# Patient Record
Sex: MALE | Race: WHITE | NOT HISPANIC OR LATINO
[De-identification: names, ages, dates, MRNs, and addresses within clinical notes are randomized per-mention and may not be internally consistent; named-entity substitution may affect disease eponyms.]

---

## 2017-07-17 ENCOUNTER — APPOINTMENT (OUTPATIENT)
Dept: RADIOLOGY | Facility: CLINIC | Age: 82
End: 2017-07-17

## 2017-07-17 ENCOUNTER — OUTPATIENT (OUTPATIENT)
Dept: OUTPATIENT SERVICES | Facility: HOSPITAL | Age: 82
LOS: 1 days | End: 2017-07-17
Payer: MEDICARE

## 2017-07-17 ENCOUNTER — APPOINTMENT (OUTPATIENT)
Dept: VASCULAR SURGERY | Facility: CLINIC | Age: 82
End: 2017-07-17

## 2017-07-17 VITALS
HEART RATE: 78 BPM | SYSTOLIC BLOOD PRESSURE: 183 MMHG | BODY MASS INDEX: 26.33 KG/M2 | OXYGEN SATURATION: 98 % | HEIGHT: 65 IN | TEMPERATURE: 97.8 F | DIASTOLIC BLOOD PRESSURE: 65 MMHG | WEIGHT: 158 LBS | RESPIRATION RATE: 15 BRPM

## 2017-07-17 DIAGNOSIS — H26.9 UNSPECIFIED CATARACT: Chronic | ICD-10-CM

## 2017-07-17 DIAGNOSIS — M79.89 OTHER SPECIFIED SOFT TISSUE DISORDERS: ICD-10-CM

## 2017-07-17 DIAGNOSIS — Z98.89 OTHER SPECIFIED POSTPROCEDURAL STATES: Chronic | ICD-10-CM

## 2017-07-17 DIAGNOSIS — Z87.891 PERSONAL HISTORY OF NICOTINE DEPENDENCE: ICD-10-CM

## 2017-07-17 DIAGNOSIS — Z86.39 PERSONAL HISTORY OF OTHER ENDOCRINE, NUTRITIONAL AND METABOLIC DISEASE: ICD-10-CM

## 2017-07-17 DIAGNOSIS — Z78.9 OTHER SPECIFIED HEALTH STATUS: ICD-10-CM

## 2017-07-17 PROCEDURE — 73090 X-RAY EXAM OF FOREARM: CPT

## 2017-07-17 PROCEDURE — 73090 X-RAY EXAM OF FOREARM: CPT | Mod: 26,RT

## 2017-07-17 PROCEDURE — 73110 X-RAY EXAM OF WRIST: CPT

## 2017-07-17 PROCEDURE — 73110 X-RAY EXAM OF WRIST: CPT | Mod: 26,RT

## 2017-07-18 ENCOUNTER — APPOINTMENT (OUTPATIENT)
Dept: ULTRASOUND IMAGING | Facility: CLINIC | Age: 82
End: 2017-07-18

## 2017-07-18 ENCOUNTER — OUTPATIENT (OUTPATIENT)
Dept: OUTPATIENT SERVICES | Facility: HOSPITAL | Age: 82
LOS: 1 days | End: 2017-07-18
Payer: MEDICARE

## 2017-07-18 DIAGNOSIS — H26.9 UNSPECIFIED CATARACT: Chronic | ICD-10-CM

## 2017-07-18 DIAGNOSIS — Z98.89 OTHER SPECIFIED POSTPROCEDURAL STATES: Chronic | ICD-10-CM

## 2017-07-18 DIAGNOSIS — M79.89 OTHER SPECIFIED SOFT TISSUE DISORDERS: ICD-10-CM

## 2017-07-18 PROCEDURE — 93971 EXTREMITY STUDY: CPT

## 2017-07-24 ENCOUNTER — APPOINTMENT (OUTPATIENT)
Dept: VASCULAR SURGERY | Facility: CLINIC | Age: 82
End: 2017-07-24

## 2017-07-24 VITALS
HEIGHT: 65 IN | WEIGHT: 158 LBS | SYSTOLIC BLOOD PRESSURE: 173 MMHG | TEMPERATURE: 97 F | HEART RATE: 59 BPM | RESPIRATION RATE: 15 BRPM | DIASTOLIC BLOOD PRESSURE: 68 MMHG | BODY MASS INDEX: 26.33 KG/M2 | OXYGEN SATURATION: 97 %

## 2017-07-24 DIAGNOSIS — M79.89 OTHER SPECIFIED SOFT TISSUE DISORDERS: ICD-10-CM

## 2017-07-31 ENCOUNTER — APPOINTMENT (OUTPATIENT)
Dept: VASCULAR SURGERY | Facility: CLINIC | Age: 82
End: 2017-07-31
Payer: MEDICARE

## 2017-07-31 VITALS
DIASTOLIC BLOOD PRESSURE: 68 MMHG | HEIGHT: 65 IN | TEMPERATURE: 98 F | BODY MASS INDEX: 26.33 KG/M2 | SYSTOLIC BLOOD PRESSURE: 162 MMHG | RESPIRATION RATE: 15 BRPM | WEIGHT: 158 LBS | OXYGEN SATURATION: 98 % | HEART RATE: 73 BPM

## 2017-07-31 PROCEDURE — 29580 STRAPPING UNNA BOOT: CPT | Mod: LT

## 2017-07-31 PROCEDURE — 99213 OFFICE O/P EST LOW 20 MIN: CPT | Mod: 25

## 2017-08-07 ENCOUNTER — APPOINTMENT (OUTPATIENT)
Dept: VASCULAR SURGERY | Facility: CLINIC | Age: 82
End: 2017-08-07
Payer: MEDICARE

## 2017-08-07 VITALS
OXYGEN SATURATION: 99 % | WEIGHT: 156.03 LBS | HEIGHT: 66 IN | BODY MASS INDEX: 25.07 KG/M2 | RESPIRATION RATE: 16 BRPM | DIASTOLIC BLOOD PRESSURE: 50 MMHG | HEART RATE: 56 BPM | SYSTOLIC BLOOD PRESSURE: 143 MMHG | TEMPERATURE: 97.5 F

## 2017-08-07 PROCEDURE — 29580 STRAPPING UNNA BOOT: CPT | Mod: LT

## 2017-08-07 PROCEDURE — 99213 OFFICE O/P EST LOW 20 MIN: CPT | Mod: 25

## 2017-08-14 ENCOUNTER — APPOINTMENT (OUTPATIENT)
Dept: VASCULAR SURGERY | Facility: CLINIC | Age: 82
End: 2017-08-14
Payer: MEDICARE

## 2017-08-14 VITALS
BODY MASS INDEX: 25.07 KG/M2 | RESPIRATION RATE: 16 BRPM | HEART RATE: 64 BPM | WEIGHT: 156 LBS | TEMPERATURE: 98.1 F | OXYGEN SATURATION: 99 % | HEIGHT: 66 IN | SYSTOLIC BLOOD PRESSURE: 132 MMHG | DIASTOLIC BLOOD PRESSURE: 46 MMHG

## 2017-08-14 PROCEDURE — 29580 STRAPPING UNNA BOOT: CPT | Mod: LT

## 2017-08-14 PROCEDURE — 99213 OFFICE O/P EST LOW 20 MIN: CPT | Mod: 25

## 2017-08-21 ENCOUNTER — APPOINTMENT (OUTPATIENT)
Dept: VASCULAR SURGERY | Facility: CLINIC | Age: 82
End: 2017-08-21
Payer: MEDICARE

## 2017-08-21 ENCOUNTER — APPOINTMENT (OUTPATIENT)
Dept: VASCULAR SURGERY | Facility: CLINIC | Age: 82
End: 2017-08-21

## 2017-08-21 VITALS
BODY MASS INDEX: 24.7 KG/M2 | TEMPERATURE: 98 F | RESPIRATION RATE: 16 BRPM | OXYGEN SATURATION: 98 % | WEIGHT: 153.03 LBS | HEART RATE: 65 BPM | SYSTOLIC BLOOD PRESSURE: 155 MMHG | DIASTOLIC BLOOD PRESSURE: 55 MMHG

## 2017-08-21 PROCEDURE — 36471 NJX SCLRSNT MLT INCMPTNT VN: CPT | Mod: LT

## 2017-09-05 ENCOUNTER — APPOINTMENT (OUTPATIENT)
Dept: VASCULAR SURGERY | Facility: CLINIC | Age: 82
End: 2017-09-05
Payer: MEDICARE

## 2017-09-05 PROCEDURE — 36471 NJX SCLRSNT MLT INCMPTNT VN: CPT | Mod: LT

## 2017-09-18 ENCOUNTER — APPOINTMENT (OUTPATIENT)
Dept: VASCULAR SURGERY | Facility: CLINIC | Age: 82
End: 2017-09-18

## 2017-10-02 ENCOUNTER — APPOINTMENT (OUTPATIENT)
Dept: VASCULAR SURGERY | Facility: CLINIC | Age: 82
End: 2017-10-02
Payer: MEDICARE

## 2017-10-02 VITALS
OXYGEN SATURATION: 98 % | RESPIRATION RATE: 16 BRPM | SYSTOLIC BLOOD PRESSURE: 161 MMHG | WEIGHT: 155 LBS | HEIGHT: 66 IN | HEART RATE: 57 BPM | TEMPERATURE: 97.8 F | DIASTOLIC BLOOD PRESSURE: 57 MMHG | BODY MASS INDEX: 24.91 KG/M2

## 2017-10-02 DIAGNOSIS — I83.892 VARICOSE VEINS OF LEFT LOWER EXTREMITY WITH OTHER COMPLICATIONS: ICD-10-CM

## 2017-10-02 PROCEDURE — 36471 NJX SCLRSNT MLT INCMPTNT VN: CPT | Mod: LT

## 2017-10-05 ENCOUNTER — APPOINTMENT (OUTPATIENT)
Dept: VASCULAR SURGERY | Facility: CLINIC | Age: 82
End: 2017-10-05

## 2017-10-05 VITALS — SYSTOLIC BLOOD PRESSURE: 160 MMHG | DIASTOLIC BLOOD PRESSURE: 54 MMHG

## 2017-10-05 VITALS
OXYGEN SATURATION: 98 % | HEIGHT: 66 IN | BODY MASS INDEX: 25.07 KG/M2 | DIASTOLIC BLOOD PRESSURE: 74 MMHG | SYSTOLIC BLOOD PRESSURE: 174 MMHG | WEIGHT: 156 LBS | TEMPERATURE: 97.8 F | HEART RATE: 77 BPM | RESPIRATION RATE: 16 BRPM

## 2017-10-08 PROBLEM — I83.892 VARICOSE VEINS OF LEFT LOWER EXTREMITY WITH COMPLICATIONS: Status: ACTIVE | Noted: 2017-09-06

## 2017-12-21 ENCOUNTER — APPOINTMENT (OUTPATIENT)
Dept: CARDIOLOGY | Facility: CLINIC | Age: 82
End: 2017-12-21
Payer: MEDICARE

## 2017-12-21 PROCEDURE — 93000 ELECTROCARDIOGRAM COMPLETE: CPT

## 2017-12-21 PROCEDURE — 99214 OFFICE O/P EST MOD 30 MIN: CPT

## 2018-04-04 ENCOUNTER — RECORD ABSTRACTING (OUTPATIENT)
Age: 83
End: 2018-04-04

## 2018-04-04 DIAGNOSIS — Z95.828 PRESENCE OF OTHER VASCULAR IMPLANTS AND GRAFTS: ICD-10-CM

## 2018-04-04 DIAGNOSIS — Z98.890 OTHER SPECIFIED POSTPROCEDURAL STATES: ICD-10-CM

## 2018-04-04 DIAGNOSIS — Z86.79 OTHER SPECIFIED POSTPROCEDURAL STATES: ICD-10-CM

## 2018-04-04 RX ORDER — SIMVASTATIN 20 MG/1
20 TABLET, FILM COATED ORAL
Qty: 90 | Refills: 3 | Status: ACTIVE | COMMUNITY

## 2018-04-05 PROBLEM — N20.0 RECURRENT NEPHROLITHIASIS: Status: RESOLVED | Noted: 2018-04-05 | Resolved: 2018-04-05

## 2018-04-10 ENCOUNTER — APPOINTMENT (OUTPATIENT)
Dept: CARDIOLOGY | Facility: CLINIC | Age: 83
End: 2018-04-10
Payer: MEDICARE

## 2018-04-10 VITALS
HEART RATE: 62 BPM | DIASTOLIC BLOOD PRESSURE: 60 MMHG | BODY MASS INDEX: 27.31 KG/M2 | RESPIRATION RATE: 16 BRPM | HEIGHT: 64 IN | WEIGHT: 160 LBS | SYSTOLIC BLOOD PRESSURE: 125 MMHG

## 2018-04-10 DIAGNOSIS — N20.0 CALCULUS OF KIDNEY: ICD-10-CM

## 2018-04-10 PROCEDURE — 93000 ELECTROCARDIOGRAM COMPLETE: CPT

## 2018-04-10 PROCEDURE — 99214 OFFICE O/P EST MOD 30 MIN: CPT

## 2018-04-10 PROCEDURE — 93306 TTE W/DOPPLER COMPLETE: CPT

## 2018-04-10 RX ORDER — CIPROFLOXACIN HYDROCHLORIDE 500 MG/1
500 TABLET, FILM COATED ORAL
Qty: 14 | Refills: 0 | Status: DISCONTINUED | COMMUNITY
Start: 2017-03-20 | End: 2018-04-10

## 2018-04-10 RX ORDER — PREDNISONE 10 MG/1
10 TABLET ORAL
Refills: 0 | Status: DISCONTINUED | COMMUNITY
End: 2018-04-10

## 2018-04-10 RX ORDER — RANITIDINE 150 MG/1
150 TABLET ORAL DAILY
Refills: 0 | Status: DISCONTINUED | COMMUNITY
End: 2018-04-10

## 2018-04-10 RX ORDER — SULFAMETHOXAZOLE AND TRIMETHOPRIM 800; 160 MG/1; MG/1
800-160 TABLET ORAL
Qty: 14 | Refills: 0 | Status: DISCONTINUED | COMMUNITY
Start: 2017-05-21 | End: 2018-04-10

## 2018-04-10 RX ORDER — CEFADROXIL 500 MG/1
500 CAPSULE ORAL
Qty: 14 | Refills: 0 | Status: DISCONTINUED | COMMUNITY
Start: 2017-07-14 | End: 2018-04-10

## 2018-04-10 RX ORDER — CEPHALEXIN 500 MG/1
500 CAPSULE ORAL
Qty: 28 | Refills: 0 | Status: DISCONTINUED | COMMUNITY
Start: 2017-04-12 | End: 2018-04-10

## 2018-04-10 RX ORDER — METHYLPREDNISOLONE 4 MG/1
4 TABLET ORAL
Qty: 21 | Refills: 0 | Status: DISCONTINUED | COMMUNITY
Start: 2017-07-14 | End: 2018-04-10

## 2018-04-10 RX ORDER — AMPICILLIN 250 MG/1
250 CAPSULE ORAL
Qty: 28 | Refills: 0 | Status: DISCONTINUED | COMMUNITY
Start: 2017-04-30 | End: 2018-04-10

## 2018-04-10 RX ORDER — PREDNISONE 5 MG/1
5 TABLET ORAL
Qty: 30 | Refills: 0 | Status: DISCONTINUED | COMMUNITY
Start: 2017-07-31 | End: 2018-04-10

## 2018-05-04 ENCOUNTER — MEDICATION RENEWAL (OUTPATIENT)
Age: 83
End: 2018-05-04

## 2018-05-08 ENCOUNTER — RX RENEWAL (OUTPATIENT)
Age: 83
End: 2018-05-08

## 2018-07-13 ENCOUNTER — MEDICATION RENEWAL (OUTPATIENT)
Age: 83
End: 2018-07-13

## 2018-08-14 ENCOUNTER — APPOINTMENT (OUTPATIENT)
Dept: CARDIOLOGY | Facility: CLINIC | Age: 83
End: 2018-08-14
Payer: MEDICARE

## 2018-08-14 VITALS
HEART RATE: 55 BPM | SYSTOLIC BLOOD PRESSURE: 150 MMHG | DIASTOLIC BLOOD PRESSURE: 58 MMHG | HEIGHT: 64 IN | RESPIRATION RATE: 16 BRPM | WEIGHT: 159 LBS | BODY MASS INDEX: 27.14 KG/M2

## 2018-08-14 PROCEDURE — 93000 ELECTROCARDIOGRAM COMPLETE: CPT

## 2018-08-14 PROCEDURE — 93880 EXTRACRANIAL BILAT STUDY: CPT

## 2018-08-14 PROCEDURE — 99214 OFFICE O/P EST MOD 30 MIN: CPT

## 2018-12-11 ENCOUNTER — APPOINTMENT (OUTPATIENT)
Dept: CARDIOLOGY | Facility: CLINIC | Age: 83
End: 2018-12-11
Payer: MEDICARE

## 2018-12-11 VITALS
WEIGHT: 154 LBS | RESPIRATION RATE: 16 BRPM | BODY MASS INDEX: 26.29 KG/M2 | HEART RATE: 53 BPM | DIASTOLIC BLOOD PRESSURE: 68 MMHG | SYSTOLIC BLOOD PRESSURE: 140 MMHG | HEIGHT: 64 IN

## 2018-12-11 DIAGNOSIS — M06.9 RHEUMATOID ARTHRITIS, UNSPECIFIED: ICD-10-CM

## 2018-12-11 DIAGNOSIS — H91.90 UNSPECIFIED HEARING LOSS, UNSPECIFIED EAR: ICD-10-CM

## 2018-12-11 PROCEDURE — 93000 ELECTROCARDIOGRAM COMPLETE: CPT

## 2018-12-11 PROCEDURE — 99214 OFFICE O/P EST MOD 30 MIN: CPT

## 2018-12-11 RX ORDER — CYANOCOBALAMIN 1000 UG/ML
1000 INJECTION INTRAMUSCULAR; SUBCUTANEOUS
Refills: 0 | Status: DISCONTINUED | COMMUNITY
End: 2018-12-11

## 2018-12-11 RX ORDER — ADALIMUMAB 40MG/0.8ML
40 KIT SUBCUTANEOUS
Refills: 0 | Status: ACTIVE | COMMUNITY

## 2018-12-11 RX ORDER — AMITRIPTYLINE HYDROCHLORIDE 25 MG/1
25 TABLET, FILM COATED ORAL
Refills: 0 | Status: DISCONTINUED | COMMUNITY
Start: 2017-07-31 | End: 2018-12-11

## 2018-12-11 NOTE — REASON FOR VISIT
[FreeTextEntry1] : Patient returns here for followup with regard to management of problems which include:\par \par 1. Aortic stenosis - severe\par \par 2. Coronary artery disease\par \par 3. Carotid stenosis - moderate\par \par 4. Pulmonary hypertension - mild\par

## 2018-12-11 NOTE — DISCUSSION/SUMMARY
[FreeTextEntry1] : ECG: Normal sinus rhythm at 53 beats per minute. LVH. Early repolarization. Left atrial enlargement.\par \par Carotid study 8/14/18\par bilateral tortuosity with moderate plaquing and ulcerated disease. Relative;y unchanged from prior.\par \par Laboratory data 6/19/2018\par Creatinine 1.96 (Prior 1.74 > 1.5 7/17)\par \par Hemoglobin 13.3\par A1c 6\par No lipids\par \par 4/10/18: Echocardiogram\par  hyperdynamic left ventricular systolic function. LVEF 70%. Left atrial dilatation.\par Severe aortic stenosis.\par Moderate tricuspid insufficiency\par Mild pulmonary hypertension.\par \par Impression: \par Patient has no new symptoms referable to his aortic stenosis. \par The echocardiographic findings have not substantially change.\par \par Mixed hyperlipidemia remains a bit elevated on current medical management which is simvastatin 20 mg a day. repeat labs pending.\par \par Carotid disease with moderate bilateral stenoses unchanged from prior\par Increasing creatinine from baseline with a current diuretic regimen.\par \par Plan:\par Followup laboratory data through the office of Dr. Wenceslao Sweeney\par \par Patient to contact me with any increasing shortness of breath, chest pain or dizziness.\par \par Echo in April with F/U\par No other changes in management at this time.

## 2018-12-11 NOTE — HISTORY OF PRESENT ILLNESS
[FreeTextEntry1] : Remains physically active, less during the winter months and looking into beginning the "Silver Sneakers" program. Denies any new chest pain, palpitations, PND, orthopnea. Does get a little bit of edema and LACEY,  unchanged from baseline.\par \par \par Other medical problems include:\par Diabetes\par Hiatal hernia\par Hearing impairment\par Chronic kidney disease

## 2018-12-11 NOTE — PHYSICAL EXAM
[Normal Conjunctiva] : the conjunctiva exhibited no abnormalities [Eyelids - No Xanthelasma] : the eyelids demonstrated no xanthelasmas [Normal Oral Mucosa] : normal oral mucosa [No Oral Pallor] : no oral pallor [No Oral Cyanosis] : no oral cyanosis [Normal Jugular Venous A Waves Present] : normal jugular venous A waves present [Normal Jugular Venous V Waves Present] : normal jugular venous V waves present [No Jugular Venous Lan A Waves] : no jugular venous lan A waves [Respiration, Rhythm And Depth] : normal respiratory rhythm and effort [Exaggerated Use Of Accessory Muscles For Inspiration] : no accessory muscle use [Auscultation Breath Sounds / Voice Sounds] : lungs were clear to auscultation bilaterally [Systolic grade ___/6] : A grade [unfilled]/6 systolic murmur was heard. [Abdomen Soft] : soft [Abdomen Tenderness] : non-tender [Abdomen Mass (___ Cm)] : no abdominal mass palpated [Abnormal Walk] : normal gait [Gait - Sufficient For Exercise Testing] : the gait was sufficient for exercise testing [Nail Clubbing] : no clubbing of the fingernails [Cyanosis, Localized] : no localized cyanosis [Petechial Hemorrhages (___cm)] : no petechial hemorrhages [Skin Color & Pigmentation] : normal skin color and pigmentation [] : no rash [No Venous Stasis] : no venous stasis [Skin Lesions] : no skin lesions [No Skin Ulcers] : no skin ulcer [No Xanthoma] : no  xanthoma was observed [FreeTextEntry1] : trace pretibial edema

## 2019-04-09 ENCOUNTER — NON-APPOINTMENT (OUTPATIENT)
Age: 84
End: 2019-04-09

## 2019-04-09 ENCOUNTER — APPOINTMENT (OUTPATIENT)
Dept: CARDIOLOGY | Facility: CLINIC | Age: 84
End: 2019-04-09
Payer: MEDICARE

## 2019-04-09 VITALS
HEIGHT: 64 IN | DIASTOLIC BLOOD PRESSURE: 60 MMHG | HEART RATE: 58 BPM | RESPIRATION RATE: 14 BRPM | BODY MASS INDEX: 26.63 KG/M2 | SYSTOLIC BLOOD PRESSURE: 145 MMHG | WEIGHT: 156 LBS

## 2019-04-09 DIAGNOSIS — M81.0 AGE-RELATED OSTEOPOROSIS W/OUT CURRENT PATHOLOGICAL FRACTURE: ICD-10-CM

## 2019-04-09 PROCEDURE — 93306 TTE W/DOPPLER COMPLETE: CPT

## 2019-04-09 PROCEDURE — 93000 ELECTROCARDIOGRAM COMPLETE: CPT

## 2019-04-09 PROCEDURE — 99214 OFFICE O/P EST MOD 30 MIN: CPT

## 2019-04-09 RX ORDER — DENOSUMAB 60 MG/ML
60 INJECTION SUBCUTANEOUS
Refills: 0 | Status: ACTIVE | COMMUNITY

## 2019-04-09 NOTE — PHYSICAL EXAM
[Normal Conjunctiva] : the conjunctiva exhibited no abnormalities [Eyelids - No Xanthelasma] : the eyelids demonstrated no xanthelasmas [Normal Oral Mucosa] : normal oral mucosa [No Oral Pallor] : no oral pallor [No Oral Cyanosis] : no oral cyanosis [Normal Jugular Venous A Waves Present] : normal jugular venous A waves present [Normal Jugular Venous V Waves Present] : normal jugular venous V waves present [Respiration, Rhythm And Depth] : normal respiratory rhythm and effort [No Jugular Venous Lan A Waves] : no jugular venous lan A waves [Exaggerated Use Of Accessory Muscles For Inspiration] : no accessory muscle use [Auscultation Breath Sounds / Voice Sounds] : lungs were clear to auscultation bilaterally [Systolic grade ___/6] : A grade [unfilled]/6 systolic murmur was heard. [Abdomen Soft] : soft [Abdomen Tenderness] : non-tender [Gait - Sufficient For Exercise Testing] : the gait was sufficient for exercise testing [Abnormal Walk] : normal gait [Abdomen Mass (___ Cm)] : no abdominal mass palpated [Cyanosis, Localized] : no localized cyanosis [Nail Clubbing] : no clubbing of the fingernails [Petechial Hemorrhages (___cm)] : no petechial hemorrhages [FreeTextEntry1] : trace pretibial edema [Skin Color & Pigmentation] : normal skin color and pigmentation [] : no rash [No Venous Stasis] : no venous stasis [Skin Lesions] : no skin lesions [No Xanthoma] : no  xanthoma was observed [No Skin Ulcers] : no skin ulcer

## 2019-04-09 NOTE — DISCUSSION/SUMMARY
[FreeTextEntry1] : ECG: Normal sinus rhythm at 58 beats per minute. LVH. Early repolarization Peaked t waves . Left atrial enlargement.\par \par Lab data 3/26/19:\par A1c 5.8\par Creatinine 1.6\par Hemoglobin 13.7\par \par Echocardiogram today:\par Severe aortic valve stenosis with some degree of progression of the transvalvular gradient.\par Normal LV function\par Calcific mitral valve disease with mild insufficiency\par \par \par \par Carotid study 8/14/18\par bilateral tortuosity with moderate plaquing and ulcerated disease. Relative;y unchanged from prior.\par \par Laboratory data 6/19/2018\par Creatinine 1.96 (Prior 1.74 > 1.5 7/17)\par \par Hemoglobin 13.3\par A1c 6\par No lipids\par \par Impression: \par Patient has no new symptoms referable to his aortic stenosis. \par The echocardiographic findings indicate some progression of the AS of which he remains asymptomatic\par \par Mixed hyperlipidemia remains a bit elevated on current medical management which is simvastatin 20 mg a day. repeat labs pending.\par \par Carotid disease with moderate bilateral stenoses unchanged from prior\par Increasing creatinine unchanged from baseline \par \par Plan:\par Followup laboratory data through the office of Dr. Wenceslao Sweeney\par \par Patient to contact me with any increasing shortness of breath, chest pain or dizziness.\par \par No other changes in management at this time.

## 2019-04-09 NOTE — HISTORY OF PRESENT ILLNESS
[FreeTextEntry1] : Remains physically active, less during the winter months and he has started the "Silver Sneakers" program. Denies any new chest pain, palpitations, PND, orthopnea. Does get a little bit of edema and LACEY,  unchanged from baseline.\par \par \par Other medical problems include:\par Diabetes\par Hiatal hernia\par Hearing impairment\par Chronic kidney disease

## 2019-04-25 ENCOUNTER — MEDICATION RENEWAL (OUTPATIENT)
Age: 84
End: 2019-04-25

## 2019-06-25 ENCOUNTER — NON-APPOINTMENT (OUTPATIENT)
Age: 84
End: 2019-06-25

## 2019-06-25 ENCOUNTER — RECORD ABSTRACTING (OUTPATIENT)
Age: 84
End: 2019-06-25

## 2019-06-25 ENCOUNTER — APPOINTMENT (OUTPATIENT)
Dept: CARDIOLOGY | Facility: CLINIC | Age: 84
End: 2019-06-25
Payer: MEDICARE

## 2019-06-25 VITALS
SYSTOLIC BLOOD PRESSURE: 150 MMHG | HEIGHT: 64 IN | RESPIRATION RATE: 14 BRPM | OXYGEN SATURATION: 98 % | WEIGHT: 151 LBS | HEART RATE: 55 BPM | BODY MASS INDEX: 25.78 KG/M2 | DIASTOLIC BLOOD PRESSURE: 65 MMHG

## 2019-06-25 PROCEDURE — 99214 OFFICE O/P EST MOD 30 MIN: CPT

## 2019-06-25 PROCEDURE — 93000 ELECTROCARDIOGRAM COMPLETE: CPT

## 2019-06-25 NOTE — HISTORY OF PRESENT ILLNESS
[FreeTextEntry1] : Remains physically active,  and he has started the "Silver Sneakers" program. Denies any new chest pain, palpitations, PND, orthopnea. Does get a little bit of edema and LACEY,  unchanged from baseline.\par \par \par Other medical problems include:\par - Diabetes\par - Hiatal hernia\par - Hearing impairment\par - Chronic kidney disease

## 2019-06-25 NOTE — REASON FOR VISIT
[FreeTextEntry1] : Patient returns here for medical clearance for pending cysto and bladder biopsy.  His medical history includes:\par \par 1. Aortic stenosis - severe\par \par 2. Coronary artery disease\par \par 3. Carotid stenosis - moderate\par \par 4. Pulmonary hypertension - mild\par

## 2019-06-25 NOTE — DISCUSSION/SUMMARY
[FreeTextEntry1] : ECG: Normal sinus rhythm at 58 beats per minute. LVH. Early repolarization Peaked t waves . Left atrial enlargement.\par \par Lab data 3/26/19:\par A1c 5.8\par Creatinine 1.6\par Hemoglobin 13.7\par \par Echocardiogram  4/9/19\par Severe aortic valve stenosis with some increase of the transvalvular gradient.\par Normal LV function\par Calcific mitral valve disease with mild insufficiency\par \par \par Carotid study 8/14/18\par bilateral tortuosity with moderate plaquing and ulcerated disease. Relative;y unchanged from prior.\par \par Laboratory data 6/19/2018\par Creatinine 1.96 (Prior 1.74 > 1.5 7/17)\par \par Hemoglobin 13.3\par A1c 6\par No lipids\par \par Impression: \par 1. Patient has no new symptoms referable to his aortic stenosis. \par    The echocardiographic findings indicate some progression of the AS of which he remains asymptomatic\par \par 2. Mixed hyperlipidemia remains a bit elevated on current medical management which is simvastatin 20 mg a day. \par \par 3. Carotid disease with moderate bilateral stenoses unchanged from prior\par \par 4. Increasing creatinine unchanged from baseline \par \par Plan:\par Patient is at its least a moderate cardiac risk but he is in as good condition as is possible. Currently there is no cardiac contraindication to undergoing proposed cystoscopy and bladder biopsy with the use of IV conscious sedation such as propofol.\par Care must be taken to watch his blood pressure very carefully during the case and avoid precipitating any significant drops.\par \par Followup laboratory data through the office of Dr. Wenceslao Sweeney\par \par Patient to contact me with any increasing shortness of breath, chest pain or dizziness.\par \par No other changes in management at this time.

## 2019-06-25 NOTE — PHYSICAL EXAM
[Normal Conjunctiva] : the conjunctiva exhibited no abnormalities [Normal Oral Mucosa] : normal oral mucosa [No Oral Pallor] : no oral pallor [Eyelids - No Xanthelasma] : the eyelids demonstrated no xanthelasmas [No Oral Cyanosis] : no oral cyanosis [Normal Jugular Venous A Waves Present] : normal jugular venous A waves present [Normal Jugular Venous V Waves Present] : normal jugular venous V waves present [No Jugular Venous Lan A Waves] : no jugular venous lan A waves [Respiration, Rhythm And Depth] : normal respiratory rhythm and effort [Auscultation Breath Sounds / Voice Sounds] : lungs were clear to auscultation bilaterally [Exaggerated Use Of Accessory Muscles For Inspiration] : no accessory muscle use [Systolic grade ___/6] : A grade [unfilled]/6 systolic murmur was heard. [Abdomen Soft] : soft [Abdomen Tenderness] : non-tender [Abdomen Mass (___ Cm)] : no abdominal mass palpated [Abnormal Walk] : normal gait [Gait - Sufficient For Exercise Testing] : the gait was sufficient for exercise testing [Nail Clubbing] : no clubbing of the fingernails [Petechial Hemorrhages (___cm)] : no petechial hemorrhages [Cyanosis, Localized] : no localized cyanosis [No Venous Stasis] : no venous stasis [] : no rash [Skin Color & Pigmentation] : normal skin color and pigmentation [Skin Lesions] : no skin lesions [No Skin Ulcers] : no skin ulcer [No Xanthoma] : no  xanthoma was observed [FreeTextEntry1] : trace pretibial edema

## 2019-06-26 ENCOUNTER — RX RENEWAL (OUTPATIENT)
Age: 84
End: 2019-06-26

## 2020-01-02 ENCOUNTER — NON-APPOINTMENT (OUTPATIENT)
Age: 85
End: 2020-01-02

## 2020-01-02 ENCOUNTER — APPOINTMENT (OUTPATIENT)
Dept: CARDIOLOGY | Facility: CLINIC | Age: 85
End: 2020-01-02
Payer: MEDICARE

## 2020-01-02 VITALS
DIASTOLIC BLOOD PRESSURE: 68 MMHG | SYSTOLIC BLOOD PRESSURE: 142 MMHG | BODY MASS INDEX: 26.12 KG/M2 | WEIGHT: 153 LBS | OXYGEN SATURATION: 98 % | HEART RATE: 60 BPM | HEIGHT: 64 IN | RESPIRATION RATE: 16 BRPM

## 2020-01-02 PROCEDURE — 93000 ELECTROCARDIOGRAM COMPLETE: CPT

## 2020-01-02 PROCEDURE — 99214 OFFICE O/P EST MOD 30 MIN: CPT

## 2020-01-02 NOTE — REVIEW OF SYSTEMS
[see HPI] : see HPI [Joint Pain] : joint pain [Loss Of Hearing] : hearing loss [Joint Stiffness] : joint stiffness [Joint Swelling] : joint swelling [Dizziness] : dizziness [Negative] : Integumentary

## 2020-01-02 NOTE — PHYSICAL EXAM
[Normal Conjunctiva] : the conjunctiva exhibited no abnormalities [Normal Oral Mucosa] : normal oral mucosa [Eyelids - No Xanthelasma] : the eyelids demonstrated no xanthelasmas [No Oral Pallor] : no oral pallor [No Oral Cyanosis] : no oral cyanosis [Normal Jugular Venous V Waves Present] : normal jugular venous V waves present [Normal Jugular Venous A Waves Present] : normal jugular venous A waves present [No Jugular Venous Lan A Waves] : no jugular venous lan A waves [Exaggerated Use Of Accessory Muscles For Inspiration] : no accessory muscle use [Respiration, Rhythm And Depth] : normal respiratory rhythm and effort [Auscultation Breath Sounds / Voice Sounds] : lungs were clear to auscultation bilaterally [Systolic grade ___/6] : A grade [unfilled]/6 systolic murmur was heard. [Abdomen Soft] : soft [Abdomen Tenderness] : non-tender [Abdomen Mass (___ Cm)] : no abdominal mass palpated [Nail Clubbing] : no clubbing of the fingernails [Cyanosis, Localized] : no localized cyanosis [Petechial Hemorrhages (___cm)] : no petechial hemorrhages [Skin Color & Pigmentation] : normal skin color and pigmentation [] : no rash [No Skin Ulcers] : no skin ulcer [Skin Lesions] : no skin lesions [No Xanthoma] : no  xanthoma was observed [FreeTextEntry1] : Mild venous stasis changes

## 2020-01-02 NOTE — REASON FOR VISIT
[FreeTextEntry1] : Patient returns here cardiac revaluation. His medical history includes:\par \par 1. Aortic stenosis - severe\par \par 2. Coronary artery disease\par \par 3. Carotid stenosis - moderate\par \par 4. Pulmonary hypertension - mild\par

## 2020-01-02 NOTE — HISTORY OF PRESENT ILLNESS
[FreeTextEntry1] : During his last visit he was cleared for  cystoscopy which he tolerated well. There is a known hx if frequent  UTIs. This is managed through Dr. Avalos. By his report all biopsies collected were negative for any malignancy.\par \par Remains physically active and uses an cane with ambulation. Denies any new chest pain, palpitations, PND, orthopnea. Does get a little bit of edema and LACEY both  unchanged from baseline. There is some mild dizziness when he bends over, but none with exertion\par \par Mentions that he stopped his Lasix temporarily due to dehydration but this has been restarted.\par \par \par Other medical problems include:\par - Diabetes\par - Hiatal hernia\par - Hearing impairment\par - Chronic kidney disease

## 2020-01-02 NOTE — ASSESSMENT
[FreeTextEntry1] : ECG: Normal sinus rhythm at 60 beats per minute, J point elevation, 1'AVB\par \par Lab data 12/6/19\par Creat. 1.77 ( 1.69 in March)\par \par Lab data 3/26/19\par A1C 5.8\par \par Lab data 3/19/2018\par Chol. 168\par HDL    41\par LDL    93\par Tri.    261\par \par Echocardiogram  4/9/19\par Severe aortic valve stenosis with some increase of the transvalvular gradient.\par Peak 78 mm Hg\par Mean 43 mm Hg\par Normal LV function\par Calcific mitral valve disease with mild insufficiency\par \par Carotid study 8/14/18\par bilateral tortuosity with moderate plaquing and ulcerated disease. Relative;y unchanged from prior.\par \par Impression: \par 1. Patient has no new symptoms referable to his aortic stenosis. \par    The echocardiographic findings indicate some progression of the AS of which he remains asymptomatic\par \par 2.last lipid collected in 2018 showed mixed hyperlipidemia which was a bit elevated on current medical     management which is simvastatin 20 mg a day. \par \par 3. Carotid disease with moderate bilateral stenoses unchanged from prior. Bruits noted on exam right >left.\par \par 4. December creat. 1.7 creatinine at his baseline, 1.69 in march.\par \par 5. Mild edema noted on exam today, denies worsening SOB. Did stop Lasix temporarily.\par \par Plan:\par \par Followup laboratory data through the office of Dr. Wenceslao Sweeney\par \par Patient to contact me with any increasing shortness of breath, chest pain or dizziness.\par  \par Schedule echocardiogram to reassess his aortic valve stenosis and pulmonary hypertension.\par \par Schedule carotid duplex to reassess his bilateral carotid artery stenosis.

## 2020-01-20 ENCOUNTER — RX RENEWAL (OUTPATIENT)
Age: 85
End: 2020-01-20

## 2020-06-29 ENCOUNTER — RX RENEWAL (OUTPATIENT)
Age: 85
End: 2020-06-29

## 2020-07-01 ENCOUNTER — APPOINTMENT (OUTPATIENT)
Dept: CARDIOLOGY | Facility: CLINIC | Age: 85
End: 2020-07-01
Payer: MEDICARE

## 2020-07-01 PROCEDURE — 93306 TTE W/DOPPLER COMPLETE: CPT

## 2020-07-02 NOTE — PHYSICAL EXAM
[Normal Conjunctiva] : the conjunctiva exhibited no abnormalities [Eyelids - No Xanthelasma] : the eyelids demonstrated no xanthelasmas [Normal Oral Mucosa] : normal oral mucosa [No Oral Pallor] : no oral pallor [No Oral Cyanosis] : no oral cyanosis [Normal Jugular Venous A Waves Present] : normal jugular venous A waves present [No Jugular Venous Lan A Waves] : no jugular venous lan A waves [Normal Jugular Venous V Waves Present] : normal jugular venous V waves present [Respiration, Rhythm And Depth] : normal respiratory rhythm and effort [Systolic grade ___/6] : A grade [unfilled]/6 systolic murmur was heard. [Auscultation Breath Sounds / Voice Sounds] : lungs were clear to auscultation bilaterally [Exaggerated Use Of Accessory Muscles For Inspiration] : no accessory muscle use [Abdomen Tenderness] : non-tender [Abdomen Soft] : soft [Abdomen Mass (___ Cm)] : no abdominal mass palpated [Nail Clubbing] : no clubbing of the fingernails [Cyanosis, Localized] : no localized cyanosis [Petechial Hemorrhages (___cm)] : no petechial hemorrhages [Skin Color & Pigmentation] : normal skin color and pigmentation [] : no rash [No Skin Ulcers] : no skin ulcer [Skin Lesions] : no skin lesions [No Xanthoma] : no  xanthoma was observed [FreeTextEntry1] : Mild venous stasis changes

## 2020-07-02 NOTE — REVIEW OF SYSTEMS
[see HPI] : see HPI [Joint Pain] : joint pain [Loss Of Hearing] : hearing loss [Joint Stiffness] : joint stiffness [Dizziness] : dizziness [Joint Swelling] : joint swelling [Negative] : Endocrine

## 2020-07-07 ENCOUNTER — APPOINTMENT (OUTPATIENT)
Dept: CARDIOLOGY | Facility: CLINIC | Age: 85
End: 2020-07-07
Payer: MEDICARE

## 2020-07-16 ENCOUNTER — APPOINTMENT (OUTPATIENT)
Dept: CARDIOLOGY | Facility: CLINIC | Age: 85
End: 2020-07-16
Payer: MEDICARE

## 2020-07-16 PROCEDURE — 93880 EXTRACRANIAL BILAT STUDY: CPT

## 2020-07-22 NOTE — REVIEW OF SYSTEMS
[Joint Pain] : joint pain [Joint Swelling] : joint swelling [see HPI] : see HPI [Loss Of Hearing] : hearing loss [Joint Stiffness] : joint stiffness [Dizziness] : dizziness [Negative] : Heme/Lymph

## 2020-07-23 ENCOUNTER — APPOINTMENT (OUTPATIENT)
Dept: CARDIOLOGY | Facility: CLINIC | Age: 85
End: 2020-07-23
Payer: MEDICARE

## 2020-07-23 ENCOUNTER — NON-APPOINTMENT (OUTPATIENT)
Age: 85
End: 2020-07-23

## 2020-07-23 VITALS
SYSTOLIC BLOOD PRESSURE: 120 MMHG | OXYGEN SATURATION: 97 % | TEMPERATURE: 98.1 F | RESPIRATION RATE: 16 BRPM | BODY MASS INDEX: 26.29 KG/M2 | DIASTOLIC BLOOD PRESSURE: 70 MMHG | WEIGHT: 154 LBS | HEIGHT: 64 IN | HEART RATE: 62 BPM

## 2020-07-23 PROCEDURE — 93000 ELECTROCARDIOGRAM COMPLETE: CPT

## 2020-07-23 PROCEDURE — 99214 OFFICE O/P EST MOD 30 MIN: CPT

## 2020-07-23 RX ORDER — GLIMEPIRIDE 1 MG/1
1 TABLET ORAL
Refills: 0 | Status: DISCONTINUED | COMMUNITY
End: 2020-07-23

## 2020-07-23 RX ORDER — GLIMEPIRIDE 1 MG/1
1 TABLET ORAL
Refills: 0 | Status: ACTIVE | COMMUNITY

## 2020-07-23 NOTE — HISTORY OF PRESENT ILLNESS
[FreeTextEntry1] : Remains physically active and uses an cane with ambulation. Denies any new chest pain, palpitations, PND, orthopnea. Does get a little bit of edema and LACEY both  unchanged from baseline. There is some mild dizziness when he bends over, but none with exertion\par \par Other medical problems include:\par - Diabetes\par - Hiatal hernia\par - Hearing impairment\par - Chronic kidney disease

## 2020-07-23 NOTE — ASSESSMENT
[FreeTextEntry1] : ECG: Normal sinus rhythm at 62  beats per minute, J point elevation, 1'AVB\par \par Lab Data 20\par Chol: 164\par HDL: 57\par LDL: 84\par Tr\par Creat: 1.65\par A1c: 5.8\par \par Lab data 19\par Creat. 1.77 ( 1.69 in March)\par \par Lab data 3/26/19\par A1C 5.8\par \par Lab data 3/19/2018\par Chol. 168\par HDL    41\par LDL    93\par Tri.    261\par \par Echocardiogram 20:\par LV of 65-70%\par Severe aortic stenosis with a peak velocity 4.7\par Peak gradient of 87 mmHg\par Mean gradient 40 mm mercury\par Pulmonary pressures of 33\par \par Echocardiogram  19\par Severe aortic valve stenosis with some increase of the transvalvular gradient.\par Peak 78 mm Hg\par Mean 43 mm Hg\par Normal LV function\par Calcific mitral valve disease with mild insufficiency\par \par Carotid study 20\par bilateral tortuosity with moderate plaquing and ulcerated disease. Relative;y unchanged from prior.\par \par Impression: \par 1. Patient has no new symptoms referable to his aortic stenosis. \par    The echocardiographic findings indicate some progression of the AS of which he remains asymptomatic\par \par 2.last lipid collected shows mixed hyperlipidemia which was a bit elevated on current medical management which is simvastatin 20 mg a day. \par \par 3. Carotid disease with moderate bilateral stenoses unchanged from prior. Bruits noted on exam right >left.\par \par 4. creatinine at his baseline, 1.64 \par \par 5. Mild edema noted on exam today, denies worsening SOB. Did stop Lasix temporarily.\par \par Plan:\par \par Followup laboratory data through the office of Dr. Wenceslao Sweeney\par \par Patient to contact me with any increasing shortness of breath, chest pain or dizziness.\par  \par

## 2020-07-23 NOTE — PHYSICAL EXAM
[Normal Conjunctiva] : the conjunctiva exhibited no abnormalities [Eyelids - No Xanthelasma] : the eyelids demonstrated no xanthelasmas [Normal Oral Mucosa] : normal oral mucosa [No Oral Pallor] : no oral pallor [Normal Jugular Venous A Waves Present] : normal jugular venous A waves present [Normal Jugular Venous V Waves Present] : normal jugular venous V waves present [No Oral Cyanosis] : no oral cyanosis [No Jugular Venous Lan A Waves] : no jugular venous lan A waves [Respiration, Rhythm And Depth] : normal respiratory rhythm and effort [Exaggerated Use Of Accessory Muscles For Inspiration] : no accessory muscle use [Auscultation Breath Sounds / Voice Sounds] : lungs were clear to auscultation bilaterally [Systolic grade ___/6] : A grade [unfilled]/6 systolic murmur was heard. [Abdomen Soft] : soft [Abdomen Tenderness] : non-tender [Abdomen Mass (___ Cm)] : no abdominal mass palpated [Nail Clubbing] : no clubbing of the fingernails [Petechial Hemorrhages (___cm)] : no petechial hemorrhages [Cyanosis, Localized] : no localized cyanosis [] : no rash [Skin Color & Pigmentation] : normal skin color and pigmentation [No Skin Ulcers] : no skin ulcer [Skin Lesions] : no skin lesions [No Xanthoma] : no  xanthoma was observed [FreeTextEntry1] : Mild venous stasis changes

## 2020-10-13 ENCOUNTER — INPATIENT (INPATIENT)
Facility: HOSPITAL | Age: 85
LOS: 3 days | Discharge: HOME | DRG: 242 | End: 2020-10-17
Attending: FAMILY MEDICINE | Admitting: HOSPITALIST
Payer: COMMERCIAL

## 2020-10-13 ENCOUNTER — APPOINTMENT (OUTPATIENT)
Dept: CARDIOLOGY | Facility: CLINIC | Age: 85
End: 2020-10-13
Payer: MEDICARE

## 2020-10-13 ENCOUNTER — NON-APPOINTMENT (OUTPATIENT)
Age: 85
End: 2020-10-13

## 2020-10-13 VITALS
WEIGHT: 158 LBS | DIASTOLIC BLOOD PRESSURE: 76 MMHG | BODY MASS INDEX: 26.98 KG/M2 | OXYGEN SATURATION: 93 % | HEIGHT: 64 IN | SYSTOLIC BLOOD PRESSURE: 152 MMHG | RESPIRATION RATE: 20 BRPM | HEART RATE: 77 BPM | TEMPERATURE: 97.6 F

## 2020-10-13 VITALS
OXYGEN SATURATION: 99 % | HEART RATE: 75 BPM | HEIGHT: 65 IN | TEMPERATURE: 99 F | DIASTOLIC BLOOD PRESSURE: 65 MMHG | RESPIRATION RATE: 24 BRPM | SYSTOLIC BLOOD PRESSURE: 143 MMHG

## 2020-10-13 DIAGNOSIS — H26.9 UNSPECIFIED CATARACT: Chronic | ICD-10-CM

## 2020-10-13 DIAGNOSIS — Z98.89 OTHER SPECIFIED POSTPROCEDURAL STATES: Chronic | ICD-10-CM

## 2020-10-13 DIAGNOSIS — I50.9 HEART FAILURE, UNSPECIFIED: ICD-10-CM

## 2020-10-13 LAB
ALBUMIN SERPL ELPH-MCNC: 4 G/DL — SIGNIFICANT CHANGE UP (ref 3.3–5.2)
ALP SERPL-CCNC: 51 U/L — SIGNIFICANT CHANGE UP (ref 40–120)
ALT FLD-CCNC: 31 U/L — SIGNIFICANT CHANGE UP
ANION GAP SERPL CALC-SCNC: 14 MMOL/L — SIGNIFICANT CHANGE UP (ref 5–17)
AST SERPL-CCNC: 22 U/L — SIGNIFICANT CHANGE UP
BASOPHILS # BLD AUTO: 0.06 K/UL — SIGNIFICANT CHANGE UP (ref 0–0.2)
BASOPHILS NFR BLD AUTO: 0.7 % — SIGNIFICANT CHANGE UP (ref 0–2)
BILIRUB SERPL-MCNC: 0.4 MG/DL — SIGNIFICANT CHANGE UP (ref 0.4–2)
BUN SERPL-MCNC: 32 MG/DL — HIGH (ref 8–20)
CALCIUM SERPL-MCNC: 9.3 MG/DL — SIGNIFICANT CHANGE UP (ref 8.6–10.2)
CHLORIDE SERPL-SCNC: 104 MMOL/L — SIGNIFICANT CHANGE UP (ref 98–107)
CO2 SERPL-SCNC: 20 MMOL/L — LOW (ref 22–29)
CREAT SERPL-MCNC: 1.52 MG/DL — HIGH (ref 0.5–1.3)
EOSINOPHIL # BLD AUTO: 0.17 K/UL — SIGNIFICANT CHANGE UP (ref 0–0.5)
EOSINOPHIL NFR BLD AUTO: 1.9 % — SIGNIFICANT CHANGE UP (ref 0–6)
GLUCOSE SERPL-MCNC: 106 MG/DL — HIGH (ref 70–99)
HCT VFR BLD CALC: 39.9 % — SIGNIFICANT CHANGE UP (ref 39–50)
HGB BLD-MCNC: 12.4 G/DL — LOW (ref 13–17)
IMM GRANULOCYTES NFR BLD AUTO: 0.3 % — SIGNIFICANT CHANGE UP (ref 0–1.5)
LYMPHOCYTES # BLD AUTO: 1.68 K/UL — SIGNIFICANT CHANGE UP (ref 1–3.3)
LYMPHOCYTES # BLD AUTO: 19.1 % — SIGNIFICANT CHANGE UP (ref 13–44)
MAGNESIUM SERPL-MCNC: 2.2 MG/DL — SIGNIFICANT CHANGE UP (ref 1.6–2.6)
MCHC RBC-ENTMCNC: 30.8 PG — SIGNIFICANT CHANGE UP (ref 27–34)
MCHC RBC-ENTMCNC: 31.1 GM/DL — LOW (ref 32–36)
MCV RBC AUTO: 99 FL — SIGNIFICANT CHANGE UP (ref 80–100)
MONOCYTES # BLD AUTO: 0.79 K/UL — SIGNIFICANT CHANGE UP (ref 0–0.9)
MONOCYTES NFR BLD AUTO: 9 % — SIGNIFICANT CHANGE UP (ref 2–14)
NEUTROPHILS # BLD AUTO: 6.06 K/UL — SIGNIFICANT CHANGE UP (ref 1.8–7.4)
NEUTROPHILS NFR BLD AUTO: 69 % — SIGNIFICANT CHANGE UP (ref 43–77)
NT-PROBNP SERPL-SCNC: HIGH PG/ML (ref 0–300)
PLATELET # BLD AUTO: 263 K/UL — SIGNIFICANT CHANGE UP (ref 150–400)
POTASSIUM SERPL-MCNC: 4.9 MMOL/L — SIGNIFICANT CHANGE UP (ref 3.5–5.3)
POTASSIUM SERPL-SCNC: 4.9 MMOL/L — SIGNIFICANT CHANGE UP (ref 3.5–5.3)
PROT SERPL-MCNC: 7.6 G/DL — SIGNIFICANT CHANGE UP (ref 6.6–8.7)
RBC # BLD: 4.03 M/UL — LOW (ref 4.2–5.8)
RBC # FLD: 13.9 % — SIGNIFICANT CHANGE UP (ref 10.3–14.5)
SODIUM SERPL-SCNC: 138 MMOL/L — SIGNIFICANT CHANGE UP (ref 135–145)
TROPONIN T SERPL-MCNC: 0.07 NG/ML — HIGH (ref 0–0.06)
WBC # BLD: 8.79 K/UL — SIGNIFICANT CHANGE UP (ref 3.8–10.5)
WBC # FLD AUTO: 8.79 K/UL — SIGNIFICANT CHANGE UP (ref 3.8–10.5)

## 2020-10-13 PROCEDURE — 93000 ELECTROCARDIOGRAM COMPLETE: CPT

## 2020-10-13 PROCEDURE — 93010 ELECTROCARDIOGRAM REPORT: CPT

## 2020-10-13 PROCEDURE — 99285 EMERGENCY DEPT VISIT HI MDM: CPT

## 2020-10-13 PROCEDURE — 99223 1ST HOSP IP/OBS HIGH 75: CPT

## 2020-10-13 PROCEDURE — 99214 OFFICE O/P EST MOD 30 MIN: CPT

## 2020-10-13 PROCEDURE — 71046 X-RAY EXAM CHEST 2 VIEWS: CPT | Mod: 26

## 2020-10-13 PROCEDURE — 93306 TTE W/DOPPLER COMPLETE: CPT | Mod: 26

## 2020-10-13 RX ORDER — METOPROLOL TARTRATE 50 MG
25 TABLET ORAL EVERY 12 HOURS
Refills: 0 | Status: DISCONTINUED | OUTPATIENT
Start: 2020-10-13 | End: 2020-10-17

## 2020-10-13 RX ORDER — SIMVASTATIN 20 MG/1
20 TABLET, FILM COATED ORAL AT BEDTIME
Refills: 0 | Status: DISCONTINUED | OUTPATIENT
Start: 2020-10-13 | End: 2020-10-17

## 2020-10-13 RX ORDER — FUROSEMIDE 40 MG
40 TABLET ORAL EVERY 12 HOURS
Refills: 0 | Status: DISCONTINUED | OUTPATIENT
Start: 2020-10-14 | End: 2020-10-15

## 2020-10-13 RX ORDER — ASPIRIN/CALCIUM CARB/MAGNESIUM 324 MG
325 TABLET ORAL ONCE
Refills: 0 | Status: COMPLETED | OUTPATIENT
Start: 2020-10-13 | End: 2020-10-13

## 2020-10-13 RX ORDER — ASPIRIN/CALCIUM CARB/MAGNESIUM 324 MG
81 TABLET ORAL DAILY
Refills: 0 | Status: DISCONTINUED | OUTPATIENT
Start: 2020-10-13 | End: 2020-10-17

## 2020-10-13 RX ORDER — FUROSEMIDE 40 MG
40 TABLET ORAL ONCE
Refills: 0 | Status: COMPLETED | OUTPATIENT
Start: 2020-10-13 | End: 2020-10-13

## 2020-10-13 RX ORDER — HEPARIN SODIUM 5000 [USP'U]/ML
5000 INJECTION INTRAVENOUS; SUBCUTANEOUS EVERY 8 HOURS
Refills: 0 | Status: DISCONTINUED | OUTPATIENT
Start: 2020-10-13 | End: 2020-10-16

## 2020-10-13 RX ORDER — CLOPIDOGREL BISULFATE 75 MG/1
75 TABLET, FILM COATED ORAL DAILY
Refills: 0 | Status: DISCONTINUED | OUTPATIENT
Start: 2020-10-13 | End: 2020-10-17

## 2020-10-13 RX ADMIN — Medication 40 MILLIGRAM(S): at 17:20

## 2020-10-13 RX ADMIN — Medication 25 MILLIGRAM(S): at 23:08

## 2020-10-13 RX ADMIN — HEPARIN SODIUM 5000 UNIT(S): 5000 INJECTION INTRAVENOUS; SUBCUTANEOUS at 23:07

## 2020-10-13 RX ADMIN — Medication 325 MILLIGRAM(S): at 19:53

## 2020-10-13 RX ADMIN — SIMVASTATIN 20 MILLIGRAM(S): 20 TABLET, FILM COATED ORAL at 23:08

## 2020-10-13 NOTE — ED ADULT NURSE REASSESSMENT NOTE - NS ED NURSE REASSESS COMMENT FT1
received pt aao x3, forgetful. moving all extremities with equal strength, Respirations even and unlabored. sp02 97% on room air. moist cough noted. states relief of sob denies chest pain, dizziness. no complaints. resting comfortably. no distress noted. will continue to monitor

## 2020-10-13 NOTE — CONSULT NOTE ADULT - SUBJECTIVE AND OBJECTIVE BOX
CHIEF COMPLAINT: dyspnea    HPI: Patient is a  89y Male with severe AS, CAD, PAD, DM, HTN, RA, gout here with increasing dyspnea. Began past few weeks. MOre short of breath and unable to lay flat without dyspnea. No PND however. Increasing leg edema as well. LAsix cut back to QOD recently due to increased creatinine, he states dyspnea started prior to this however. Mild sharp left sided chest pains that are self limiting and brief. No palps/syncope/change in diet or weight. No fevers/chills/sweats. No blood in urine or stool.     PAST MEDICAL & SURGICAL HISTORY:  -Stented coronary artery  -High cholesterol  -HTN (hypertension)  -Diabetes  -Gout  -h/o RA (rheumatoid arthritis)  -Osteoarthrosis  -Ulcer disease stomach  -Diabetes mellitus type II  -Kidney stones  -BPH (benign prostatic hypertrophy)  -Status post hip surgery  -Cataract b/l 2001 &amp; 2002  -History of cystoscopy TURP 2010  -Kidney stone had lithotripsy in 1983 &amp; 2010  -S/P knee replacement right in January 1/28/13 &amp; left 12/13/13        MEDICATIONS:  AMaryl 1mg daily  CLopidogrel 75mg daily  ASA 325mg daily  Furosemide 40mg PO QOD  GLyburide 1.25mg po daily  Humira  Metoprolol tartrate 25mg po bid  Prolia  Simvastatin 20mg daily      FAMILY HISTORY:  FAMILY HISTORY: non-contributory for CV diseaes      SOCIAL HISTORY: no EtOH, drugs or tobacco    ROS: GI negative, All others negative    PHYSCIAL EXAM:  Vital Signs Last 24 Hrs  T(C): 37.1 (13 Oct 2020 15:52), Max: 37.1 (13 Oct 2020 15:52)  T(F): 98.7 (13 Oct 2020 15:52), Max: 98.7 (13 Oct 2020 15:52)  HR: 75 (13 Oct 2020 15:52) (75 - 75)  BP: 143/65 (13 Oct 2020 15:52) (143/65 - 143/65)  BP(mean): --  RR: 24 (13 Oct 2020 15:52) (24 - 24)  SpO2: 99% (13 Oct 2020 15:52) (99% - 99%)  I&O's Summary    GEN: elderly M mildly dyspneic  HEENT: MMM, sclera anicteric  RESP: bilateral crackles 1/3 up lung fields  CVS: diminished S1S2, RRR, ,mild JVD, II/VI harsh systolic murmur  GI: Soft, NT, ND, BS+  EXT: 1+ edema bilaterally to mid shins  NEURO: AAOX3  PSYCH: Normal affect    ECG: (from office) SR, LVH with repol, anterior infarct, possible anterior/lateral ischemia    LABS: pending    ECHO 7/2020:  1. NOrmal LV function, EF 65-70%  2. Severe AS, peak velocity 4.7m/sec, peak gradient 87mmHg  3. RVSP 33mmHg                RADIOLOGY & ADDITIONAL STUDIES:    Assessment:     89y Male with severe AS, CAD, PAD, DM, HTN, RA, gout here with acute diastolic CHF. Likely underlying severe AS contributing, possibly ischemic heart disease as well. LAsix recently cut back too. Patient in SR, BP low with narrow pulse pressure suggestive poor output. Needs diuresis, expect he will tolerate but need to monitor closely. Will need cath as well to evaluate CAD and for evaluation prior to possible TAVR of which patient amenable to.     Plan:  1. Admit to monitored bed  2. LAsix 40mg IV BID and monitor renal function lytes closely. Will get one dose now then additional dose in am, will adjust further depending on response  3. Continue home CV meds, may need to hold beta blocker if BP low (MAP < 60)  4. Echo and trend biomarkers, repeat ECG in am  5. Will arrange LHC once stabilizes and pending clinical course  6. Supportive care  7. Glycemic control per hospitalist  8. Discussed with ED attending     Reid Marcial MD

## 2020-10-13 NOTE — H&P ADULT - NSHPSOCIALHISTORY_GEN_ALL_CORE
Former cigarette use stopped many years ago, drinks wine socially, no illicit drug use. , lives with family

## 2020-10-13 NOTE — ASSESSMENT
[FreeTextEntry1] : Sinus  Rhythm \par -Left atrial enlargement. \par  Voltage criteria for LVH  (R(I)+S(III) exceeds 2.50 mV)  -Voltage criteria w/o ST/T abnormality may be normal. \par  -Anterior infarct -age undetermined. \par  -Nonspecific ST depression   +   T-abnormality  -Nondiagnostic  -Possible  Anterior/lateral  ischemia. \par \par Laboratory data 2020:\par BUN 41\par Creatinine 1.88\par Cholesterol 160\par HDL 56\par LDL 79\par \par Lab Data 20\par Chol: 164\par HDL: 57\par LDL: 84\par Tr\par Creat: 1.65\par A1c: 5.8\par \par Lab data 19\par Creat. 1.77 ( 1.69 in March)\par \par Lab data 3/26/19\par A1C 5.8\par \par Lab data 3/19/2018\par Chol. 168\par HDL    41\par LDL    93\par Tri.    261\par \par Echocardiogram 20:\par LV of 65-70%\par Severe aortic stenosis with a peak velocity 4.7\par Peak gradient of 87 mmHg\par Mean gradient 40 mm mercury\par Pulmonary pressures of 33\par \par Echocardiogram  19\par Severe aortic valve stenosis with some increase of the transvalvular gradient.\par Peak 78 mm Hg\par Mean 43 mm Hg\par Normal LV function\par Calcific mitral valve disease with mild insufficiency\par \par Carotid study 20\par bilateral tortuosity with moderate plaquing and ulcerated disease. Relative;y unchanged from prior.\par \par Impression: \par 1.  Patient with progressive symptoms of heart failure likely related to his severe aortic valve stenosis.  He is     dyspneic with minimal activity is having orthopnea and worsening lower extremity edema.\par    The echocardiographic findings indicate some progression of the AS of which he remains asymptomatic\par \par 2.chronic kidney disease with a baseline creatinine of about 1.6 which recently increased to 1.88.\par \par 3. Carotid disease with moderate bilateral stenoses unchanged from prior. Bruits noted on exam right >left.\par \par 4.  Recently abnormal renal and pelvic ultrasound with a cystoscopy planned in the near future\par \par 5.  Worsening lower extremity edema, likely contributed to by recent decrease in Lasix dosing.\par \par Plan:\par \par The patient will be hospitalized in order to treat advancing heart failure in the face of severe aortic stenosis.\par I explained this to the patient and his family who all understand and completely agree with that.\par \par The patient will be evaluated and likely undergo cardiac catheterization and angiography.\par \par The issue of his chronic kidney disease will certainly pose an increased risk and concern.\par \par The patient may be a candidate for a TAVR.\par \par The family understands that given his advancing age and multiple problems, that his overall morbidity and mortality are necessarily going to be increased and that we will remain in close contact with them with regard to testing outcomes and proposed treatment strategies.\par  \par

## 2020-10-13 NOTE — ED ADULT NURSE NOTE - NSIMPLEMENTINTERV_GEN_ALL_ED
Implemented All Fall Risk Interventions:  [place] to call system. Call bell, personal items and telephone within reach. Instruct patient to call for assistance. Room bathroom lighting operational. Non-slip footwear when patient is off stretcher. Physically safe environment: no spills, clutter or unnecessary equipment. Stretcher in lowest position, wheels locked, appropriate side rails in place. Provide visual cue, wrist band, yellow gown, etc. Monitor gait and stability. Monitor for mental status changes and reorient to person, place, and time. Review medications for side effects contributing to fall risk. Reinforce activity limits and safety measures with patient and family.

## 2020-10-13 NOTE — ED PROVIDER NOTE - CARE PLAN
Principal Discharge DX:	CHF (congestive heart failure)  Secondary Diagnosis:	Elevated troponin level

## 2020-10-13 NOTE — REVIEW OF SYSTEMS
[Loss Of Hearing] : hearing loss [see HPI] : see HPI [Joint Pain] : joint pain [Joint Swelling] : joint swelling [Joint Stiffness] : joint stiffness [Dizziness] : dizziness [Negative] : Heme/Lymph [Shortness Of Breath] : shortness of breath [Dyspnea on exertion] : dyspnea during exertion [Lower Ext Edema] : lower extremity edema

## 2020-10-13 NOTE — H&P ADULT - NSICDXPASTSURGICALHX_GEN_ALL_CORE_FT
PAST SURGICAL HISTORY:  Cataract b/l 2001 & 2002    History of cystoscopy TURP 2010    Kidney stone removed about 50 years ago    Kidney stone had lithotripsy in 1983 & 2010    S/P angioplasty with stent 2011 - 1 coronary stent    S/P knee replacement right in January 1/28/13 & left 12/13/13    Status post hip surgery

## 2020-10-13 NOTE — PHYSICAL EXAM
[Normal Conjunctiva] : the conjunctiva exhibited no abnormalities [Eyelids - No Xanthelasma] : the eyelids demonstrated no xanthelasmas [Normal Oral Mucosa] : normal oral mucosa [No Oral Pallor] : no oral pallor [No Oral Cyanosis] : no oral cyanosis [Normal Jugular Venous A Waves Present] : normal jugular venous A waves present [Normal Jugular Venous V Waves Present] : normal jugular venous V waves present [No Jugular Venous Lan A Waves] : no jugular venous lan A waves [Respiration, Rhythm And Depth] : normal respiratory rhythm and effort [Exaggerated Use Of Accessory Muscles For Inspiration] : no accessory muscle use [Auscultation Breath Sounds / Voice Sounds] : lungs were clear to auscultation bilaterally [Systolic grade ___/6] : A grade [unfilled]/6 systolic murmur was heard. [Abdomen Soft] : soft [Abdomen Tenderness] : non-tender [Abdomen Mass (___ Cm)] : no abdominal mass palpated [Nail Clubbing] : no clubbing of the fingernails [Cyanosis, Localized] : no localized cyanosis [Petechial Hemorrhages (___cm)] : no petechial hemorrhages [Skin Color & Pigmentation] : normal skin color and pigmentation [] : no rash [Skin Lesions] : no skin lesions [No Skin Ulcers] : no skin ulcer [No Xanthoma] : no  xanthoma was observed [FreeTextEntry1] : Mild venous stasis changes

## 2020-10-13 NOTE — H&P ADULT - ASSESSMENT
89 y/o male with PMH of CAD s/p stent, HTN, RA, DM-2 came to the ED complaining of increasing shortness of breath with orthopnea and b/l LE edema.       Acute CHF exacerbation   Admit to telemetry   Troponin: 0.07; BNP: > 16K   ECG: TWI in lateral lead  Lasix 40mg bid, monitor renal function  Echo ordered   Cardiology following   Trend cardiac enzymes   Daily weight     CAD   S/p stents   Plavix 75mg   Aspirin 81mg   Statin and BB     HTN/HLD  Metoprolol ER 25mg bid with holding parameters   Simvastatin 20mg     DM-2  Hold PO medications   Insulin sliding scale     RA/OP  Humira q 2weeks (next dose on Friday)  Prolia q 6months     Supportive   DVT prophylaxis: Heparin sc   Diet: DASH     89 y/o male with PMH of CAD s/p stent, HTN, RA, DM-2 came to the ED complaining of increasing shortness of breath with orthopnea and b/l LE edema.       Acute CHF exacerbation   Admit to telemetry   Troponin: 0.07; BNP: > 16K   ECG: TWI in lateral lead  Lasix 40mg bid, monitor renal function  Echo ordered   Cardiology following   Trend cardiac enzymes   Daily weight     CKD-3   Stable (prior seen in [facility])   Monitor renal function while on diuretic     CAD   S/p stents   Plavix 75mg   Aspirin 81mg   Statin and BB     HTN/HLD  Metoprolol ER 25mg bid with holding parameters   Simvastatin 20mg     DM-2  Hold PO medications   Insulin sliding scale     RA/OP  Humira q 2weeks (next dose on Friday)  Prolia q 6months     Supportive   DVT prophylaxis: Heparin sc   Diet: DASH

## 2020-10-13 NOTE — ED ADULT NURSE REASSESSMENT NOTE - NS ED NURSE REASSESS COMMENT FT1
returned from echo. no distress noted. pt has no complaints. aao x3, respirations even and unlabored. denies chest pain, sob. call bell within reach. assisted with urinal. clear yellow urine voided. no distress noted. will continue to monitor.

## 2020-10-13 NOTE — ED ADULT NURSE NOTE - OBJECTIVE STATEMENT
Assumed care at 1648 pt co difficulty breathing and leg swelling bilaterally. pt placed on cardiac monitor, sating at 98% on room air. pt denies any fevers, cough, chest pain.

## 2020-10-13 NOTE — ED PROVIDER NOTE - CLINICAL SUMMARY MEDICAL DECISION MAKING FREE TEXT BOX
Pt sent in by Cardiology/Dr. Yadav for admission with dx CHF.  Pt with elevated BnP and Troponin.  Case d/w Hospitalist and will admit to Tele.  Rx ASA and diuretics

## 2020-10-13 NOTE — ED ADULT NURSE NOTE - ED STAT RN HANDOFF DETAILS
report given to Bethany in holding room. pt has no complaints. vss. transported to CDU 9 in no acute distress

## 2020-10-13 NOTE — ED PROVIDER NOTE - OBJECTIVE STATEMENT
Pt presenting from cardiologist office for worsening SOB. He ntoes symptoms present for the past several days. Ocassional chest tightness. No fevers, chills, cough,s yncope. MIld LE edema. Snet in for concern for worsening hear failure. No changes in med.

## 2020-10-13 NOTE — H&P ADULT - NSICDXPASTMEDICALHX_GEN_ALL_CORE_FT
PAST MEDICAL HISTORY:  BPH (benign prostatic hypertrophy)     CAD (coronary artery disease)     Diabetes     Diabetes mellitus type II    Gout h/o    High cholesterol     HTN (hypertension)     Hypertension     Kidney stones     Osteoarthrosis     RA (rheumatoid arthritis)     Stented coronary artery 1    Ulcer disease stomach

## 2020-10-13 NOTE — ED ADULT NURSE NOTE - PMH
BPH (benign prostatic hypertrophy)    CAD (coronary artery disease)    Diabetes    Diabetes mellitus  type II  Gout  h/o  High cholesterol    HTN (hypertension)    Hypertension    Kidney stones    Osteoarthrosis    RA (rheumatoid arthritis)    Stented coronary artery  1  Ulcer disease  stomach

## 2020-10-13 NOTE — HISTORY OF PRESENT ILLNESS
[FreeTextEntry1] : Patient has been experiencing progressive exertional dyspnea orthopnea and edema over the course of the last several weeks.  There is not been any associated chest pain.\par This seems to be a change from his baseline exertional dyspnea and edema.\par Speaking with his daughter she confirms that he has "been much worse lately".\par Her recent laboratory test showed that his creatinine had increased from 1.6-1.8 and furosemide was cut from daily to every other day but the shortness of breath preexisted that.\par \par Is also having a work-up performed through his urologist with regard to possible renal stones or bladder stones.  A cystoscopy is planned.\par \par Other medical problems include:\par - Diabetes\par - Hiatal hernia\par - Hearing impairment\par - Chronic kidney disease

## 2020-10-13 NOTE — H&P ADULT - HISTORY OF PRESENT ILLNESS
88 y/o male with PMH of 88 y/o male with PMH of CAD s/p stent, HTN, RA, DM-2 came to the ED complaining of increasing shortness of breath. Patient said it has been going on for few weeks now, he saw his PCP recently who changed his Lasix from daily to every other day. He noted associated orthopnea making it difficult to sleep at night and b/l LE edema. He has no chest pain, palpitation, diaphoresis, nausea, vomiting, abdominal pain, change in bowel/urinary habit, dizziness, HA, recent travel, calf pain.

## 2020-10-14 ENCOUNTER — TRANSCRIPTION ENCOUNTER (OUTPATIENT)
Age: 85
End: 2020-10-14

## 2020-10-14 DIAGNOSIS — I10 ESSENTIAL (PRIMARY) HYPERTENSION: ICD-10-CM

## 2020-10-14 DIAGNOSIS — E78.5 HYPERLIPIDEMIA, UNSPECIFIED: ICD-10-CM

## 2020-10-14 DIAGNOSIS — E11.9 TYPE 2 DIABETES MELLITUS WITHOUT COMPLICATIONS: ICD-10-CM

## 2020-10-14 LAB
ANION GAP SERPL CALC-SCNC: 14 MMOL/L — SIGNIFICANT CHANGE UP (ref 5–17)
BUN SERPL-MCNC: 31 MG/DL — HIGH (ref 8–20)
CALCIUM SERPL-MCNC: 8.9 MG/DL — SIGNIFICANT CHANGE UP (ref 8.6–10.2)
CHLORIDE SERPL-SCNC: 105 MMOL/L — SIGNIFICANT CHANGE UP (ref 98–107)
CK SERPL-CCNC: 74 U/L — SIGNIFICANT CHANGE UP (ref 30–200)
CO2 SERPL-SCNC: 21 MMOL/L — LOW (ref 22–29)
CREAT SERPL-MCNC: 1.55 MG/DL — HIGH (ref 0.5–1.3)
GLUCOSE BLDC GLUCOMTR-MCNC: 113 MG/DL — HIGH (ref 70–99)
GLUCOSE BLDC GLUCOMTR-MCNC: 85 MG/DL — SIGNIFICANT CHANGE UP (ref 70–99)
GLUCOSE BLDC GLUCOMTR-MCNC: 85 MG/DL — SIGNIFICANT CHANGE UP (ref 70–99)
GLUCOSE BLDC GLUCOMTR-MCNC: 91 MG/DL — SIGNIFICANT CHANGE UP (ref 70–99)
GLUCOSE SERPL-MCNC: 92 MG/DL — SIGNIFICANT CHANGE UP (ref 70–99)
MAGNESIUM SERPL-MCNC: 1.9 MG/DL — SIGNIFICANT CHANGE UP (ref 1.6–2.6)
PHOSPHATE SERPL-MCNC: 2.9 MG/DL — SIGNIFICANT CHANGE UP (ref 2.4–4.7)
POTASSIUM SERPL-MCNC: 4.2 MMOL/L — SIGNIFICANT CHANGE UP (ref 3.5–5.3)
POTASSIUM SERPL-SCNC: 4.2 MMOL/L — SIGNIFICANT CHANGE UP (ref 3.5–5.3)
SARS-COV-2 IGG SERPL QL IA: NEGATIVE — SIGNIFICANT CHANGE UP
SARS-COV-2 IGM SERPL IA-ACNC: <3.8 AU/ML — SIGNIFICANT CHANGE UP
SARS-COV-2 RNA SPEC QL NAA+PROBE: SIGNIFICANT CHANGE UP
SODIUM SERPL-SCNC: 140 MMOL/L — SIGNIFICANT CHANGE UP (ref 135–145)
TROPONIN T SERPL-MCNC: 0.09 NG/ML — HIGH (ref 0–0.06)

## 2020-10-14 PROCEDURE — 93454 CORONARY ARTERY ANGIO S&I: CPT | Mod: 26

## 2020-10-14 PROCEDURE — 93010 ELECTROCARDIOGRAM REPORT: CPT

## 2020-10-14 PROCEDURE — 99152 MOD SED SAME PHYS/QHP 5/>YRS: CPT

## 2020-10-14 PROCEDURE — 99232 SBSQ HOSP IP/OBS MODERATE 35: CPT | Mod: 25

## 2020-10-14 RX ORDER — DEXTROSE 50 % IN WATER 50 %
15 SYRINGE (ML) INTRAVENOUS ONCE
Refills: 0 | Status: DISCONTINUED | OUTPATIENT
Start: 2020-10-14 | End: 2020-10-17

## 2020-10-14 RX ORDER — DEXTROSE 50 % IN WATER 50 %
25 SYRINGE (ML) INTRAVENOUS ONCE
Refills: 0 | Status: DISCONTINUED | OUTPATIENT
Start: 2020-10-14 | End: 2020-10-17

## 2020-10-14 RX ORDER — GLUCAGON INJECTION, SOLUTION 0.5 MG/.1ML
1 INJECTION, SOLUTION SUBCUTANEOUS ONCE
Refills: 0 | Status: DISCONTINUED | OUTPATIENT
Start: 2020-10-14 | End: 2020-10-17

## 2020-10-14 RX ORDER — GLIMEPIRIDE 1 MG
1 TABLET ORAL
Qty: 0 | Refills: 0 | DISCHARGE

## 2020-10-14 RX ORDER — SODIUM CHLORIDE 9 MG/ML
1000 INJECTION, SOLUTION INTRAVENOUS
Refills: 0 | Status: DISCONTINUED | OUTPATIENT
Start: 2020-10-14 | End: 2020-10-17

## 2020-10-14 RX ORDER — DEXTROSE 50 % IN WATER 50 %
12.5 SYRINGE (ML) INTRAVENOUS ONCE
Refills: 0 | Status: DISCONTINUED | OUTPATIENT
Start: 2020-10-14 | End: 2020-10-17

## 2020-10-14 RX ORDER — INSULIN LISPRO 100/ML
VIAL (ML) SUBCUTANEOUS
Refills: 0 | Status: DISCONTINUED | OUTPATIENT
Start: 2020-10-14 | End: 2020-10-17

## 2020-10-14 RX ORDER — INSULIN LISPRO 100/ML
VIAL (ML) SUBCUTANEOUS AT BEDTIME
Refills: 0 | Status: DISCONTINUED | OUTPATIENT
Start: 2020-10-14 | End: 2020-10-17

## 2020-10-14 RX ADMIN — Medication 25 MILLIGRAM(S): at 05:34

## 2020-10-14 RX ADMIN — Medication 81 MILLIGRAM(S): at 11:58

## 2020-10-14 RX ADMIN — CLOPIDOGREL BISULFATE 75 MILLIGRAM(S): 75 TABLET, FILM COATED ORAL at 11:58

## 2020-10-14 RX ADMIN — HEPARIN SODIUM 5000 UNIT(S): 5000 INJECTION INTRAVENOUS; SUBCUTANEOUS at 05:34

## 2020-10-14 RX ADMIN — Medication 25 MILLIGRAM(S): at 20:46

## 2020-10-14 RX ADMIN — Medication 40 MILLIGRAM(S): at 20:46

## 2020-10-14 RX ADMIN — Medication 40 MILLIGRAM(S): at 05:34

## 2020-10-14 RX ADMIN — SIMVASTATIN 20 MILLIGRAM(S): 20 TABLET, FILM COATED ORAL at 21:07

## 2020-10-14 NOTE — DISCHARGE NOTE PROVIDER - PROVIDER TOKENS
FREE:[LAST:[Balot],PHONE:[(   )    -],FAX:[(   )    -]],PROVIDER:[TOKEN:[[id number]]] PROVIDER:[TOKEN:[[id number]]],FREE:[LAST:[Balot],PHONE:[(   )    -],FAX:[(   )    -]],PROVIDER:[TOKEN:[[id number]]] PROVIDER:[TOKEN:[[id number]]],PROVIDER:[TOKEN:[[id number]]],FREE:[LAST:[Balot],PHONE:[(   )    -],FAX:[(   )    -]],PROVIDER:[TOKEN:[[id number]]]

## 2020-10-14 NOTE — PROGRESS NOTE ADULT - SUBJECTIVE AND OBJECTIVE BOX
Patient s/p coronary angiogram.   Full report to follow.     Severe proximal to mid lAD disease. RCA proximal 70% as well. Severe AS.    Based on echo findings of wall motion abnormality, NSTEMI, Plan for PCI to LAD. Deferred for today considering poor GFR.   Plan for friday.     Continue aspirin and plavix.

## 2020-10-14 NOTE — PROGRESS NOTE ADULT - SUBJECTIVE AND OBJECTIVE BOX
BEVERLY THOMAS  [id number]        Chief Complaint: follow up CHFrEF/AS/CAD      Subjective: feeling better, able to sleep a bit and lay flat. Breathing better      24 hour Tele: SR, 4.5 second pause/CHB, short run NSVT         aspirin  chewable 81 milliGRAM(s) Oral daily  clopidogrel Tablet 75 milliGRAM(s) Oral daily  dextrose 40% Gel 15 Gram(s) Oral once PRN  dextrose 5%. 1000 milliLiter(s) IV Continuous <Continuous>  dextrose 50% Injectable 12.5 Gram(s) IV Push once  dextrose 50% Injectable 25 Gram(s) IV Push once  dextrose 50% Injectable 25 Gram(s) IV Push once  furosemide   Injectable 40 milliGRAM(s) IV Push every 12 hours  glucagon  Injectable 1 milliGRAM(s) IntraMuscular once PRN  heparin   Injectable 5000 Unit(s) SubCutaneous every 8 hours  insulin lispro (HumaLOG) corrective regimen sliding scale   SubCutaneous three times a day before meals  insulin lispro (HumaLOG) corrective regimen sliding scale   SubCutaneous at bedtime  metoprolol succinate ER 25 milliGRAM(s) Oral every 12 hours  simvastatin 20 milliGRAM(s) Oral at bedtime          Physical Exam:  T(C): 36.5 (10-14-20 @ 07:47), Max: 37.1 (10-13-20 @ 15:52)  HR: 67 (10-14-20 @ 07:47) (67 - 88)  BP: 129/66 (10-14-20 @ 07:47) (128/65 - 164/65)  RR: 18 (10-14-20 @ 07:47) (18 - 25)  SpO2: 97% (10-14-20 @ 07:47) (96% - 99%)  Wt(kg): --  GEN: elderly M mildly dyspneic  HEENT: MMM, sclera anicteric  RESP: faint basilar crackles  CVS: diminished S1S2, RRR, ,mild JVD, II/VI harsh systolic murmur  GI: Soft, NT, ND, BS+  EXT: 1+ edema bilaterally to mid shins  NEURO: AAOX3  PSYCH: Normal affect    I&O's Summary    13 Oct 2020 07:01  -  14 Oct 2020 07:00  --------------------------------------------------------  IN: 240 mL / OUT: 0 mL / NET: 240 mL    14 Oct 2020 07:01  -  14 Oct 2020 09:48  --------------------------------------------------------  IN: 500 mL / OUT: 0 mL / NET: 500 mL          Labs:   14 Oct 2020 06:19    140    |  105    |  31.0   ----------------------------<  92     4.2     |  21.0   |  1.55     Ca    8.9        14 Oct 2020 06:19  Phos  2.9       14 Oct 2020 06:19  Mg     1.9       14 Oct 2020 06:19    TPro  7.6    /  Alb  4.0    /  TBili  0.4    /  DBili  x      /  AST  22     /  ALT  31     /  AlkPhos  51     13 Oct 2020 17:01                          12.4   8.79  )-----------( 263      ( 13 Oct 2020 17:01 )             39.9       CARDIAC MARKERS ( 14 Oct 2020 04:13 )  x     / 0.09 ng/mL / 74 U/L / x     / x      CARDIAC MARKERS ( 13 Oct 2020 17:01 )  x     / 0.07 ng/mL / x     / x     / x            ECG: SR, prior ASMI likely recent, anterolateral ST depressions/TWI    ECHO (personally reviewed)  1. Mild to moderate LV dysfunction, EF 40-45%  2. Severe hypokinesis mid-apical septal/anterior walls, apex, apical inferior and basal inferior  3. Severe AS, mild to moderate AI  4. Mild RV dysfunction  5. Elevated LAP, grade I diastolic dysfunction  6. MOderate MR  7. RVSP 40mmHg  8. SEvere LAE      Assessment:  89yMale PMHX CAD, severe AS, DM, HTN, RA here with acute systolic CHF. Suspect subacute MI as leading to exacerbation, also with severe AS underlying. Diuresed well, has CKD but creatinine stable overnight and lower than recently (1.6-1.8 last month). Trops borderline only. CXR with significant congestion, ? underlying interstitial lung disease as well but suspect predominantly CHF. Short CHB ovenight as well.     Plan:  1. Cath today to help determine course of management, ? PCI and TAVR  2. Continue IV lasix  3. Continue ASA/statin/metoprolol, consider adding additional after load reduction with entresto or CCB depending on renal function  4. EP evaluation for CHB  5. GLycemic control  6. Discussed with patient and his daughter Arabella who are in agreement with plan       Reid Marcial MD

## 2020-10-14 NOTE — ED ADULT NURSE REASSESSMENT NOTE - NS ED NURSE REASSESS COMMENT FT1
pt sleeping in stretcher, easy to arouse, respirations even and unlabored. sp02 96-98% on room air. no complaints denies sob, chest pain. vital signs stable. call bell within reach. will continue to monitor.

## 2020-10-14 NOTE — DISCHARGE NOTE PROVIDER - CARE PROVIDERS DIRECT ADDRESSES
,DirectAddress_Unknown,[email or web address],DirectAddress_Unknown,DirectAddress_Unknown ,[email or web address],DirectAddress_Unknown,DirectAddress_Unknown,DirectAddress_Unknown

## 2020-10-14 NOTE — DISCHARGE NOTE PROVIDER - CARE PROVIDER_API CALL
Zahra,   Phone: (   )    -  Fax: (   )    -  Follow Up Time:     Osmar Corona  CARDIOVASCULAR DISEASE  [address] [place] [address]  Phone: [phone]  Fax: [phone]  Follow Up Time:    Osmar Corona  CARDIOVASCULAR DISEASE  [address] [place] [address]  Phone: [phone]  Fax: [phone]  Follow Up Time:     Zahra   Phone: (   )    -  Fax: (   )    -  Follow Up Time:     Adonis Kaur)  Cardiac Electrophysiology; Cardiovascular Disease; Internal Medicine  [address] [place] [address]  Phone: [phone]  Fax: [phone]  Follow Up Time:    Osmar Corona  CARDIOVASCULAR DISEASE  [address] [place] [address]  Phone: [phone]  Fax: [phone]  Follow Up Time:     Adonis Kaur)  Cardiac Electrophysiology; Cardiovascular Disease; Internal Medicine  [address] [place] [address]  Phone: [phone]  Fax: [phone]  Follow Up Time:     Zahra,   Phone: (   )    -  Fax: (   )    -  Follow Up Time:     Praful Jett)  Surgery; Thoracic and Cardiac Surgery  [address] [place] [address]  Phone: [phone]  Fax: [phone]  Follow Up Time:

## 2020-10-14 NOTE — PROGRESS NOTE ADULT - SUBJECTIVE AND OBJECTIVE BOX
Patient is a 89y old  Male who presents with a chief complaint of CHF exacerbation (14 Oct 2020 09:48)        CARDIOVASCULAR:  furosemide   Injectable 40 milliGRAM(s) IV Push every 12 hours  metoprolol succinate ER 25 milliGRAM(s) Oral every 12 hours      HEMATOLOGIC:  aspirin  chewable 81 milliGRAM(s) Oral daily  clopidogrel Tablet 75 milliGRAM(s) Oral daily  heparin   Injectable 5000 Unit(s) SubCutaneous every 8 hours        ENDO/METABOLIC:  dextrose 40% Gel 15 Gram(s) Oral once PRN  dextrose 50% Injectable 12.5 Gram(s) IV Push once  dextrose 50% Injectable 25 Gram(s) IV Push once  dextrose 50% Injectable 25 Gram(s) IV Push once  glucagon  Injectable 1 milliGRAM(s) IntraMuscular once PRN  insulin lispro (HumaLOG) corrective regimen sliding scale   SubCutaneous three times a day before meals  insulin lispro (HumaLOG) corrective regimen sliding scale   SubCutaneous at bedtime  simvastatin 20 milliGRAM(s) Oral at bedtime    IV FLUID/NUTRITION:  dextrose 5%. 1000 milliLiter(s) IV Continuous <Continuous>          Allergies    No Known Allergies    Intolerances      Vital Signs Last 24 Hrs  T(C): 36.5 (14 Oct 2020 07:47), Max: 37.1 (13 Oct 2020 15:52)  T(F): 97.7 (14 Oct 2020 07:47), Max: 98.7 (13 Oct 2020 15:52)  HR: 67 (14 Oct 2020 07:47) (67 - 88)  BP: 129/66 (14 Oct 2020 07:47) (128/65 - 164/65)  BP(mean): --  RR: 18 (14 Oct 2020 07:47) (18 - 25)  SpO2: 97% (14 Oct 2020 07:47) (96% - 99%)                REVIEW OF SYSTEMS:    CONSTITUTIONAL: No fever, weight loss, or fatigue  EYES: No eye pain, visual disturbances, or discharge  ENMT:  No difficulty hearing, tinnitus, vertigo; No sinus or throat pain  NECK: No pain or stiffness  RESPIRATORY: + shortness of breath  CARDIOVASCULAR: No chest pain, palpitations, dizziness, or leg swelling  GASTROINTESTINAL: No abdominal or epigastric pain. No nausea, vomiting, or hematemesis; No diarrhea or constipation. No melena or hematochezia.  EXT: + ankle edema   NEUROLOGICAL: No headaches, memory loss, loss of strength, numbness, or tremors  SKIN: No itching, burning, rashes, or lesions   LYMPH NODES: No enlarged glands  MUSCULOSKELETAL: No joint pain or swelling; No muscle, back, or extremity pain        PHYSICAL EXAM:    GENERAL: NAD, well-groomed, well-developed  HEAD:  Atraumatic, Normocephalic  EYES: EOMI, PERRLA, conjunctiva and sclera clear  NERVOUS SYSTEM:  Alert & Oriented X3, Good concentration;   CHEST/LUNG: clear  no SOB   HEART: Regular   ABDOMEN: Soft, Nontender, Nondistended; Bowel sounds present  EXTREMITIES:  2+ Peripheral Pulses, No clubbing, cyanosis, or edema  LYMPH: No lymphadenopathy noted        LABS:                        12.4   8.79  )-----------( 263      ( 13 Oct 2020 17:01 )             39.9           10-14    140  |  105  |  31.0<H>  ----------------------------<  92  4.2   |  21.0<L>  |  1.55<H>    Ca    8.9      14 Oct 2020 06:19  Phos  2.9     10-14  Mg     1.9     10-14    TPro  7.6  /  Alb  4.0  /  TBili  0.4  /  DBili  x   /  AST  22  /  ALT  31  /  AlkPhos  51  10-13            Serum Pro-Brain Natriuretic Peptide: 02194 pg/mL (10-13 @ 17:01)      Albumin, Serum: 4.0 g/dL (10-13 @ 17:01)    LIVER FUNCTIONS - ( 13 Oct 2020 17:01 )  Alb: 4.0 g/dL / Pro: 7.6 g/dL / ALK PHOS: 51 U/L / ALT: 31 U/L / AST: 22 U/L / GGT: x             RADIOLOGY & ADDITIONAL TESTS:    < from: Xray Chest 2 Views PA/Lat (10.13.20 @ 17:26) >  IMPRESSION: Congestive changes as noted.    < end of copied text >

## 2020-10-14 NOTE — DISCHARGE NOTE PROVIDER - NSDCCPTREATMENT_GEN_ALL_CORE_FT
PRINCIPAL PROCEDURE  Procedure: Coronary stent placement  Findings and Treatment: LESLYE x 3 to LAD       PRINCIPAL PROCEDURE  Procedure: Coronary stent placement  Findings and Treatment: LESLYE x 3 to LAD      SECONDARY PROCEDURE  Procedure: Insertion, cardiac pacemaker, dual chamber  Findings and Treatment: [facility] will call you to schedule a 2 week post op follow up, please call [phone] if you dont hear from them or need to be seen sooner.   Post Operative Device Instructions:  - Bruising around the implant site or over the chest, side or arm near the incision is normal, and will take a few weeks to resolve.  -Do not lift the affected arm higher than 90 degrees (shoulder height) in any direction for 6 weeks.   - Do not push, pull or lift anything heavier than 10 lbs (about a gallon of milk) with the affected arm for 4 weeks.     -keep site dry for one week  - Do not touch the incision until it is completely healed.   - There is dermabond on your incision, purple surgical glue, it will slowly fall off on its own, do not pick or pull it.  - Do not apply soaps, creams, lotions, ointments or powders to the incision until it is completely healed.  You should call the doctor if:   - you notice redness, drainage, swelling, increased tenderness, hot sensation around the  incision, bleeding or incision edges pulling apart.  - your temperature is greater than 100 degress F for more than 24 hours.  - you notice swelling or bulging at the incision or around the device that was not there when you left the hospital or is increasing in size.  -you experience increased difficulty breathing.  - you notice new/worsening swelling in your legs and ankles.  - you faint or have dizzy spells.  -you have any questions or concerns regarding your device or the procedure.

## 2020-10-14 NOTE — PROGRESS NOTE ADULT - SUBJECTIVE AND OBJECTIVE BOX
Nurse Practitioner Progress note:   s/p LHC   Reveals Severe prox to mid 95% occlusion of the LAD  60-70 % occlusion to the RCA      Patient feels well.  Denies chest pain, shortness of breath, dizziness or palpitations at this time    Right groin procedure site with mynx closure CDI.  No bleeding, no hematoma, site soft, non tender, positive pedal pulses bilaterally    T(C): 36.4 (10-14-20 @ 17:46), Max: 37.1 (10-14-20 @ 03:24)  HR: 77 (10-14-20 @ 19:07) (67 - 81)  BP: 128/70 (10-14-20 @ 19:07) (120/64 - 164/65)  RR: 18 (10-14-20 @ 19:07) (18 - 23)  SpO2: 99% (10-14-20 @ 19:07) (96% - 99%)    CBC Full  -  ( 13 Oct 2020 17:01 )  WBC Count : 8.79 K/uL  RBC Count : 4.03 M/uL  Hemoglobin : 12.4 g/dL  Hematocrit : 39.9 %  Platelet Count - Automated : 263 K/uL  Mean Cell Volume : 99.0 fl  Mean Cell Hemoglobin : 30.8 pg  Mean Cell Hemoglobin Concentration : 31.1 gm/dL  Auto Neutrophil # : 6.06 K/uL  Auto Lymphocyte # : 1.68 K/uL  Auto Monocyte # : 0.79 K/uL  Auto Eosinophil # : 0.17 K/uL  Auto Basophil # : 0.06 K/uL  Auto Neutrophil % : 69.0 %  Auto Lymphocyte % : 19.1 %  Auto Monocyte % : 9.0 %  Auto Eosinophil % : 1.9 %  Auto Basophil % : 0.7 %    10-14    140  |  105  |  31.0<H>  ----------------------------<  92  4.2   |  21.0<L>  |  1.55<H>    Ca    8.9      14 Oct 2020 06:19  Phos  2.9     10-14  Mg     1.9     10-14    TPro  7.6  /  Alb  4.0  /  TBili  0.4  /  DBili  x   /  AST  22  /  ALT  31  /  AlkPhos  51  10-13    CARDIAC MARKERS ( 14 Oct 2020 04:13 )  x     / 0.09 ng/mL / 74 U/L / x     / x      CARDIAC MARKERS ( 13 Oct 2020 17:01 )  x     / 0.07 ng/mL / x     / x     / x              MEDICATIONS  (STANDING):  aspirin  chewable 81 milliGRAM(s) Oral daily  clopidogrel Tablet 75 milliGRAM(s) Oral daily  dextrose 5%. 1000 milliLiter(s) (50 mL/Hr) IV Continuous <Continuous>  dextrose 50% Injectable 12.5 Gram(s) IV Push once  dextrose 50% Injectable 25 Gram(s) IV Push once  dextrose 50% Injectable 25 Gram(s) IV Push once  furosemide   Injectable 40 milliGRAM(s) IV Push every 12 hours  heparin   Injectable 5000 Unit(s) SubCutaneous every 8 hours  insulin lispro (HumaLOG) corrective regimen sliding scale   SubCutaneous three times a day before meals  insulin lispro (HumaLOG) corrective regimen sliding scale   SubCutaneous at bedtime  metoprolol succinate ER 25 milliGRAM(s) Oral every 12 hours  simvastatin 20 milliGRAM(s) Oral at bedtime    MEDICATIONS  (PRN):  dextrose 40% Gel 15 Gram(s) Oral once PRN Blood Glucose LESS THAN 70 milliGRAM(s)/deciliter  glucagon  Injectable 1 milliGRAM(s) IntraMuscular once PRN Glucose LESS THAN 70 milligrams/deciliter        HPI:  89 y/o male with PMH of CAD s/p stent, HTN, RA, DM-2 came to the ED complaining of increasing shortness of breath. Patient said it has been going on for few weeks now, he saw his PCP recently who changed his Lasix from daily to every other day. He noted associated orthopnea making it difficult to sleep at night and b/l LE edema. He has no chest pain, palpitation, diaphoresis, nausea, vomiting, abdominal pain, change in bowel/urinary habit, dizziness, HA, recent travel, calf pain.  (13 Oct 2020 23:30)    now s/p LHC   Reveals Severe prox to mid 95% occlusion of the LAD  60-70 % occlusion to the RCA        ASSESSMENT/PLAN:    Coronary artery disease  -Severe Coronary artery disease  -Recover patient for 3 hours  -Resume cardiac diet  -Ambulate patient p 3 hours  -Plan LHC on Friday with intervention to the LAD

## 2020-10-14 NOTE — CHART NOTE - NSCHARTNOTEFT_GEN_A_CORE
Patient received for a [facility] as part of evaluation of CAD pre TAVR.    89yMale PMHX CAD, severe AS, DM, HTN, RA here with acute systolic CHF. Suspect subacute MI as leading to exacerbation, also with severe AS underlying. Diuresed well, has CKD but creatinine stable overnight and lower than recently (1.6-1.8 last month). Trops borderline only. CXR with significant congestion, ? underlying interstitial lung disease as well but suspect predominantly CHF. Short CHB ovenight as well.     Pt A&O x 3  Lungs CTA  S1S2 3/6 ROB  Telemetry SR    ASA 3  Mallampati II  Creat 1.55  GFR 39  Bleeding Risk 1.8%    Plan C

## 2020-10-14 NOTE — DISCHARGE NOTE PROVIDER - NSDCCPCAREPLAN_GEN_ALL_CORE_FT
PRINCIPAL DISCHARGE DIAGNOSIS  Diagnosis: CHF (congestive heart failure)  Assessment and Plan of Treatment:       SECONDARY DISCHARGE DIAGNOSES  Diagnosis: CAD (coronary atherosclerotic disease)  Assessment and Plan of Treatment:      PRINCIPAL DISCHARGE DIAGNOSIS  Diagnosis: Acute on chronic HFrEF (heart failure with reduced ejection fraction)  Assessment and Plan of Treatment:       SECONDARY DISCHARGE DIAGNOSES  Diagnosis: CAD (coronary atherosclerotic disease)  Assessment and Plan of Treatment:      PRINCIPAL DISCHARGE DIAGNOSIS  Diagnosis: Acute on chronic HFrEF (heart failure with reduced ejection fraction)  Assessment and Plan of Treatment:       SECONDARY DISCHARGE DIAGNOSES  Diagnosis: Subacute ischemic heart disease  Assessment and Plan of Treatment:     Diagnosis: Symptomatic severe aortic stenosis with low ejection fraction  Assessment and Plan of Treatment:     Diagnosis: Heart block  Assessment and Plan of Treatment: Heart block    Diagnosis: CAD (coronary atherosclerotic disease)  Assessment and Plan of Treatment:

## 2020-10-14 NOTE — DISCHARGE NOTE PROVIDER - NSDCFUADDINST_GEN_ALL_CORE_FT
Choose lean meats and poultry without skin and prepare them without added saturated and trans fat.  Eat fish at least twice a week. Recent research shows that eating oily fish containing omega-3 fatty acids (for example, salmon, trout and herring) may help lower your risk of death from coronary artery disease.  Select fat-free, 1 percent fat and low-fat dairy products.  Cut back on foods containing partially hydrogenated vegetable oils to reduce trans fat in your diet.   To lower cholesterol, reduce saturated fat to no more than 5 to 6 percent of total calories. For someone eating 2,000 calories a day, that’s about 13 grams of saturated fat.  Cut back on beverages and foods with added sugars.  Choose and prepare foods with little or no salt. To lower blood pressure, aim to eat no more than 2,400 milligrams of sodium per day. Reducing daily intake to 1,500 mg is desirable because it can lower blood pressure even further.  If you drink alcohol, drink in moderation. That means one drink per day if you’re a woman and two drinks  per day if you’re a man.  Follow the American Heart Association recommendations when you eat out, and keep an eye on your portion sizes.   Choose lean meats and poultry without skin and prepare them without added saturated and trans fat.  Eat fish at least twice a week. Recent research shows that eating oily fish containing omega-3 fatty acids (for example, salmon, trout and herring) may help lower your risk of death from coronary artery disease.  Select fat-free, 1 percent fat and low-fat dairy products.  Cut back on foods containing partially hydrogenated vegetable oils to reduce trans fat in your diet.   To lower cholesterol, reduce saturated fat to no more than 5 to 6 percent of total calories. For someone eating 2,000 calories a day, that’s about 13 grams of saturated fat.  Cut back on beverages and foods with added sugars.  Choose and prepare foods with little or no salt. To lower blood pressure, aim to eat no more than 2,400 milligrams of sodium per day. Reducing daily intake to 1,500 mg is desirable because it can lower blood pressure even further.  If you drink alcohol, drink in moderation. That means one drink per day if you’re a woman and two drinks  per day if you’re a man.  Follow the American Heart Association recommendations when you eat out, and keep an eye on your portion sizes.     will need follow up with Dr Jett for TAVR.  Office number [phone].

## 2020-10-14 NOTE — DISCHARGE NOTE PROVIDER - NSDCFUADDAPPT_GEN_ALL_CORE_FT
Follow up with Dr. Corona in one to two weeks    No heavy lifting, driving, sex, tub baths, swimming, or any activity that submerges the lower half of the body in water for 48 hours.  Limited walking and stairs for 48 hours.    Change the bandaid after 24 hours and every 24 hours after that.  Keep the puncture site dry and covered with a bandaid until a scab forms.    Observe the site frequently.  If bleeding or a large lump (the size of a golf ball or bigger) occurs lie flat, apply continuous direct pressure just above the puncture site for at least 10 minutes, and notify your physician immediately.  If the bleeding cannot be controlled, call 911 immediately for assistance.  Notify your physician of pain, swelling or any drainage.    Notify your physician immediately if coldness, numbness, discoloration or pain in your foot occurs.   Follow up with Dr. Corona in one to two weeks    No heavy lifting, driving, sex, tub baths, swimming, or any activity that submerges the lower half of the body in water for 48 hours.  Limited walking and stairs for 48 hours.    Change the bandaid after 24 hours and every 24 hours after that.  Keep the puncture site dry and covered with a bandaid until a scab forms.    Observe the site frequently.  If bleeding or a large lump (the size of a golf ball or bigger) occurs lie flat, apply continuous direct pressure just above the puncture site for at least 10 minutes, and notify your physician immediately.  If the bleeding cannot be controlled, call 911 immediately for assistance.  Notify your physician of pain, swelling or any drainage.    Notify your physician immediately if coldness, numbness, discoloration or pain in your foot occurs.    will need follow up with Dr Jett for TAVR.  Office number [phone].

## 2020-10-14 NOTE — DISCHARGE NOTE PROVIDER - HOSPITAL COURSE
89 y/o male with PMH of CAD s/p stent, HTN, RA, DM-2 came to the ED complaining of increasing shortness of breath. Patient said it has been going on for few weeks now, he saw his PCP recently who changed his Lasix from daily to every other day. He noted associated orthopnea making it difficult to sleep at night and b/l LE edema. He has no chest pain, palpitation, diaphoresis, nausea, vomiting, abdominal pain, change in bowel/urinary habit, dizziness, HA, recent travel, calf pain.     1. Acute on chronic CHF exacerbation -- due to Severe Aortic Stenosis     2. Severe AS - for TAVR as out pt     3. 3* Heart Block   pacemaker placed by EP     3. Subacute MI and CAD -   severe prox occlusion of LAD and 60-70% fo RCA  Had PCI on Oct 16, 2020    4. CKD-3     5. HTN  Metoprolol ER 25mg bid     6, HLD   continue Zocor     7. DM-2  Hold PO medications   Insulin sliding scale     8. RA/OP  Humira q 2weeks (next dose on Friday)  Prolia q 6months

## 2020-10-14 NOTE — PROGRESS NOTE ADULT - ASSESSMENT
89 y/o male with PMH of CAD s/p stent, HTN, RA, DM-2 came to the ED complaining of increasing shortness of breath. Patient said it has been going on for few weeks now, he saw his PCP recently who changed his Lasix from daily to every other day. He noted associated orthopnea making it difficult to sleep at night and b/l LE edema. He has no chest pain, palpitation, diaphoresis, nausea, vomiting, abdominal pain, change in bowel/urinary habit, dizziness, HA, recent travel, calf pain.     1. Acute CHF exacerbation   Troponin: 0.07; BNP: > 16K   Lasix 40mg bid, monitor renal function  Echo ordered   Cardiology Dr ZACHARY Corona  Trend cardiac enzymes   Daily weight     2. CKD-3   Stable (prior seen in [facility])   Monitor renal function while on diuretic     3. CAD   S/p stents   Plavix 75mg   Aspirin 81mg   Statin and BB     4. HTN  Metoprolol ER 25mg bid with holding parameters     5, HLD   continue Zocor     6. DM-2  Hold PO medications   Insulin sliding scale     7. RA/OP  Humira q 2weeks (next dose on Friday)  Prolia q 6months      89 y/o male with PMH of CAD s/p stent, HTN, RA, DM-2 came to the ED complaining of increasing shortness of breath. Patient said it has been going on for few weeks now, he saw his PCP recently who changed his Lasix from daily to every other day. He noted associated orthopnea making it difficult to sleep at night and b/l LE edema. He has no chest pain, palpitation, diaphoresis, nausea, vomiting, abdominal pain, change in bowel/urinary habit, dizziness, HA, recent travel, calf pain.     1. Acute on chronic CHF exacerbation   Troponin: 0.07; BNP: > 16K   Lasix 40mg bid, monitor renal function  Echo ordered   Cardiology Dr ZACHARY Corona  Trend cardiac enzymes   Daily weight     2. CKD-3   Stable (prior seen in [facility])   Monitor renal function while on diuretic     3. CAD   S/p stents   Plavix 75mg   Aspirin 81mg   Statin and BB     4. HTN  Metoprolol ER 25mg bid with holding parameters     5, HLD   continue Zocor     6. DM-2  Hold PO medications   Insulin sliding scale     7. RA/OP  Humira q 2weeks (next dose on Friday)  Prolia q 6months

## 2020-10-14 NOTE — CONSULT NOTE ADULT - SUBJECTIVE AND OBJECTIVE BOX
89 year old male patient with a known history of CAD s/p stent, HTN, RA, DM-2 came to the ED complaining of increasing shortness of breath. Patient said it has been going on for few weeks now, he saw his PCP recently who changed his Lasix from daily to every other day. He noted associated orthopnea making it difficult to sleep at night and b/l LE edema. He has no chest pain, palpitation, diaphoresis, nausea, vomiting, abdominal pain, change in bowel/urinary habit, dizziness, HA, recent travel, calf pain.     PAST MEDICAL & SURGICAL HISTORY:  Stented coronary artery  1  High cholesterol  HTN (hypertension)  Diabetes  Gout  h/o  RA (rheumatoid arthritis)  Osteoarthrosis  Ulcer disease  stomach  Diabetes mellitus  type II  Kidney stones  BPH (benign prostatic hypertrophy)  Hypertension  CAD (coronary artery disease)  Status post hip surgery  Cataract  b/l 2001 &amp; 2002  History of cystoscopy  TURP 2010  Kidney stone  had lithotripsy in 1983 &amp; 2010  Kidney stone  removed about 50 years ago  S/P angioplasty with stent  2011 - 1 coronary stent  S/P knee replacement  right in January 1/28/13 &amp; left 12/13/13    REVIEW OF SYSTEMS  General:	  Skin/Breast:	  Ophthalmologic:	  ENMT:	  Respiratory and Thorax:  Cardiovascular:	  Gastrointestinal:	  Genitourinary:	  Musculoskeletal:	  Neurological:	  Psychiatric:	  Hematology/Lymphatics:	  Endocrine:	  Allergic/Immunologic:	    MEDICATIONS  (STANDING):  aspirin  chewable 81 milliGRAM(s) Oral daily  clopidogrel Tablet 75 milliGRAM(s) Oral daily  dextrose 5%. 1000 milliLiter(s) (50 mL/Hr) IV Continuous <Continuous>  dextrose 50% Injectable 12.5 Gram(s) IV Push once  dextrose 50% Injectable 25 Gram(s) IV Push once  dextrose 50% Injectable 25 Gram(s) IV Push once  furosemide   Injectable 40 milliGRAM(s) IV Push every 12 hours  heparin   Injectable 5000 Unit(s) SubCutaneous every 8 hours  insulin lispro (HumaLOG) corrective regimen sliding scale   SubCutaneous three times a day before meals  insulin lispro (HumaLOG) corrective regimen sliding scale   SubCutaneous at bedtime  metoprolol succinate ER 25 milliGRAM(s) Oral every 12 hours  simvastatin 20 milliGRAM(s) Oral at bedtime    MEDICATIONS  (PRN):  dextrose 40% Gel 15 Gram(s) Oral once PRN Blood Glucose LESS THAN 70 milliGRAM(s)/deciliter  glucagon  Injectable 1 milliGRAM(s) IntraMuscular once PRN Glucose LESS THAN 70 milligrams/deciliter    Allergies  No Known Allergies    SOCIAL HISTORY:    FAMILY HISTORY:  No pertinent family history in first degree relatives    Vital Signs Last 24 Hrs  T(C): 36.5 (14 Oct 2020 07:47), Max: 37.1 (13 Oct 2020 15:52)  T(F): 97.7 (14 Oct 2020 07:47), Max: 98.7 (13 Oct 2020 15:52)  HR: 67 (14 Oct 2020 07:47) (67 - 88)  BP: 129/66 (14 Oct 2020 07:47) (128/65 - 164/65)  RR: 18 (14 Oct 2020 07:47) (18 - 25)  SpO2: 97% (14 Oct 2020 07:47) (96% - 99%)    Physical Exam:  Constitutional: AAOx3, NAD  Neck: supple, No JVD  Cardiovascular: +S1S2 RRR, 3/6 ROB at LSB  Pulmonary: CTA b/l, unlabored, no wheezes, rales. rhonci  Abdomen: +BS, soft NTND  Extremities: no edema b/l, +distal pulses b/l  Neuro: non focal, speech clear, DONAHUE x 4    LABS:                        12.4   8.79  )-----------( 263      ( 13 Oct 2020 17:01 )             39.9     140  |  105  |  31.0<H>  ----------------------------<  92  4.2   |  21.0<L>  |  1.55<H>  Ca    8.9      14 Oct 2020 06:19  Phos  2.9     10-14  Mg     1.9     10-14  TPro  7.6  /  Alb  4.0  /  TBili  0.4  /  DBili  x   /  AST  22  /  ALT  31  /  AlkPhos  51  10-13  LIVER FUNCTIONS - ( 13 Oct 2020 17:01 )  Alb: 4.0 g/dL / Pro: 7.6 g/dL / ALK PHOS: 51 U/L / ALT: 31 U/L / AST: 22 U/L / GGT: x         CARDIAC MARKERS ( 14 Oct 2020 04:13 )  x     / 0.09 ng/mL / 74 U/L / x     / x      CARDIAC MARKERS ( 13 Oct 2020 17:01 )  x     / 0.07 ng/mL / x     / x     / x        RADIOLOGY & ADDITIONAL STUDIES:  CXR 10/13/2020  Frontal and lateral views of the chest were obtained with suboptimal inspiration. With allowance for this, the heart is enlarged in size and the lungs show moderate central congestion with small pleural effusions. There is no evidence of pneumothorax.  IMPRESSION: Congestive changes as noted.    TTE 10/13/2020  Summary:   1. Left ventricular ejection fraction, by visual estimation, is 40 to 45%.   2. Mildly to moderately decreased global left ventricular systolic function.   3. Multiple left ventricular regional wall motion abnormalities exist. See wall motion findings.   4. Severely enlarged left atrium.   5. Spectral Doppler shows impaired relaxation pattern of left ventricular myocardial filling (Grade I diastolic dysfunction).   6. Normal right atrial size.   7. There is no evidence of pericardial effusion.   8. Mild thickening of the anterior and posterior mitral valve leaflets.   9. Mild to moderate mitral valve regurgitation.  10. Peak transmitral mean gradient equals 3.0 mmHg, calculated mitral valve area by pressure half time equals 1.77 cm² consistent with mild mitral stenosis.  11. Severe mitral annular calcification.  12. Mild-moderate tricuspid regurgitation.  13. Mild aortic regurgitation.  14. Severe aortic valve stenosis.  15. Estimated pulmonary artery systolic pressure is 40.5 mmHg assuming a right atrial pressure of 3 mmHg, which is consistent with mild pulmonary hypertension.  16. Endocardial visualization was enhanced with intravenous echo contrast.  17. Mitral valve mean gradient is 3.0 mmHg consistent with mild mitral stenosis.  18. Peak transaortic gradient equals 73.3 mmHg, mean transaortic gradient equals 44.2 mmHg, the calculated aortic valve area equals 0.60 cm² by the continuity equation consistent with severe aortic stenosis.  19. The aortic valve mean gradient is 44.2 mmHg consistent with severe aortic stenosis.    EKG: NSR at 74bpm; QRSD 88ms    A/P  89 year old male patient with a known history of CAD s/p stent, HTN, RA, DM-2, severe aortic stenosis, EF 40-45%, admitted with an acute on chronic systolic heart failure exacerbation    Noted on telemetry to have an episode of high grade AV block: Phase IV block vs vagally mediated. Awaiting [facility] for PCI/TAVR.        Very pleasant 89 year old male patient with a known history of CAD s/p stent, HTN, RA, DM-2 came to the ED complaining of increasing shortness of breath. Patient said it has been going on for few weeks now, he saw his PCP recently who changed his Lasix from daily to every other day. He noted associated orthopnea making it difficult to sleep at night and b/l LE edema. He also admits to fatigue and has been tired for at least 3 weeks time. He is very active otherwise and enjoys working outside. He is worried that his symptoms will affect his quality of life. He denies any dizziness or syncope in his lifetime. He has no chest pain, palpitation, diaphoresis, nausea, vomiting, abdominal pain, change in bowel/urinary habit, HA, recent travel, calf pain.     Noted on telemetry to have high grade AV block with pause of 4.59seconds. Patient reports he was sleeping at the time of the event. He admits that he has been somnolent recently and has been falling asleep spontaneously throughout the day.     PAST MEDICAL & SURGICAL HISTORY:  Stented coronary artery  1  High cholesterol  HTN (hypertension)  Diabetes  Gout  h/o  RA (rheumatoid arthritis)  Osteoarthrosis  Ulcer disease  stomach  Diabetes mellitus  type II  Kidney stones  BPH (benign prostatic hypertrophy)  Hypertension  CAD (coronary artery disease)  Status post hip surgery  Cataract  b/l 2001 &amp; 2002  History of cystoscopy  TURP 2010  Kidney stone  had lithotripsy in 1983 &amp; 2010  Kidney stone  removed about 50 years ago  S/P angioplasty with stent  2011 - 1 coronary stent  S/P knee replacement  right in January 1/28/13 &amp; left 12/13/13    REVIEW OF SYSTEMS  General: +fatigue, - fever or chills  Skin/Breast: - rashes  Ophthalmologic: - blurred vision	  ENMT: - sore throat  Respiratory and Thorax: +cough, - dyspnea  Cardiovascular:	+chest pain, +LACEY, + LE edema  Gastrointestinal:	 -N/V/D/C  Genitourinary: - dysuria  Musculoskeletal:	 +gout and arthritis  Neurological: - weaknesses  Psychiatric: - anxiety or depression    MEDICATIONS  (STANDING):  aspirin  chewable 81 milliGRAM(s) Oral daily  clopidogrel Tablet 75 milliGRAM(s) Oral daily  dextrose 5%. 1000 milliLiter(s) (50 mL/Hr) IV Continuous <Continuous>  dextrose 50% Injectable 12.5 Gram(s) IV Push once  dextrose 50% Injectable 25 Gram(s) IV Push once  dextrose 50% Injectable 25 Gram(s) IV Push once  furosemide   Injectable 40 milliGRAM(s) IV Push every 12 hours  heparin   Injectable 5000 Unit(s) SubCutaneous every 8 hours  insulin lispro (HumaLOG) corrective regimen sliding scale   SubCutaneous three times a day before meals  insulin lispro (HumaLOG) corrective regimen sliding scale   SubCutaneous at bedtime  metoprolol succinate ER 25 milliGRAM(s) Oral every 12 hours  simvastatin 20 milliGRAM(s) Oral at bedtime    MEDICATIONS  (PRN):  dextrose 40% Gel 15 Gram(s) Oral once PRN Blood Glucose LESS THAN 70 milliGRAM(s)/deciliter  glucagon  Injectable 1 milliGRAM(s) IntraMuscular once PRN Glucose LESS THAN 70 milligrams/deciliter    Allergies  No Known Allergies    SOCIAL HISTORY: former cigarette, cigar and pipe smoker (1ppd x 35 years stopped 1989), 1 cup coffee daily, social ETOH    FAMILY HISTORY:  No pertinent family history in first degree relatives    Vital Signs Last 24 Hrs  T(C): 36.5 (14 Oct 2020 07:47), Max: 37.1 (13 Oct 2020 15:52)  T(F): 97.7 (14 Oct 2020 07:47), Max: 98.7 (13 Oct 2020 15:52)  HR: 67 (14 Oct 2020 07:47) (67 - 88)  BP: 129/66 (14 Oct 2020 07:47) (128/65 - 164/65)  RR: 18 (14 Oct 2020 07:47) (18 - 25)  SpO2: 97% (14 Oct 2020 07:47) (96% - 99%)    Physical Exam:  Constitutional: AAOx3, NAD, thin  Neck: supple, No JVD  Cardiovascular: +S1S2 RRR, 3/6 ROB at LSB  Pulmonary: Coarse breath sounds bilaterally with rales at bases  Abdomen: +BS, soft NTND  Extremities: trace LE pitting edema  Neuro: non focal, speech clear, DONAHUE x 4    LABS:                        12.4   8.79  )-----------( 263      ( 13 Oct 2020 17:01 )             39.9     140  |  105  |  31.0<H>  ----------------------------<  92  4.2   |  21.0<L>  |  1.55<H>  Ca    8.9      14 Oct 2020 06:19  Phos  2.9     10-14  Mg     1.9     10-14  TPro  7.6  /  Alb  4.0  /  TBili  0.4  /  DBili  x   /  AST  22  /  ALT  31  /  AlkPhos  51  10-13  LIVER FUNCTIONS - ( 13 Oct 2020 17:01 )  Alb: 4.0 g/dL / Pro: 7.6 g/dL / ALK PHOS: 51 U/L / ALT: 31 U/L / AST: 22 U/L / GGT: x         CARDIAC MARKERS ( 14 Oct 2020 04:13 )  x     / 0.09 ng/mL / 74 U/L / x     / x      CARDIAC MARKERS ( 13 Oct 2020 17:01 )  x     / 0.07 ng/mL / x     / x     / x        RADIOLOGY & ADDITIONAL STUDIES:  CXR 10/13/2020  Frontal and lateral views of the chest were obtained with suboptimal inspiration. With allowance for this, the heart is enlarged in size and the lungs show moderate central congestion with small pleural effusions. There is no evidence of pneumothorax.  IMPRESSION: Congestive changes as noted.    TTE 10/13/2020  Summary:   1. Left ventricular ejection fraction, by visual estimation, is 40 to 45%.   2. Mildly to moderately decreased global left ventricular systolic function.   3. Multiple left ventricular regional wall motion abnormalities exist. See wall motion findings.   4. Severely enlarged left atrium.   5. Spectral Doppler shows impaired relaxation pattern of left ventricular myocardial filling (Grade I diastolic dysfunction).   6. Normal right atrial size.   7. There is no evidence of pericardial effusion.   8. Mild thickening of the anterior and posterior mitral valve leaflets.   9. Mild to moderate mitral valve regurgitation.  10. Peak transmitral mean gradient equals 3.0 mmHg, calculated mitral valve area by pressure half time equals 1.77 cm² consistent with mild mitral stenosis.  11. Severe mitral annular calcification.  12. Mild-moderate tricuspid regurgitation.  13. Mild aortic regurgitation.  14. Severe aortic valve stenosis.  15. Estimated pulmonary artery systolic pressure is 40.5 mmHg assuming a right atrial pressure of 3 mmHg, which is consistent with mild pulmonary hypertension.  16. Endocardial visualization was enhanced with intravenous echo contrast.  17. Mitral valve mean gradient is 3.0 mmHg consistent with mild mitral stenosis.  18. Peak transaortic gradient equals 73.3 mmHg, mean transaortic gradient equals 44.2 mmHg, the calculated aortic valve area equals 0.60 cm² by the continuity equation consistent with severe aortic stenosis.  19. The aortic valve mean gradient is 44.2 mmHg consistent with severe aortic stenosis.    EKG: NSR at 74bpm; QRSD 88ms    A/P  89 year old male patient with a known history of CAD s/p stent, HTN, RA, DM-2, severe aortic stenosis, EF 40-45%, admitted with an acute on chronic systolic heart failure exacerbation    Noted on telemetry to have an episode of high grade AV block: Phase IV block vs vagally mediated. Awaiting [facility] for PCI/TAVR.     - NPO for LHC  - Full recommendations to follow.        Very pleasant 89 year old male patient with a known history of CAD s/p stent, HTN, RA, DM-2 came to the ED complaining of increasing shortness of breath. Patient said it has been going on for few weeks now, he saw his PCP recently who changed his Lasix from daily to every other day. He noted associated orthopnea making it difficult to sleep at night and b/l LE edema. He also admits to fatigue and has been tired for at least 3 weeks time. He is very active otherwise and enjoys working outside. He is worried that his symptoms will affect his quality of life. He denies any dizziness or syncope in his lifetime. He has no chest pain, palpitation, diaphoresis, nausea, vomiting, abdominal pain, change in bowel/urinary habit, HA, recent travel, calf pain.     Noted on telemetry to have high grade AV block with pause of 4.59 seconds during nocturnal hours, and also around 4 seconds sinus pause during daytime.  He admits that he has been somnolent recently and has been falling asleep spontaneously throughout the day.     PAST MEDICAL & SURGICAL HISTORY:  Stented coronary artery  High cholesterol  HTN (hypertension)  Diabetes  Gout  h/o  RA (rheumatoid arthritis)  Osteoarthrosis  Ulcer disease  stomach  Diabetes mellitus  type II  Kidney stones  BPH (benign prostatic hypertrophy)  Hypertension  CAD (coronary artery disease)  Status post hip surgery  Cataract  b/l 2001 &amp; 2002  History of cystoscopy  TURP 2010  Kidney stone  had lithotripsy in 1983 &amp; 2010  Kidney stone  removed about 50 years ago  S/P angioplasty with stent  2011 - 1 coronary stent  S/P knee replacement  right in January 1/28/13 &amp; left 12/13/13    REVIEW OF SYSTEMS  General: +fatigue, - fever or chills  Skin/Breast: - rashes  Ophthalmologic: - blurred vision	  ENMT: - sore throat  Respiratory and Thorax: +cough, - dyspnea  Cardiovascular:	+chest pain, +LACEY, + LE edema  Gastrointestinal:	 -N/V/D/C  Genitourinary: - dysuria  Musculoskeletal:	 +gout and arthritis  Neurological: - weaknesses  Psychiatric: - anxiety or depression    MEDICATIONS  (STANDING):  aspirin  chewable 81 milliGRAM(s) Oral daily  clopidogrel Tablet 75 milliGRAM(s) Oral daily  dextrose 5%. 1000 milliLiter(s) (50 mL/Hr) IV Continuous <Continuous>  dextrose 50% Injectable 12.5 Gram(s) IV Push once  dextrose 50% Injectable 25 Gram(s) IV Push once  dextrose 50% Injectable 25 Gram(s) IV Push once  furosemide   Injectable 40 milliGRAM(s) IV Push every 12 hours  heparin   Injectable 5000 Unit(s) SubCutaneous every 8 hours  insulin lispro (HumaLOG) corrective regimen sliding scale   SubCutaneous three times a day before meals  insulin lispro (HumaLOG) corrective regimen sliding scale   SubCutaneous at bedtime  metoprolol succinate ER 25 milliGRAM(s) Oral every 12 hours  simvastatin 20 milliGRAM(s) Oral at bedtime    MEDICATIONS  (PRN):  dextrose 40% Gel 15 Gram(s) Oral once PRN Blood Glucose LESS THAN 70 milliGRAM(s)/deciliter  glucagon  Injectable 1 milliGRAM(s) IntraMuscular once PRN Glucose LESS THAN 70 milligrams/deciliter    Allergies  No Known Allergies    SOCIAL HISTORY: former cigarette, cigar and pipe smoker (1ppd x 35 years stopped 1989), 1 cup coffee daily, social ETOH    FAMILY HISTORY:  No pertinent family history in first degree relatives    Vital Signs Last 24 Hrs  T(C): 36.5 (14 Oct 2020 07:47), Max: 37.1 (13 Oct 2020 15:52)  T(F): 97.7 (14 Oct 2020 07:47), Max: 98.7 (13 Oct 2020 15:52)  HR: 67 (14 Oct 2020 07:47) (67 - 88)  BP: 129/66 (14 Oct 2020 07:47) (128/65 - 164/65)  RR: 18 (14 Oct 2020 07:47) (18 - 25)  SpO2: 97% (14 Oct 2020 07:47) (96% - 99%)    Physical Exam:  Constitutional: AAOx3, NAD, thin  Neck: supple, No JVD  Cardiovascular: +S1S2 RRR, 3/6 ROB at LSB  Pulmonary: Coarse breath sounds bilaterally with rales at bases  Abdomen: +BS, soft NTND  Extremities: trace LE pitting edema  Neuro: non focal, speech clear, DONAHUE x 4    LABS:                        12.4   8.79  )-----------( 263      ( 13 Oct 2020 17:01 )             39.9     140  |  105  |  31.0<H>  ----------------------------<  92  4.2   |  21.0<L>  |  1.55<H>  Ca    8.9      14 Oct 2020 06:19  Phos  2.9     10-14  Mg     1.9     10-14  TPro  7.6  /  Alb  4.0  /  TBili  0.4  /  DBili  x   /  AST  22  /  ALT  31  /  AlkPhos  51  10-13  LIVER FUNCTIONS - ( 13 Oct 2020 17:01 )  Alb: 4.0 g/dL / Pro: 7.6 g/dL / ALK PHOS: 51 U/L / ALT: 31 U/L / AST: 22 U/L / GGT: x         CARDIAC MARKERS ( 14 Oct 2020 04:13 )  x     / 0.09 ng/mL / 74 U/L / x     / x      CARDIAC MARKERS ( 13 Oct 2020 17:01 )  x     / 0.07 ng/mL / x     / x     / x        RADIOLOGY & ADDITIONAL STUDIES:  CXR 10/13/2020  Frontal and lateral views of the chest were obtained with suboptimal inspiration. With allowance for this, the heart is enlarged in size and the lungs show moderate central congestion with small pleural effusions. There is no evidence of pneumothorax.  IMPRESSION: Congestive changes as noted.    TTE 10/13/2020  Summary:   1. Left ventricular ejection fraction, by visual estimation, is 40 to 45%.   2. Mildly to moderately decreased global left ventricular systolic function.   3. Multiple left ventricular regional wall motion abnormalities exist. See wall motion findings.   4. Severely enlarged left atrium.   5. Spectral Doppler shows impaired relaxation pattern of left ventricular myocardial filling (Grade I diastolic dysfunction).   6. Normal right atrial size.   7. There is no evidence of pericardial effusion.   8. Mild thickening of the anterior and posterior mitral valve leaflets.   9. Mild to moderate mitral valve regurgitation.  10. Peak transmitral mean gradient equals 3.0 mmHg, calculated mitral valve area by pressure half time equals 1.77 cm² consistent with mild mitral stenosis.  11. Severe mitral annular calcification.  12. Mild-moderate tricuspid regurgitation.  13. Mild aortic regurgitation.  14. Severe aortic valve stenosis.  15. Estimated pulmonary artery systolic pressure is 40.5 mmHg assuming a right atrial pressure of 3 mmHg, which is consistent with mild pulmonary hypertension.  16. Endocardial visualization was enhanced with intravenous echo contrast.  17. Mitral valve mean gradient is 3.0 mmHg consistent with mild mitral stenosis.  18. Peak transaortic gradient equals 73.3 mmHg, mean transaortic gradient equals 44.2 mmHg, the calculated aortic valve area equals 0.60 cm² by the continuity equation consistent with severe aortic stenosis.  19. The aortic valve mean gradient is 44.2 mmHg consistent with severe aortic stenosis.    EKG: NSR at 74bpm; QRSD 88ms    A/P  89 year old male patient with a known history of CAD s/p stent, HTN, RA, DM-2, severe aortic stenosis, EF 40-45%, admitted with an acute on chronic systolic heart failure exacerbation    Noted on telemetry to have an episode of high grade AV block: Awaiting [facility] for PCI/TAVR.     - NPO for LHC  - Full recommendations to follow.

## 2020-10-15 LAB
ANION GAP SERPL CALC-SCNC: 14 MMOL/L — SIGNIFICANT CHANGE UP (ref 5–17)
ANION GAP SERPL CALC-SCNC: 17 MMOL/L — SIGNIFICANT CHANGE UP (ref 5–17)
BUN SERPL-MCNC: 31 MG/DL — HIGH (ref 8–20)
BUN SERPL-MCNC: 34 MG/DL — HIGH (ref 8–20)
CALCIUM SERPL-MCNC: 9.1 MG/DL — SIGNIFICANT CHANGE UP (ref 8.6–10.2)
CALCIUM SERPL-MCNC: 9.3 MG/DL — SIGNIFICANT CHANGE UP (ref 8.6–10.2)
CHLORIDE SERPL-SCNC: 101 MMOL/L — SIGNIFICANT CHANGE UP (ref 98–107)
CHLORIDE SERPL-SCNC: 99 MMOL/L — SIGNIFICANT CHANGE UP (ref 98–107)
CO2 SERPL-SCNC: 22 MMOL/L — SIGNIFICANT CHANGE UP (ref 22–29)
CO2 SERPL-SCNC: 24 MMOL/L — SIGNIFICANT CHANGE UP (ref 22–29)
CREAT SERPL-MCNC: 1.74 MG/DL — HIGH (ref 0.5–1.3)
CREAT SERPL-MCNC: 1.8 MG/DL — HIGH (ref 0.5–1.3)
GLUCOSE BLDC GLUCOMTR-MCNC: 109 MG/DL — HIGH (ref 70–99)
GLUCOSE BLDC GLUCOMTR-MCNC: 116 MG/DL — HIGH (ref 70–99)
GLUCOSE BLDC GLUCOMTR-MCNC: 126 MG/DL — HIGH (ref 70–99)
GLUCOSE BLDC GLUCOMTR-MCNC: 127 MG/DL — HIGH (ref 70–99)
GLUCOSE SERPL-MCNC: 107 MG/DL — HIGH (ref 70–99)
GLUCOSE SERPL-MCNC: 138 MG/DL — HIGH (ref 70–99)
HCT VFR BLD CALC: 43.8 % — SIGNIFICANT CHANGE UP (ref 39–50)
HGB BLD-MCNC: 13.9 G/DL — SIGNIFICANT CHANGE UP (ref 13–17)
MAGNESIUM SERPL-MCNC: 1.9 MG/DL — SIGNIFICANT CHANGE UP (ref 1.6–2.6)
MCHC RBC-ENTMCNC: 31.2 PG — SIGNIFICANT CHANGE UP (ref 27–34)
MCHC RBC-ENTMCNC: 31.7 GM/DL — LOW (ref 32–36)
MCV RBC AUTO: 98.4 FL — SIGNIFICANT CHANGE UP (ref 80–100)
PLATELET # BLD AUTO: 262 K/UL — SIGNIFICANT CHANGE UP (ref 150–400)
POTASSIUM SERPL-MCNC: 4.3 MMOL/L — SIGNIFICANT CHANGE UP (ref 3.5–5.3)
POTASSIUM SERPL-MCNC: 4.5 MMOL/L — SIGNIFICANT CHANGE UP (ref 3.5–5.3)
POTASSIUM SERPL-SCNC: 4.3 MMOL/L — SIGNIFICANT CHANGE UP (ref 3.5–5.3)
POTASSIUM SERPL-SCNC: 4.5 MMOL/L — SIGNIFICANT CHANGE UP (ref 3.5–5.3)
RBC # BLD: 4.45 M/UL — SIGNIFICANT CHANGE UP (ref 4.2–5.8)
RBC # FLD: 13.7 % — SIGNIFICANT CHANGE UP (ref 10.3–14.5)
SODIUM SERPL-SCNC: 138 MMOL/L — SIGNIFICANT CHANGE UP (ref 135–145)
SODIUM SERPL-SCNC: 139 MMOL/L — SIGNIFICANT CHANGE UP (ref 135–145)
WBC # BLD: 7.91 K/UL — SIGNIFICANT CHANGE UP (ref 3.8–10.5)
WBC # FLD AUTO: 7.91 K/UL — SIGNIFICANT CHANGE UP (ref 3.8–10.5)

## 2020-10-15 PROCEDURE — 93010 ELECTROCARDIOGRAM REPORT: CPT

## 2020-10-15 PROCEDURE — 99232 SBSQ HOSP IP/OBS MODERATE 35: CPT

## 2020-10-15 RX ORDER — SODIUM CHLORIDE 9 MG/ML
1000 INJECTION INTRAMUSCULAR; INTRAVENOUS; SUBCUTANEOUS
Refills: 0 | Status: DISCONTINUED | OUTPATIENT
Start: 2020-10-15 | End: 2020-10-17

## 2020-10-15 RX ADMIN — SODIUM CHLORIDE 50 MILLILITER(S): 9 INJECTION INTRAMUSCULAR; INTRAVENOUS; SUBCUTANEOUS at 19:17

## 2020-10-15 RX ADMIN — CLOPIDOGREL BISULFATE 75 MILLIGRAM(S): 75 TABLET, FILM COATED ORAL at 11:09

## 2020-10-15 RX ADMIN — Medication 81 MILLIGRAM(S): at 11:09

## 2020-10-15 RX ADMIN — Medication 40 MILLIGRAM(S): at 05:21

## 2020-10-15 RX ADMIN — Medication 25 MILLIGRAM(S): at 21:40

## 2020-10-15 RX ADMIN — HEPARIN SODIUM 5000 UNIT(S): 5000 INJECTION INTRAVENOUS; SUBCUTANEOUS at 21:40

## 2020-10-15 RX ADMIN — HEPARIN SODIUM 5000 UNIT(S): 5000 INJECTION INTRAVENOUS; SUBCUTANEOUS at 05:21

## 2020-10-15 RX ADMIN — HEPARIN SODIUM 5000 UNIT(S): 5000 INJECTION INTRAVENOUS; SUBCUTANEOUS at 17:09

## 2020-10-15 RX ADMIN — SIMVASTATIN 20 MILLIGRAM(S): 20 TABLET, FILM COATED ORAL at 21:40

## 2020-10-15 RX ADMIN — Medication 25 MILLIGRAM(S): at 05:21

## 2020-10-15 NOTE — PROGRESS NOTE ADULT - ASSESSMENT
88 y/o male with PMH of CAD s/p stent, HTN, RA, DM-2 came to the ED complaining of increasing shortness of breath. Patient said it has been going on for few weeks now, he saw his PCP recently who changed his Lasix from daily to every other day. He noted associated orthopnea making it difficult to sleep at night and b/l LE edema. He has no chest pain, palpitation, diaphoresis, nausea, vomiting, abdominal pain, change in bowel/urinary habit, dizziness, HA, recent travel, calf pain.     1. Acute on chronic CHF exacerbation   Troponin: 0.07; BNP: > 16K   Lasix 40mg bid, monitor renal function  Echo ordered   Cardiology Dr ZACHARY Corona  Trend cardiac enzymes   Daily weight     2. Severe AS - for TAVR     3. CAD - severe prox occlusion of LAD  and 60-70% fo RCA  for PCI on Oct 16, 2020    4. CKD-3   Stable (prior seen in [facility])   Monitor renal function while on diuretic         5. HTN  Metoprolol ER 25mg bid with holding parameters     6, HLD   continue Zocor     7. DM-2  Hold PO medications   Insulin sliding scale     8. RA/OP  Humira q 2weeks (next dose on Friday)  Prolia q 6months

## 2020-10-15 NOTE — PROGRESS NOTE ADULT - SUBJECTIVE AND OBJECTIVE BOX
Cardiology Nurse Practitioner Progress note:     s/p LHC via RFA -Reveals Severe prox to mid 95% occlusion of the LAD  60-70 % occlusion to the RCA    observed overnight on tele-no overnight events    Patient feels well.  Denies chest pain, shortness of breath, dizziness or palpitations at this time.        EKG:NSR@70  TELE: NSR with PACs 70s  GENERAL: appears comfortable  CV: RRR, S1 S2, no murmur, rub, clicks or gallop noted  RESP: LCTA bilaterally, no wheeze, no rhonchi or crackles noted  GI/: soft, NT/ND  NEURO: AOx4, no focal deficits  EXTREMITIES: no edema, clubbing or cyanosis noted  PROCEDURE SITE:  Right groin dressing intact , site CDI with no bleeding, no hematoma, area soft, non tender, positive pedal pulses bilaterally ( angioseal closure agent utilized)        T(C): 36.8 (10-15-20 @ 05:00), Max: 36.8 (10-15-20 @ 05:00)  HR: 76 (10-15-20 @ 09:05) (65 - 77)  BP: 111/58 (10-15-20 @ 09:05) (109/58 - 149/75)  RR: 18 (10-15-20 @ 09:05) (17 - 20)  SpO2: 96% (10-15-20 @ 09:05) (96% - 99%)  Wt(kg): --  CBC Full  -  ( 15 Oct 2020 08:26 )  WBC Count : 7.91 K/uL  RBC Count : 4.45 M/uL  Hemoglobin : 13.9 g/dL  Hematocrit : 43.8 %  Platelet Count - Automated : 262 K/uL  Mean Cell Volume : 98.4 fl  Mean Cell Hemoglobin : 31.2 pg  Mean Cell Hemoglobin Concentration : 31.7 gm/dL  Auto Neutrophil # : x  Auto Lymphocyte # : x  Auto Monocyte # : x  Auto Eosinophil # : x  Auto Basophil # : x  Auto Neutrophil % : x  Auto Lymphocyte % : x  Auto Monocyte % : x  Auto Eosinophil % : x  Auto Basophil % : x    10-15    138  |  99  |  31.0<H>  ----------------------------<  107<H>  4.5   |  22.0  |  1.74<H>    Ca    9.3      15 Oct 2020 08:26  Phos  2.9     10-14  Mg     1.9     10-15    TPro  7.6  /  Alb  4.0  /  TBili  0.4  /  DBili  x   /  AST  22  /  ALT  31  /  AlkPhos  51  10-13    CARDIAC MARKERS ( 14 Oct 2020 04:13 )  x     / 0.09 ng/mL / 74 U/L / x     / x      CARDIAC MARKERS ( 13 Oct 2020 17:01 )  x     / 0.07 ng/mL / x     / x     / x              MEDICATIONS  (STANDING):  aspirin  chewable 81 milliGRAM(s) Oral daily  clopidogrel Tablet 75 milliGRAM(s) Oral daily  dextrose 5%. 1000 milliLiter(s) (50 mL/Hr) IV Continuous <Continuous>  dextrose 50% Injectable 12.5 Gram(s) IV Push once  dextrose 50% Injectable 25 Gram(s) IV Push once  dextrose 50% Injectable 25 Gram(s) IV Push once  heparin   Injectable 5000 Unit(s) SubCutaneous every 8 hours  insulin lispro (HumaLOG) corrective regimen sliding scale   SubCutaneous three times a day before meals  insulin lispro (HumaLOG) corrective regimen sliding scale   SubCutaneous at bedtime  metoprolol succinate ER 25 milliGRAM(s) Oral every 12 hours  simvastatin 20 milliGRAM(s) Oral at bedtime    MEDICATIONS  (PRN):  dextrose 40% Gel 15 Gram(s) Oral once PRN Blood Glucose LESS THAN 70 milliGRAM(s)/deciliter  glucagon  Injectable 1 milliGRAM(s) IntraMuscular once PRN Glucose LESS THAN 70 milligrams/deciliter        HPI:  89 y/o male with PMH of CAD s/p stent, HTN, RA, DM-2 came to the ED complaining of increasing shortness of breath. Patient said it has been going on for few weeks now, he saw his PCP recently who changed his Lasix from daily to every other day. He noted associated orthopnea making it difficult to sleep at night and b/l LE edema. He has no chest pain, palpitation, diaphoresis, nausea, vomiting, abdominal pain, change in bowel/urinary habit, dizziness, HA, recent travel, calf pain.  (13 Oct 2020 23:30)    s/p LHC via RFA -Reveals Severe prox to mid 95% occlusion of the LAD  60-70 % occlusion to the RCA    ASSESSMENT/PLAN:    -RFA groin precautions  -Transfer to inpatient tele unit  -NPO after midnight   -Plan for PCI of LAD tomorrow 10/16 with Dr Doshi   -Discontinue lasix IV as per Dr Doshi  -BMP @ 1700 today to determine need for gentle hydration (EF 40-45%) in preparation for LAD PCI/ intervention tomorrow)  -continue aspirin, plavix, statin   -Plan of care discussed with patient  -labs, EKG and procedure site assessed  -Discussed therapeutic lifestyle changes to reduce risk factors such as following a cardiac diet, weight loss, maintaining a healthy weight, exercise, smoking cessation, medication compliance, and regular follow-up  with MD to know your numbers (BP, cholesterol, weight, and glucose)

## 2020-10-15 NOTE — PROGRESS NOTE ADULT - ASSESSMENT
Assessment:  Morales Nichols is an 89 year old man with past medical history of Coronary artery disease, Severe aortic stenosis, Hypertension, Diabetes mellitus and rheumatoid arthritis who presented with shortness of breath, found to have acute systolic congestive heart failure. Patient with new decrement in LV systolic function, also with severe aortic stenosis (which patient had previously). Hospital course also complicated by episodes of complete heart block, so will require permanent pacemaker implantation.     Would recommend the following:     Recommendations:  [] Acute systolic congestive heart failure: Given elevated Cr post cardiac catherization, would hold diuresis at this time. Can continue beta blocker as dosed, would not titrate up further.  [] Severe aortic stenosis: Echo images reviewed and dimensionless index < 0.25. Likely plan for TAVR given underlying comorbidities and patient age.   [] Coronary artery disease: Elevated troponin, cardiac cath with obstructive disease in mid-LAD and in-stent RCA disease. Plan for PCI when Cr stabilizes (baseline appears ~1.8 on review of outpatient charts). Continue aspirin, statin, metoprolol.   [] Complete heart block: Follow up Cardiac EP regarding pacemaker placement     Thank you for the consult. We will continue to follow along.    Nasra Iyer MD  Cardiology

## 2020-10-15 NOTE — CHART NOTE - NSCHARTNOTEFT_GEN_A_CORE
Repeat creat slightly higher  going for stent tomorrow  will start ns at 50cc /hr starting at 12 am  bmp in the am

## 2020-10-15 NOTE — PROGRESS NOTE ADULT - SUBJECTIVE AND OBJECTIVE BOX
Patient is a 89y old  Male who presents with a chief complaint of CHF exacerbation (14 Oct 2020 09:48)        CARDIOVASCULAR:  furosemide   Injectable 40 milliGRAM(s) IV Push every 12 hours  metoprolol succinate ER 25 milliGRAM(s) Oral every 12 hours      HEMATOLOGIC:  aspirin  chewable 81 milliGRAM(s) Oral daily  clopidogrel Tablet 75 milliGRAM(s) Oral daily  heparin   Injectable 5000 Unit(s) SubCutaneous every 8 hours        ENDO/METABOLIC:  dextrose 40% Gel 15 Gram(s) Oral once PRN  dextrose 50% Injectable 12.5 Gram(s) IV Push once  dextrose 50% Injectable 25 Gram(s) IV Push once  dextrose 50% Injectable 25 Gram(s) IV Push once  glucagon  Injectable 1 milliGRAM(s) IntraMuscular once PRN  insulin lispro (HumaLOG) corrective regimen sliding scale   SubCutaneous three times a day before meals  insulin lispro (HumaLOG) corrective regimen sliding scale   SubCutaneous at bedtime  simvastatin 20 milliGRAM(s) Oral at bedtime    IV FLUID/NUTRITION:  dextrose 5%. 1000 milliLiter(s) IV Continuous <Continuous>          Allergies    No Known Allergies    Intolerances      Vital Signs Last 24 Hrs  T(C): 36.4 (15 Oct 2020 18:03), Max: 36.8 (15 Oct 2020 05:00)  T(F): 97.6 (15 Oct 2020 18:03), Max: 98.3 (15 Oct 2020 05:00)  HR: 79 (15 Oct 2020 18:03) (65 - 80)  BP: 145/69 (15 Oct 2020 18:03) (105/54 - 145/69)  BP(mean): 94 (15 Oct 2020 18:03) (71 - 94)  RR: 18 (15 Oct 2020 18:03) (17 - 18)  SpO2: 96% (15 Oct 2020 18:03) (94% - 99%)              REVIEW OF SYSTEMS:    CONSTITUTIONAL: No fever, weight loss, or fatigue  EYES: No eye pain, visual disturbances, or discharge  ENMT:  No difficulty hearing, tinnitus, vertigo; No sinus or throat pain  NECK: No pain or stiffness  RESPIRATORY: + shortness of breath  CARDIOVASCULAR: No chest pain, palpitations, dizziness, or leg swelling  GASTROINTESTINAL: No abdominal or epigastric pain. No nausea, vomiting, or hematemesis; No diarrhea or constipation. No melena or hematochezia.  EXT: + ankle edema   NEUROLOGICAL: No headaches, memory loss, loss of strength, numbness, or tremors  SKIN: No itching, burning, rashes, or lesions   LYMPH NODES: No enlarged glands  MUSCULOSKELETAL: No joint pain or swelling; No muscle, back, or extremity pain        PHYSICAL EXAM:    GENERAL: NAD, well-groomed, well-developed  HEAD:  Atraumatic, Normocephalic  EYES: EOMI, PERRLA, conjunctiva and sclera clear  NERVOUS SYSTEM:  Alert & Oriented X3, Good concentration;   CHEST/LUNG: clear  no SOB   HEART: Regular   ABDOMEN: Soft, Nontender, Nondistended; Bowel sounds present  EXTREMITIES:  2+ Peripheral Pulses, No clubbing, cyanosis, or edema  LYMPH: No lymphadenopathy noted        LABS:                          13.9   7.91  )-----------( 262      ( 15 Oct 2020 08:26 )             43.8       10-15    139  |  101  |  34.0<H>  ----------------------------<  138<H>  4.3   |  24.0  |  1.80<H>    Ca    9.1      15 Oct 2020 16:54  Phos  2.9     10-14  Mg     1.9     10-15                      Albumin, Serum: 4.0 g/dL (10-13 @ 17:01)    LIVER FUNCTIONS - ( 13 Oct 2020 17:01 )  Alb: 4.0 g/dL / Pro: 7.6 g/dL / ALK PHOS: 51 U/L / ALT: 31 U/L / AST: 22 U/L / GGT: x             RADIOLOGY & ADDITIONAL TESTS:    < from: Xray Chest 2 Views PA/Lat (10.13.20 @ 17:26) >  IMPRESSION: Congestive changes as noted.    < end of copied text >

## 2020-10-15 NOTE — PROGRESS NOTE ADULT - SUBJECTIVE AND OBJECTIVE BOX
BEVERLY THOMAS  [id number]      Chief Complaint: Coronary artery disease/Severe aortic stenosis/HFrEF      Interval History: The patient reports feeling ok, denies angina or dyspnea.       Tele: normal sinus rhythm, ~1.6 second pause this morning, patient denies dizziness at this time      Current meds:   aspirin  chewable 81 milliGRAM(s) Oral daily  clopidogrel Tablet 75 milliGRAM(s) Oral daily  dextrose 40% Gel 15 Gram(s) Oral once PRN  dextrose 5%. 1000 milliLiter(s) IV Continuous <Continuous>  dextrose 50% Injectable 12.5 Gram(s) IV Push once  dextrose 50% Injectable 25 Gram(s) IV Push once  dextrose 50% Injectable 25 Gram(s) IV Push once  glucagon  Injectable 1 milliGRAM(s) IntraMuscular once PRN  heparin   Injectable 5000 Unit(s) SubCutaneous every 8 hours  insulin lispro (HumaLOG) corrective regimen sliding scale   SubCutaneous three times a day before meals  insulin lispro (HumaLOG) corrective regimen sliding scale   SubCutaneous at bedtime  metoprolol succinate ER 25 milliGRAM(s) Oral every 12 hours  simvastatin 20 milliGRAM(s) Oral at bedtime      Objective:     Vital Signs:   T(C): 36.8 (10-15-20 @ 09:35), Max: 36.8 (10-15-20 @ 05:00)  HR: 72 (10-15-20 @ 09:35) (65 - 77)  BP: 115/52 (10-15-20 @ 09:35) (109/58 - 149/75)  RR: 18 (10-15-20 @ 09:35) (17 - 20)  SpO2: 94% (10-15-20 @ 09:35) (94% - 99%)  Wt(kg): --    Physical Exam:   General: elderly man, no distress  Neck: supple, radiated systolic murmur present  CVS: JVP ~ 10 cm H20, RRR, s1, obliteration of s2, harsh 4/6 systolic ejection murmur at RUSB, radiates to carotids  Pulm: unlabored respirations, mostly clear   Abd: non-distended  Ext: trace lower extremity edema b/l   Neuro A&O x3  Psych: Normal affect      Labs:   15 Oct 2020 08:26    138    |  99     |  31.0   ----------------------------<  107    4.5     |  22.0   |  1.74     Ca    9.3        15 Oct 2020 08:26  Phos  2.9       14 Oct 2020 06:19  Mg     1.9       15 Oct 2020 08:26    TPro  7.6    /  Alb  4.0    /  TBili  0.4    /  DBili  x      /  AST  22     /  ALT  31     /  AlkPhos  51     13 Oct 2020 17:01                          13.9   7.91  )-----------( 262      ( 15 Oct 2020 08:26 )             43.8       CARDIAC MARKERS ( 14 Oct 2020 04:13 )  x     / 0.09 ng/mL / 74 U/L / x     / x      CARDIAC MARKERS ( 13 Oct 2020 17:01 )  x     / 0.07 ng/mL / x     / x     / x          ECG (10/13/2020): normal sinus rhythm, LVH with repolarization abnormality, nonspecific ST/T wave changes     TTE (10/13/2020):   1. Left ventricular ejection fraction, by visual estimation, is 40 to 45%.   2. Mildly to moderately decreased global left ventricular systolic function.   3. Multiple left ventricular regional wall motion abnormalities exist. See wall motion findings.   4. Severely enlarged left atrium.   5. Spectral Doppler shows impaired relaxation pattern of left ventricular myocardial filling (Grade I diastolic dysfunction).   6. Normal right atrial size.   7. There is no evidence of pericardial effusion.   8. Mild thickening of the anterior and posterior mitral valve leaflets.   9. Mild to moderate mitral valve regurgitation.  10. Peak transmitral mean gradient equals 3.0 mmHg, calculated mitral valve area by pressure half time equals 1.77 cm² consistent with mild mitral stenosis.  11. Severe mitral annular calcification.  12. Mild-moderate tricuspid regurgitation.  13. Mild aortic regurgitation.  14. Severe aortic valve stenosis.  15. Estimated pulmonary artery systolic pressure is 40.5 mmHg assuming a right atrial pressure of 3 mmHg, which is consistent with mild pulmonary hypertension.  16. Endocardial visualization was enhanced with intravenous echo contrast.  17. Mitral valve mean gradient is 3.0 mmHg consistent with mild mitral stenosis.  18. Peak transaortic gradient equals 73.3 mmHg, mean transaortic gradient equals 44.2 mmHg, the calculated aortic valve area equals 0.60 cm² by the continuity equation consistent with severe aortic stenosis.  19. The aortic valve mean gradient is 44.2 mmHg consistent with severe aortic stenosis.      Cardiac Cath (10/14/2020):  VENTRICLES: No LV gram was performed; however, a recent echocardiogram  demonstrated an EF of 40 %.  CORONARY VESSELS: The coronary circulation is right dominant.  LM:   --  LM: 20-30% disease.  LAD:   --  Mid LAD: There was a 95 % stenosis. The lesion was heavily  calcified.  CX:   --  OM1: There was a 50 % stenosis.  RCA:   --  Proximal RCA: There was a 70 % stenosis in-stent.  COMPLICATIONS: No complications occurred during the cath lab visit.  DIAGNOSTIC IMPRESSIONS: Severe mid LAD and RCA disease. Intermediate syntax  score.  Severe AS by TTE.  DIAGNOSTIC RECOMMENDATIONS: Considering abnormal cardiac enzymes, wall  motion abnormality in the anterior wall, plan for high risk PCI of LAD.  Deferred due to low GFR.  Patient also need PPM due to high grade AV block. In addition, TAVR  evaluation.  Not a surgical candidate due to advanced age.  Will plan for PCI of LAD after renal recovery.

## 2020-10-16 DIAGNOSIS — I50.23 ACUTE ON CHRONIC SYSTOLIC (CONGESTIVE) HEART FAILURE: ICD-10-CM

## 2020-10-16 DIAGNOSIS — I45.9 CONDUCTION DISORDER, UNSPECIFIED: ICD-10-CM

## 2020-10-16 DIAGNOSIS — E11.9 TYPE 2 DIABETES MELLITUS WITHOUT COMPLICATIONS: ICD-10-CM

## 2020-10-16 DIAGNOSIS — I25.10 ATHEROSCLEROTIC HEART DISEASE OF NATIVE CORONARY ARTERY WITHOUT ANGINA PECTORIS: ICD-10-CM

## 2020-10-16 DIAGNOSIS — I35.0 NONRHEUMATIC AORTIC (VALVE) STENOSIS: ICD-10-CM

## 2020-10-16 LAB
A1C WITH ESTIMATED AVERAGE GLUCOSE RESULT: 5.9 % — HIGH (ref 4–5.6)
ALBUMIN SERPL ELPH-MCNC: 3.4 G/DL — SIGNIFICANT CHANGE UP (ref 3.3–5.2)
ALP SERPL-CCNC: 43 U/L — SIGNIFICANT CHANGE UP (ref 40–120)
ALT FLD-CCNC: 18 U/L — SIGNIFICANT CHANGE UP
ANION GAP SERPL CALC-SCNC: 12 MMOL/L — SIGNIFICANT CHANGE UP (ref 5–17)
APTT BLD: 102.1 SEC — HIGH (ref 27.5–35.5)
APTT BLD: 29 SEC — SIGNIFICANT CHANGE UP (ref 27.5–35.5)
AST SERPL-CCNC: 24 U/L — SIGNIFICANT CHANGE UP
BILIRUB SERPL-MCNC: 0.3 MG/DL — LOW (ref 0.4–2)
BLD GP AB SCN SERPL QL: SIGNIFICANT CHANGE UP
BUN SERPL-MCNC: 32 MG/DL — HIGH (ref 8–20)
CALCIUM SERPL-MCNC: 8.1 MG/DL — LOW (ref 8.6–10.2)
CHLORIDE SERPL-SCNC: 104 MMOL/L — SIGNIFICANT CHANGE UP (ref 98–107)
CHOLEST SERPL-MCNC: 135 MG/DL — SIGNIFICANT CHANGE UP (ref 110–199)
CO2 SERPL-SCNC: 22 MMOL/L — SIGNIFICANT CHANGE UP (ref 22–29)
CREAT SERPL-MCNC: 1.75 MG/DL — HIGH (ref 0.5–1.3)
ESTIMATED AVERAGE GLUCOSE: 123 MG/DL — HIGH (ref 68–114)
GLUCOSE BLDC GLUCOMTR-MCNC: 107 MG/DL — HIGH (ref 70–99)
GLUCOSE BLDC GLUCOMTR-MCNC: 138 MG/DL — HIGH (ref 70–99)
GLUCOSE BLDC GLUCOMTR-MCNC: 94 MG/DL — SIGNIFICANT CHANGE UP (ref 70–99)
GLUCOSE SERPL-MCNC: 140 MG/DL — HIGH (ref 70–99)
HCT VFR BLD CALC: 39 % — SIGNIFICANT CHANGE UP (ref 39–50)
HDLC SERPL-MCNC: 59 MG/DL — SIGNIFICANT CHANGE UP
HGB BLD-MCNC: 12.3 G/DL — LOW (ref 13–17)
INR BLD: 1.07 RATIO — SIGNIFICANT CHANGE UP (ref 0.88–1.16)
INR BLD: 1.15 RATIO — SIGNIFICANT CHANGE UP (ref 0.88–1.16)
LIPID PNL WITH DIRECT LDL SERPL: 53 MG/DL — SIGNIFICANT CHANGE UP
MCHC RBC-ENTMCNC: 31.1 PG — SIGNIFICANT CHANGE UP (ref 27–34)
MCHC RBC-ENTMCNC: 31.5 GM/DL — LOW (ref 32–36)
MCV RBC AUTO: 98.5 FL — SIGNIFICANT CHANGE UP (ref 80–100)
NT-PROBNP SERPL-SCNC: HIGH PG/ML (ref 0–300)
PLATELET # BLD AUTO: 220 K/UL — SIGNIFICANT CHANGE UP (ref 150–400)
POTASSIUM SERPL-MCNC: 4.7 MMOL/L — SIGNIFICANT CHANGE UP (ref 3.5–5.3)
POTASSIUM SERPL-SCNC: 4.7 MMOL/L — SIGNIFICANT CHANGE UP (ref 3.5–5.3)
PREALB SERPL-MCNC: 17 MG/DL — LOW (ref 18–38)
PROT SERPL-MCNC: 6.4 G/DL — LOW (ref 6.6–8.7)
PROTHROM AB SERPL-ACNC: 12.4 SEC — SIGNIFICANT CHANGE UP (ref 10.6–13.6)
PROTHROM AB SERPL-ACNC: 13.2 SEC — SIGNIFICANT CHANGE UP (ref 10.6–13.6)
RBC # BLD: 3.96 M/UL — LOW (ref 4.2–5.8)
RBC # FLD: 13.6 % — SIGNIFICANT CHANGE UP (ref 10.3–14.5)
SODIUM SERPL-SCNC: 138 MMOL/L — SIGNIFICANT CHANGE UP (ref 135–145)
TOTAL CHOLESTEROL/HDL RATIO MEASUREMENT: 2 RATIO — LOW (ref 3.4–9.6)
TRIGL SERPL-MCNC: 116 MG/DL — SIGNIFICANT CHANGE UP (ref 10–200)
WBC # BLD: 7.44 K/UL — SIGNIFICANT CHANGE UP (ref 3.8–10.5)
WBC # FLD AUTO: 7.44 K/UL — SIGNIFICANT CHANGE UP (ref 3.8–10.5)

## 2020-10-16 PROCEDURE — 93458 L HRT ARTERY/VENTRICLE ANGIO: CPT | Mod: 26,XU

## 2020-10-16 PROCEDURE — 33208 INSRT HEART PM ATRIAL & VENT: CPT | Mod: KX

## 2020-10-16 PROCEDURE — 92941 PRQ TRLML REVSC TOT OCCL AMI: CPT | Mod: LD

## 2020-10-16 PROCEDURE — 93880 EXTRACRANIAL BILAT STUDY: CPT | Mod: 26

## 2020-10-16 PROCEDURE — 93010 ELECTROCARDIOGRAM REPORT: CPT | Mod: 76

## 2020-10-16 PROCEDURE — 99232 SBSQ HOSP IP/OBS MODERATE 35: CPT | Mod: 25

## 2020-10-16 PROCEDURE — 99152 MOD SED SAME PHYS/QHP 5/>YRS: CPT

## 2020-10-16 RX ORDER — ONDANSETRON 8 MG/1
4 TABLET, FILM COATED ORAL THREE TIMES A DAY
Refills: 0 | Status: DISCONTINUED | OUTPATIENT
Start: 2020-10-16 | End: 2020-10-17

## 2020-10-16 RX ORDER — ACETAMINOPHEN 500 MG
650 TABLET ORAL EVERY 6 HOURS
Refills: 0 | Status: DISCONTINUED | OUTPATIENT
Start: 2020-10-16 | End: 2020-10-17

## 2020-10-16 RX ORDER — CEFAZOLIN SODIUM 1 G
2000 VIAL (EA) INJECTION EVERY 8 HOURS
Refills: 0 | Status: COMPLETED | OUTPATIENT
Start: 2020-10-16 | End: 2020-10-17

## 2020-10-16 RX ADMIN — Medication 25 MILLIGRAM(S): at 17:52

## 2020-10-16 RX ADMIN — Medication 25 MILLIGRAM(S): at 06:02

## 2020-10-16 RX ADMIN — SIMVASTATIN 20 MILLIGRAM(S): 20 TABLET, FILM COATED ORAL at 21:10

## 2020-10-16 RX ADMIN — Medication 100 MILLIGRAM(S): at 21:10

## 2020-10-16 NOTE — CONSULT NOTE ADULT - ASSESSMENT
89 year old man with past medical history of Coronary artery disease, Severe aortic stenosis, Hypertension, Diabetes mellitus and rheumatoid arthritis who presented with shortness of breath, found to have acute systolic congestive heart failure. Patient with new decrement in LV systolic function, also with severe aortic stenosis (which patient had previously). Hospital course also complicated by episodes of complete heart block. On 10/16/2020 went to cath lab for 3 stents as well as left subclavian PPM.  Now being seen for Possible TAVR intervention for Severe Aortic valve stenosis.

## 2020-10-16 NOTE — CONSULT NOTE ADULT - ATTENDING COMMENTS
The patient was personally seen and examined. Echocardiogram and cardiac catheterization were personally reviewed. With the patient's advanced age, he is an appropriate candidate for transcatheter and upper placement. The transcatheter procedure was discussed in detail the patient. He is scheduled for discharge home tomorrow. We'll arrange for outpatient transcatheter workup including CT angiogram of the chest abdomen and pelvis. Thereafter, he will be scheduled for transcatheter aortic valve replacement with both myself and Dr. Doshi.    Risks benefits and alternatives to transcatheter aortic valve placement were discussed with the patient in detail.  Risks discussed included, but not limited to infection, bleeding, myocardial infarction, cerebrovascular accident, renal failure,  vascular injury requiring intervention, cardiac rupture, and death.  .   Furthermore, the patient's STS score and its significance were discussed directly with the patient and family.  I would like to thank you for referring this patient to my attention and for allowing me to participate in his care.  If there are any further questions, or I can be of any further assistance, please do not hesitate contacting me at any time.

## 2020-10-16 NOTE — PROGRESS NOTE ADULT - SUBJECTIVE AND OBJECTIVE BOX
Cardiology NP post procedure note:    -s/p RHC via RFV #7 fr sheath: RV 35/1/9, PCW 17/14/14, PA 37/15/23, RA 8/7/6, PA sat 65%; s/p Rotational artherectomy and LESLYE x 1 (Synergy 2.25x64pj) to pLAD and LESLYE x 2 to mLAD (Synergy 3.0x8mm and Synergy 3.0x20mm) via RFA #6Fr sheath by Dr. Doshi (prelim result; official report to follow)    EKG post PCI: NSR 65 bpm LVH without ST elevations/depressions  TELE: NSR 60s    MEDICATIONS  (STANDING):  aspirin  chewable 81 milliGRAM(s) Oral daily  clopidogrel Tablet 75 milliGRAM(s) Oral daily  dextrose 5%. 1000 milliLiter(s) (50 mL/Hr) IV Continuous <Continuous>  dextrose 50% Injectable 12.5 Gram(s) IV Push once  dextrose 50% Injectable 25 Gram(s) IV Push once  dextrose 50% Injectable 25 Gram(s) IV Push once  insulin lispro (HumaLOG) corrective regimen sliding scale   SubCutaneous three times a day before meals  insulin lispro (HumaLOG) corrective regimen sliding scale   SubCutaneous at bedtime  metoprolol succinate ER 25 milliGRAM(s) Oral every 12 hours  simvastatin 20 milliGRAM(s) Oral at bedtime  sodium chloride 0.9%. 1000 milliLiter(s) (50 mL/Hr) IV Continuous <Continuous>    MEDICATIONS  (PRN):  dextrose 40% Gel 15 Gram(s) Oral once PRN Blood Glucose LESS THAN 70 milliGRAM(s)/deciliter  glucagon  Injectable 1 milliGRAM(s) IntraMuscular once PRN Glucose LESS THAN 70 milligrams/deciliter      Allergies:  No Known Allergies      PAST MEDICAL & SURGICAL HISTORY:  Stented coronary artery  1    High cholesterol    HTN (hypertension)    Diabetes    Gout  h/o    RA (rheumatoid arthritis)    Osteoarthrosis    Ulcer disease  stomach    Diabetes mellitus  type II    Kidney stones    BPH (benign prostatic hypertrophy)    Hypertension    CAD (coronary artery disease)    Status post hip surgery    Cataract  b/l 2001 &amp; 2002    History of cystoscopy  TURP 2010    Kidney stone  had lithotripsy in 1983 &amp; 2010    Kidney stone  removed about 50 years ago    S/P angioplasty with stent  2011 - 1 coronary stent    S/P knee replacement  right in January 1/28/13 &amp; left 12/13/13        Vital Signs Last 24 Hrs  T(C): 36.6 (16 Oct 2020 06:29), Max: 36.7 (15 Oct 2020 13:51)  T(F): 97.8 (16 Oct 2020 06:29), Max: 98.1 (15 Oct 2020 13:51)  HR: 61 (16 Oct 2020 09:45) (59 - 80)  BP: 123/56 (16 Oct 2020 09:45) (105/54 - 145/69)  BP(mean): 94 (15 Oct 2020 18:03) (71 - 94)  RR: 16 (16 Oct 2020 09:45) (16 - 18)  SpO2: 100% (16 Oct 2020 09:45) (94% - 100%)    Physical Exam:  Constitutional: NAD, AAOx3  Cardiovascular: +S1S2 RRR  Pulmonary: CTA b/l, unlabored  GI: soft NTND +BS  Extremities: no pedal edema, +distal pulses b/l  Neuro: non focal, DONAHUE x4  Procedure site: RFA #6fr sheath sutured in place and RFV #7fr sheath immobilized in place; sites benign without hematoma/bleeding; + right PP    LABS:                        13.9   7.91  )-----------( 262      ( 15 Oct 2020 08:26 )             43.8     10-15    139  |  101  |  34.0<H>  ----------------------------<  138<H>  4.3   |  24.0  |  1.80<H>    Ca    9.1      15 Oct 2020 16:54  Mg     1.9     10-15        RADIOLOGY & ADDITIONAL TESTS:

## 2020-10-16 NOTE — CONSULT NOTE ADULT - SUBJECTIVE AND OBJECTIVE BOX
Surgeon: Dr. Jett        HISTORY OF PRESENT ILLNESS:  89 year old man with past medical history of Coronary artery disease, Severe aortic stenosis, Hypertension, Diabetes mellitus and rheumatoid arthritis.  He is a very active gentelmen who works in his garden outside often. He explained that he had been having to stop more often to catch his breath doing his ormal activity.  SOB releived with rest.  He presented to [facility] on 10/13/20 with increased shortness of breath, and increase in lower extremity edema.  He was found to have acute systolic congestive heart failure. Patient with new decrement in LV systolic function, also with severe aortic stenosis (which patient had previously). Hospital course also complicated by episodes of complete heart block. Had Cath on 10/14 and plan was made for high risk stent as well as possible TAVR in future.  Brought to Cath lab on 10/16/20 for 3 cardiac stents placed as well as PPM  (information from cath lab recovery, report not in chart yet.)  Now being evaluated for TAVR.    Currently denies and fevers, chills, SOB, chest pain Nausea or vomiting.        PAST MEDICAL & SURGICAL HISTORY:  Stented coronary artery  1    High cholesterol    HTN (hypertension)    Diabetes    Gout  h/o    RA (rheumatoid arthritis)    Osteoarthrosis    Ulcer disease  stomach    Diabetes mellitus  type II    Kidney stones    BPH (benign prostatic hypertrophy)    Hypertension    CAD (coronary artery disease)    Status post hip surgery    Cataract  b/l 2001 &amp; 2002    History of cystoscopy  TURP 2010    Kidney stone  had lithotripsy in 1983 &amp; 2010    Kidney stone  removed about 50 years ago    S/P angioplasty with stent  2011 - 1 coronary stent    S/P knee replacement  right in January 1/28/13 &amp; left 12/13/13        MEDICATIONS  (STANDING):  aspirin  chewable 81 milliGRAM(s) Oral daily  ceFAZolin   IVPB 2000 milliGRAM(s) IV Intermittent every 8 hours  clopidogrel Tablet 75 milliGRAM(s) Oral daily  insulin lispro (HumaLOG) corrective regimen sliding scale   SubCutaneous three times a day before meals  insulin lispro (HumaLOG) corrective regimen sliding scale   SubCutaneous at bedtime  metoprolol succinate ER 25 milliGRAM(s) Oral every 12 hours  simvastatin 20 milliGRAM(s) Oral at bedtime  sodium chloride 0.9%. 1000 milliLiter(s) (50 mL/Hr) IV Continuous <Continuous>    MEDICATIONS  (PRN):  acetaminophen   Tablet .. 650 milliGRAM(s) Oral every 6 hours PRN Mild Pain (1 - 3)  dextrose 40% Gel 15 Gram(s) Oral once PRN Blood Glucose LESS THAN 70 milliGRAM(s)/deciliter  glucagon  Injectable 1 milliGRAM(s) IntraMuscular once PRN Glucose LESS THAN 70 milligrams/deciliter  ondansetron Injectable 4 milliGRAM(s) IV Push three times a day PRN Nausea and/or Vomiting        Allergies    No Known Allergies    Intolerances        SOCIAL HISTORY:  Smoker: [x ] Yes  [ ] No        PACK YEARS:                         WHEN QUIT?1988  ETOH use: [x ] Yes  [ ] No              FREQUENCY / QUANTITY: 1 glass of red wine nightly  Ilicit Drug use:  [ ] Yes  [x ] No  Occupation: retired  Live with: wife and Daughter  Assisted device use:cane    FAMILY HISTORY:  No pertinent family history in first degree relatives        Review of Systems  CONSTITUTIONAL:  Fevers[ ] chills[ ] sweats[ ] fatigue[ ] weight loss[ ] weight gain [ ]                                     NEGATIVE [x ]   NEURO:  parathesias[ ] seizures [ ]  syncope [ ]  confusion [ ]                                                                                NEGATIVE[x ]   EYES: glasses[ ]  blurry vision[ ]  discharge[ ] pain[ ] glaucoma [ ]                                                                          NEGATIVE[x ]   ENMT:  difficulty hearing [x ]  vertigo[ ]  dysphagia[ ] epistaxis[ ] recent dental work [ ]                                    NEGATIVE[ ]   CV:  chest pain[ ] palpitations[ ] LACEY [ ] diaphoresis [ ]                                                                                           NEGATIVE[x ]   RESPIRATORY:  wheezing[ ] SOB[x ] cough [ ] sputum[ ] hemoptysis[ ]                                                                  NEGATIVE[ ]   GI:  nausea[ ]  vommiting [ ]  diarrhea[ ] constipation [ ] melena [ ]                                                                      NEGATIVE[x ]   : hematuria[ ]  dysuria[ ] urgency[ ] incontinence[ ]                                                                                            NEGATIVE[x ]   MUSKULOSKELETAL:  arthritis[x ]  joint swelling [ ] muscle weakness [ ]                                                                NEGATIVE[ ]   SKIN/BREAST:  rash[ ] itching [ ]  hair loss[ ] masses[ ]                                                                                              NEGATIVE[x ]   PSYCH:  dementia [ ] depresion [ ] anxiety[ ]                                                                                                               NEGATIVE[x ]   HEME/LYMPH:  bruises easily[ ] enlarged lymph nodes[ ] tender lymph nodes[ ]                                               NEGATIVE[x ]   ENDOCRINE:  cold intolerance[ ] heat intolerance[ ] polydipsia[ ]                                                                          NEGATIVE[x ]     PHYSICAL EXAM  Vital Signs Last 24 Hrs  T(C): 36.6 (16 Oct 2020 06:29), Max: 36.7 (15 Oct 2020 16:05)  T(F): 97.8 (16 Oct 2020 06:29), Max: 98.1 (15 Oct 2020 16:05)  HR: 66 (16 Oct 2020 14:45) (59 - 79)  BP: 104/48 (16 Oct 2020 14:45) (104/48 - 145/69)  BP(mean): 94 (15 Oct 2020 18:03) (94 - 94)  RR: 18 (16 Oct 2020 14:45) (16 - 18)  SpO2: 94% (16 Oct 2020 14:45) (94% - 100%)    CONSTITUTIONAL:                                                                          WNL[ ]   Neuro: WNL[x ] Normal exam oriented to person/place & time with no focal motor or sensory  deficits.                     Eyes: WNL[x ]   Normal exam of conjunctiva & lids, pupils equally reactive.    ENT: WNL[x ]    Normal exam of nasal/oral mucosa with absence of cyanosis.   Neck: WNL[x ]  Normal exam of jugular veins, trachea & thyroid.   Chest:  Normal lung exam with good air movement decreased at bases                                                                             CV:  Regular rate and rhythm, + murmur                                                                          GI: WNL[x ] Normal exam of abdomen, + BS, Non tender/                                                                                                   Extremities: Trace B/L edema in lower extremities.  Pressure dressing to left subclavian S/P PPM  Lower Extremity Pulses: B/L PT/Dp +1 B/L radial +1                                                            LABS:                        13.9   7.91  )-----------( 262      ( 15 Oct 2020 08:26 )             43.8     10-15    139  |  101  |  34.0<H>  ----------------------------<  138<H>  4.3   |  24.0  |  1.80<H>    Ca    9.1      15 Oct 2020 16:54  Mg     1.9     10-15      PT/INR - ( 16 Oct 2020 10:29 )   PT: 13.2 sec;   INR: 1.15 ratio         PTT - ( 16 Oct 2020 10:29 )  PTT:102.1 sec          Serum Pro-Brain Natriuretic Peptide: 80375 pg/mL (10-13-20 @ 17:01)        Cardiac Cath:ath< from: Cardiac Cath Lab - Adult (10.14.20 @ 18:30) >  VENTRICLES: No LV gram was performed; however, a recent echocardiogram  demonstrated an EF of 40 %.  CORONARY VESSELS: The coronary circulation is right dominant.  LM:   --  LM: 20-30% disease.  LAD:   --  Mid LAD: There was a 95 % stenosis. The lesion was heavily  calcified.  CX:   --  OM1: There was a 50 % stenosis.  RCA:   --  Proximal RCA: There was a 70 % stenosis in-stent.  COMPLICATIONS: No complications occurred during the cath lab visit.  DIAGNOSTIC IMPRESSIONS: Severe mid LAD and RCA disease. Intermediate syntax  score.  Severe AS by TTE.  DIAGNOSTIC RECOMMENDATIONS: Considering abnormal cardiac enzymes, wall  motion abnormality in the anterior wall, plan for high risk PCI of LAD.  Deferred due to low GFR.  Patient also need PPM due to high grade AV block. In addition, TAVR  evaluation.  Not a surgical candidate due to advanced age.  Will plan for PCI of LAD after renal recovery.  Prepared and signed by  Elvin Doshi MD      < end of copied text >      TTE / CONNIE:    < from: TTE Echo Complete w/o Contrast w/ Doppler (10.13.20 @ 20:56) >  Summary:   1. Left ventricular ejection fraction, by visual estimation, is 40 to 45%.   2. Mildly to moderately decreased global left ventricular systolic function.   3. Multiple left ventricular regional wall motion abnormalities exist. See wall motion findings.   4. Severely enlarged left atrium.   5. Spectral Doppler shows impaired relaxation pattern of left ventricular myocardial filling (Grade I diastolic dysfunction).   6. Normal right atrial size.   7. There is no evidence of pericardial effusion.   8. Mild thickening of the anterior and posterior mitral valve leaflets.   9. Mild to moderate mitral valve regurgitation.  10. Peak transmitral mean gradient equals 3.0 mmHg, calculated mitral valve area by pressure half time equals 1.77 cm² consistent with mild mitral stenosis.  11. Severe mitral annular calcification.  12. Mild-moderate tricuspid regurgitation.  13. Mild aortic regurgitation.  14. Severe aortic valve stenosis.  15. Estimated pulmonary artery systolic pressure is 40.5 mmHg assuming a right atrial pressure of 3 mmHg, which is consistent with mild pulmonary hypertension.  16. Endocardial visualization was enhanced with intravenous echo contrast.  17. Mitral valve mean gradient is 3.0 mmHg consistent with mild mitral stenosis.  18. Peak transaortic gradient equals 73.3 mmHg, mean transaortic gradient equals 44.2 mmHg, the calculated aortic valve area equals 0.60 cm² by the continuity equation consistent with severe aortic stenosis.  19. The aortic valve mean gradient is 44.2 mmHg consistent with severe aortic stenosis.    MD Puma Electronically signed on 10/14/2020 at 10:18:58 AM      < end of copied text >    xray< from: Xray Chest 2 Views PA/Lat (10.13.20 @ 17:26) >  PROCEDURE DATE:  10/13/2020          INTERPRETATION:  Chest 2 views    HISTORY: Chest pain    COMPARISON: 7/27/2015    Frontal and lateral views of the chest were obtained with suboptimal inspiration. With allowance for this, the heart is enlarged in size and the lungs show moderate central congestion with small pleural effusions. There is no evidence of pneumothorax.    IMPRESSION: Congestive changes as noted.        < end of copied text >

## 2020-10-16 NOTE — CONSULT NOTE ADULT - PROBLEM SELECTOR RECOMMENDATION 3
Undergoing evaluation for possible TAVR in future  Carotid duplex ordered as well as PFT's  Will need out patient TAVR CT in the future.  Patient can be discharged as per primary team and will need follow up with Dr Jett for TAVR.  Office number [phone].

## 2020-10-16 NOTE — PROGRESS NOTE ADULT - SUBJECTIVE AND OBJECTIVE BOX
PROCEDURE(S): Dual Chamber [employer] Pacemaker Implant    ELECTROPHYSIOLOGIST(S): Adonis Kaur MD    COMPLICATIONS:  none          DISPOSITION:  Observation Unit           CONDITION: Stable    Pt doing well s/p DC BSCI PPM implant to LACW via cephalic cutdown. DDD 60-115bpm.  Denies complaint. Pt also s/p LESLYE x 3 via RFA this am (pLAD & mLAD).    Exam:   T(C): 36.6 (10-16-20 @ 06:29), Max: 36.7 (10-15-20 @ 16:05)  HR: 66 (10-16-20 @ 14:45) (59 - 79)  BP: 104/48 (10-16-20 @ 14:45) (104/48 - 145/69)  RR: 18 (10-16-20 @ 14:45) (16 - 18)  SpO2: 94% (10-16-20 @ 14:45) (94% - 100%)    VSS, NAD, A&O x 3  LACW PPM site: +pressure dsg C/D/I; no bleeding, hematoma, erythema or edema  Card: S1/S2, RRR, +IV/VI ROB radiation to carotids   Resp: lungs CTA b/l    ECG: SR 68 bpm, prolonged AV delay, narrow QRS 95msec, TWI V1-V3, present since admission   TTE 10/13/20: EF 40-45%, +WMA, enlarged LA, grade I DD, mild-mod MR, mild-mod TR, severe AS    Assessment: 89 year old male patient with a known history of HTN, RA, DM-2, CAD s/p prior PCI, severe aortic stenosis, admitted 10/13/20 with acute on chronic CHF ( EF 40-45%). While on telemetry pt noted to have intermittent sinus pauses and intermittent high grade AV block. Pt s/p R&LHC today 10/16/20 s/p PCI x 3 (pLAD/mLAD), plan for outpt TAVR. Now s/p uncomplicated DC BSCI PPM to LACW via cephalic cutdown. DDD 60-115bpm.        Plan:   Continued observation on telemetry overnight.   Bedrest x 4 hours post implant, then OOB w/ assist & progress as tolerated.    Cont Ancef 2gm IV q 8 hours x 2 additional doses to complete 24 hour course.   Pain control with PO analgesia PRN.   NO HEPARIN OR LOVENOX, INCLUDING PROPHYLACTIC/SUBCUT DOSING, UNTIL OTHERWISE ADVISED BY EP.   ECG, Labs, PA/Lat CXR and device check in AM.   EP PA to remove pressure dsg in am, Keep site dry x 48 hours, no lifting affected arm over shoulder x 6 weeks  Continued plan per general Medical and Cardiology team   Continue ASA, Plavix, metoprolol, simvastatin   No ACEI/ARB due to AS  Outpt f/up in 1-2 weeks for device and site check, sooner if needed.    ROOSEVELT Kaur

## 2020-10-16 NOTE — PROGRESS NOTE ADULT - ASSESSMENT
A/P: Morales Nichols is an 89 year old man with past medical history of Coronary artery disease, Severe aortic stenosis, Hypertension, Diabetes mellitus and rheumatoid arthritis who presented with shortness of breath, found to have acute systolic congestive heart failure and elevated troponin.   Patient with new decrement in LV systolic function, also with severe aortic stenosis (which patient had previously). Hospital course also complicated by episodes of complete heart block.  LHC 10/14/20 revealed obstructive disease in mid-LAD and in-stent RCA disease. Plan was for PCI when Cr stabilizes (baseline appears ~1.8 on review of outpatient charts).  Today he is s/p  RHC via RFV #7 fr sheath: RV 35/1/9, PCW 17/14/14, PA 37/15/23, RA 8/7/6, PA sat 65%; s/p Rotational artherectomy and LESLYE x 1 (Synergy 2.25t57sn) to pLAD and LESLYE x 2 to mLAD (Synergy 3.0x8mm and Synergy 3.0x20mm) via RFA #6Fr sheath by Dr. Ruggiero (prelim result; official report to follow).  -Bedrest while sheaths in place  -Discontinue sheaths when ACT <= 180--will check 2 hours post procedure ( intraprocedure)  -Meds: Continue ASA/plavix/statin/beta blocker as dosed (as per Dr. Iyer)  -Creatine stable at 1.8--gentle hydration overnight, intra and post procedure for 4-6 hours (Wedge 14)  -Continue to hold diuretics   -Severe aortic stenosis: Likely plan for TAVR given underlying comorbidities and patient age.   -CHB:  Plan is for PPM placement this afternoon   -Maintain NPO status  -Discussed with Dr. Ruggiero and EP staff             A/P: Morales Nichols is an 89 year old man with past medical history of Coronary artery disease, Severe aortic stenosis, Hypertension, Diabetes mellitus and rheumatoid arthritis who presented with shortness of breath, found to have acute systolic congestive heart failure and elevated troponin.   Patient with new decrement in LV systolic function, also with severe aortic stenosis (which patient had previously). Hospital course also complicated by episodes of complete heart block.  LHC 10/14/20 revealed obstructive disease in mid-LAD and in-stent RCA disease. Plan was for PCI when Cr stabilizes (baseline appears ~1.8 on review of outpatient charts).  Today he is s/p  RHC via RFV #7 fr sheath: RV 35/1/9, PCW 17/14/14, PA 37/15/23, RA 8/7/6, PA sat 65%; s/p Rotational artherectomy and LESLYE x 1 (Synergy 2.48n47ny) to pLAD and LESLYE x 2 to mLAD (Synergy 3.0x8mm and Synergy 3.0x20mm) via RFA #6Fr sheath by Dr. Ruggiero (prelim result; official report to follow).  -Bedrest while sheaths in place  -Discontinue sheaths when ACT <= 180--will check 2 hours post procedure ( intraprocedure)  -Meds: Continue ASA/plavix/statin/beta blocker as dosed (as per Dr. Iyer)  -Creatine stable at 1.8--gentle hydration overnight, intra and post procedure for 4-6 hours (Wedge 14)  -Continue to hold diuretics   -Severe aortic stenosis: Likely plan for TAVR given underlying comorbidities and patient age.   -CHB:  Plan is for PPM placement this afternoon   -Maintain NPO status  -Discussed with Dr. Ruggiero and EP staff    Cardiology NP addendum:   -RFA/V sheaths removed--Manual compression held x 20 minutes with hemostasis obtained--4x4 and tegaderm dressing placed; no bruit; + right PP  -Neurovascular checks to RLE with frequent VS as ordered  -Bedrest x 6 hours post removal of sheath until 1730 then OOB as tolerated  -Patient may sit up 4 hours after removal of sheath at 1530          A/P: Morales Nichols is an 89 year old man with past medical history of Coronary artery disease, Severe aortic stenosis, Hypertension, Diabetes mellitus and rheumatoid arthritis who presented with shortness of breath, found to have acute systolic congestive heart failure and elevated troponin.   Patient with new decrement in LV systolic function, also with severe aortic stenosis (which patient had previously). Hospital course also complicated by episodes of complete heart block.  LHC 10/14/20 revealed obstructive disease in mid-LAD and in-stent RCA disease. Plan was for PCI when Cr stabilizes (baseline appears ~1.8 on review of outpatient charts).  Today he is s/p  RHC via RFV #7 fr sheath: RV 35/1/9, PCW 17/14/14, PA 37/15/23, RA 8/7/6, PA sat 65%; s/p Rotational artherectomy and LESLYE x 1 (Synergy 2.49n65tr) to pLAD and LESLYE x 2 to mLAD (Synergy 3.0x8mm and Synergy 3.0x20mm) via RFA #6Fr sheath by Dr. Ruggiero (prelim result; official report to follow).  -Bedrest while sheaths in place  -Discontinue sheaths when ACT <= 180--will check 2 hours post procedure ( intraprocedure)  -Meds: Continue ASA/plavix/statin/beta blocker as dosed (as per Dr. Iyer)  -cardiac rehab info provided/referral and communication to cardiac rehab completed  -Creatine stable at 1.8--gentle hydration overnight, intra and post procedure for 4-6 hours (Wedge 14)  -Continue to hold diuretics   -Severe aortic stenosis: Likely plan for TAVR given underlying comorbidities and patient age.   -CHB:  Plan is for PPM placement this afternoon   -Maintain NPO status  -Discussed with Dr. Ruggiero and EP staff    Cardiology NP addendum:   - at 1100: RFA/V sheaths removed--Manual compression held x 20 minutes with hemostasis obtained--4x4 and tegaderm dressing placed; no bruit; + right PP  -Neurovascular checks to RLE with frequent VS as ordered  -Bedrest x 6 hours post removal of sheath until 1730 then OOB as tolerated  -Patient may sit up 4 hours after removal of sheath at 1530

## 2020-10-16 NOTE — PROGRESS NOTE ADULT - SUBJECTIVE AND OBJECTIVE BOX
Patient is a 89y old  Male who presents with a chief complaint of CHF exacerbation (14 Oct 2020 09:48)        CARDIOVASCULAR:  furosemide   Injectable 40 milliGRAM(s) IV Push every 12 hours  metoprolol succinate ER 25 milliGRAM(s) Oral every 12 hours      HEMATOLOGIC:  aspirin  chewable 81 milliGRAM(s) Oral daily  clopidogrel Tablet 75 milliGRAM(s) Oral daily  heparin   Injectable 5000 Unit(s) SubCutaneous every 8 hours        ENDO/METABOLIC:  dextrose 40% Gel 15 Gram(s) Oral once PRN  dextrose 50% Injectable 12.5 Gram(s) IV Push once  dextrose 50% Injectable 25 Gram(s) IV Push once  dextrose 50% Injectable 25 Gram(s) IV Push once  glucagon  Injectable 1 milliGRAM(s) IntraMuscular once PRN  insulin lispro (HumaLOG) corrective regimen sliding scale   SubCutaneous three times a day before meals  insulin lispro (HumaLOG) corrective regimen sliding scale   SubCutaneous at bedtime  simvastatin 20 milliGRAM(s) Oral at bedtime    IV FLUID/NUTRITION:  dextrose 5%. 1000 milliLiter(s) IV Continuous <Continuous>          Allergies    No Known Allergies          Vital Signs Last 24 Hrs  T(C): 36.6 (16 Oct 2020 06:29), Max: 36.8 (15 Oct 2020 09:35)  T(F): 97.8 (16 Oct 2020 06:29), Max: 98.2 (15 Oct 2020 09:35)  HR: 67 (16 Oct 2020 06:29) (65 - 80)  BP: 119/56 (16 Oct 2020 06:29) (105/54 - 145/69)  BP(mean): 94 (15 Oct 2020 18:03) (71 - 94)  RR: 18 (16 Oct 2020 06:29) (18 - 18)  SpO2: 97% (16 Oct 2020 06:29) (94% - 98%)              REVIEW OF SYSTEMS:    CONSTITUTIONAL: No fever, weight loss, or fatigue  EYES: No eye pain, visual disturbances, or discharge  ENMT:  No difficulty hearing, tinnitus, vertigo; No sinus or throat pain  NECK: No pain or stiffness  RESPIRATORY: + shortness of breath  CARDIOVASCULAR: No chest pain, palpitations, dizziness, or leg swelling  GASTROINTESTINAL: No abdominal or epigastric pain. No nausea, vomiting, or hematemesis; No diarrhea or constipation. No melena or hematochezia.  EXT: + ankle edema   NEUROLOGICAL: No headaches, memory loss, loss of strength, numbness, or tremors  SKIN: No itching, burning, rashes, or lesions   LYMPH NODES: No enlarged glands  MUSCULOSKELETAL: No joint pain or swelling; No muscle, back, or extremity pain        PHYSICAL EXAM:    GENERAL: NAD, well-groomed, well-developed  HEAD:  Atraumatic, Normocephalic  EYES: EOMI, PERRLA, conjunctiva and sclera clear  NERVOUS SYSTEM:  Alert & Oriented X3, Good concentration;   CHEST/LUNG: clear  no SOB   HEART: Regular   ABDOMEN: Soft, Nontender, Nondistended; Bowel sounds present  EXTREMITIES:  2+ Peripheral Pulses, No clubbing, cyanosis, or edema  LYMPH: No lymphadenopathy noted        LABS:                          13.9   7.91  )-----------( 262      ( 15 Oct 2020 08:26 )             43.8       10-15    139  |  101  |  34.0<H>  ----------------------------<  138<H>  4.3   |  24.0  |  1.80<H>    Ca    9.1      15 Oct 2020 16:54  Phos  2.9     10-14  Mg     1.9     10-15                      Albumin, Serum: 4.0 g/dL (10-13 @ 17:01)    LIVER FUNCTIONS - ( 13 Oct 2020 17:01 )  Alb: 4.0 g/dL / Pro: 7.6 g/dL / ALK PHOS: 51 U/L / ALT: 31 U/L / AST: 22 U/L / GGT: x             RADIOLOGY & ADDITIONAL TESTS:    < from: Xray Chest 2 Views PA/Lat (10.13.20 @ 17:26) >  IMPRESSION: Congestive changes as noted.    < end of copied text >

## 2020-10-16 NOTE — PROGRESS NOTE ADULT - ASSESSMENT
89 y/o male with PMH of CAD s/p stent, HTN, RA, DM-2 came to the ED complaining of increasing shortness of breath. Patient said it has been going on for few weeks now, he saw his PCP recently who changed his Lasix from daily to every other day. He noted associated orthopnea making it difficult to sleep at night and b/l LE edema. He has no chest pain, palpitation, diaphoresis, nausea, vomiting, abdominal pain, change in bowel/urinary habit, dizziness, HA, recent travel, calf pain.     1. Acute on chronic CHF exacerbation   Troponin: 0.07; BNP: > 16K   Lasix 40mg bid, monitor renal function  Echo ordered   Cardiology Dr ZACHARY Corona        2. Severe AS - for TAVR     3. CAD - severe prox occlusion of LAD  and 60-70% fo RCA  for PCI on Oct 16, 2020    4. CKD-3   Stable (prior seen in [facility])   Monitor renal function while on diuretic         5. HTN  Metoprolol ER 25mg bid with holding parameters     6, HLD   continue Zocor     7. DM-2  Hold PO medications   Insulin sliding scale     8. RA/OP  Humira q 2weeks (next dose on Friday)  Prolia q 6months

## 2020-10-16 NOTE — PROGRESS NOTE ADULT - SUBJECTIVE AND OBJECTIVE BOX
BEVERLY THOMAS  [id number]      Chief Complaint: Coronary artery disease/Severe aortic stenosis/HFrEF      Interval History: The patient was examined in cath lab holding area, had PCI to LAD this morning. Reports feeling ok.       Current meds:   aspirin  chewable 81 milliGRAM(s) Oral daily  clopidogrel Tablet 75 milliGRAM(s) Oral daily  dextrose 40% Gel 15 Gram(s) Oral once PRN  dextrose 5%. 1000 milliLiter(s) IV Continuous <Continuous>  dextrose 50% Injectable 12.5 Gram(s) IV Push once  dextrose 50% Injectable 25 Gram(s) IV Push once  dextrose 50% Injectable 25 Gram(s) IV Push once  glucagon  Injectable 1 milliGRAM(s) IntraMuscular once PRN  insulin lispro (HumaLOG) corrective regimen sliding scale   SubCutaneous three times a day before meals  insulin lispro (HumaLOG) corrective regimen sliding scale   SubCutaneous at bedtime  metoprolol succinate ER 25 milliGRAM(s) Oral every 12 hours  simvastatin 20 milliGRAM(s) Oral at bedtime  sodium chloride 0.9%. 1000 milliLiter(s) IV Continuous <Continuous>      Objective:     Vital Signs:   T(C): 36.6 (10-16-20 @ 06:29), Max: 36.7 (10-15-20 @ 13:51)  HR: 69 (10-16-20 @ 12:30) (59 - 80)  BP: 138/63 (10-16-20 @ 12:30) (105/54 - 145/69)  RR: 18 (10-16-20 @ 12:30) (16 - 18)  SpO2: 98% (10-16-20 @ 12:30) (94% - 100%)  Wt(kg): --    Physical Exam:   General: elderly man, no distress  Neck: supple, radiated systolic murmur present  CVS: JVP ~ 10 cm H20, RRR, s1, obliteration of s2, harsh 4/6 systolic ejection murmur at RUSB, radiates to carotids  Pulm: unlabored respirations, mostly clear   Abd: non-distended  Ext: trace lower extremity edema b/l   Neuro A&O x3  Psych: Normal affect        Labs:   15 Oct 2020 16:54    139    |  101    |  34.0   ----------------------------<  138    4.3     |  24.0   |  1.80     Ca    9.1        15 Oct 2020 16:54  Mg     1.9       15 Oct 2020 08:26                            13.9   7.91  )-----------( 262      ( 15 Oct 2020 08:26 )             43.8     PT/INR - ( 16 Oct 2020 10:29 )   PT: 13.2 sec;   INR: 1.15 ratio         PTT - ( 16 Oct 2020 10:29 )  PTT:102.1 sec      ECG (10/13/2020): normal sinus rhythm, LVH with repolarization abnormality, nonspecific ST/T wave changes     TTE (10/13/2020):   1. Left ventricular ejection fraction, by visual estimation, is 40 to 45%.   2. Mildly to moderately decreased global left ventricular systolic function.   3. Multiple left ventricular regional wall motion abnormalities exist. See wall motion findings.   4. Severely enlarged left atrium.   5. Spectral Doppler shows impaired relaxation pattern of left ventricular myocardial filling (Grade I diastolic dysfunction).   6. Normal right atrial size.   7. There is no evidence of pericardial effusion.   8. Mild thickening of the anterior and posterior mitral valve leaflets.   9. Mild to moderate mitral valve regurgitation.  10. Peak transmitral mean gradient equals 3.0 mmHg, calculated mitral valve area by pressure half time equals 1.77 cm² consistent with mild mitral stenosis.  11. Severe mitral annular calcification.  12. Mild-moderate tricuspid regurgitation.  13. Mild aortic regurgitation.  14. Severe aortic valve stenosis.  15. Estimated pulmonary artery systolic pressure is 40.5 mmHg assuming a right atrial pressure of 3 mmHg, which is consistent with mild pulmonary hypertension.  16. Endocardial visualization was enhanced with intravenous echo contrast.  17. Mitral valve mean gradient is 3.0 mmHg consistent with mild mitral stenosis.  18. Peak transaortic gradient equals 73.3 mmHg, mean transaortic gradient equals 44.2 mmHg, the calculated aortic valve area equals 0.60 cm² by the continuity equation consistent with severe aortic stenosis.  19. The aortic valve mean gradient is 44.2 mmHg consistent with severe aortic stenosis.      Cardiac Cath (10/14/2020):  VENTRICLES: No LV gram was performed; however, a recent echocardiogram  demonstrated an EF of 40 %.  CORONARY VESSELS: The coronary circulation is right dominant.  LM:   --  LM: 20-30% disease.  LAD:   --  Mid LAD: There was a 95 % stenosis. The lesion was heavily  calcified.  CX:   --  OM1: There was a 50 % stenosis.  RCA:   --  Proximal RCA: There was a 70 % stenosis in-stent.  COMPLICATIONS: No complications occurred during the cath lab visit.  DIAGNOSTIC IMPRESSIONS: Severe mid LAD and RCA disease. Intermediate syntax  score.  Severe AS by TTE.  DIAGNOSTIC RECOMMENDATIONS: Considering abnormal cardiac enzymes, wall  motion abnormality in the anterior wall, plan for high risk PCI of LAD.  Deferred due to low GFR.  Patient also need PPM due to high grade AV block. In addition, TAVR  evaluation.  Not a surgical candidate due to advanced age.  Will plan for PCI of LAD after renal recovery.

## 2020-10-16 NOTE — PROGRESS NOTE ADULT - ASSESSMENT
Assessment:  Morales Nichols is an 89 year old man with past medical history of Coronary artery disease, Severe aortic stenosis, Hypertension, Diabetes mellitus and rheumatoid arthritis who presented with shortness of breath, found to have acute systolic congestive heart failure. Patient with new decrement in LV systolic function, also with severe aortic stenosis (which patient had previously). Hospital course also complicated by episodes of complete heart block, so will require permanent pacemaker implantation.     Would recommend the following:     Recommendations:  [] Acute systolic congestive heart failure: Is euvolemic, PCWP of 14 on cardiac cath today. No further diuretics needed at this time. Continue beta blocker.   [] Severe aortic stenosis: Echo images reviewed and dimensionless index < 0.25. Likely plan for TAVR given underlying comorbidities and patient age.   [] Coronary artery disease: S/p PCI to LAD this morning and plan for medical management of RCA disease per Interventional cardiology, patient reports feeling fine after procedure. Continue aspirin, clopidogrel, statin, metoprolol.   [] Complete heart block: Permanent pacemaker placement today per Cardiac EP.    Thank you for the consult. We will continue to follow along.    Nasra Iyer MD  Cardiology

## 2020-10-17 ENCOUNTER — TRANSCRIPTION ENCOUNTER (OUTPATIENT)
Age: 85
End: 2020-10-17

## 2020-10-17 VITALS
SYSTOLIC BLOOD PRESSURE: 122 MMHG | TEMPERATURE: 98 F | HEART RATE: 72 BPM | RESPIRATION RATE: 18 BRPM | DIASTOLIC BLOOD PRESSURE: 65 MMHG | OXYGEN SATURATION: 94 %

## 2020-10-17 LAB
ANION GAP SERPL CALC-SCNC: 14 MMOL/L — SIGNIFICANT CHANGE UP (ref 5–17)
APPEARANCE UR: CLEAR — SIGNIFICANT CHANGE UP
BACTERIA # UR AUTO: NEGATIVE — SIGNIFICANT CHANGE UP
BASOPHILS # BLD AUTO: 0.03 K/UL — SIGNIFICANT CHANGE UP (ref 0–0.2)
BASOPHILS NFR BLD AUTO: 0.3 % — SIGNIFICANT CHANGE UP (ref 0–2)
BILIRUB UR-MCNC: NEGATIVE — SIGNIFICANT CHANGE UP
BUN SERPL-MCNC: 27 MG/DL — HIGH (ref 8–20)
CALCIUM SERPL-MCNC: 8 MG/DL — LOW (ref 8.6–10.2)
CHLORIDE SERPL-SCNC: 104 MMOL/L — SIGNIFICANT CHANGE UP (ref 98–107)
CO2 SERPL-SCNC: 20 MMOL/L — LOW (ref 22–29)
COLOR SPEC: YELLOW — SIGNIFICANT CHANGE UP
CREAT SERPL-MCNC: 1.52 MG/DL — HIGH (ref 0.5–1.3)
DIFF PNL FLD: ABNORMAL
EOSINOPHIL # BLD AUTO: 0.28 K/UL — SIGNIFICANT CHANGE UP (ref 0–0.5)
EOSINOPHIL NFR BLD AUTO: 3.2 % — SIGNIFICANT CHANGE UP (ref 0–6)
EPI CELLS # UR: SIGNIFICANT CHANGE UP
GLUCOSE BLDC GLUCOMTR-MCNC: 95 MG/DL — SIGNIFICANT CHANGE UP (ref 70–99)
GLUCOSE BLDC GLUCOMTR-MCNC: 98 MG/DL — SIGNIFICANT CHANGE UP (ref 70–99)
GLUCOSE SERPL-MCNC: 109 MG/DL — HIGH (ref 70–99)
GLUCOSE UR QL: NEGATIVE MG/DL — SIGNIFICANT CHANGE UP
HCT VFR BLD CALC: 39.2 % — SIGNIFICANT CHANGE UP (ref 39–50)
HGB BLD-MCNC: 12.5 G/DL — LOW (ref 13–17)
IMM GRANULOCYTES NFR BLD AUTO: 0.2 % — SIGNIFICANT CHANGE UP (ref 0–1.5)
KETONES UR-MCNC: ABNORMAL
LEUKOCYTE ESTERASE UR-ACNC: ABNORMAL
LYMPHOCYTES # BLD AUTO: 1.44 K/UL — SIGNIFICANT CHANGE UP (ref 1–3.3)
LYMPHOCYTES # BLD AUTO: 16.6 % — SIGNIFICANT CHANGE UP (ref 13–44)
MAGNESIUM SERPL-MCNC: 1.9 MG/DL — SIGNIFICANT CHANGE UP (ref 1.8–2.6)
MCHC RBC-ENTMCNC: 31 PG — SIGNIFICANT CHANGE UP (ref 27–34)
MCHC RBC-ENTMCNC: 31.9 GM/DL — LOW (ref 32–36)
MCV RBC AUTO: 97.3 FL — SIGNIFICANT CHANGE UP (ref 80–100)
MONOCYTES # BLD AUTO: 1.1 K/UL — HIGH (ref 0–0.9)
MONOCYTES NFR BLD AUTO: 12.7 % — SIGNIFICANT CHANGE UP (ref 2–14)
NEUTROPHILS # BLD AUTO: 5.82 K/UL — SIGNIFICANT CHANGE UP (ref 1.8–7.4)
NEUTROPHILS NFR BLD AUTO: 67 % — SIGNIFICANT CHANGE UP (ref 43–77)
NITRITE UR-MCNC: NEGATIVE — SIGNIFICANT CHANGE UP
PH UR: 6 — SIGNIFICANT CHANGE UP (ref 5–8)
PLATELET # BLD AUTO: 205 K/UL — SIGNIFICANT CHANGE UP (ref 150–400)
POTASSIUM SERPL-MCNC: 4.4 MMOL/L — SIGNIFICANT CHANGE UP (ref 3.5–5.3)
POTASSIUM SERPL-SCNC: 4.4 MMOL/L — SIGNIFICANT CHANGE UP (ref 3.5–5.3)
PROT UR-MCNC: 30 MG/DL
RBC # BLD: 4.03 M/UL — LOW (ref 4.2–5.8)
RBC # FLD: 13.3 % — SIGNIFICANT CHANGE UP (ref 10.3–14.5)
RBC CASTS # UR COMP ASSIST: SIGNIFICANT CHANGE UP /HPF (ref 0–4)
SODIUM SERPL-SCNC: 138 MMOL/L — SIGNIFICANT CHANGE UP (ref 135–145)
SP GR SPEC: 1.01 — SIGNIFICANT CHANGE UP (ref 1.01–1.02)
UROBILINOGEN FLD QL: NEGATIVE MG/DL — SIGNIFICANT CHANGE UP
WBC # BLD: 8.69 K/UL — SIGNIFICANT CHANGE UP (ref 3.8–10.5)
WBC # FLD AUTO: 8.69 K/UL — SIGNIFICANT CHANGE UP (ref 3.8–10.5)
WBC UR QL: ABNORMAL

## 2020-10-17 PROCEDURE — 99221 1ST HOSP IP/OBS SF/LOW 40: CPT

## 2020-10-17 PROCEDURE — 86900 BLOOD TYPING SEROLOGIC ABO: CPT

## 2020-10-17 PROCEDURE — C1874: CPT

## 2020-10-17 PROCEDURE — 86769 SARS-COV-2 COVID-19 ANTIBODY: CPT

## 2020-10-17 PROCEDURE — C1889: CPT

## 2020-10-17 PROCEDURE — 99152 MOD SED SAME PHYS/QHP 5/>YRS: CPT

## 2020-10-17 PROCEDURE — 83880 ASSAY OF NATRIURETIC PEPTIDE: CPT

## 2020-10-17 PROCEDURE — 36415 COLL VENOUS BLD VENIPUNCTURE: CPT

## 2020-10-17 PROCEDURE — 85027 COMPLETE CBC AUTOMATED: CPT

## 2020-10-17 PROCEDURE — 84134 ASSAY OF PREALBUMIN: CPT

## 2020-10-17 PROCEDURE — C1760: CPT

## 2020-10-17 PROCEDURE — 71046 X-RAY EXAM CHEST 2 VIEWS: CPT

## 2020-10-17 PROCEDURE — 93306 TTE W/DOPPLER COMPLETE: CPT

## 2020-10-17 PROCEDURE — C1898: CPT

## 2020-10-17 PROCEDURE — 85610 PROTHROMBIN TIME: CPT

## 2020-10-17 PROCEDURE — 93010 ELECTROCARDIOGRAM REPORT: CPT

## 2020-10-17 PROCEDURE — 85730 THROMBOPLASTIN TIME PARTIAL: CPT

## 2020-10-17 PROCEDURE — 82550 ASSAY OF CK (CPK): CPT

## 2020-10-17 PROCEDURE — 84484 ASSAY OF TROPONIN QUANT: CPT

## 2020-10-17 PROCEDURE — 99232 SBSQ HOSP IP/OBS MODERATE 35: CPT | Mod: 24

## 2020-10-17 PROCEDURE — 84100 ASSAY OF PHOSPHORUS: CPT

## 2020-10-17 PROCEDURE — 83036 HEMOGLOBIN GLYCOSYLATED A1C: CPT

## 2020-10-17 PROCEDURE — 86850 RBC ANTIBODY SCREEN: CPT

## 2020-10-17 PROCEDURE — 82962 GLUCOSE BLOOD TEST: CPT

## 2020-10-17 PROCEDURE — C1769: CPT

## 2020-10-17 PROCEDURE — C1753: CPT

## 2020-10-17 PROCEDURE — 83735 ASSAY OF MAGNESIUM: CPT

## 2020-10-17 PROCEDURE — C1724: CPT

## 2020-10-17 PROCEDURE — 93454 CORONARY ARTERY ANGIO S&I: CPT

## 2020-10-17 PROCEDURE — 93880 EXTRACRANIAL BILAT STUDY: CPT

## 2020-10-17 PROCEDURE — 83520 IMMUNOASSAY QUANT NOS NONAB: CPT

## 2020-10-17 PROCEDURE — 96374 THER/PROPH/DIAG INJ IV PUSH: CPT

## 2020-10-17 PROCEDURE — 85025 COMPLETE CBC W/AUTO DIFF WBC: CPT

## 2020-10-17 PROCEDURE — 80053 COMPREHEN METABOLIC PANEL: CPT

## 2020-10-17 PROCEDURE — 83051 HEMOGLOBIN PLASMA: CPT

## 2020-10-17 PROCEDURE — 80061 LIPID PANEL: CPT

## 2020-10-17 PROCEDURE — C1725: CPT

## 2020-10-17 PROCEDURE — 71046 X-RAY EXAM CHEST 2 VIEWS: CPT | Mod: 26

## 2020-10-17 PROCEDURE — C1887: CPT

## 2020-10-17 PROCEDURE — U0003: CPT

## 2020-10-17 PROCEDURE — 81001 URINALYSIS AUTO W/SCOPE: CPT

## 2020-10-17 PROCEDURE — C1894: CPT

## 2020-10-17 PROCEDURE — C1785: CPT

## 2020-10-17 PROCEDURE — 93005 ELECTROCARDIOGRAM TRACING: CPT

## 2020-10-17 PROCEDURE — C1892: CPT

## 2020-10-17 PROCEDURE — C9600: CPT | Mod: LD

## 2020-10-17 PROCEDURE — 86901 BLOOD TYPING SEROLOGIC RH(D): CPT

## 2020-10-17 PROCEDURE — 99153 MOD SED SAME PHYS/QHP EA: CPT

## 2020-10-17 PROCEDURE — 80048 BASIC METABOLIC PNL TOTAL CA: CPT

## 2020-10-17 PROCEDURE — 93458 L HRT ARTERY/VENTRICLE ANGIO: CPT | Mod: XU

## 2020-10-17 PROCEDURE — 99285 EMERGENCY DEPT VISIT HI MDM: CPT | Mod: 25

## 2020-10-17 PROCEDURE — 33208 INSRT HEART PM ATRIAL & VENT: CPT

## 2020-10-17 RX ORDER — METOPROLOL TARTRATE 50 MG
2 TABLET ORAL
Qty: 0 | Refills: 0 | DISCHARGE

## 2020-10-17 RX ORDER — METOPROLOL TARTRATE 50 MG
1 TABLET ORAL
Qty: 0 | Refills: 0 | DISCHARGE
Start: 2020-10-17

## 2020-10-17 RX ADMIN — Medication 25 MILLIGRAM(S): at 05:20

## 2020-10-17 RX ADMIN — Medication 650 MILLIGRAM(S): at 05:19

## 2020-10-17 RX ADMIN — Medication 81 MILLIGRAM(S): at 08:53

## 2020-10-17 RX ADMIN — Medication 650 MILLIGRAM(S): at 06:22

## 2020-10-17 RX ADMIN — CLOPIDOGREL BISULFATE 75 MILLIGRAM(S): 75 TABLET, FILM COATED ORAL at 08:53

## 2020-10-17 RX ADMIN — Medication 100 MILLIGRAM(S): at 05:21

## 2020-10-17 NOTE — PROGRESS NOTE ADULT - SUBJECTIVE AND OBJECTIVE BOX
Patient is a 89y old  Male who presents with a chief complaint of CHF exacerbation (17 Oct 2020 10:48)        CARDIOVASCULAR:  metoprolol succinate ER 25 milliGRAM(s) Oral every 12 hours      NEUROLOGIC:  acetaminophen   Tablet .. 650 milliGRAM(s) Oral every 6 hours PRN  ondansetron Injectable 4 milliGRAM(s) IV Push three times a day PRN        HEMATOLOGIC:  aspirin  chewable 81 milliGRAM(s) Oral daily  clopidogrel Tablet 75 milliGRAM(s) Oral daily        ENDO/METABOLIC:  dextrose 40% Gel 15 Gram(s) Oral once PRN  dextrose 50% Injectable 12.5 Gram(s) IV Push once  dextrose 50% Injectable 25 Gram(s) IV Push once  dextrose 50% Injectable 25 Gram(s) IV Push once  glucagon  Injectable 1 milliGRAM(s) IntraMuscular once PRN  insulin lispro (HumaLOG) corrective regimen sliding scale   SubCutaneous three times a day before meals  insulin lispro (HumaLOG) corrective regimen sliding scale   SubCutaneous at bedtime  simvastatin 20 milliGRAM(s) Oral at bedtime    IV FLUID/NUTRITION:  dextrose 5%. 1000 milliLiter(s) IV Continuous <Continuous>  sodium chloride 0.9%. 1000 milliLiter(s) IV Continuous <Continuous>    TOPICAL:    IMMUNOLOGIC & OTHER      Allergies    No Known Allergies            Vital Signs Last 24 Hrs  T(C): 36.7 (17 Oct 2020 08:50), Max: 36.8 (17 Oct 2020 05:16)  T(F): 98.1 (17 Oct 2020 08:50), Max: 98.2 (17 Oct 2020 05:16)  HR: 72 (17 Oct 2020 08:50) (65 - 77)  BP: 122/65 (17 Oct 2020 08:50) (104/48 - 142/65)  BP(mean): --  RR: 18 (17 Oct 2020 08:50) (16 - 18)  SpO2: 94% (17 Oct 2020 08:50) (93% - 97%)                  REVIEW OF SYSTEMS:    CONSTITUTIONAL: No fever, weight loss, or fatigue  EYES: No eye pain, visual disturbances, or discharge  ENMT:  No difficulty hearing, tinnitus, vertigo; No sinus or throat pain  NECK: No pain or stiffness  RESPIRATORY: No cough, wheezing, chills or hemoptysis; No shortness of breath  CARDIOVASCULAR: No chest pain, palpitations, dizziness, or leg swelling  GASTROINTESTINAL: No abdominal or epigastric pain. No nausea, vomiting, or hematemesis; No diarrhea or constipation. No melena or hematochezia.  GENITOURINARY: No dysuria, frequency, hematuria, or incontinence  NEUROLOGICAL: No headaches, memory loss, loss of strength, numbness, or tremors  SKIN: No itching, burning, rashes, or lesions   LYMPH NODES: No enlarged glands  ENDOCRINE: No heat or cold intolerance; No hair loss      PHYSICAL EXAM:    GENERAL: NAD, well-groomed, well-developed  HEAD:  Atraumatic, Normocephalic  EYES: EOMI, PERRLA, conjunctiva and sclera clear  ENMT: No tonsillar erythema, exudates, or enlargement; Moist mucous membranes, Good dentition, No lesions  NECK: Supple, No JVD, Normal thyroid  NERVOUS SYSTEM:  Alert & Oriented X3, Good concentration; Motor Strength 5/5 B/L upper and lower extremities; DTRs 2+ intact and symmetric  CHEST/LUNG: Clear   L Upper chest wall Sx bandage clean and dry   HEART: Regular paced  ABDOMEN: Soft, Nontender, Nondistended; Bowel sounds present  EXTREMITIES:  2+ Peripheral Pulses, No clubbing, cyanosis, or edema        LABS:                        12.5   8.69  )-----------( 205      ( 17 Oct 2020 06:37 )             39.2       10-17    138  |  104  |  27.0<H>  ----------------------------<  109<H>  4.4   |  20.0<L>  |  1.52<H>    Ca    8.0<L>      17 Oct 2020 06:34  Mg     1.9     10-17    TPro  6.4<L>  /  Alb  3.4  /  TBili  0.3<L>  /  DBili  x   /  AST  24  /  ALT  18  /  AlkPhos  43  10-16    PT/INR - ( 16 Oct 2020 21:23 )   PT: 12.4 sec;   INR: 1.07 ratio         PTT - ( 16 Oct 2020 21:23 )  PTT:29.0 sec        Serum Pro-Brain Natriuretic Peptide: 97005 pg/mL (10-16 @ 21:23)      Albumin, Serum: 3.4 g/dL (10-16 @ 21:23)    LIVER FUNCTIONS - ( 16 Oct 2020 21:23 )  Alb: 3.4 g/dL / Pro: 6.4 g/dL / ALK PHOS: 43 U/L / ALT: 18 U/L / AST: 24 U/L / GGT: x             RADIOLOGY & ADDITIONAL TESTS:

## 2020-10-17 NOTE — DISCHARGE NOTE NURSING/CASE MANAGEMENT/SOCIAL WORK - PATIENT PORTAL LINK FT
You can access the FollowMyHealth Patient Portal offered by [facility] by registering at the following website: [email or web address]. By joining [facility] [employer]’s FollowMyHealth portal, you will also be able to view your health information using other applications (apps) compatible with our system.

## 2020-10-17 NOTE — PROGRESS NOTE ADULT - SUBJECTIVE AND OBJECTIVE BOX
MORALES THOMAS  [id number]        Chief Complaint: follow up HFrEF/AS/CAD/CHB  -pt s/p PCI LAD and PPM     Subjective: no cp/sob/palps      24 hour Tele: SR, no events        acetaminophen   Tablet .. 650 milliGRAM(s) Oral every 6 hours PRN  aspirin  chewable 81 milliGRAM(s) Oral daily  clopidogrel Tablet 75 milliGRAM(s) Oral daily  dextrose 40% Gel 15 Gram(s) Oral once PRN  dextrose 5%. 1000 milliLiter(s) IV Continuous <Continuous>  dextrose 50% Injectable 12.5 Gram(s) IV Push once  dextrose 50% Injectable 25 Gram(s) IV Push once  dextrose 50% Injectable 25 Gram(s) IV Push once  glucagon  Injectable 1 milliGRAM(s) IntraMuscular once PRN  insulin lispro (HumaLOG) corrective regimen sliding scale   SubCutaneous three times a day before meals  insulin lispro (HumaLOG) corrective regimen sliding scale   SubCutaneous at bedtime  metoprolol succinate ER 25 milliGRAM(s) Oral every 12 hours  ondansetron Injectable 4 milliGRAM(s) IV Push three times a day PRN  simvastatin 20 milliGRAM(s) Oral at bedtime  sodium chloride 0.9%. 1000 milliLiter(s) IV Continuous <Continuous>          Physical Exam:  T(C): 36.7 (10-17-20 @ 08:50), Max: 36.8 (10-17-20 @ 05:16)  HR: 72 (10-17-20 @ 08:50) (65 - 77)  BP: 122/65 (10-17-20 @ 08:50) (104/48 - 142/65)  RR: 18 (10-17-20 @ 08:50) (16 - 18)  SpO2: 94% (10-17-20 @ 08:50) (93% - 97%)  Wt(kg): --  GEN: elderly M mildly dyspneic  HEENT: MMM, sclera anicteric  RESP: faint basilar crackles  CVS: diminished S1, absent S2, RRR, ,no  JVD, III/VI harsh systolic murmur  GI: Soft, NT, ND, BS+  EXT: trace edema   NEURO: AAOX3  PSYCH: Normal affect    I&O's Summary    16 Oct 2020 07:01  -  17 Oct 2020 07:00  --------------------------------------------------------  IN: 340 mL / OUT: 700 mL / NET: -360 mL    17 Oct 2020 07:01  -  17 Oct 2020 12:33  --------------------------------------------------------  IN: 0 mL / OUT: 200 mL / NET: -200 mL          Labs:   17 Oct 2020 06:34    138    |  104    |  27.0   ----------------------------<  109    4.4     |  20.0   |  1.52     Ca    8.0        17 Oct 2020 06:34  Mg     1.9       17 Oct 2020 06:34    TPro  6.4    /  Alb  3.4    /  TBili  0.3    /  DBili  x      /  AST  24     /  ALT  18     /  AlkPhos  43     16 Oct 2020 21:23                          12.5   8.69  )-----------( 205      ( 17 Oct 2020 06:37 )             39.2     PT/INR - ( 16 Oct 2020 21:23 )   PT: 12.4 sec;   INR: 1.07 ratio         PTT - ( 16 Oct 2020 21:23 )  PTT:29.0 sec      TTE (10/13/2020):   1. Left ventricular ejection fraction, by visual estimation, is 40 to 45%.   2. Mildly to moderately decreased global left ventricular systolic function.   3. Multiple left ventricular regional wall motion abnormalities exist. See wall motion findings.   4. Severely enlarged left atrium.   5. Spectral Doppler shows impaired relaxation pattern of left ventricular myocardial filling (Grade I diastolic dysfunction).   6. Normal right atrial size.   7. There is no evidence of pericardial effusion.   8. Mild thickening of the anterior and posterior mitral valve leaflets.   9. Mild to moderate mitral valve regurgitation.  10. Peak transmitral mean gradient equals 3.0 mmHg, calculated mitral valve area by pressure half time equals 1.77 cm² consistent with mild mitral stenosis.  11. Severe mitral annular calcification.  12. Mild-moderate tricuspid regurgitation.  13. Mild aortic regurgitation.  14. Severe aortic valve stenosis.  15. Estimated pulmonary artery systolic pressure is 40.5 mmHg assuming a right atrial pressure of 3 mmHg, which is consistent with mild pulmonary hypertension.  16. Endocardial visualization was enhanced with intravenous echo contrast.  17. Mitral valve mean gradient is 3.0 mmHg consistent with mild mitral stenosis.  18. Peak transaortic gradient equals 73.3 mmHg, mean transaortic gradient equals 44.2 mmHg, the calculated aortic valve area equals 0.60 cm² by the continuity equation consistent with severe aortic stenosis.  19. The aortic valve mean gradient is 44.2 mmHg consistent with severe aortic stenosis.      Cardiac Cath (10/14/2020):  VENTRICLES: No LV gram was performed; however, a recent echocardiogram  demonstrated an EF of 40 %.  CORONARY VESSELS: The coronary circulation is right dominant.  LM:   --  LM: 20-30% disease.  LAD:   --  Mid LAD: There was a 95 % stenosis. The lesion was heavily  calcified.  CX:   --  OM1: There was a 50 % stenosis.  RCA:   --  Proximal RCA: There was a 70 % stenosis in-stent.  COMPLICATIONS: No complications occurred during the cath lab visit.  DIAGNOSTIC IMPRESSIONS: Severe mid LAD and RCA disease. Intermediate syntax score.  Severe AS by TTE.    DIAGNOSTIC RECOMMENDATIONS: Considering abnormal cardiac enzymes, wall  motion abnormality in the anterior wall, plan for high risk PCI of LAD.  Deferred due to low GFR.  Patient also need PPM due to high grade AV block. In addition, TAVR  evaluation.  Not a surgical candidate due to advanced age.  Will plan for PCI of LAD after renal recovery.        Assessment and Plan:   · Assessment	  Assessment:  Morales Thomas is an 89 year old man with past medical history of Coronary artery disease, Severe aortic stenosis, Hypertension, Diabetes mellitus and rheumatoid arthritis who presented with shortness of breath, found to have acute systolic congestive heart failure and likely recent MI. . Hospital course also complicated by episodes of complete heart block as well  -Patient s/p PCI to LAD and tolerated well. Renal function stable  -Diuresed initially, will need po daily lasix at discharge  -CHB and now s/p PPM  -seen by CTs, further outpatient eval for TAVR           Plan:  1. DC home today  2. Continue DAPT/statin/ beta blocker for recent PCI/MI  3. Restart po lasix home dose of 40mg daily tomorrow  4. Follow up this week with Dr. Corona  5. Follow up CTS for TAVR eval  6. EP follow up for PPM and wound check   7. Will need repeat BW this week, if renal function stable add ARB   8. thanks!      Reid Marcial MD

## 2020-10-17 NOTE — PROGRESS NOTE ADULT - SUBJECTIVE AND OBJECTIVE BOX
Pt doing well POD #1 s/p [employer] dual chamber pacemaker implant via left cephalic cutdown. Stable overnight w/o event. Denies complaint.       Exam:   VSS, NAD, A&O x 3  Incision: pressure dressing removed; Dermabond C/D/I; no bleeding, hematoma, erythema, exudate or edema; distal pulses intact  Card: S1/S2, RRR, no m/g/r  Resp: lungs CTA b/l  Abd: S/NT/ND  Groin (ight only): site C/D/I; no bleeding/hematoma/erythema/edema  Ext: no edema, distal pulses intact      Device interrogation: measurements appropriate & stable. Parameters confirmed.  [facility] Accolade MRI L311  DDD (RYTHMIQ) 60-110bpm  Sensing: P=1.8mV, R>25mV  Impedance: A = 533ohms, RV = 1026ohms  Threshold: A = 0.5V@0.4ms, RV = 0.4V@0.4ms  AP=3%,  = 3%    Tele: sinus rhythm w/ intact conduction; no sustained arrhythmias or acute changes.       CXR: pending.       Labs:                         12.5   8.69  )-----------( 205      ( 17 Oct 2020 06:37 )             39.2     10-17    138  |  104  |  27.0<H>  ----------------------------<  109<H>  4.4   |  20.0<L>  |  1.52<H>    Ca    8.0<L>      17 Oct 2020 06:34  Mg     1.9     10-17    TPro  6.4<L>  /  Alb  3.4  /  TBili  0.3<L>  /  DBili  x   /  AST  24  /  ALT  18  /  AlkPhos  43  10-16        Assessment:   89y Male h/o 89 year old male patient with a known history of HTN, RA, DM-2, CAD s/p prior PCI, severe aortic stenosis, admitted 10/13/20 with acute on chronic CHF ( EF 40-45%). He underwent R&LHC 10/16/20 with PCI x 3 (pLAD/mLAD) with plan for outpt TAVR in the near future. While on telemetry pt noted to have intermittent sinus pauses and intermittent high grade AV block. Now POD #1 s/p uncomplicated DC BSCI PPM via left cephalic cutdown (DDD 60-110bpm).      Plan:   Pt instructed as to ROM of affected arm - no lifting/pushing/pulling >10 lbs & shoulder ROM limited to 90deg & below x 4 weeks.   Pt instructed as to incision care and f/up - written instructions included in d/c documents.  Outpt f/up in 2-3 weeks - office will call pt to schedule.   Will await PA/Lat CXR.

## 2020-10-17 NOTE — PROGRESS NOTE ADULT - ATTENDING COMMENTS
doing well s/p ppm implant. device check reviewed with normal ppm function. outpatient EP followup for wound check and device monitoring.
Agree with above, daughter and primary cardiologist updated.
High risk PCI performed with 3 LESLYE to LAD. Medical management of RCA.   Patient's LVEDP is 14 after receiving fluids. SHould not be placed on any further diuretics at this time.

## 2020-10-17 NOTE — DISCHARGE NOTE NURSING/CASE MANAGEMENT/SOCIAL WORK - NSDCFUADDAPPT_GEN_ALL_CORE_FT
Follow up with Dr. Corona in one to two weeks    No heavy lifting, driving, sex, tub baths, swimming, or any activity that submerges the lower half of the body in water for 48 hours.  Limited walking and stairs for 48 hours.    Change the bandaid after 24 hours and every 24 hours after that.  Keep the puncture site dry and covered with a bandaid until a scab forms.    Observe the site frequently.  If bleeding or a large lump (the size of a golf ball or bigger) occurs lie flat, apply continuous direct pressure just above the puncture site for at least 10 minutes, and notify your physician immediately.  If the bleeding cannot be controlled, call 911 immediately for assistance.  Notify your physician of pain, swelling or any drainage.    Notify your physician immediately if coldness, numbness, discoloration or pain in your foot occurs.    will need follow up with Dr Jett for TAVR.  Office number [phone].

## 2020-10-17 NOTE — PROGRESS NOTE ADULT - SUBJECTIVE AND OBJECTIVE BOX
PHYSICAL EXAM:  R groin site  dressing CDI no bleeding no hematoma noted, site soft non tender, positive pedal pulses bilat        -VS, labs, diet   -continue aspirin, plavix, BB, CCB, ARB and statin  -ambulate ad jaswinder   -likely d/c as per Medicine  -post cath and d/c instructions reviewed  -discussed therapeutic lifestyle changes including diet, exercise weight loss, medication compliance, MD follow up appointment and knowing your number for BP, cholesterol, blood sugar

## 2020-10-20 ENCOUNTER — RX RENEWAL (OUTPATIENT)
Age: 85
End: 2020-10-20

## 2020-10-20 LAB
HGB FREE PLAS-MCNC: 63.5 MG/DL — HIGH
TSH RECEP AB FLD-ACNC: <1.1 IU/L — SIGNIFICANT CHANGE UP (ref 0–1.75)

## 2020-10-22 ENCOUNTER — APPOINTMENT (OUTPATIENT)
Dept: CARDIOLOGY | Facility: CLINIC | Age: 85
End: 2020-10-22
Payer: MEDICARE

## 2020-10-22 ENCOUNTER — NON-APPOINTMENT (OUTPATIENT)
Age: 85
End: 2020-10-22

## 2020-10-22 VITALS
OXYGEN SATURATION: 98 % | BODY MASS INDEX: 25.61 KG/M2 | HEIGHT: 64 IN | TEMPERATURE: 98 F | SYSTOLIC BLOOD PRESSURE: 130 MMHG | WEIGHT: 150 LBS | RESPIRATION RATE: 16 BRPM | DIASTOLIC BLOOD PRESSURE: 70 MMHG | HEART RATE: 62 BPM

## 2020-10-22 DIAGNOSIS — K21.9 GASTRO-ESOPHAGEAL REFLUX DISEASE W/OUT ESOPHAGITIS: ICD-10-CM

## 2020-10-22 DIAGNOSIS — R06.81 GASTRO-ESOPHAGEAL REFLUX DISEASE W/OUT ESOPHAGITIS: ICD-10-CM

## 2020-10-22 PROCEDURE — 99214 OFFICE O/P EST MOD 30 MIN: CPT

## 2020-10-22 PROCEDURE — 93000 ELECTROCARDIOGRAM COMPLETE: CPT

## 2020-10-22 RX ORDER — ASPIRIN 325 MG/1
325 TABLET, COATED ORAL
Refills: 0 | Status: DISCONTINUED | COMMUNITY
End: 2020-10-22

## 2020-10-22 NOTE — REVIEW OF SYSTEMS
[Loss Of Hearing] : hearing loss [see HPI] : see HPI [Shortness Of Breath] : shortness of breath [Dyspnea on exertion] : dyspnea during exertion [Lower Ext Edema] : lower extremity edema [Joint Pain] : joint pain [Joint Swelling] : joint swelling [Joint Stiffness] : joint stiffness [Dizziness] : dizziness [Negative] : Heme/Lymph

## 2020-10-22 NOTE — HISTORY OF PRESENT ILLNESS
[FreeTextEntry1] : Patient had been experiencing progressive exertional dyspnea orthopnea and edema over the course of the last several weeks.  There is not been any associated chest pain.\par This seems to be a change from his baseline exertional dyspnea and edema.\par Speaking with his daughter she confirms that he has "been much worse lately".\par Her recent laboratory test showed that his creatinine had increased from 1.6-1.8 and furosemide was cut from daily to every other day but the shortness of breath preexisted that.\par \par Based on the symptoms he was hospitalized from the office on 10/13/2020.\par Cardiac catheterization demonstrated\par A high-grade heavily calcified mid LAD stenosis.\par Because of creatinine elevation and high degree of AV block, PCI was deferred for the next day.\par A permanent pacemaker was implanted and the creatinine went from 1.8-1.5.\par Next day patient had 2 stents placed to the LAD with Rotablator\par \par While not back at baseline, he is less short of breath with exertion than he was before hospitalization.\par \par Is also having a work-up performed through his urologist with regard to possible renal stones or bladder stones.  A cystoscopy is planned.\par \par Other medical problems include:\par - Diabetes\par - Hiatal hernia\par - Hearing impairment\par - Chronic kidney disease

## 2020-10-22 NOTE — ASSESSMENT
[FreeTextEntry1] : Sinus  Rhythm \par -Left atrial enlargement. \par  Voltage criteria for LVH  (R(I)+S(III) exceeds 2.50 mV)  -Voltage criteria w/o ST/T abnormality may be normal. \par  -Anterior infarct -age undetermined. \par  -Nonspecific ST depression   +   T-abnormality  -Nondiagnostic  -Possible  Anterior/lateral  ischemia. \par \par Laboratory data 2020:\par BUN 41\par Creatinine 1.88\par Cholesterol 160\par HDL 56\par LDL 79\par \par Lab Data 20\par Chol: 164\par HDL: 57\par LDL: 84\par Tr\par Creat: 1.65\par A1c: 5.8\par \par Lab data 19\par Creat. 1.77 ( 1.69 in March)\par \par Lab data 3/26/19\par A1C 5.8\par \par Lab data 3/19/2018\par Chol. 168\par HDL    41\par LDL    93\par Tri.    261\par \par Echocardiogram 10/13/2020:\par Peak transaortic valve gradient 73\par Mean gradient 44\par Aortic valve area 0.6 calculation\par Estimated pulmonary pressure 40 mmHg\par Mild to moderate mitral regurgitation\par Diastolic dysfunction\par Left ventricular ejection fraction 40 to 45%\par \par Cardiac catheterization 10/14/2020:\par 20 to 30% left main\par 95% calcified mid LAD\par 50% circumflex\par 70% RCA in-stent restenosis\par \par 10/15/20\par Right heart catheterization;\par     pulmonary capillary wedge pressure 17\par     Pulmonary artery pressure at 37/15\par    Right atrial pressure 8\par Rotational atherectomy and drug-eluting stent to proximal LAD and drug-eluting stent x 2 to the mid LAD\par \par Echocardiogram 20:\par LV of 65-70%\par Severe aortic stenosis with a peak velocity 4.7\par Peak gradient of 87 mmHg\par Mean gradient 40 mm mercury\par Pulmonary pressures of 33\par \par Echocardiogram  19\par Severe aortic valve stenosis with some increase of the transvalvular gradient.\par Peak 78 mm Hg\par Mean 43 mm Hg\par Normal LV function\par Calcific mitral valve disease with mild insufficiency\par \par Carotid study 20\par bilateral tortuosity with moderate plaquing and ulcerated disease. Relative;y unchanged from prior.\par \par Impression: \par 1.  Patient with progressive symptoms of heart failure likely related to his LAD disease and  severe aortic valve stenosis.  He is clinically less short of breath though not back to baseline, however, is continuing to have worsening lower extremity edema off of furosemide which was stopped during hospitalization.\par    The echocardiographic findings indicate some progression of the AS \par \par 2.chronic kidney disease with a baseline creatinine of about 1.6 which recently increased to 1.88 then back to 1.5\par \par 3. Carotid disease with moderate bilateral stenoses unchanged from prior. Bruits noted on exam right >left.\par \par 4.  Recently abnormal renal and pelvic ultrasound with a cystoscopy planned in the near future\par \par 5.  Worsening lower extremity edema, likely contributed to by recent decrease in Lasix dosing.\par \par Plan:\par 1.  We will reinstitute furosemide 40 q. OD and obtain a basic metabolic panel in about 1 to 2 weeks.\par \par 2.  Patient will follow up with cardiothoracic and get the CT and preop stuff so he can proceed with TAVR in a timely fashion.\par \par 3. The issue of his chronic kidney disease will certainly pose an increased risk and concern.  We will watch this carefully and hydrate as necessary when possible.\par \par 4. The family understands that given his advancing age and multiple problems, that his overall morbidity and mortality are necessarily going to be increased and that we will remain in close contact with them with regard to testing outcomes and arrange for the TAVR in a timely fashion.\par  \par

## 2020-10-22 NOTE — REASON FOR VISIT
[FreeTextEntry1] : Patient returns here cardiac revaluation after hospitalization for progressive symptoms of heart failure and angina.\par He is status post stenting of the LAD and a permanent pacemaker.\par He is being evaluated for a TAVR in the near future.\par \par . His medical history includes:\par \par 1. Aortic stenosis - severe\par \par 2. Coronary artery disease\par \par 3. Carotid stenosis - moderate\par \par 4. Pulmonary hypertension - mild\par

## 2020-10-23 ENCOUNTER — NON-APPOINTMENT (OUTPATIENT)
Age: 85
End: 2020-10-23

## 2020-10-27 ENCOUNTER — RESULT REVIEW (OUTPATIENT)
Age: 85
End: 2020-10-27

## 2020-10-27 ENCOUNTER — OUTPATIENT (OUTPATIENT)
Dept: OUTPATIENT SERVICES | Facility: HOSPITAL | Age: 85
LOS: 1 days | End: 2020-10-27
Payer: MEDICARE

## 2020-10-27 DIAGNOSIS — H26.9 UNSPECIFIED CATARACT: Chronic | ICD-10-CM

## 2020-10-27 DIAGNOSIS — I35.0 NONRHEUMATIC AORTIC (VALVE) STENOSIS: ICD-10-CM

## 2020-10-27 DIAGNOSIS — Z98.89 OTHER SPECIFIED POSTPROCEDURAL STATES: Chronic | ICD-10-CM

## 2020-10-27 PROCEDURE — 71275 CT ANGIOGRAPHY CHEST: CPT

## 2020-10-27 PROCEDURE — 74174 CTA ABD&PLVS W/CONTRAST: CPT | Mod: 26

## 2020-10-27 PROCEDURE — 74174 CTA ABD&PLVS W/CONTRAST: CPT

## 2020-10-27 PROCEDURE — 71275 CT ANGIOGRAPHY CHEST: CPT | Mod: 26

## 2020-10-27 RX ORDER — SODIUM CHLORIDE 9 MG/ML
200 INJECTION INTRAMUSCULAR; INTRAVENOUS; SUBCUTANEOUS
Refills: 0 | Status: DISCONTINUED | OUTPATIENT
Start: 2020-10-27 | End: 2020-11-10

## 2020-10-27 RX ORDER — SODIUM CHLORIDE 9 MG/ML
400 INJECTION INTRAMUSCULAR; INTRAVENOUS; SUBCUTANEOUS
Refills: 0 | Status: COMPLETED | OUTPATIENT
Start: 2020-10-27 | End: 2020-10-27

## 2020-10-27 RX ADMIN — SODIUM CHLORIDE 200 MILLILITER(S): 9 INJECTION INTRAMUSCULAR; INTRAVENOUS; SUBCUTANEOUS at 10:41

## 2020-10-27 RX ADMIN — SODIUM CHLORIDE 200 MILLILITER(S): 9 INJECTION INTRAMUSCULAR; INTRAVENOUS; SUBCUTANEOUS at 13:21

## 2020-10-30 ENCOUNTER — NON-APPOINTMENT (OUTPATIENT)
Age: 85
End: 2020-10-30

## 2020-11-02 ENCOUNTER — APPOINTMENT (OUTPATIENT)
Dept: ELECTROPHYSIOLOGY | Facility: CLINIC | Age: 85
End: 2020-11-02
Payer: MEDICARE

## 2020-11-02 VITALS
WEIGHT: 157 LBS | TEMPERATURE: 98.2 F | HEIGHT: 64 IN | DIASTOLIC BLOOD PRESSURE: 63 MMHG | SYSTOLIC BLOOD PRESSURE: 142 MMHG | HEART RATE: 60 BPM | BODY MASS INDEX: 26.8 KG/M2 | OXYGEN SATURATION: 95 %

## 2020-11-02 DIAGNOSIS — I45.5 OTHER SPECIFIED HEART BLOCK: ICD-10-CM

## 2020-11-02 PROCEDURE — 93280 PM DEVICE PROGR EVAL DUAL: CPT

## 2020-11-02 PROCEDURE — 99024 POSTOP FOLLOW-UP VISIT: CPT

## 2020-11-02 NOTE — DISCUSSION/SUMMARY
[FreeTextEntry1] : 89y Male h/o 89 year old male patient with a known history of HTN, RA, DM-2, CAD s/p prior PCI, severe aortic stenosis, admitted 10/13/20 with acute on chronic CHF ( EF 40-45%). He underwent R&LHC 10/16/20 with PCI x 3 (pLAD/mLAD) with plan for outpt TAVR in the near future. While on telemetry pt noted to\par have intermittent sinus pauses and intermittent high grade AV block s/p uncomplicated DC BSCI PPM via left cephalic cutdown (DDD 60-110bpm) on 10/16/20.\par \par Since discharge he reports he has been feeling well, denies pain from implant site. Incision well approximated without erythema,edema, or exudate. Pocket without hematoma. \par \par Interrogation of dual chamber pacemaker reveals normal function. A, RV with adequate sense and pace thresholds. No events in arrhythmia log. Minimal HR variability, therefore rate response programmed on. \par \par Recommendation:\par \par Site care, remote follow up and post op arm restrictions reviewed. Dual chamber pacemaker with normal function. Remote monitoring in place. To return to office for routine device interrogation and programming of chronic settings in 3 months.\par \par Nataliya MCCARTHY-C

## 2020-11-02 NOTE — PROCEDURE
[No] : not [Sinus Bradycardia] : sinus bradycardia [Pacemaker] : pacemaker [Longevity: ___ months] : The estimated remaining battery life is [unfilled] months [Threshold Testing Performed] : Threshold testing was performed [Lead Imp:  ___ohms] : lead impedance was [unfilled] ohms [Sensing Amplitude ___mv] : sensing amplitude was [unfilled] mv [___V @] : [unfilled] V [___ ms] : [unfilled] ms [de-identified] : Octaviano Sci  [de-identified] : Acczulmade  [de-identified] : [id number] [de-identified] : 10/16/20 [de-identified] : Marti ELLSWORTH [de-identified] : 60 [de-identified] : AP- 15  <1%

## 2020-11-02 NOTE — HISTORY OF PRESENT ILLNESS
[de-identified] : 89y Male h/o 89 year old male patient with a known history of HTN, RA, DM-2, CAD s/p prior PCI, severe aortic stenosis, admitted 10/13/20 with acute on chronic CHF ( EF 40-45%). He underwent R&LHC 10/16/20 with PCI x 3 (pLAD/mLAD) with plan for outpt TAVR in the near future. While on telemetry pt noted to\par have intermittent sinus pauses and intermittent high grade AV block s/p uncomplicated DC BSCI PPM via left cephalic cutdown (DDD 60-110bpm) on 10/16/20.\par \par Since discharge he reports he has been feeling well, denies pain from implant site. Incision well approximated without erythema,edema, or exudate. Pocket without hematoma. \par \par Interrogatino of dual chamber pacemaker reveals normal function. A, RV with adequate sense and pace thresholds. No events in arrhythmia log. Minimal HR variability, therefore rate response programmed on.

## 2020-11-12 ENCOUNTER — OUTPATIENT (OUTPATIENT)
Dept: OUTPATIENT SERVICES | Facility: HOSPITAL | Age: 85
LOS: 1 days | End: 2020-11-12
Payer: MEDICARE

## 2020-11-12 ENCOUNTER — RESULT REVIEW (OUTPATIENT)
Age: 85
End: 2020-11-12

## 2020-11-12 VITALS
RESPIRATION RATE: 20 BRPM | SYSTOLIC BLOOD PRESSURE: 110 MMHG | TEMPERATURE: 98 F | HEART RATE: 65 BPM | HEIGHT: 64 IN | WEIGHT: 154.54 LBS | DIASTOLIC BLOOD PRESSURE: 50 MMHG

## 2020-11-12 DIAGNOSIS — Z98.89 OTHER SPECIFIED POSTPROCEDURAL STATES: Chronic | ICD-10-CM

## 2020-11-12 DIAGNOSIS — I25.10 ATHEROSCLEROTIC HEART DISEASE OF NATIVE CORONARY ARTERY WITHOUT ANGINA PECTORIS: ICD-10-CM

## 2020-11-12 DIAGNOSIS — I10 ESSENTIAL (PRIMARY) HYPERTENSION: ICD-10-CM

## 2020-11-12 DIAGNOSIS — Z01.818 ENCOUNTER FOR OTHER PREPROCEDURAL EXAMINATION: ICD-10-CM

## 2020-11-12 DIAGNOSIS — E11.9 TYPE 2 DIABETES MELLITUS WITHOUT COMPLICATIONS: ICD-10-CM

## 2020-11-12 DIAGNOSIS — Z91.89 OTHER SPECIFIED PERSONAL RISK FACTORS, NOT ELSEWHERE CLASSIFIED: ICD-10-CM

## 2020-11-12 DIAGNOSIS — Z29.9 ENCOUNTER FOR PROPHYLACTIC MEASURES, UNSPECIFIED: ICD-10-CM

## 2020-11-12 DIAGNOSIS — I35.0 NONRHEUMATIC AORTIC (VALVE) STENOSIS: ICD-10-CM

## 2020-11-12 DIAGNOSIS — H26.9 UNSPECIFIED CATARACT: Chronic | ICD-10-CM

## 2020-11-12 LAB
A1C WITH ESTIMATED AVERAGE GLUCOSE RESULT: 6.1 % — HIGH (ref 4–5.6)
ALBUMIN SERPL ELPH-MCNC: 3.9 G/DL — SIGNIFICANT CHANGE UP (ref 3.3–5.2)
ALP SERPL-CCNC: 45 U/L — SIGNIFICANT CHANGE UP (ref 40–120)
ALT FLD-CCNC: 11 U/L — SIGNIFICANT CHANGE UP
ANION GAP SERPL CALC-SCNC: 12 MMOL/L — SIGNIFICANT CHANGE UP (ref 5–17)
APPEARANCE UR: CLEAR — SIGNIFICANT CHANGE UP
APTT BLD: 32.9 SEC — SIGNIFICANT CHANGE UP (ref 27.5–35.5)
AST SERPL-CCNC: 13 U/L — SIGNIFICANT CHANGE UP
BASOPHILS # BLD AUTO: 0.04 K/UL — SIGNIFICANT CHANGE UP (ref 0–0.2)
BASOPHILS NFR BLD AUTO: 0.6 % — SIGNIFICANT CHANGE UP (ref 0–2)
BILIRUB SERPL-MCNC: 0.3 MG/DL — LOW (ref 0.4–2)
BILIRUB UR-MCNC: NEGATIVE — SIGNIFICANT CHANGE UP
BLD GP AB SCN SERPL QL: SIGNIFICANT CHANGE UP
BUN SERPL-MCNC: 37 MG/DL — HIGH (ref 8–20)
CALCIUM SERPL-MCNC: 9.3 MG/DL — SIGNIFICANT CHANGE UP (ref 8.6–10.2)
CHLORIDE SERPL-SCNC: 101 MMOL/L — SIGNIFICANT CHANGE UP (ref 98–107)
CO2 SERPL-SCNC: 25 MMOL/L — SIGNIFICANT CHANGE UP (ref 22–29)
COLOR SPEC: YELLOW — SIGNIFICANT CHANGE UP
CREAT SERPL-MCNC: 1.65 MG/DL — HIGH (ref 0.5–1.3)
DIFF PNL FLD: NEGATIVE — SIGNIFICANT CHANGE UP
EOSINOPHIL # BLD AUTO: 0.24 K/UL — SIGNIFICANT CHANGE UP (ref 0–0.5)
EOSINOPHIL NFR BLD AUTO: 3.4 % — SIGNIFICANT CHANGE UP (ref 0–6)
ESTIMATED AVERAGE GLUCOSE: 128 MG/DL — HIGH (ref 68–114)
GLUCOSE SERPL-MCNC: 68 MG/DL — LOW (ref 70–99)
GLUCOSE UR QL: NEGATIVE MG/DL — SIGNIFICANT CHANGE UP
HCT VFR BLD CALC: 40.8 % — SIGNIFICANT CHANGE UP (ref 39–50)
HGB BLD-MCNC: 12.7 G/DL — LOW (ref 13–17)
IMM GRANULOCYTES NFR BLD AUTO: 0.3 % — SIGNIFICANT CHANGE UP (ref 0–1.5)
INR BLD: 1.05 RATIO — SIGNIFICANT CHANGE UP (ref 0.88–1.16)
KETONES UR-MCNC: NEGATIVE — SIGNIFICANT CHANGE UP
LEUKOCYTE ESTERASE UR-ACNC: NEGATIVE — SIGNIFICANT CHANGE UP
LYMPHOCYTES # BLD AUTO: 1.57 K/UL — SIGNIFICANT CHANGE UP (ref 1–3.3)
LYMPHOCYTES # BLD AUTO: 22.4 % — SIGNIFICANT CHANGE UP (ref 13–44)
MCHC RBC-ENTMCNC: 30.8 PG — SIGNIFICANT CHANGE UP (ref 27–34)
MCHC RBC-ENTMCNC: 31.1 GM/DL — LOW (ref 32–36)
MCV RBC AUTO: 99 FL — SIGNIFICANT CHANGE UP (ref 80–100)
MONOCYTES # BLD AUTO: 0.77 K/UL — SIGNIFICANT CHANGE UP (ref 0–0.9)
MONOCYTES NFR BLD AUTO: 11 % — SIGNIFICANT CHANGE UP (ref 2–14)
MRSA PCR RESULT.: SIGNIFICANT CHANGE UP
NEUTROPHILS # BLD AUTO: 4.37 K/UL — SIGNIFICANT CHANGE UP (ref 1.8–7.4)
NEUTROPHILS NFR BLD AUTO: 62.3 % — SIGNIFICANT CHANGE UP (ref 43–77)
NITRITE UR-MCNC: NEGATIVE — SIGNIFICANT CHANGE UP
NT-PROBNP SERPL-SCNC: HIGH PG/ML (ref 0–300)
PH UR: 5 — SIGNIFICANT CHANGE UP (ref 5–8)
PLATELET # BLD AUTO: 239 K/UL — SIGNIFICANT CHANGE UP (ref 150–400)
POTASSIUM SERPL-MCNC: 4.7 MMOL/L — SIGNIFICANT CHANGE UP (ref 3.5–5.3)
POTASSIUM SERPL-SCNC: 4.7 MMOL/L — SIGNIFICANT CHANGE UP (ref 3.5–5.3)
PREALB SERPL-MCNC: 26 MG/DL — SIGNIFICANT CHANGE UP (ref 18–38)
PROT SERPL-MCNC: 7.8 G/DL — SIGNIFICANT CHANGE UP (ref 6.6–8.7)
PROT UR-MCNC: NEGATIVE MG/DL — SIGNIFICANT CHANGE UP
PROTHROM AB SERPL-ACNC: 12.1 SEC — SIGNIFICANT CHANGE UP (ref 10.6–13.6)
RBC # BLD: 4.12 M/UL — LOW (ref 4.2–5.8)
RBC # FLD: 13.2 % — SIGNIFICANT CHANGE UP (ref 10.3–14.5)
S AUREUS DNA NOSE QL NAA+PROBE: DETECTED
SODIUM SERPL-SCNC: 138 MMOL/L — SIGNIFICANT CHANGE UP (ref 135–145)
SP GR SPEC: 1.01 — SIGNIFICANT CHANGE UP (ref 1.01–1.02)
T3 SERPL-MCNC: 82 NG/DL — SIGNIFICANT CHANGE UP (ref 80–200)
T4 AB SER-ACNC: 5.8 UG/DL — SIGNIFICANT CHANGE UP (ref 4.5–12)
TSH SERPL-MCNC: 2.84 UIU/ML — SIGNIFICANT CHANGE UP (ref 0.27–4.2)
UROBILINOGEN FLD QL: NEGATIVE MG/DL — SIGNIFICANT CHANGE UP
WBC # BLD: 7.01 K/UL — SIGNIFICANT CHANGE UP (ref 3.8–10.5)
WBC # FLD AUTO: 7.01 K/UL — SIGNIFICANT CHANGE UP (ref 3.8–10.5)

## 2020-11-12 PROCEDURE — 93005 ELECTROCARDIOGRAM TRACING: CPT

## 2020-11-12 PROCEDURE — 93010 ELECTROCARDIOGRAM REPORT: CPT

## 2020-11-12 PROCEDURE — G0463: CPT

## 2020-11-12 PROCEDURE — 71046 X-RAY EXAM CHEST 2 VIEWS: CPT

## 2020-11-12 PROCEDURE — 71046 X-RAY EXAM CHEST 2 VIEWS: CPT | Mod: 26

## 2020-11-12 RX ORDER — MUPIROCIN 20 MG/G
1 OINTMENT TOPICAL
Qty: 1 | Refills: 0
Start: 2020-11-12

## 2020-11-12 NOTE — H&P PST ADULT - NSICDXPASTMEDICALHX_GEN_ALL_CORE_FT
PAST MEDICAL HISTORY:  BPH (benign prostatic hypertrophy)     CAD (coronary artery disease)     Diabetes     Diabetes mellitus type II    Gout h/o    High cholesterol     HTN (hypertension)     Hypertension     Kidney stones     Osteoarthrosis     RA (rheumatoid arthritis)     Stented coronary artery 1    Ulcer disease stomach     PAST MEDICAL HISTORY:  BPH (benign prostatic hypertrophy)     CAD (coronary artery disease)     Diabetes     Diabetes mellitus type II    Gout h/o    High cholesterol     HTN (hypertension)     Hypertension     Kidney stones     Osteoarthrosis     Pacemaker     RA (rheumatoid arthritis)     Stented coronary artery 2011, 2020    Ulcer disease stomach

## 2020-11-12 NOTE — H&P PST ADULT - EKG AND INTERPRETATION
Atrial paced rhythm with prolonged AV conduction 63  minimal voltage for LVH  anterior infarct, t wave abnormality possible lateral ischemia  pending final interpretation

## 2020-11-12 NOTE — H&P PST ADULT - LAB RESULTS AND INTERPRETATION
labs faxed to PCP Dr Sweeney  Surgeon made aware   Russ MCCARTHY-KATALINA labs faxed to PCP Dr Sweeney  +MSSA mupirocin ointment sent to pharmacy, patient notified and given instructions  Surgeon made aware of labs   Russ QUEZADA labs faxed to PCP Dr Sweeney  +MSSA mupirocin ointment sent to pharmacy, patients daughter Chasidy was notified and given instructions  Surgeon made aware of labs   Russ QUEZADA

## 2020-11-12 NOTE — PATIENT PROFILE ADULT - NSPREOP1_ABLETOREACHPT_GEN_A_NUR
[phone] daughter Manda    Denies domestic or international travel in the past 3 weeks [phone] daughter Manda    Denies domestic or international travel in the past 3 weeks/yes

## 2020-11-12 NOTE — H&P PST ADULT - ASSESSMENT
CAPRINI SCORE [CLOT]    AGE RELATED RISK FACTORS                                                       MOBILITY RELATED FACTORS  [ ] Age 41-60 years                                            (1 Point)                  [ ] Bed rest                                                        (1 Point)  [ ] Age: 61-74 years                                           (2 Points)                 [ ] Plaster cast                                                   (2 Points)  [x ] Age= 75 years                                              (3 Points)                 [ ] Bed bound for more than 72 hours                 (2 Points)    DISEASE RELATED RISK FACTORS                                               GENDER SPECIFIC FACTORS  [ ] Edema in the lower extremities                       (1 Point)                  [ ] Pregnancy                                                     (1 Point)  [ ] Varicose veins                                               (1 Point)                  [ ] Post-partum < 6 weeks                                   (1 Point)             [ ] BMI > 25 Kg/m2                                            (1 Point)                  [ ] Hormonal therapy  or oral contraception          (1 Point)                 [ ] Sepsis (in the previous month)                        (1 Point)                  [ ] History of pregnancy complications                 (1 point)  [ ] Pneumonia or serious lung disease                                               [ ] Unexplained or recurrent                     (1 Point)           (in the previous month)                               (1 Point)  [ ] Abnormal pulmonary function test                     (1 Point)                 SURGERY RELATED RISK FACTORS  [ ] Acute myocardial infarction                              (1 Point)                 [ ]  Section                                             (1 Point)  [ ] Congestive heart failure (in the previous month)  (1 Point)               [ ] Minor surgery                                                  (1 Point)   [ ] Inflammatory bowel disease                             (1 Point)                 [ ] Arthroscopic surgery                                        (2 Points)  [ ] Central venous access                                      (2 Points)                [ ] General surgery lasting more than 45 minutes   (2 Points)       [ ] Stroke (in the previous month)                          (5 Points)               [ ] Elective arthroplasty                                         (5 Points)                                                                                                                                               HEMATOLOGY RELATED FACTORS                                                 TRAUMA RELATED RISK FACTORS  [ ] Prior episodes of VTE                                     (3 Points)                [ ] Fracture of the hip, pelvis, or leg                       (5 Points)  [ ] Positive family history for VTE                         (3 Points)                 [ ] Acute spinal cord injury (in the previous month)  (5 Points)  [ ] Prothrombin 04316 A                                     (3 Points)                 [ ] Paralysis  (less than 1 month)                             (5 Points)  [ ] Factor V Leiden                                             (3 Points)                  [ ] Multiple Trauma within 1 month                        (5 Points)  [ ] Lupus anticoagulants                                     (3 Points)                                                           [ ] Anticardiolipin antibodies                               (3 Points)                                                       [ ] High homocysteine in the blood                      (3 Points)                                             [ ] Other congenital or acquired thrombophilia      (3 Points)                                                [ ] Heparin induced thrombocytopenia                  (3 Points)                                          Total Score [          ]    Caprini Score 0 - 2:  Low Risk, No VTE Prophylaxis required for most patients, encourage ambulation  Caprini Score 3 - 6:  At Risk, pharmacologic VTE prophylaxis is indicated for most patients (in the absence of a contraindication)  Caprini Score Greater than or = 7:  High Risk, pharmacologic VTE prophylaxis is indicated for most patients (in the absence of a contraindication)    OPIOID RISK TOOL    TONY EACH BOX THAT APPLIES AND ADD TOTALS AT THE END    FAMILY HISTORY OF SUBSTANCE ABUSE                 FEMALE         MALE                                                Alcohol                             [  ]1 pt          [  ]3pts                                               Illegal Durgs                     [  ]2 pts        [  ]3pts                                               Rx Drugs                           [  ]4 pts        [  ]4 pts    PERSONAL HISTORY OF SUBSTANCE ABUSE                                                                                          Alcohol                             [  ]3 pts       [  ]3 pts                                               Illegal Durgs                     [  ]4 pts        [  ]4 pts                                               Rx Drugs                           [  ]5 pts        [  ]5 pts    AGE BETWEEN 16-45 YEARS                                      [  ]1 pt         [  ]1 pt    HISTORY OF PREADOLESCENT   SEXUAL ABUSE                                                             [  ]3 pts        [  ]0pts    PSYCHOLOGICAL DISEASE                     ADD, OCD, Bipolar, Schizophrenia        [  ]2 pts         [  ]2 pts                      Depression                                               [  ]1 pt           [  ]1 pt           SCORING TOTAL  0                             A score of 3 or lower indicated LOW risk for future opiod abuse  A score of 4 to 7 indicated moderate risk for future opiod abuse  A score of 8 or higher indicates a high risk for opiod abuse CAPRINI SCORE [CLOT]    AGE RELATED RISK FACTORS                                                       MOBILITY RELATED FACTORS  [ ] Age 41-60 years                                            (1 Point)                  [ ] Bed rest                                                        (1 Point)  [ ] Age: 61-74 years                                           (2 Points)                 [ ] Plaster cast                                                   (2 Points)  [x ] Age= 75 years                                              (3 Points)                 [ ] Bed bound for more than 72 hours                 (2 Points)    DISEASE RELATED RISK FACTORS                                               GENDER SPECIFIC FACTORS  [x ] Edema in the lower extremities                       (1 Point)                  [ ] Pregnancy                                                     (1 Point)  [ ] Varicose veins                                               (1 Point)                  [ ] Post-partum < 6 weeks                                   (1 Point)             [x ] BMI > 25 Kg/m2                                            (1 Point)                  [ ] Hormonal therapy  or oral contraception          (1 Point)                 [ ] Sepsis (in the previous month)                        (1 Point)                  [ ] History of pregnancy complications                 (1 point)  [ ] Pneumonia or serious lung disease                                               [ ] Unexplained or recurrent                     (1 Point)           (in the previous month)                               (1 Point)  [ ] Abnormal pulmonary function test                     (1 Point)                 SURGERY RELATED RISK FACTORS  [ ] Acute myocardial infarction                              (1 Point)                 [ ]  Section                                             (1 Point)  [x ] Congestive heart failure (in the previous month)  (1 Point)               [ ] Minor surgery                                                  (1 Point)   [ ] Inflammatory bowel disease                             (1 Point)                 [ ] Arthroscopic surgery                                        (2 Points)  [ ] Central venous access                                      (2 Points)                [x ] General surgery lasting more than 45 minutes   (2 Points)       [ ] Stroke (in the previous month)                          (5 Points)               [ ] Elective arthroplasty                                         (5 Points)                                                                                                                                               HEMATOLOGY RELATED FACTORS                                                 TRAUMA RELATED RISK FACTORS  [ ] Prior episodes of VTE                                     (3 Points)                [ ] Fracture of the hip, pelvis, or leg                       (5 Points)  [ ] Positive family history for VTE                         (3 Points)                 [ ] Acute spinal cord injury (in the previous month)  (5 Points)  [ ] Prothrombin 38732 A                                     (3 Points)                 [ ] Paralysis  (less than 1 month)                             (5 Points)  [ ] Factor V Leiden                                             (3 Points)                  [ ] Multiple Trauma within 1 month                        (5 Points)  [ ] Lupus anticoagulants                                     (3 Points)                                                           [ ] Anticardiolipin antibodies                               (3 Points)                                                       [ ] High homocysteine in the blood                      (3 Points)                                             [ ] Other congenital or acquired thrombophilia      (3 Points)                                                [ ] Heparin induced thrombocytopenia                  (3 Points)                                          Total Score [     8     ]    Caprini Score 0 - 2:  Low Risk, No VTE Prophylaxis required for most patients, encourage ambulation  Caprini Score 3 - 6:  At Risk, pharmacologic VTE prophylaxis is indicated for most patients (in the absence of a contraindication)  Caprini Score Greater than or = 7:  High Risk, pharmacologic VTE prophylaxis is indicated for most patients (in the absence of a contraindication)    OPIOID RISK TOOL    TONY EACH BOX THAT APPLIES AND ADD TOTALS AT THE END    FAMILY HISTORY OF SUBSTANCE ABUSE                 FEMALE         MALE                                                Alcohol                             [  ]1 pt          [  ]3pts                                               Illegal Durgs                     [  ]2 pts        [  ]3pts                                               Rx Drugs                           [  ]4 pts        [  ]4 pts    PERSONAL HISTORY OF SUBSTANCE ABUSE                                                                                          Alcohol                             [  ]3 pts       [  ]3 pts                                               Illegal Durgs                     [  ]4 pts        [  ]4 pts                                               Rx Drugs                           [  ]5 pts        [  ]5 pts    AGE BETWEEN 16-45 YEARS                                      [  ]1 pt         [  ]1 pt    HISTORY OF PREADOLESCENT   SEXUAL ABUSE                                                             [  ]3 pts        [  ]0pts    PSYCHOLOGICAL DISEASE                     ADD, OCD, Bipolar, Schizophrenia        [  ]2 pts         [  ]2 pts                      Depression                                               [  ]1 pt           [  ]1 pt           SCORING TOTAL  0                             A score of 3 or lower indicated LOW risk for future opiod abuse  A score of 4 to 7 indicated moderate risk for future opiod abuse  A score of 8 or higher indicates a high risk for opiod abuse    Patient is scheduled for a TAVR on 20 with Dr Jett.  Patient and daughter verbalized understanding of pre operative instructions including to continue aspirin and plavix therapy, take metoprolol AM of surgery, NPO after 11pm 20, CHG  bath, COVID test , stop all vitamins/supplements for 1 week.

## 2020-11-12 NOTE — H&P PST ADULT - NSANTHOSAYNRD_GEN_A_CORE
No. SHERMAN screening performed.  STOP BANG Legend: 0-2 = LOW Risk; 3-4 = INTERMEDIATE Risk; 5-8 = HIGH Risk

## 2020-11-12 NOTE — H&P PST ADULT - MENTAL STATUS
patient alert and oriented x3  as per daughter confused at times however patient able to recall full medical history, medication dosages and time taken

## 2020-11-12 NOTE — H&P PST ADULT - RS GEN PE MLT RESP DETAILS PC
respirations non-labored/airway patent/no wheezes/clear to auscultation bilaterally/good air movement/no rhonchi/breath sounds equal/no rales

## 2020-11-12 NOTE — PATIENT PROFILE ADULT - NSPROHMSYMPCOND_GEN_A_NUR
musculoskeletal/pre op teaching surgical scrub pain management instructions given to pt and his daughter    Covid swab to be done Nov 17/cardiovascular/diabetes

## 2020-11-12 NOTE — H&P PST ADULT - HISTORY OF PRESENT ILLNESS
89 year old male with a pmhx of aortic stenosis  pacemaker  cad with stent in coronary artery   diabetes mellitus type 2   89 year old male presents to PST today with daughter present, patient has a pmhx of CAD stent x2 2011 and October 2020 following hospitalization  aortic stenosis  pacemaker  cad with stent in coronary artery   diabetes mellitus type 2   89 year old male presents to PST today with daughter present, patient has a pmhx of  HTN, RA, DM-2, CAD s/p prior PCI in 2011, severe aortic stenosis, admitted 10/13/20 with acute on chronic CHF (EF 40-45%). He underwent R&LHC 10/16/20 with PCI x 3 (pLAD/mLAD), plan for TAVR post discharge. Patient also had pacemaker placed for intermittent sinus pauses and high grade AV block.  Patient presents to PST today with complaints of dyspnea on exertion and lower leg swelling, he denies chest pain, pressure, SOB at rest, orthopnea, PND.  Patient is scheduled for a TAVR on 11/20/20 with Dr. Jett.

## 2020-11-12 NOTE — H&P PST ADULT - NEGATIVE NEUROLOGICAL SYMPTOMS
no generalized seizures/no vertigo/no syncope/no tremors/no paresthesias/no loss of sensation/no focal seizures

## 2020-11-12 NOTE — H&P PST ADULT - NSICDXPROBLEM_GEN_ALL_CORE_FT
PROBLEM DIAGNOSES  Problem: Aortic stenosis  Assessment and Plan: Severe aortic stenosis on TTE 10/2020, 3/6 murmur noted at [facility], plan for TAVR on 11/20/20 with Dr Jett.    Problem: CAD (coronary artery disease)  Assessment and Plan: pt is s/p cardiac cath with PCI stent x3 on plavix/aspirin. Plan to maintain aspirin/plavix therapy for surgery on 11/20 with Dr Jett    Problem: Type 2 diabetes mellitus  Assessment and Plan: Continue glimeperide as ordered, hold dose AM of surgery, A1C pending.    Problem: Need for prophylactic measure  Assessment and Plan: Caprini Score 8, HIGH risk, surgical team to order appropriate VTE prophylaxis.     Problem: At risk for sleep apnea  Assessment and Plan: At risk for sleep apnea, stop bang score 5, SHERMAN precautions    Problem: Hypertension  Assessment and Plan: /50 today at [facility],  pt denies any symptoms of low BP today at PST, patient to see PCP Zahra today, PCP notified of BP.

## 2020-11-12 NOTE — H&P PST ADULT - NS MD HP INPLANTS MED DEV
right knee prosthesis ,cardiac stent  X1 Pacemaker/b/l knee prosthesis, L hip, cardiac stent  X2/Lens implant

## 2020-11-12 NOTE — H&P PST ADULT - NEGATIVE GASTROINTESTINAL SYMPTOMS
no nausea/no melena/no vomiting/no change in bowel habits/no constipation/no abdominal pain/no diarrhea

## 2020-11-12 NOTE — H&P PST ADULT - LAST ECHOCARDIOGRAM
pt unsure LVEF 40-45% mild pulmonary htn, severe aortic stenosis, mild-moderate mitral regurgitation

## 2020-11-12 NOTE — PATIENT PROFILE ADULT - NSPROSPHOSPCHAPLAINYN_GEN_A_NUR
CLINICAL PHARMACY NOTE: MEDS TO [address] Select Patient?: Yes  Total # of Prescriptions Filled: 1   The following medications were delivered to the patient:  · Pantoprazole   Total # of Interventions Completed: 0  Time Spent (min): 0    Additional Documentation: no

## 2020-11-13 LAB
CULTURE RESULTS: SIGNIFICANT CHANGE UP
SPECIMEN SOURCE: SIGNIFICANT CHANGE UP

## 2020-11-16 ENCOUNTER — TRANSCRIPTION ENCOUNTER (OUTPATIENT)
Age: 85
End: 2020-11-16

## 2020-11-17 ENCOUNTER — APPOINTMENT (OUTPATIENT)
Dept: DISASTER EMERGENCY | Facility: CLINIC | Age: 85
End: 2020-11-17

## 2020-11-18 LAB — SARS-COV-2 N GENE NPH QL NAA+PROBE: NOT DETECTED

## 2020-11-19 ENCOUNTER — TRANSCRIPTION ENCOUNTER (OUTPATIENT)
Age: 85
End: 2020-11-19

## 2020-11-20 ENCOUNTER — INPATIENT (INPATIENT)
Facility: HOSPITAL | Age: 85
LOS: 1 days | Discharge: HOME | DRG: 266 | End: 2020-11-22
Attending: THORACIC SURGERY (CARDIOTHORACIC VASCULAR SURGERY) | Admitting: THORACIC SURGERY (CARDIOTHORACIC VASCULAR SURGERY)
Payer: MEDICARE

## 2020-11-20 ENCOUNTER — APPOINTMENT (OUTPATIENT)
Dept: CARDIOTHORACIC SURGERY | Facility: HOSPITAL | Age: 85
End: 2020-11-20

## 2020-11-20 VITALS
RESPIRATION RATE: 16 BRPM | HEART RATE: 59 BPM | WEIGHT: 151.9 LBS | OXYGEN SATURATION: 97 % | TEMPERATURE: 98 F | DIASTOLIC BLOOD PRESSURE: 57 MMHG | HEIGHT: 65 IN | SYSTOLIC BLOOD PRESSURE: 127 MMHG

## 2020-11-20 DIAGNOSIS — I35.0 NONRHEUMATIC AORTIC (VALVE) STENOSIS: ICD-10-CM

## 2020-11-20 DIAGNOSIS — Z98.89 OTHER SPECIFIED POSTPROCEDURAL STATES: Chronic | ICD-10-CM

## 2020-11-20 DIAGNOSIS — H26.9 UNSPECIFIED CATARACT: Chronic | ICD-10-CM

## 2020-11-20 LAB
ALBUMIN SERPL ELPH-MCNC: 3.3 G/DL — SIGNIFICANT CHANGE UP (ref 3.3–5.2)
ALP SERPL-CCNC: 42 U/L — SIGNIFICANT CHANGE UP (ref 40–120)
ALT FLD-CCNC: 9 U/L — SIGNIFICANT CHANGE UP
ANION GAP SERPL CALC-SCNC: 14 MMOL/L — SIGNIFICANT CHANGE UP (ref 5–17)
APTT BLD: 32.1 SEC — SIGNIFICANT CHANGE UP (ref 27.5–35.5)
APTT BLD: 33.1 SEC — SIGNIFICANT CHANGE UP (ref 27.5–35.5)
AST SERPL-CCNC: 14 U/L — SIGNIFICANT CHANGE UP
BASOPHILS # BLD AUTO: 0.02 K/UL — SIGNIFICANT CHANGE UP (ref 0–0.2)
BASOPHILS NFR BLD AUTO: 0.2 % — SIGNIFICANT CHANGE UP (ref 0–2)
BILIRUB SERPL-MCNC: 0.4 MG/DL — SIGNIFICANT CHANGE UP (ref 0.4–2)
BUN SERPL-MCNC: 32 MG/DL — HIGH (ref 8–20)
CALCIUM SERPL-MCNC: 8.3 MG/DL — LOW (ref 8.6–10.2)
CHLORIDE SERPL-SCNC: 103 MMOL/L — SIGNIFICANT CHANGE UP (ref 98–107)
CO2 SERPL-SCNC: 19 MMOL/L — LOW (ref 22–29)
CREAT SERPL-MCNC: 1.4 MG/DL — HIGH (ref 0.5–1.3)
EOSINOPHIL # BLD AUTO: 0.16 K/UL — SIGNIFICANT CHANGE UP (ref 0–0.5)
EOSINOPHIL NFR BLD AUTO: 1.9 % — SIGNIFICANT CHANGE UP (ref 0–6)
GAS PNL BLDA: SIGNIFICANT CHANGE UP
GLUCOSE BLDC GLUCOMTR-MCNC: 105 MG/DL — HIGH (ref 70–99)
GLUCOSE BLDC GLUCOMTR-MCNC: 177 MG/DL — HIGH (ref 70–99)
GLUCOSE BLDC GLUCOMTR-MCNC: 190 MG/DL — HIGH (ref 70–99)
GLUCOSE SERPL-MCNC: 207 MG/DL — HIGH (ref 70–99)
HCT VFR BLD CALC: 33.8 % — LOW (ref 39–50)
HGB BLD-MCNC: 10.6 G/DL — LOW (ref 13–17)
IMM GRANULOCYTES NFR BLD AUTO: 0.7 % — SIGNIFICANT CHANGE UP (ref 0–1.5)
INR BLD: 1.04 RATIO — SIGNIFICANT CHANGE UP (ref 0.88–1.16)
INR BLD: 1.14 RATIO — SIGNIFICANT CHANGE UP (ref 0.88–1.16)
LYMPHOCYTES # BLD AUTO: 0.99 K/UL — LOW (ref 1–3.3)
LYMPHOCYTES # BLD AUTO: 11.9 % — LOW (ref 13–44)
MAGNESIUM SERPL-MCNC: 2 MG/DL — SIGNIFICANT CHANGE UP (ref 1.6–2.6)
MCHC RBC-ENTMCNC: 30.4 PG — SIGNIFICANT CHANGE UP (ref 27–34)
MCHC RBC-ENTMCNC: 31.4 GM/DL — LOW (ref 32–36)
MCV RBC AUTO: 96.8 FL — SIGNIFICANT CHANGE UP (ref 80–100)
MONOCYTES # BLD AUTO: 0.17 K/UL — SIGNIFICANT CHANGE UP (ref 0–0.9)
MONOCYTES NFR BLD AUTO: 2.1 % — SIGNIFICANT CHANGE UP (ref 2–14)
NEUTROPHILS # BLD AUTO: 6.89 K/UL — SIGNIFICANT CHANGE UP (ref 1.8–7.4)
NEUTROPHILS NFR BLD AUTO: 83.2 % — HIGH (ref 43–77)
PLATELET # BLD AUTO: 174 K/UL — SIGNIFICANT CHANGE UP (ref 150–400)
POTASSIUM SERPL-MCNC: 4.2 MMOL/L — SIGNIFICANT CHANGE UP (ref 3.5–5.3)
POTASSIUM SERPL-SCNC: 4.2 MMOL/L — SIGNIFICANT CHANGE UP (ref 3.5–5.3)
PROT SERPL-MCNC: 6.3 G/DL — LOW (ref 6.6–8.7)
PROTHROM AB SERPL-ACNC: 12 SEC — SIGNIFICANT CHANGE UP (ref 10.6–13.6)
PROTHROM AB SERPL-ACNC: 13.1 SEC — SIGNIFICANT CHANGE UP (ref 10.6–13.6)
RBC # BLD: 3.49 M/UL — LOW (ref 4.2–5.8)
RBC # FLD: 13.1 % — SIGNIFICANT CHANGE UP (ref 10.3–14.5)
SODIUM SERPL-SCNC: 135 MMOL/L — SIGNIFICANT CHANGE UP (ref 135–145)
WBC # BLD: 8.29 K/UL — SIGNIFICANT CHANGE UP (ref 3.8–10.5)
WBC # FLD AUTO: 8.29 K/UL — SIGNIFICANT CHANGE UP (ref 3.8–10.5)

## 2020-11-20 PROCEDURE — 33363 REPLACE AORTIC VALVE OPEN: CPT | Mod: Q0,62

## 2020-11-20 PROCEDURE — ZZZZZ: CPT

## 2020-11-20 PROCEDURE — 15734 MUSCLE-SKIN GRAFT TRUNK: CPT

## 2020-11-20 PROCEDURE — 93010 ELECTROCARDIOGRAM REPORT: CPT

## 2020-11-20 PROCEDURE — 71045 X-RAY EXAM CHEST 1 VIEW: CPT | Mod: 26

## 2020-11-20 PROCEDURE — 93355 ECHO TRANSESOPHAGEAL (TEE): CPT

## 2020-11-20 RX ORDER — CLOPIDOGREL BISULFATE 75 MG/1
75 TABLET, FILM COATED ORAL DAILY
Refills: 0 | Status: DISCONTINUED | OUTPATIENT
Start: 2020-11-21 | End: 2020-11-22

## 2020-11-20 RX ORDER — DEXTROSE 50 % IN WATER 50 %
25 SYRINGE (ML) INTRAVENOUS ONCE
Refills: 0 | Status: DISCONTINUED | OUTPATIENT
Start: 2020-11-20 | End: 2020-11-22

## 2020-11-20 RX ORDER — SODIUM CHLORIDE 9 MG/ML
1000 INJECTION, SOLUTION INTRAVENOUS
Refills: 0 | Status: DISCONTINUED | OUTPATIENT
Start: 2020-11-20 | End: 2020-11-22

## 2020-11-20 RX ORDER — GLUCAGON INJECTION, SOLUTION 0.5 MG/.1ML
1 INJECTION, SOLUTION SUBCUTANEOUS ONCE
Refills: 0 | Status: DISCONTINUED | OUTPATIENT
Start: 2020-11-20 | End: 2020-11-22

## 2020-11-20 RX ORDER — PANTOPRAZOLE SODIUM 20 MG/1
40 TABLET, DELAYED RELEASE ORAL
Refills: 0 | Status: DISCONTINUED | OUTPATIENT
Start: 2020-11-20 | End: 2020-11-22

## 2020-11-20 RX ORDER — ONDANSETRON 8 MG/1
4 TABLET, FILM COATED ORAL ONCE
Refills: 0 | Status: DISCONTINUED | OUTPATIENT
Start: 2020-11-20 | End: 2020-11-22

## 2020-11-20 RX ORDER — ADALIMUMAB 40MG/0.8ML
0 KIT SUBCUTANEOUS
Qty: 0 | Refills: 0 | DISCHARGE

## 2020-11-20 RX ORDER — SIMVASTATIN 20 MG/1
20 TABLET, FILM COATED ORAL AT BEDTIME
Refills: 0 | Status: DISCONTINUED | OUTPATIENT
Start: 2020-11-20 | End: 2020-11-22

## 2020-11-20 RX ORDER — CHLORHEXIDINE GLUCONATE 213 G/1000ML
5 SOLUTION TOPICAL
Refills: 0 | Status: DISCONTINUED | OUTPATIENT
Start: 2020-11-20 | End: 2020-11-20

## 2020-11-20 RX ORDER — CEFUROXIME AXETIL 250 MG
1500 TABLET ORAL EVERY 8 HOURS
Refills: 0 | Status: COMPLETED | OUTPATIENT
Start: 2020-11-20 | End: 2020-11-21

## 2020-11-20 RX ORDER — SODIUM CHLORIDE 9 MG/ML
3 INJECTION INTRAMUSCULAR; INTRAVENOUS; SUBCUTANEOUS EVERY 8 HOURS
Refills: 0 | Status: DISCONTINUED | OUTPATIENT
Start: 2020-11-20 | End: 2020-11-20

## 2020-11-20 RX ORDER — POTASSIUM CHLORIDE 20 MEQ
10 PACKET (EA) ORAL
Refills: 0 | Status: DISCONTINUED | OUTPATIENT
Start: 2020-11-20 | End: 2020-11-20

## 2020-11-20 RX ORDER — INSULIN LISPRO 100/ML
VIAL (ML) SUBCUTANEOUS
Refills: 0 | Status: DISCONTINUED | OUTPATIENT
Start: 2020-11-20 | End: 2020-11-22

## 2020-11-20 RX ORDER — CEFUROXIME AXETIL 250 MG
1500 TABLET ORAL ONCE
Refills: 0 | Status: DISCONTINUED | OUTPATIENT
Start: 2020-11-20 | End: 2020-11-20

## 2020-11-20 RX ORDER — DEXTROSE 50 % IN WATER 50 %
12.5 SYRINGE (ML) INTRAVENOUS ONCE
Refills: 0 | Status: DISCONTINUED | OUTPATIENT
Start: 2020-11-20 | End: 2020-11-22

## 2020-11-20 RX ORDER — DEXTROSE 50 % IN WATER 50 %
15 SYRINGE (ML) INTRAVENOUS ONCE
Refills: 0 | Status: DISCONTINUED | OUTPATIENT
Start: 2020-11-20 | End: 2020-11-22

## 2020-11-20 RX ORDER — ASPIRIN/CALCIUM CARB/MAGNESIUM 324 MG
81 TABLET ORAL ONCE
Refills: 0 | Status: COMPLETED | OUTPATIENT
Start: 2020-11-20 | End: 2020-11-20

## 2020-11-20 RX ORDER — CEFUROXIME AXETIL 250 MG
750 TABLET ORAL EVERY 8 HOURS
Refills: 0 | Status: DISCONTINUED | OUTPATIENT
Start: 2020-11-20 | End: 2020-11-20

## 2020-11-20 RX ORDER — CLOPIDOGREL BISULFATE 75 MG/1
75 TABLET, FILM COATED ORAL ONCE
Refills: 0 | Status: COMPLETED | OUTPATIENT
Start: 2020-11-20 | End: 2020-11-20

## 2020-11-20 RX ORDER — ASPIRIN/CALCIUM CARB/MAGNESIUM 324 MG
81 TABLET ORAL DAILY
Refills: 0 | Status: DISCONTINUED | OUTPATIENT
Start: 2020-11-21 | End: 2020-11-22

## 2020-11-20 RX ORDER — PANTOPRAZOLE SODIUM 20 MG/1
40 TABLET, DELAYED RELEASE ORAL ONCE
Refills: 0 | Status: COMPLETED | OUTPATIENT
Start: 2020-11-20 | End: 2020-11-20

## 2020-11-20 RX ORDER — SODIUM CHLORIDE 9 MG/ML
1000 INJECTION INTRAMUSCULAR; INTRAVENOUS; SUBCUTANEOUS
Refills: 0 | Status: DISCONTINUED | OUTPATIENT
Start: 2020-11-20 | End: 2020-11-20

## 2020-11-20 RX ADMIN — Medication 1: at 22:39

## 2020-11-20 RX ADMIN — Medication 100 MILLIGRAM(S): at 22:35

## 2020-11-20 RX ADMIN — PANTOPRAZOLE SODIUM 40 MILLIGRAM(S): 20 TABLET, DELAYED RELEASE ORAL at 12:02

## 2020-11-20 RX ADMIN — SODIUM CHLORIDE 10 MILLILITER(S): 9 INJECTION INTRAMUSCULAR; INTRAVENOUS; SUBCUTANEOUS at 12:03

## 2020-11-20 RX ADMIN — CHLORHEXIDINE GLUCONATE 5 MILLILITER(S): 213 SOLUTION TOPICAL at 17:02

## 2020-11-20 RX ADMIN — Medication 100 MILLIGRAM(S): at 16:55

## 2020-11-20 RX ADMIN — SIMVASTATIN 20 MILLIGRAM(S): 20 TABLET, FILM COATED ORAL at 23:47

## 2020-11-20 RX ADMIN — CLOPIDOGREL BISULFATE 75 MILLIGRAM(S): 75 TABLET, FILM COATED ORAL at 23:46

## 2020-11-20 RX ADMIN — Medication 81 MILLIGRAM(S): at 23:46

## 2020-11-20 NOTE — BRIEF OPERATIVE NOTE - NSICDXBRIEFPREOP_GEN_ALL_CORE_FT
PRE-OP DIAGNOSIS:  Chronic diastolic CHF (congestive heart failure), NYHA class 3 20-Nov-2020 09:59:24  Shaheen Vázquez  Severe aortic stenosis 20-Nov-2020 09:58:49  Shaheen Vázquez

## 2020-11-20 NOTE — BRIEF OPERATIVE NOTE - COMMENTS
[employer] Representative: Porfirio Gary (clinical support)  Cell Saver Utilized - Unable to Quantify Blood Loss  Invasive Lines: Right Radial Arterial Line, Right Femoral Venous Sheath  IV Medication Infusions: None

## 2020-11-20 NOTE — CHART NOTE - NSCHARTNOTEFT_GEN_A_CORE
Commercial 34mm [employer] CoreValve Evolut R TAVR via Left Subclavian Artery Cutdown.  NCT# [id number], STS/ACC TVT Registry Patient ID# [id number].

## 2020-11-20 NOTE — BRIEF OPERATIVE NOTE - NSICDXBRIEFPOSTOP_GEN_ALL_CORE_FT
POST-OP DIAGNOSIS:  Chronic diastolic CHF (congestive heart failure), NYHA class 3 20-Nov-2020 09:59:49  Shaheen Vázquez  Severe aortic stenosis 20-Nov-2020 09:59:41  Shaheen Vázquez

## 2020-11-20 NOTE — BRIEF OPERATIVE NOTE - NSICDXBRIEFPROCEDURE_GEN_ALL_CORE_FT
PROCEDURES:  TAVR, open axillary artery approach 20-Nov-2020 10:00:10 TAVR via Left Subclavian Artery Cutdown (34mm CoreValve Evolut R) (NCT# [id number]) (STS/ACC TVT Registry Patient ID# [id number]) Shaheen Vázquez

## 2020-11-21 DIAGNOSIS — N40.0 BENIGN PROSTATIC HYPERPLASIA WITHOUT LOWER URINARY TRACT SYMPTOMS: ICD-10-CM

## 2020-11-21 DIAGNOSIS — E78.00 PURE HYPERCHOLESTEROLEMIA, UNSPECIFIED: ICD-10-CM

## 2020-11-21 LAB
ANION GAP SERPL CALC-SCNC: 12 MMOL/L — SIGNIFICANT CHANGE UP (ref 5–17)
APTT BLD: 30.3 SEC — SIGNIFICANT CHANGE UP (ref 27.5–35.5)
BUN SERPL-MCNC: 33 MG/DL — HIGH (ref 8–20)
CALCIUM SERPL-MCNC: 8.1 MG/DL — LOW (ref 8.6–10.2)
CHLORIDE SERPL-SCNC: 105 MMOL/L — SIGNIFICANT CHANGE UP (ref 98–107)
CO2 SERPL-SCNC: 20 MMOL/L — LOW (ref 22–29)
CREAT SERPL-MCNC: 1.53 MG/DL — HIGH (ref 0.5–1.3)
GLUCOSE BLDC GLUCOMTR-MCNC: 105 MG/DL — HIGH (ref 70–99)
GLUCOSE BLDC GLUCOMTR-MCNC: 124 MG/DL — HIGH (ref 70–99)
GLUCOSE BLDC GLUCOMTR-MCNC: 136 MG/DL — HIGH (ref 70–99)
GLUCOSE BLDC GLUCOMTR-MCNC: 140 MG/DL — HIGH (ref 70–99)
GLUCOSE BLDC GLUCOMTR-MCNC: 96 MG/DL — SIGNIFICANT CHANGE UP (ref 70–99)
GLUCOSE SERPL-MCNC: 147 MG/DL — HIGH (ref 70–99)
HCT VFR BLD CALC: 33 % — LOW (ref 39–50)
HGB BLD-MCNC: 10.4 G/DL — LOW (ref 13–17)
INR BLD: 1.12 RATIO — SIGNIFICANT CHANGE UP (ref 0.88–1.16)
MAGNESIUM SERPL-MCNC: 2.1 MG/DL — SIGNIFICANT CHANGE UP (ref 1.6–2.6)
MCHC RBC-ENTMCNC: 30.3 PG — SIGNIFICANT CHANGE UP (ref 27–34)
MCHC RBC-ENTMCNC: 31.5 GM/DL — LOW (ref 32–36)
MCV RBC AUTO: 96.2 FL — SIGNIFICANT CHANGE UP (ref 80–100)
PHOSPHATE SERPL-MCNC: 2.7 MG/DL — SIGNIFICANT CHANGE UP (ref 2.4–4.7)
PLATELET # BLD AUTO: 188 K/UL — SIGNIFICANT CHANGE UP (ref 150–400)
POTASSIUM SERPL-MCNC: 4.7 MMOL/L — SIGNIFICANT CHANGE UP (ref 3.5–5.3)
POTASSIUM SERPL-SCNC: 4.7 MMOL/L — SIGNIFICANT CHANGE UP (ref 3.5–5.3)
PROTHROM AB SERPL-ACNC: 12.9 SEC — SIGNIFICANT CHANGE UP (ref 10.6–13.6)
RBC # BLD: 3.43 M/UL — LOW (ref 4.2–5.8)
RBC # FLD: 13.2 % — SIGNIFICANT CHANGE UP (ref 10.3–14.5)
SODIUM SERPL-SCNC: 137 MMOL/L — SIGNIFICANT CHANGE UP (ref 135–145)
WBC # BLD: 9.89 K/UL — SIGNIFICANT CHANGE UP (ref 3.8–10.5)
WBC # FLD AUTO: 9.89 K/UL — SIGNIFICANT CHANGE UP (ref 3.8–10.5)

## 2020-11-21 PROCEDURE — 99232 SBSQ HOSP IP/OBS MODERATE 35: CPT

## 2020-11-21 PROCEDURE — 71250 CT THORAX DX C-: CPT | Mod: 26

## 2020-11-21 PROCEDURE — 93010 ELECTROCARDIOGRAM REPORT: CPT

## 2020-11-21 PROCEDURE — 71045 X-RAY EXAM CHEST 1 VIEW: CPT | Mod: 26

## 2020-11-21 RX ORDER — AMLODIPINE BESYLATE 2.5 MG/1
5 TABLET ORAL DAILY
Refills: 0 | Status: DISCONTINUED | OUTPATIENT
Start: 2020-11-21 | End: 2020-11-22

## 2020-11-21 RX ORDER — FUROSEMIDE 40 MG
40 TABLET ORAL DAILY
Refills: 0 | Status: DISCONTINUED | OUTPATIENT
Start: 2020-11-21 | End: 2020-11-22

## 2020-11-21 RX ORDER — TAMSULOSIN HYDROCHLORIDE 0.4 MG/1
0.4 CAPSULE ORAL AT BEDTIME
Refills: 0 | Status: DISCONTINUED | OUTPATIENT
Start: 2020-11-21 | End: 2020-11-22

## 2020-11-21 RX ADMIN — Medication 81 MILLIGRAM(S): at 13:14

## 2020-11-21 RX ADMIN — TAMSULOSIN HYDROCHLORIDE 0.4 MILLIGRAM(S): 0.4 CAPSULE ORAL at 01:39

## 2020-11-21 RX ADMIN — AMLODIPINE BESYLATE 5 MILLIGRAM(S): 2.5 TABLET ORAL at 14:08

## 2020-11-21 RX ADMIN — TAMSULOSIN HYDROCHLORIDE 0.4 MILLIGRAM(S): 0.4 CAPSULE ORAL at 22:41

## 2020-11-21 RX ADMIN — SIMVASTATIN 20 MILLIGRAM(S): 20 TABLET, FILM COATED ORAL at 22:39

## 2020-11-21 RX ADMIN — CLOPIDOGREL BISULFATE 75 MILLIGRAM(S): 75 TABLET, FILM COATED ORAL at 13:14

## 2020-11-21 RX ADMIN — Medication 40 MILLIGRAM(S): at 01:39

## 2020-11-21 RX ADMIN — PANTOPRAZOLE SODIUM 40 MILLIGRAM(S): 20 TABLET, DELAYED RELEASE ORAL at 06:27

## 2020-11-21 RX ADMIN — Medication 100 MILLIGRAM(S): at 06:35

## 2020-11-21 NOTE — PHYSICAL THERAPY INITIAL EVALUATION ADULT - PHYSICAL ASSIST/NONPHYSICAL ASSIST: STAND/SIT, REHAB EVAL
pt required increased physical assistance + verbal cues for proper hand placement, to help perform transfer/to safely lower to a sitting position/1 person assist

## 2020-11-21 NOTE — PHYSICAL THERAPY INITIAL EVALUATION ADULT - PHYSICAL ASSIST/NONPHYSICAL ASSIST: GAIT, REHAB EVAL
1 person assist/pt required increased physical assistance + verbal cues to help maintain proper upright walking posture during ambulation, assistance re proper use of AD and assistance for safety & falls prevention; pt required increased verbal cues for proper breathing techniques due to pt c/o apparent SOB, O2 sat WNL t/o.

## 2020-11-21 NOTE — PHYSICAL THERAPY INITIAL EVALUATION ADULT - ADDITIONAL COMMENTS
Pt lives with spouse, daughter and 14 y.o grandson in a private home with 1 ANA no handrails + 7 steps 2 handrails to bed&bath. Pt's PLOF was independent in all ADL's + ambulation with SAC, avid  and driving PTA. pt has RW, SAC and shower chair DME at home.

## 2020-11-21 NOTE — PHYSICAL THERAPY INITIAL EVALUATION ADULT - PHYSICAL ASSIST/NONPHYSICAL ASSIST: SIT/STAND, REHAB EVAL
pt required increased physical assistance + verbal cues for proper hand placement, to help perform transfer/to rise to a full standing position./1 person assist

## 2020-11-21 NOTE — PROGRESS NOTE ADULT - PROBLEM SELECTOR PLAN 2
Continue ASA and Plavix for graft patency  Continue statin for anti inflammatory properties  Resume home meds prior to d/c Recent PCI  Continue ASA and Plavix daily  Continue statin for anti inflammatory properties  Resume home meds prior to d/c

## 2020-11-21 NOTE — PROGRESS NOTE ADULT - SUBJECTIVE AND OBJECTIVE BOX
Subjective:  88yo M c/o nausea/vomiting, Zofran given.      HPI:  89 year old male presents to PST today with daughter present, patient has a pmhx of  HTN, RA, DM-2, CAD s/p prior PCI in 2011, severe aortic stenosis, admitted 10/13/20 with acute on chronic CHF (EF 40-45%). He underwent R&LHC 10/16/20 with PCI x 3 (pLAD/mLAD), plan for TAVR post discharge. Patient also had pacemaker placed for intermittent sinus pauses and high grade AV block.  Patient presents to PST today with complaints of dyspnea on exertion and lower leg swelling, he denies chest pain, pressure, SOB at rest, orthopnea, PND.  Patient is scheduled for a TAVR on 11/20/20 with Dr. Jett.      (12 Nov 2020 09:12)          PAST MEDICAL & SURGICAL HISTORY:  Pacemaker    Stented coronary artery  2011, 2020    High cholesterol    HTN (hypertension)    Diabetes    Gout  h/o    RA (rheumatoid arthritis)    Osteoarthrosis    Ulcer disease  stomach    Diabetes mellitus  type II    Kidney stones    BPH (benign prostatic hypertrophy)    Hypertension    CAD (coronary artery disease)    Status post hip surgery    Cataract  b/l 2001 &amp; 2002    History of cystoscopy  TURP 2010    Kidney stone  had lithotripsy in 1983 &amp; 2010    Kidney stone  removed about 50 years ago    S/P angioplasty with stent  2011 - 1 coronary stent    S/P knee replacement  right in January 1/28/13 &amp; left 12/13/13            MEDICATIONS  (STANDING):  aspirin  chewable 81 milliGRAM(s) Oral daily  cefuroxime  IVPB 1500 milliGRAM(s) IV Intermittent every 8 hours  clopidogrel Tablet 75 milliGRAM(s) Oral daily  dextrose 40% Gel 15 Gram(s) Oral once  dextrose 5%. 1000 milliLiter(s) (50 mL/Hr) IV Continuous <Continuous>  dextrose 5%. 1000 milliLiter(s) (100 mL/Hr) IV Continuous <Continuous>  dextrose 50% Injectable 25 Gram(s) IV Push once  dextrose 50% Injectable 12.5 Gram(s) IV Push once  dextrose 50% Injectable 25 Gram(s) IV Push once  glucagon  Injectable 1 milliGRAM(s) IntraMuscular once  insulin lispro (ADMELOG) corrective regimen sliding scale   SubCutaneous Before meals and at bedtime  ondansetron  IVPB 4 milliGRAM(s) IV Intermittent once  pantoprazole    Tablet 40 milliGRAM(s) Oral before breakfast  simvastatin 20 milliGRAM(s) Oral at bedtime    MEDICATIONS  (PRN):          Allergies    No Known Allergies    Intolerances          WEIGHTS:  Daily Height in cm: 165.1 (20 Nov 2020 05:57)    Daily   Admit Wt: Drug Dosing Weight  Height (cm): 165.1 (20 Nov 2020 05:57)  Weight (kg): 68.9 (20 Nov 2020 05:57)  BMI (kg/m2): 25.3 (20 Nov 2020 05:57)  BSA (m2): 1.76 (20 Nov 2020 05:57)  I&O's Summary    20 Nov 2020 07:01  -  21 Nov 2020 01:18  --------------------------------------------------------  IN: 280 mL / OUT: 705 mL / NET: -425 mL        VITAL SIGNS:  ICU Vital Signs Last 24 Hrs  T(C): 36.5 (21 Nov 2020 00:22), Max: 36.8 (20 Nov 2020 05:57)  T(F): 97.7 (21 Nov 2020 00:22), Max: 98.3 (20 Nov 2020 05:57)  HR: 64 (21 Nov 2020 00:00) (59 - 75)  BP: 151/64 (21 Nov 2020 00:00) (102/50 - 158/65)  BP(mean): 92 (21 Nov 2020 00:00) (71 - 99)  ABP: 140/40 (21 Nov 2020 00:00) (105/31 - 153/44)  ABP(mean): 72 (21 Nov 2020 00:00) (50 - 79)  RR: 16 (21 Nov 2020 00:00) (14 - 25)  SpO2: 96% (21 Nov 2020 00:00) (92% - 100%)        All laboratory results, radiology and medications reviewed.    LABS:  11-20    135  |  103  |  32.0<H>  ----------------------------<  207<H>  4.2   |  19.0<L>  |  1.40<H>    Ca    8.3<L>      20 Nov 2020 11:15  Mg     2.0     11-20    TPro  6.3<L>  /  Alb  3.3  /  TBili  0.4  /  DBili  x   /  AST  14  /  ALT  9   /  AlkPhos  42  11-20                                 10.6   8.29  )-----------( 174      ( 20 Nov 2020 11:15 )             33.8          PT/INR - ( 20 Nov 2020 11:15 )   PT: 13.1 sec;   INR: 1.14 ratio         PTT - ( 20 Nov 2020 11:15 )  PTT:32.1 sec  Bilirubin Total, Serum: 0.4 mg/dL (11-20 @ 11:15)    ABG - ( 20 Nov 2020 11:18 )  pH, Arterial: 7.36  pH, Blood: x     /  pCO2: 39    /  pO2: 73    / HCO3: 22    / Base Excess: -3.4  /  SaO2: 95                    CAPILLARY BLOOD GLUCOSE      POCT Blood Glucose.: 177 mg/dL (20 Nov 2020 22:19)  POCT Blood Glucose.: 190 mg/dL (20 Nov 2020 11:05)  POCT Blood Glucose.: 105 mg/dL (20 Nov 2020 06:11)             PHYSICAL EXAM:  General:  thin, no acute distress  Neurology:  alert and oriented X 4, nonfocal, no gross deficits  Respiratory:  clear to auscultation bilaterally  CV:  regular rate and rhythm, normal S1 S2  Abdomen:  soft, nontender, nondistended, positive bowel sounds  Extremities:  warm, well perfused, no edema +DP pulses

## 2020-11-21 NOTE — PHYSICAL THERAPY INITIAL EVALUATION ADULT - GENERAL OBSERVATIONS, REHAB EVAL
Pt received on 4EST, EVER dinh'ed pt for PT. pt observed sitting in chair with 2 L O2 NC(donned by RN during eval as pt reported increased SOB, sats were WNL t/o eval), telemonitor with , BP cuff and tena, pleasant, cooperative, A&O and c/o 0/10 pain t/o eval

## 2020-11-21 NOTE — PHYSICAL THERAPY INITIAL EVALUATION ADULT - DISCHARGE DISPOSITION, PT EVAL
home with assist, RW and home PT(for strength, balance, ambulation/stair and endurance training), pending progress and pending stair assessment, as pt is an increased falls and has multiple stairs to negotiate at home, deeming pt unsafe to return home at this time.

## 2020-11-21 NOTE — PHYSICAL THERAPY INITIAL EVALUATION ADULT - PLANNED THERAPY INTERVENTIONS, PT EVAL
bed mobility training/gait training/balance training/strengthening/transfer training/stair and endurance training

## 2020-11-22 ENCOUNTER — TRANSCRIPTION ENCOUNTER (OUTPATIENT)
Age: 85
End: 2020-11-22

## 2020-11-22 VITALS
OXYGEN SATURATION: 97 % | DIASTOLIC BLOOD PRESSURE: 62 MMHG | SYSTOLIC BLOOD PRESSURE: 128 MMHG | HEART RATE: 91 BPM | RESPIRATION RATE: 20 BRPM

## 2020-11-22 LAB
ANION GAP SERPL CALC-SCNC: 10 MMOL/L — SIGNIFICANT CHANGE UP (ref 5–17)
BUN SERPL-MCNC: 36 MG/DL — HIGH (ref 8–20)
CALCIUM SERPL-MCNC: 8.2 MG/DL — LOW (ref 8.6–10.2)
CHLORIDE SERPL-SCNC: 105 MMOL/L — SIGNIFICANT CHANGE UP (ref 98–107)
CO2 SERPL-SCNC: 23 MMOL/L — SIGNIFICANT CHANGE UP (ref 22–29)
CREAT SERPL-MCNC: 1.45 MG/DL — HIGH (ref 0.5–1.3)
GLUCOSE BLDC GLUCOMTR-MCNC: 127 MG/DL — HIGH (ref 70–99)
GLUCOSE BLDC GLUCOMTR-MCNC: 99 MG/DL — SIGNIFICANT CHANGE UP (ref 70–99)
GLUCOSE SERPL-MCNC: 125 MG/DL — HIGH (ref 70–99)
HCT VFR BLD CALC: 31.8 % — LOW (ref 39–50)
HGB BLD-MCNC: 10.3 G/DL — LOW (ref 13–17)
MAGNESIUM SERPL-MCNC: 2 MG/DL — SIGNIFICANT CHANGE UP (ref 1.6–2.6)
MCHC RBC-ENTMCNC: 31.1 PG — SIGNIFICANT CHANGE UP (ref 27–34)
MCHC RBC-ENTMCNC: 32.4 GM/DL — SIGNIFICANT CHANGE UP (ref 32–36)
MCV RBC AUTO: 96.1 FL — SIGNIFICANT CHANGE UP (ref 80–100)
PLATELET # BLD AUTO: 168 K/UL — SIGNIFICANT CHANGE UP (ref 150–400)
POTASSIUM SERPL-MCNC: 4 MMOL/L — SIGNIFICANT CHANGE UP (ref 3.5–5.3)
POTASSIUM SERPL-SCNC: 4 MMOL/L — SIGNIFICANT CHANGE UP (ref 3.5–5.3)
RBC # BLD: 3.31 M/UL — LOW (ref 4.2–5.8)
RBC # FLD: 13.2 % — SIGNIFICANT CHANGE UP (ref 10.3–14.5)
SODIUM SERPL-SCNC: 138 MMOL/L — SIGNIFICANT CHANGE UP (ref 135–145)
WBC # BLD: 9.55 K/UL — SIGNIFICANT CHANGE UP (ref 3.8–10.5)
WBC # FLD AUTO: 9.55 K/UL — SIGNIFICANT CHANGE UP (ref 3.8–10.5)

## 2020-11-22 PROCEDURE — 99232 SBSQ HOSP IP/OBS MODERATE 35: CPT

## 2020-11-22 PROCEDURE — 71045 X-RAY EXAM CHEST 1 VIEW: CPT | Mod: 26

## 2020-11-22 RX ORDER — TAMSULOSIN HYDROCHLORIDE 0.4 MG/1
1 CAPSULE ORAL
Qty: 0 | Refills: 0 | DISCHARGE
Start: 2020-11-22

## 2020-11-22 RX ORDER — TAMSULOSIN HYDROCHLORIDE 0.4 MG/1
2 CAPSULE ORAL
Qty: 0 | Refills: 0 | DISCHARGE
Start: 2020-11-22

## 2020-11-22 RX ORDER — AMLODIPINE BESYLATE 2.5 MG/1
1 TABLET ORAL
Qty: 30 | Refills: 0
Start: 2020-11-22 | End: 2020-12-21

## 2020-11-22 RX ORDER — METOPROLOL TARTRATE 50 MG
25 TABLET ORAL DAILY
Refills: 0 | Status: DISCONTINUED | OUTPATIENT
Start: 2020-11-22 | End: 2020-11-22

## 2020-11-22 RX ADMIN — Medication 25 MILLIGRAM(S): at 07:50

## 2020-11-22 RX ADMIN — AMLODIPINE BESYLATE 5 MILLIGRAM(S): 2.5 TABLET ORAL at 05:56

## 2020-11-22 RX ADMIN — Medication 40 MILLIGRAM(S): at 05:56

## 2020-11-22 RX ADMIN — Medication 81 MILLIGRAM(S): at 11:48

## 2020-11-22 RX ADMIN — CLOPIDOGREL BISULFATE 75 MILLIGRAM(S): 75 TABLET, FILM COATED ORAL at 11:48

## 2020-11-22 RX ADMIN — PANTOPRAZOLE SODIUM 40 MILLIGRAM(S): 20 TABLET, DELAYED RELEASE ORAL at 05:56

## 2020-11-22 NOTE — DISCHARGE NOTE PROVIDER - CARE PROVIDERS DIRECT ADDRESSES
[email or web address],DirectAddress_Unknown,DirectAddress_Unknown,DirectAddress_Unknown

## 2020-11-22 NOTE — DISCHARGE NOTE PROVIDER - NSDCCPTREATMENT_GEN_ALL_CORE_FT
PRINCIPAL PROCEDURE  Procedure: Coronary stent placement  Findings and Treatment: LESLYE x 3 to LAD      SECONDARY PROCEDURE  Procedure: Insertion, cardiac pacemaker, dual chamber  Findings and Treatment: [facility] will call you to schedule a 2 week post op follow up, please call [phone] if you dont hear from them or need to be seen sooner.   Post Operative Device Instructions:  - Bruising around the implant site or over the chest, side or arm near the incision is normal, and will take a few weeks to resolve.  -Do not lift the affected arm higher than 90 degrees (shoulder height) in any direction for 6 weeks.   - Do not push, pull or lift anything heavier than 10 lbs (about a gallon of milk) with the affected arm for 4 weeks.     -keep site dry for one week  - Do not touch the incision until it is completely healed.   - There is dermabond on your incision, purple surgical glue, it will slowly fall off on its own, do not pick or pull it.  - Do not apply soaps, creams, lotions, ointments or powders to the incision until it is completely healed.  You should call the doctor if:   - you notice redness, drainage, swelling, increased tenderness, hot sensation around the  incision, bleeding or incision edges pulling apart.  - your temperature is greater than 100 degress F for more than 24 hours.  - you notice swelling or bulging at the incision or around the device that was not there when you left the hospital or is increasing in size.  -you experience increased difficulty breathing.  - you notice new/worsening swelling in your legs and ankles.  - you faint or have dizzy spells.  -you have any questions or concerns regarding your device or the procedure.

## 2020-11-22 NOTE — PROGRESS NOTE ADULT - PROBLEM SELECTOR PLAN 7
Pantoprazole for GI prophylaxis  SCDs for DVT prophylaxis
Pantoprazole for GI prophylaxis  SCDs for DVT prophylaxis

## 2020-11-22 NOTE — PROGRESS NOTE ADULT - SUBJECTIVE AND OBJECTIVE BOX
Subjective:  89yo M resting comfortable, no c/o pain.      HPI:  89 year old male presents to PST today with daughter present, patient has a pmhx of  HTN, RA, DM-2, CAD s/p prior PCI in 2011, severe aortic stenosis, admitted 10/13/20 with acute on chronic CHF (EF 40-45%). He underwent R&LHC 10/16/20 with PCI x 3 (pLAD/mLAD), plan for TAVR post discharge. Patient also had pacemaker placed for intermittent sinus pauses and high grade AV block.  Patient presents to PST today with complaints of dyspnea on exertion and lower leg swelling, he denies chest pain, pressure, SOB at rest, orthopnea, PND.  Patient is scheduled for a TAVR on 11/20/20 with Dr. Jett.      (12 Nov 2020 09:12)          PAST MEDICAL & SURGICAL HISTORY:  Pacemaker    Stented coronary artery  2011, 2020    High cholesterol    HTN (hypertension)    Diabetes    Gout  h/o    RA (rheumatoid arthritis)    Osteoarthrosis    Ulcer disease  stomach    Diabetes mellitus  type II    Kidney stones    BPH (benign prostatic hypertrophy)    Hypertension    CAD (coronary artery disease)    Status post hip surgery    Cataract  b/l 2001 &amp; 2002    History of cystoscopy  TURP 2010    Kidney stone  had lithotripsy in 1983 &amp; 2010    Kidney stone  removed about 50 years ago    S/P angioplasty with stent  2011 - 1 coronary stent    S/P knee replacement  right in January 1/28/13 &amp; left 12/13/13            MEDICATIONS  (STANDING):  amLODIPine   Tablet 5 milliGRAM(s) Oral daily  aspirin  chewable 81 milliGRAM(s) Oral daily  clopidogrel Tablet 75 milliGRAM(s) Oral daily  dextrose 40% Gel 15 Gram(s) Oral once  dextrose 5%. 1000 milliLiter(s) (50 mL/Hr) IV Continuous <Continuous>  dextrose 5%. 1000 milliLiter(s) (100 mL/Hr) IV Continuous <Continuous>  dextrose 50% Injectable 25 Gram(s) IV Push once  dextrose 50% Injectable 12.5 Gram(s) IV Push once  dextrose 50% Injectable 25 Gram(s) IV Push once  furosemide    Tablet 40 milliGRAM(s) Oral daily  glucagon  Injectable 1 milliGRAM(s) IntraMuscular once  insulin lispro (ADMELOG) corrective regimen sliding scale   SubCutaneous Before meals and at bedtime  ondansetron  IVPB 4 milliGRAM(s) IV Intermittent once  pantoprazole    Tablet 40 milliGRAM(s) Oral before breakfast  simvastatin 20 milliGRAM(s) Oral at bedtime  tamsulosin 0.4 milliGRAM(s) Oral at bedtime    MEDICATIONS  (PRN):          Allergies    No Known Allergies    Intolerances          WEIGHTS:  Daily     Daily   Admit Wt: Drug Dosing Weight  Height (cm): 165.1 (20 Nov 2020 05:57)  Weight (kg): 68.9 (20 Nov 2020 05:57)  BMI (kg/m2): 25.3 (20 Nov 2020 05:57)  BSA (m2): 1.76 (20 Nov 2020 05:57)  I&O's Summary    20 Nov 2020 07:01  -  21 Nov 2020 07:00  --------------------------------------------------------  IN: 280 mL / OUT: 705 mL / NET: -425 mL    21 Nov 2020 07:01  -  22 Nov 2020 03:06  --------------------------------------------------------  IN: 50 mL / OUT: 1205 mL / NET: -1155 mL        VITAL SIGNS:  ICU Vital Signs Last 24 Hrs  T(C): 37 (22 Nov 2020 00:29), Max: 37 (22 Nov 2020 00:29)  T(F): 98.6 (22 Nov 2020 00:29), Max: 98.6 (22 Nov 2020 00:29)  HR: 78 (22 Nov 2020 02:00) (66 - 92)  BP: 142/65 (22 Nov 2020 02:00) (122/53 - 162/67)  BP(mean): 94 (22 Nov 2020 02:00) (76 - 121)  ABP: 137/46 (21 Nov 2020 06:00) (115/37 - 138/46)  ABP(mean): 77 (21 Nov 2020 06:00) (62 - 77)  RR: 32 (22 Nov 2020 02:00) (15 - 32)  SpO2: 93% (22 Nov 2020 02:00) (93% - 100%)        All laboratory results, radiology and medications reviewed.    LABS:  11-21    137  |  105  |  33.0<H>  ----------------------------<  147<H>  4.7   |  20.0<L>  |  1.53<H>    Ca    8.1<L>      21 Nov 2020 02:44  Phos  2.7     11-21  Mg     2.1     11-21    TPro  6.3<L>  /  Alb  3.3  /  TBili  0.4  /  DBili  x   /  AST  14  /  ALT  9   /  AlkPhos  42  11-20                                 10.4   9.89  )-----------( 188      ( 21 Nov 2020 02:44 )             33.0          PT/INR - ( 21 Nov 2020 02:44 )   PT: 12.9 sec;   INR: 1.12 ratio         PTT - ( 21 Nov 2020 02:44 )  PTT:30.3 sec    ABG - ( 20 Nov 2020 11:18 )  pH, Arterial: 7.36  pH, Blood: x     /  pCO2: 39    /  pO2: 73    / HCO3: 22    / Base Excess: -3.4  /  SaO2: 95              CAPILLARY BLOOD GLUCOSE      POCT Blood Glucose.: 105 mg/dL (21 Nov 2020 22:47)  POCT Blood Glucose.: 96 mg/dL (21 Nov 2020 19:39)  POCT Blood Glucose.: 124 mg/dL (21 Nov 2020 16:50)  POCT Blood Glucose.: 140 mg/dL (21 Nov 2020 12:09)  POCT Blood Glucose.: 136 mg/dL (21 Nov 2020 07:24)             PHYSICAL EXAM:  General:  thin, frail, no acute distress  Neurology:  alert and oriented X 4, nonfocal, no gross deficits  Respiratory:  clear to auscultation bilaterally  CV:  regular rate and rhythm, normal S1 S2  Abdomen:  soft, nontender, nondistended, positive bowel sounds  Extremities:  warm, well perfused, no edema +DP pulses

## 2020-11-22 NOTE — PROGRESS NOTE ADULT - PROBLEM SELECTOR PLAN 2
Recent PCI  Continue ASA and Plavix daily  Continue statin for anti inflammatory properties  Resume home meds prior to d/c

## 2020-11-22 NOTE — DISCHARGE NOTE PROVIDER - CARE PROVIDER_API CALL
Osmar Corona  CARDIOVASCULAR DISEASE  [address] [place] [address]  Phone: [phone]  Fax: [phone]  Follow Up Time:     Adonis Kaur)  Cardiac Electrophysiology; Cardiovascular Disease; Internal Medicine  [address] [place] [address]  Phone: [phone]  Fax: [phone]  Follow Up Time:     Zahra,   Phone: (   )    -  Fax: (   )    -  Follow Up Time:     Praful Jett)  Surgery; Thoracic and Cardiac Surgery  [address] [place] [address]  Phone: [phone]  Fax: [phone]  Follow Up Time:

## 2020-11-22 NOTE — DISCHARGE NOTE PROVIDER - NSDCFUADDAPPT_GEN_ALL_CORE_FT
Follow up with Dr. Corona in one to two weeks    No heavy lifting, driving, sex, tub baths, swimming, or any activity that submerges the lower half of the body in water for 48 hours.  Limited walking and stairs for 48 hours.    Change the bandaid after 24 hours and every 24 hours after that.  Keep the puncture site dry and covered with a bandaid until a scab forms.    Observe the site frequently.  If bleeding or a large lump (the size of a golf ball or bigger) occurs lie flat, apply continuous direct pressure just above the puncture site for at least 10 minutes, and notify your physician immediately.  If the bleeding cannot be controlled, call 911 immediately for assistance.  Notify your physician of pain, swelling or any drainage.    Notify your physician immediately if coldness, numbness, discoloration or pain in your foot occurs.    will need follow up with Dr Jett for TAVR.  Office number [phone].

## 2020-11-22 NOTE — PROGRESS NOTE ADULT - PROBLEM SELECTOR PLAN 1
s/p TAVR on 11/20/2020  Continue ASA and Plavix daily  No MCOT needed at d/c, pt has own PPM  Pt's discharge delayed secondary to PT clearance for stairs, plan for d/c home later today.
s/p TAVR on 11/20/2020  Continue ASA and Plavix daily  Will need echo in am prior to d/c home  c/o N/V post op, Zofran given  No MCOT needed at d/c, pt has own PPM

## 2020-11-22 NOTE — PROGRESS NOTE ADULT - ASSESSMENT
89 year old male with pmhx of  HTN, RA, DM-2, CAD s/p prior PCI in 2011, severe aortic stenosis, admitted 10/13/20 with acute on chronic CHF (EF 40-45%). He underwent R&LHC 10/16/20 with PCI x 3 (pLAD/mLAD), plan for TAVR post discharge. Patient also had pacemaker placed for intermittent sinus pauses and high grade AV block.  On Nov 20, 2020 pt underwent TAVR via Left Subclavian cutdown with Core Valve.
89 year old male with pmhx of  HTN, RA, DM-2, CAD s/p prior PCI in 2011, severe aortic stenosis, admitted 10/13/20 with acute on chronic CHF (EF 40-45%). He underwent R&LHC 10/16/20 with PCI x 3 (pLAD/mLAD), plan for TAVR post discharge. Patient also had pacemaker placed for intermittent sinus pauses and high grade AV block.  On Nov 20, 2020 pt underwent TAVR via Left Subclavian cutdown with Core Valve.

## 2020-11-22 NOTE — DISCHARGE NOTE PROVIDER - NSDCFUSCHEDAPPT_GEN_ALL_CORE_FT
BEVERLY THOMAS ; 01/22/2021 ; [facility] DavidwBEVERLY Carr ; 02/02/2021 ; [facility] [address]

## 2020-11-22 NOTE — DISCHARGE NOTE PROVIDER - NSDCCPCAREPLAN_GEN_ALL_CORE_FT
PRINCIPAL DISCHARGE DIAGNOSIS  Diagnosis: Acute on chronic HFrEF (heart failure with reduced ejection fraction)  Assessment and Plan of Treatment:       SECONDARY DISCHARGE DIAGNOSES  Diagnosis: Subacute ischemic heart disease  Assessment and Plan of Treatment:     Diagnosis: Symptomatic severe aortic stenosis with low ejection fraction  Assessment and Plan of Treatment:     Diagnosis: Heart block  Assessment and Plan of Treatment: Heart block    Diagnosis: CAD (coronary atherosclerotic disease)  Assessment and Plan of Treatment:

## 2020-11-22 NOTE — PROGRESS NOTE ADULT - PROBLEM SELECTOR PLAN 6
Pt started on Flomax for urinary retention requiring straight cath post procedure  Now voiding without difficulty
Pt started on Flomax for urinary retention requiring straight cath post procedure

## 2020-11-22 NOTE — DISCHARGE NOTE PROVIDER - PROVIDER TOKENS
PROVIDER:[TOKEN:[[id number]]],PROVIDER:[TOKEN:[[id number]]],FREE:[LAST:[Balot],PHONE:[(   )    -],FAX:[(   )    -]],PROVIDER:[TOKEN:[[id number]]]

## 2020-11-22 NOTE — PROGRESS NOTE ADULT - PROBLEM SELECTOR PLAN 3
Hg A1C 6.1  GAUDENCIO ACHS  Resume home medications prior to d/c
Hg A1C 6.1  GAUDENCIO ACHS  Resume home medications prior to d/c

## 2020-11-22 NOTE — DISCHARGE NOTE PROVIDER - NSDCMRMEDTOKEN_GEN_ALL_CORE_FT
amLODIPine 5 mg oral tablet: 1 tab(s) orally once a day  aspirin 81 mg oral delayed release capsule:  orally   clopidogrel 75 mg oral tablet: 1 tab(s) orally once a day - last dose 4/11/14  DME: [facility]  furosemide 40 mg oral tablet: 1 tab(s) orally once a day  glimepiride 1 mg oral tablet: 0.5 tab(s) orally once a day  metoprolol succinate 25 mg oral tablet, extended release: 1 tab(s) orally every 12 hours  mupirocin 2% topical ointment: 1 application in each affected nostril 2 times a day   Start 11/15  Do not use AM of Covid Test 11/17  Use AM of Surgery 11/20  Prolia 60 mg/mL subcutaneous solution:   simvastatin 20 mg oral tablet: 1 tab(s) orally once a day (at bedtime)  tamsulosin 0.4 mg oral capsule: 1 cap(s) orally once a day (at bedtime)

## 2020-11-22 NOTE — DISCHARGE NOTE NURSING/CASE MANAGEMENT/SOCIAL WORK - PATIENT PORTAL LINK FT
You can access the FollowMyHealth Patient Portal offered by [facility] by registering at the following website: [email or web address]. By joining [facility]’s FollowMyHealth portal, you will also be able to view your health information using other applications (apps) compatible with our system.

## 2020-11-22 NOTE — PROGRESS NOTE ADULT - PROBLEM SELECTOR PLAN 4
Continue Norvasc Daily  Consider resuming Lopressor if needed (pt has own PPM as backup)
Currently normotensive

## 2020-11-22 NOTE — DISCHARGE NOTE PROVIDER - HOSPITAL COURSE
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 No post operative complications. Post op day 1 required Norvasc for HTN and Physical therapy.  He denies chest pain, SOB, palpitation, diaphoresis, nausea, vomiting, abdominal pain, change in bowel/urinary habit, dizziness, HA, or , calf pain.     1. Acute on chronic CHF exacerbation -- s/p Elective TAVR for Severe Aortic Stenosis     2. TAVR anticoagulation ASA and Plavix      3. CAD - s/p PCI on Oct 16, 2020    4. CKD-3     5. HTN  Metoprolol ER 25mg bid , Norvasc added    6, HLD   continue Zocor     7. DM-2  Insulin sliding scale. To resume DM regimen at home.

## 2020-11-22 NOTE — DISCHARGE NOTE PROVIDER - NSDCFUADDINST_GEN_ALL_CORE_FT
Choose lean meats and poultry without skin and prepare them without added saturated and trans fat.  Eat fish at least twice a week. Recent research shows that eating oily fish containing omega-3 fatty acids (for example, salmon, trout and herring) may help lower your risk of death from coronary artery disease.  Select fat-free, 1 percent fat and low-fat dairy products.  Cut back on foods containing partially hydrogenated vegetable oils to reduce trans fat in your diet.   To lower cholesterol, reduce saturated fat to no more than 5 to 6 percent of total calories. For someone eating 2,000 calories a day, that’s about 13 grams of saturated fat.  Cut back on beverages and foods with added sugars.  Choose and prepare foods with little or no salt. To lower blood pressure, aim to eat no more than 2,400 milligrams of sodium per day. Reducing daily intake to 1,500 mg is desirable because it can lower blood pressure even further.  If you drink alcohol, drink in moderation. That means one drink per day if you’re a woman and two drinks  per day if you’re a man.  Follow the American Heart Association recommendations when you eat out, and keep an eye on your portion sizes.     will need follow up with Dr Jett for TAVR.  Office number [phone].

## 2020-11-23 ENCOUNTER — TRANSCRIPTION ENCOUNTER (OUTPATIENT)
Age: 85
End: 2020-11-23

## 2020-11-23 PROBLEM — Z95.0 PRESENCE OF CARDIAC PACEMAKER: Chronic | Status: ACTIVE | Noted: 2020-11-12

## 2020-11-25 ENCOUNTER — TRANSCRIPTION ENCOUNTER (OUTPATIENT)
Age: 85
End: 2020-11-25

## 2020-11-28 ENCOUNTER — EMERGENCY (EMERGENCY)
Facility: HOSPITAL | Age: 85
LOS: 1 days | Discharge: HOME | End: 2020-11-28
Attending: EMERGENCY MEDICINE
Payer: MEDICARE

## 2020-11-28 ENCOUNTER — TRANSCRIPTION ENCOUNTER (OUTPATIENT)
Age: 85
End: 2020-11-28

## 2020-11-28 VITALS
WEIGHT: 115.08 LBS | OXYGEN SATURATION: 97 % | DIASTOLIC BLOOD PRESSURE: 62 MMHG | SYSTOLIC BLOOD PRESSURE: 134 MMHG | RESPIRATION RATE: 18 BRPM | TEMPERATURE: 98 F | HEIGHT: 65 IN | HEART RATE: 66 BPM

## 2020-11-28 VITALS
HEART RATE: 80 BPM | DIASTOLIC BLOOD PRESSURE: 70 MMHG | OXYGEN SATURATION: 99 % | RESPIRATION RATE: 18 BRPM | SYSTOLIC BLOOD PRESSURE: 133 MMHG | TEMPERATURE: 98 F

## 2020-11-28 DIAGNOSIS — Z98.89 OTHER SPECIFIED POSTPROCEDURAL STATES: Chronic | ICD-10-CM

## 2020-11-28 DIAGNOSIS — H26.9 UNSPECIFIED CATARACT: Chronic | ICD-10-CM

## 2020-11-28 DIAGNOSIS — Z95.2 PRESENCE OF PROSTHETIC HEART VALVE: Chronic | ICD-10-CM

## 2020-11-28 LAB
ALBUMIN SERPL ELPH-MCNC: 3.8 G/DL — SIGNIFICANT CHANGE UP (ref 3.3–5.2)
ALP SERPL-CCNC: 52 U/L — SIGNIFICANT CHANGE UP (ref 40–120)
ALT FLD-CCNC: 16 U/L — SIGNIFICANT CHANGE UP
ANION GAP SERPL CALC-SCNC: 13 MMOL/L — SIGNIFICANT CHANGE UP (ref 5–17)
APTT BLD: 27.7 SEC — SIGNIFICANT CHANGE UP (ref 27.5–35.5)
AST SERPL-CCNC: 17 U/L — SIGNIFICANT CHANGE UP
BASOPHILS # BLD AUTO: 0.04 K/UL — SIGNIFICANT CHANGE UP (ref 0–0.2)
BASOPHILS NFR BLD AUTO: 0.5 % — SIGNIFICANT CHANGE UP (ref 0–2)
BILIRUB SERPL-MCNC: 0.3 MG/DL — LOW (ref 0.4–2)
BUN SERPL-MCNC: 31 MG/DL — HIGH (ref 8–20)
CALCIUM SERPL-MCNC: 9.5 MG/DL — SIGNIFICANT CHANGE UP (ref 8.6–10.2)
CHLORIDE SERPL-SCNC: 100 MMOL/L — SIGNIFICANT CHANGE UP (ref 98–107)
CO2 SERPL-SCNC: 24 MMOL/L — SIGNIFICANT CHANGE UP (ref 22–29)
CREAT SERPL-MCNC: 1.49 MG/DL — HIGH (ref 0.5–1.3)
EOSINOPHIL # BLD AUTO: 0.51 K/UL — HIGH (ref 0–0.5)
EOSINOPHIL NFR BLD AUTO: 6.6 % — HIGH (ref 0–6)
GLUCOSE SERPL-MCNC: 100 MG/DL — HIGH (ref 70–99)
HCT VFR BLD CALC: 37.3 % — LOW (ref 39–50)
HGB BLD-MCNC: 11.9 G/DL — LOW (ref 13–17)
IMM GRANULOCYTES NFR BLD AUTO: 0.4 % — SIGNIFICANT CHANGE UP (ref 0–1.5)
INR BLD: 1.04 RATIO — SIGNIFICANT CHANGE UP (ref 0.88–1.16)
LYMPHOCYTES # BLD AUTO: 1.78 K/UL — SIGNIFICANT CHANGE UP (ref 1–3.3)
LYMPHOCYTES # BLD AUTO: 23.2 % — SIGNIFICANT CHANGE UP (ref 13–44)
MCHC RBC-ENTMCNC: 30.4 PG — SIGNIFICANT CHANGE UP (ref 27–34)
MCHC RBC-ENTMCNC: 31.9 GM/DL — LOW (ref 32–36)
MCV RBC AUTO: 95.2 FL — SIGNIFICANT CHANGE UP (ref 80–100)
MONOCYTES # BLD AUTO: 0.71 K/UL — SIGNIFICANT CHANGE UP (ref 0–0.9)
MONOCYTES NFR BLD AUTO: 9.3 % — SIGNIFICANT CHANGE UP (ref 2–14)
NEUTROPHILS # BLD AUTO: 4.6 K/UL — SIGNIFICANT CHANGE UP (ref 1.8–7.4)
NEUTROPHILS NFR BLD AUTO: 60 % — SIGNIFICANT CHANGE UP (ref 43–77)
NT-PROBNP SERPL-SCNC: 2937 PG/ML — HIGH (ref 0–300)
PLATELET # BLD AUTO: 301 K/UL — SIGNIFICANT CHANGE UP (ref 150–400)
POTASSIUM SERPL-MCNC: 4.4 MMOL/L — SIGNIFICANT CHANGE UP (ref 3.5–5.3)
POTASSIUM SERPL-SCNC: 4.4 MMOL/L — SIGNIFICANT CHANGE UP (ref 3.5–5.3)
PROT SERPL-MCNC: 8.1 G/DL — SIGNIFICANT CHANGE UP (ref 6.6–8.7)
PROTHROM AB SERPL-ACNC: 12 SEC — SIGNIFICANT CHANGE UP (ref 10.6–13.6)
RBC # BLD: 3.92 M/UL — LOW (ref 4.2–5.8)
RBC # FLD: 13.1 % — SIGNIFICANT CHANGE UP (ref 10.3–14.5)
SODIUM SERPL-SCNC: 137 MMOL/L — SIGNIFICANT CHANGE UP (ref 135–145)
WBC # BLD: 7.67 K/UL — SIGNIFICANT CHANGE UP (ref 3.8–10.5)
WBC # FLD AUTO: 7.67 K/UL — SIGNIFICANT CHANGE UP (ref 3.8–10.5)

## 2020-11-28 PROCEDURE — 85025 COMPLETE CBC W/AUTO DIFF WBC: CPT

## 2020-11-28 PROCEDURE — 93010 ELECTROCARDIOGRAM REPORT: CPT

## 2020-11-28 PROCEDURE — 93005 ELECTROCARDIOGRAM TRACING: CPT

## 2020-11-28 PROCEDURE — 71045 X-RAY EXAM CHEST 1 VIEW: CPT | Mod: 26

## 2020-11-28 PROCEDURE — 93308 TTE F-UP OR LMTD: CPT | Mod: 26

## 2020-11-28 PROCEDURE — C8929: CPT

## 2020-11-28 PROCEDURE — 80053 COMPREHEN METABOLIC PANEL: CPT

## 2020-11-28 PROCEDURE — 85730 THROMBOPLASTIN TIME PARTIAL: CPT

## 2020-11-28 PROCEDURE — 36415 COLL VENOUS BLD VENIPUNCTURE: CPT

## 2020-11-28 PROCEDURE — 99284 EMERGENCY DEPT VISIT MOD MDM: CPT

## 2020-11-28 PROCEDURE — 83880 ASSAY OF NATRIURETIC PEPTIDE: CPT

## 2020-11-28 PROCEDURE — 93970 EXTREMITY STUDY: CPT | Mod: 26

## 2020-11-28 PROCEDURE — 93970 EXTREMITY STUDY: CPT

## 2020-11-28 PROCEDURE — 71045 X-RAY EXAM CHEST 1 VIEW: CPT

## 2020-11-28 PROCEDURE — 99024 POSTOP FOLLOW-UP VISIT: CPT

## 2020-11-28 PROCEDURE — 99284 EMERGENCY DEPT VISIT MOD MDM: CPT | Mod: 25

## 2020-11-28 PROCEDURE — 85610 PROTHROMBIN TIME: CPT

## 2020-11-28 RX ORDER — SIMVASTATIN 20 MG/1
20 TABLET, FILM COATED ORAL AT BEDTIME
Refills: 0 | Status: DISCONTINUED | OUTPATIENT
Start: 2020-11-28 | End: 2020-11-28

## 2020-11-28 RX ORDER — GABAPENTIN 400 MG/1
1 CAPSULE ORAL
Qty: 0 | Refills: 0 | DISCHARGE
Start: 2020-11-28

## 2020-11-28 RX ORDER — FUROSEMIDE 40 MG
1 TABLET ORAL
Qty: 0 | Refills: 0 | DISCHARGE

## 2020-11-28 RX ORDER — GABAPENTIN 400 MG/1
300 CAPSULE ORAL
Refills: 0 | Status: DISCONTINUED | OUTPATIENT
Start: 2020-11-28 | End: 2020-12-03

## 2020-11-28 RX ORDER — GABAPENTIN 400 MG/1
1 CAPSULE ORAL
Qty: 60 | Refills: 0
Start: 2020-11-28

## 2020-11-28 RX ORDER — TAMSULOSIN HYDROCHLORIDE 0.4 MG/1
0.4 CAPSULE ORAL AT BEDTIME
Refills: 0 | Status: DISCONTINUED | OUTPATIENT
Start: 2020-11-28 | End: 2020-11-28

## 2020-11-28 RX ORDER — METOPROLOL TARTRATE 50 MG
25 TABLET ORAL
Refills: 0 | Status: DISCONTINUED | OUTPATIENT
Start: 2020-11-28 | End: 2020-11-28

## 2020-11-28 RX ADMIN — Medication 40 MILLIGRAM(S): at 17:05

## 2020-11-28 RX ADMIN — GABAPENTIN 300 MILLIGRAM(S): 400 CAPSULE ORAL at 17:05

## 2020-11-28 NOTE — ED CLERICAL - NS ED CLERK NOTE PRE-ARRIVAL INFORMATION; ADDITIONAL PRE-ARRIVAL INFORMATION
This patient is enrolled in the Follow Your Heart program and has undergone a cardiac surgery procedure and has active care navigation. This patient can be followed up by the care navigation team within 24 hours. To arrange close follow-up or to obtain additional clinical information about this patient, please call the contact number above. Please call the cardiac surgery team once patient is registered at [phone] for consultation PRIOR to disposition decision.  The patient recently underwent a cardiac surgery procedure and the team can assist in acute medical management.

## 2020-11-28 NOTE — ED STATDOCS - CARE PROVIDER_API CALL
Praful Jett)  Surgery; Thoracic and Cardiac Surgery  [address] [place] [address]  Phone: [phone]  Fax: [phone]  Follow Up Time:

## 2020-11-28 NOTE — DISCHARGE NOTE PROVIDER - NSDCFUSCHEDAPPT_GEN_ALL_CORE_FT
BEVERLY THOMAS ; 12/02/2020 ; NPP CTSurg [address] [facility]  BEVERLY THOMAS ; 12/09/2020 ; NPP Cardiology [address]  BEVERLY THOMAS ; 01/22/2021 ; [facility] ; 02/02/2021 ; NPP Cardio Electro [address] [facility]

## 2020-11-28 NOTE — ED STATDOCS - OBJECTIVE STATEMENT
88 y/o male with PMHx of BPH, CAD, DM, Gout, High cholesterol, HTN, Kidney stones, Osteoarthrosis, Pacemaker, and RA presents to ED c/o bilateral lower leg swelling. Patient had a recent stent and pacemaker 6 weeks ago and since then has experiencing swelling of his legs. Patient had a TAVR last week and fluid has been getting worse and not going away. Patient also states he feels like the skin on his legs are ripping off. Patient has compressed socks at home but hasn't been wearing them.

## 2020-11-28 NOTE — ED STATDOCS - PSH
Cataract  b/l 2001 & 2002  History of cystoscopy  TURP 2010  Kidney stone  had lithotripsy in 1983 & 2010  Kidney stone  removed about 50 years ago  S/P angioplasty with stent  2011 - 1 coronary stent  S/P knee replacement  right in January 1/28/13 & left 12/13/13  Status post hip surgery

## 2020-11-28 NOTE — ED ADULT NURSE NOTE - PSH
Cataract  b/l 2001 & 2002  History of cystoscopy  TURP 2010  Kidney stone  had lithotripsy in 1983 & 2010  Kidney stone  removed about 50 years ago  S/P angioplasty with stent  2011 - 1 coronary stent  S/P knee replacement  right in January 1/28/13 & left 12/13/13  S/P TAVR (transcatheter aortic valve replacement)    Status post hip surgery

## 2020-11-28 NOTE — ED ADULT NURSE REASSESSMENT NOTE - NS ED NURSE REASSESS COMMENT FT1
Patient received awake and alert x4 in room PEDS-1.  Patient to have bedside ECHO at this time.  Patient to go home after results.  Patient has no labored breathing, patient is in no acute distress.

## 2020-11-28 NOTE — ED ADULT NURSE NOTE - CHIEF COMPLAINT QUOTE
89 M, nad, sent by pmd for BLLE cellulitis x 6 weeks, finsished course of abx, had ultrasound at [facility] which was negative. Daughter Chasidy cell [phone]

## 2020-11-28 NOTE — ED STATDOCS - NSFOLLOWUPINSTRUCTIONS_ED_ALL_ED_FT
Follow up with Dr. Jett this Tuesday    Return to ED if you develop shortness of breath or worsening swelling      Lymphedema is swelling that is caused by the abnormal collection of lymph in the tissues under the skin. Lymph is fluid from the tissues in your body that is removed through the lymphatic system. This system is part of your body's defense system (immune system) and includes lymph nodes and lymph vessels. The lymph vessels collect and carry the excess fluid, fats, proteins, and wastes from the tissues of the body to the bloodstream. This system also works to clean and remove bacteria and waste products from the body.    Lymphedema occurs when the lymphatic system is blocked. When the lymph vessels or lymph nodes are blocked or damaged, lymph does not drain properly. This causes an abnormal buildup of lymph, which leads to swelling in the affected area. This may include the trunk area, or an arm or leg. Lymphedema cannot be cured by medicines, but various methods can be used to help reduce the swelling.    There are two types of lymphedema: primary lymphedema and secondary lymphedema.      What are the causes?  The cause of this condition depends on the type of lymphedema that you have.  •Primary lymphedema is caused by the absence of lymph vessels or having abnormal lymph vessels at birth.    •Secondary lymphedema occurs when lymph vessels are blocked or damaged. Secondary lymphedema is more common. Common causes of lymph vessel blockage include:  •Skin infection, such as cellulitis.      •Infection by parasites (filariasis).      •Injury.      •Radiation therapy.      •Cancer.      •Formation of scar tissue.      •Surgery.          What are the signs or symptoms?  Symptoms of this condition include:  •Swelling of the arm or leg.      •A heavy or tight feeling in the arm or leg.      •Swelling of the feet, toes, or fingers. Shoes or rings may fit more tightly than before.      •Redness of the skin over the affected area.      •Limited movement of the affected limb.      •Sensitivity to touch or discomfort in the affected limb.        How is this diagnosed?  This condition may be diagnosed based on:  •Your symptoms and medical history.      •A physical exam.      •Bioimpedance spectroscopy. In this test, painless electrical currents are used to measure fluid levels in your body.    •Imaging tests, such as:  •Lymphoscintigraphy. In this test, a low dose of a radioactive substance is injected to trace the flow of lymph through the lymph vessels.      •MRI.      •CT scan.      •Duplex ultrasound. This test uses sound waves to produce images of the vessels and the blood flow on a screen.      •Lymphangiography. In this test, a contrast dye is injected into the lymph vessel to help show blockages.          How is this treated?  Treatment for this condition may depend on the cause of your lymphedema. Treatment may include:•Complete decongestive therapy (CDT). This is done by a certified lymphedema therapist to reduce fluid congestion. This therapy includes:  •Manual lymph drainage. This is a special massage technique that promotes lymph drainage out of a limb.      •Skin care.      •Compression wrapping of the affected area.      •Specific exercises. Certain exercises can help fluid move out of the affected limb.      •Compression. Various methods may be used to apply pressure to the affected limb to reduce the swelling. They include:  •Wearing compression stockings or sleeves on the affected limb.      •Wrapping the affected limb with special bandages.        •Surgery. This is usually done for severe cases only. For example, surgery may be done if you have trouble moving the limb or if the swelling does not get better with other treatments.      If an underlying condition is causing the lymphedema, treatment for that condition will be done. For example, antibiotic medicines may be used to treat an infection.      Follow these instructions at home:    Self-care   •The affected area is more likely to become injured or infected. Take these steps to help prevent infection:  •Keep the affected area clean and dry.      •Use approved creams or lotions to keep the skin moisturized.    •Protect your skin from cuts:   •Use gloves while cooking or gardening.      •Do not walk barefoot.       •If you shave the affected area, use an electric razor.          • Do not wear tight clothes, shoes, or jewelry.      •Eat a healthy diet that includes a lot of fruits and vegetables.      Activity     •Exercise regularly as directed by your health care provider.      • Do not sit with your legs crossed.      •When possible, keep the affected limb raised (elevated) above the level of your heart.      •Avoid carrying things with an arm that is affected by lymphedema.      General instructions     •Wear compression stockings or sleeves as told by your health care provider.      •Note any changes in size of the affected limb. You may be instructed to take regular measurements and keep track of them.      •Take over-the-counter and prescription medicines only as told by your health care provider.      •If you were prescribed an antibiotic medicine, take or apply it as told by your health care provider. Do not stop using the antibiotic even if you start to feel better.      • Do not use heating pads or ice packs over the affected area.      •Avoid having blood draws, IV insertions, or blood pressure checked on the affected limb.      •Keep all follow-up visits as told by your health care provider. This is important.

## 2020-11-28 NOTE — ED ADULT NURSE NOTE - OBJECTIVE STATEMENT
Pt had recent TAVR here at [facility] but states his ankles are now swollen with a pain that feels like "the skin is ripping off my legs". Pt noted to have erythema to b/l LE's up to knee and mild swelling noted. Pt denies any SOB, CP, fever/chills, back pain, ha, dizziness.

## 2020-11-28 NOTE — PROGRESS NOTE ADULT - SUBJECTIVE AND OBJECTIVE BOX
Subjective:  89 y/o male with PMHx of BPH, CAD, DM, Gout, High cholesterol, HTN, Kidney stones, Osteoarthrosis, Pacemaker, and RA presents to ED c/o bilateral lower leg swelling. Patient had a recent stent and pacemaker 6 weeks ago and since then has experiencing swelling of his legs. Patient s/p left SCL TAVR last week Patient also states he feels like the skin on his legs are ripping off. Patient has compressed socks at home but hasn't been wearing them.  In the ER Patient appears in no distress but complaining of pain in his egs"  patient states that he has had Lower extremity edema for months now without improvement despite him having his valve replaced and  taking  lasix.  Patient states that he keeps his legs elevated and is on a low sodium diet.  Pt scheduled to see Dr Maliha Teran in the office for a follow up appointment     VITAL SIGNS  T(C): 36.8 (11-28-20 @ 12:18), Max: 36.8 (11-28-20 @ 12:18)  HR: 72 (11-28-20 @ 16:02) (66 - 72)  BP: 122/67 (11-28-20 @ 16:02) (122/67 - 134/62)  RR: 16 (11-28-20 @ 16:02) (16 - 18)  SpO2: 98% (11-28-20 @ 16:02) (97% - 98%) RA   Wt(kg): --   on (O2)            Daily Height in cm: 165.1 (28 Nov 2020 12:18)    Daily Height (cm): 165.1 (11-28 @ 12:18)  Weight (kg): 52.2 (11-28 @ 12:18)  BMI (kg/m2): 19.2 (11-28 @ 12:18)  BSA (m2): 1.56 (11-28 @ 12:18)  Admit Wt: Drug Dosing Weight  Height (cm): 165.1 (28 Nov 2020 12:18)  Weight (kg): 52.2 (28 Nov 2020 12:18)  BMI (kg/m2): 19.2 (28 Nov 2020 12:18)  BSA (m2): 1.56 (28 Nov 2020 12:18)  Telemetry:   SR       MEDICATIONS  lasix  40 QD  lipitor 40  Plavix   ASA 81  glimiperide 1 mg  flomax  amlodipine     MEDICATIONS  (PRN):        PHYSICAL EXAM  General:Alert Awake NAD  Respiratory:  decreased at bases b/l , clear b/l   CV: RRR S1 S2   Abdomen:  soft NT ND + BS   Extremities: 2-3+ pitting edema knee to ankle,  mild venous stasis changes noted.  dry skin around ankles b/l , no erythema,  temperate from knee to ankle same throughout b/l  tender to touch b/l   Incisions: Left SCL + Staples no erythema no edema , healing well             I&O's Detail      LABS  11-28    137  |  100  |  31.0<H>  ----------------------------<  100<H>  4.4   |  24.0  |  1.49<H>    Ca    9.5      28 Nov 2020 13:43    TPro  8.1  /  Alb  3.8  /  TBili  0.3<L>  /  DBili  x   /  AST  17  /  ALT  16  /  AlkPhos  52  11-28                                 11.9   7.67  )-----------( 301      ( 28 Nov 2020 13:43 )             37.3          PT/INR - ( 28 Nov 2020 13:43 )   PT: 12.0 sec;   INR: 1.04 ratio         PTT - ( 28 Nov 2020 13:43 )  PTT:27.7 sec      LIVER FUNCTIONS - ( 28 Nov 2020 13:43 )  Alb: 3.8 g/dL / Pro: 8.1 g/dL / ALK PHOS: 52 U/L / ALT: 16 U/L / AST: 17 U/L / GGT: x              CAPILLARY BLOOD GLUCOSE               Today's CXR: pending     PAST MEDICAL & SURGICAL HISTORY:  Pacemaker    Stented coronary artery  2011, 2020    High cholesterol    HTN (hypertension)    Diabetes    Gout  h/o    RA (rheumatoid arthritis)    Osteoarthrosis    Ulcer disease  stomach    Diabetes mellitus  type II    Kidney stones    BPH (benign prostatic hypertrophy)    Hypertension    CAD (coronary artery disease)    Status post hip surgery    Cataract  b/l 2001 &amp; 2002    History of cystoscopy  TURP 2010    Kidney stone  had lithotripsy in 1983 &amp; 2010    Kidney stone  removed about 50 years ago    S/P angioplasty with stent  2011 - 1 coronary stent    S/P knee replacement  right in January 1/28/13 &amp; left 12/13/13        Subjective:  89 y/o male with PMHx of BPH, CAD, DM, Gout, High cholesterol, HTN, Kidney stones, Osteoarthrosis, Pacemaker, and RA presents to ED c/o bilateral lower leg swelling. Patient had a recent stent and pacemaker 6 weeks ago and since then has experiencing swelling of his legs. Patient s/p left SCL TAVR last week Patient also states he feels like the skin on his legs are ripping off. Patient has compressed socks at home but hasn't been wearing them.  In the ER Patient appears in no distress but complaining of pain in his legs"  patient states that he has had Lower extremity edema for months now without improvement despite him having his valve replaced and  taking  lasix.  Patient states that he keeps his legs elevated and is on a low sodium diet.  Pt scheduled to see Dr Maliha Teran in the office for a follow up appointment     VITAL SIGNS  T(C): 36.8 (11-28-20 @ 12:18), Max: 36.8 (11-28-20 @ 12:18)  HR: 72 (11-28-20 @ 16:02) (66 - 72)  BP: 122/67 (11-28-20 @ 16:02) (122/67 - 134/62)  RR: 16 (11-28-20 @ 16:02) (16 - 18)  SpO2: 98% (11-28-20 @ 16:02) (97% - 98%) RA   Wt(kg): --   on (O2)            Daily Height in cm: 165.1 (28 Nov 2020 12:18)    Daily Height (cm): 165.1 (11-28 @ 12:18)  Weight (kg): 52.2 (11-28 @ 12:18)  BMI (kg/m2): 19.2 (11-28 @ 12:18)  BSA (m2): 1.56 (11-28 @ 12:18)  Admit Wt: Drug Dosing Weight  Height (cm): 165.1 (28 Nov 2020 12:18)  Weight (kg): 52.2 (28 Nov 2020 12:18)  BMI (kg/m2): 19.2 (28 Nov 2020 12:18)  BSA (m2): 1.56 (28 Nov 2020 12:18)  Telemetry:   SR       MEDICATIONS  lasix  40 QD  lipitor 40  Plavix  ASA 81  glimiperide 1 mg  flomax  amlodipine     MEDICATIONS  (PRN):        PHYSICAL EXAM  General:Alert Awake NAD  Respiratory:  decreased at bases b/l , clear b/l   CV: RRR S1 S2   Abdomen:  soft NT ND + BS   Extremities: 2-3+ pitting edema knee to ankle,  mild venous stasis changes noted.  dry skin around ankles b/l , no erythema,  temperate from knee to ankle same throughout b/l  tender to touch b/l Rt > Left,  1+ b/l pulses DP , warm well perfused b/l   Incisions: Left SCL + Staples no erythema no edema , healing well             I&O's Detail      LABS  11-28    137  |  100  |  31.0<H>  ----------------------------<  100<H>  4.4   |  24.0  |  1.49<H>    Ca    9.5      28 Nov 2020 13:43    TPro  8.1  /  Alb  3.8  /  TBili  0.3<L>  /  DBili  x   /  AST  17  /  ALT  16  /  AlkPhos  52  11-28                                 11.9   7.67  )-----------( 301      ( 28 Nov 2020 13:43 )             37.3          PT/INR - ( 28 Nov 2020 13:43 )   PT: 12.0 sec;   INR: 1.04 ratio         PTT - ( 28 Nov 2020 13:43 )  PTT:27.7 sec      LIVER FUNCTIONS - ( 28 Nov 2020 13:43 )  Alb: 3.8 g/dL / Pro: 8.1 g/dL / ALK PHOS: 52 U/L / ALT: 16 U/L / AST: 17 U/L / GGT: x              CAPILLARY BLOOD GLUCOSE               Today's CXR: pending     PAST MEDICAL & SURGICAL HISTORY:  Pacemaker    Stented coronary artery  2011, 2020    High cholesterol    HTN (hypertension)    Diabetes    Gout  h/o    RA (rheumatoid arthritis)    Osteoarthrosis    Ulcer disease  stomach    Diabetes mellitus  type II    Kidney stones    BPH (benign prostatic hypertrophy)    Hypertension    CAD (coronary artery disease)    Status post hip surgery    Cataract  b/l 2001 &amp; 2002    History of cystoscopy  TURP 2010    Kidney stone  had lithotripsy in 1983 &amp; 2010    Kidney stone  removed about 50 years ago    S/P angioplasty with stent  2011 - 1 coronary stent    S/P knee replacement  right in January 1/28/13 &amp; left 12/13/13

## 2020-11-28 NOTE — ED STATDOCS - PROGRESS NOTE DETAILS
PA NOTE: TTE and CXR reviewed by CTsx team, no emergent intervention required. Pt to be started on Torsemide / Gabapentin. Pt to follow up with Dr. Jett this Tuesday. Pt given strict return precautions.

## 2020-11-28 NOTE — DISCHARGE NOTE PROVIDER - NSDCMRMEDTOKEN_GEN_ALL_CORE_FT
amLODIPine 5 mg oral tablet: 1 tab(s) orally once a day  aspirin 81 mg oral delayed release capsule:  orally   clopidogrel 75 mg oral tablet: 1 tab(s) orally once a day - last dose 4/11/14  gabapentin 300 mg oral capsule: 1 cap(s) orally 2 times a day  glimepiride 1 mg oral tablet: 0.5 tab(s) orally once a day  metoprolol succinate 25 mg oral tablet, extended release: 1 tab(s) orally every 12 hours  potassium chloride 20 mEq oral tablet, extended release: 1 tab(s) orally once a day   Prolia 60 mg/mL subcutaneous solution:   simvastatin 20 mg oral tablet: 1 tab(s) orally once a day (at bedtime)  tamsulosin 0.4 mg oral capsule: 1 cap(s) orally once a day (at bedtime)  torsemide 20 mg oral tablet: 2 tab(s) orally every 12 hours

## 2020-11-28 NOTE — ED STATDOCS - SHIFT CHANGE DETAILS
one week s/p TAVR. Here for leg swelling. CT ZARIA Clemente has ordered echo and cxr and will change lasix to toresimide bid  her cell is   f/u tests and dispo. Black Phone pt

## 2020-11-28 NOTE — ED STATDOCS - PATIENT PORTAL LINK FT
You can access the FollowMyHealth Patient Portal offered by [facility] by registering at the following website: [email or web address]. By joining [facility]’s FollowMyHealth portal, you will also be able to view your health information using other applications (apps) compatible with our system.

## 2020-11-28 NOTE — ED STATDOCS - PMH
BPH (benign prostatic hypertrophy)    CAD (coronary artery disease)    Diabetes    Diabetes mellitus  type II  Gout  h/o  High cholesterol    HTN (hypertension)    Hypertension    Kidney stones    Osteoarthrosis    Pacemaker    RA (rheumatoid arthritis)    Stented coronary artery  2011, 2020  Ulcer disease  stomach

## 2020-11-28 NOTE — DISCHARGE NOTE PROVIDER - NSDCCPGOAL_GEN_ALL_CORE_FT
To get better and follow your care plan as instructed.
Is This A New Presentation, Or A Follow-Up?: Skin Lesion
What Type Of Note Output Would You Prefer (Optional)?: Standard Output
Has Your Skin Lesion Been Treated?: not been treated

## 2020-11-28 NOTE — ED STATDOCS - ATTENDING CONTRIBUTION TO CARE
I, Anamaria Sanchez, performed the initial face to face bedside interview with this patient regarding history of present illness, review of symptoms and relevant past medical, social and family history.  I completed an independent physical examination.  I was the initial provider who evaluated this patient. I have signed out the follow up of any pending tests (i.e. labs, radiological studies) to the ACP.  I have communicated the patient’s plan of care and disposition with the ACP.  The history, relevant review of systems, past medical and surgical history, medical decision making, and physical examination was documented by the scribe in my presence and I attest to the accuracy of the documentation.

## 2020-11-28 NOTE — PROGRESS NOTE ADULT - ASSESSMENT
This is an 89 y/o M s/p Left SCL TAVR on 11/20/20 with Dr Jett who now presents with significant LE edema and LE pain.      Plan   Labs, Stat CXR. ECHO   d/c lasix and start BID Demadex   start gabapentin for LE leg pain  Ace Wrap legs b/l daily   If ECHO and CXR is negative, then patient can go home  Patient has an appointment this Tuesday with Dr Maliha campbell Lt S[facility]  DW Dr Jett This is an 89 y/o M s/p Left SCL TAVR on 11/20/20 with Dr Jett who now presents with significant LE edema and LE pain.      Plan   Labs, Stat CXR. ECHO   d/c lasix and start BID Demadex   start Potassium   start gabapentin for LE leg pain ,  neuropathy ?   Cont Lopressor, Lipitor Plavix ASA   Ace Wrap legs b/l daily   LE doppler negative for DVT   If ECHO negative for pericardial effusion and TAVR valve in place   CXR  + Pulmonary infiltrates    Patient has an appointment this Tuesday with Dr Maliha campbell Lt SCL   DW Dr Jett

## 2020-11-28 NOTE — ED ADULT TRIAGE NOTE - CHIEF COMPLAINT QUOTE
89 M, nad, sent by pmd for BLLE cellulitis x 6 weeks, finsished course of abx, had ultrasound at [facility] which was negative. Daughter Chasidy cell [phone]

## 2020-11-29 RX ORDER — POTASSIUM CHLORIDE 20 MEQ
1 PACKET (EA) ORAL
Qty: 5 | Refills: 0
Start: 2020-11-29 | End: 2020-12-03

## 2020-11-30 ENCOUNTER — TRANSCRIPTION ENCOUNTER (OUTPATIENT)
Age: 85
End: 2020-11-30

## 2020-11-30 ENCOUNTER — APPOINTMENT (OUTPATIENT)
Dept: CARDIOTHORACIC SURGERY | Facility: CLINIC | Age: 85
End: 2020-11-30
Payer: MEDICARE

## 2020-11-30 VITALS
RESPIRATION RATE: 15 BRPM | SYSTOLIC BLOOD PRESSURE: 130 MMHG | BODY MASS INDEX: 26.8 KG/M2 | OXYGEN SATURATION: 98 % | HEIGHT: 64 IN | TEMPERATURE: 98.2 F | DIASTOLIC BLOOD PRESSURE: 63 MMHG | HEART RATE: 66 BPM | WEIGHT: 157 LBS

## 2020-11-30 PROCEDURE — 99024 POSTOP FOLLOW-UP VISIT: CPT

## 2020-12-02 ENCOUNTER — APPOINTMENT (OUTPATIENT)
Dept: CARE COORDINATION | Facility: HOME HEALTH | Age: 85
End: 2020-12-02

## 2020-12-02 ENCOUNTER — APPOINTMENT (OUTPATIENT)
Dept: CARDIOTHORACIC SURGERY | Facility: CLINIC | Age: 85
End: 2020-12-02
Payer: MEDICARE

## 2020-12-02 ENCOUNTER — TRANSCRIPTION ENCOUNTER (OUTPATIENT)
Age: 85
End: 2020-12-02

## 2020-12-09 ENCOUNTER — APPOINTMENT (OUTPATIENT)
Dept: CARDIOLOGY | Facility: CLINIC | Age: 85
End: 2020-12-09
Payer: MEDICARE

## 2020-12-09 ENCOUNTER — TRANSCRIPTION ENCOUNTER (OUTPATIENT)
Age: 85
End: 2020-12-09

## 2020-12-09 ENCOUNTER — NON-APPOINTMENT (OUTPATIENT)
Age: 85
End: 2020-12-09

## 2020-12-09 VITALS
SYSTOLIC BLOOD PRESSURE: 130 MMHG | RESPIRATION RATE: 14 BRPM | HEIGHT: 64 IN | TEMPERATURE: 98.4 F | DIASTOLIC BLOOD PRESSURE: 55 MMHG | BODY MASS INDEX: 26.98 KG/M2 | WEIGHT: 158 LBS | HEART RATE: 66 BPM

## 2020-12-09 DIAGNOSIS — Z86.79 PERSONAL HISTORY OF OTHER DISEASES OF THE CIRCULATORY SYSTEM: ICD-10-CM

## 2020-12-09 DIAGNOSIS — Z00.00 ENCOUNTER FOR GENERAL ADULT MEDICAL EXAMINATION W/OUT ABNORMAL FINDINGS: ICD-10-CM

## 2020-12-09 DIAGNOSIS — D64.9 ANEMIA, UNSPECIFIED: ICD-10-CM

## 2020-12-09 PROCEDURE — 99215 OFFICE O/P EST HI 40 MIN: CPT

## 2020-12-09 PROCEDURE — 93000 ELECTROCARDIOGRAM COMPLETE: CPT

## 2020-12-09 PROCEDURE — 99072 ADDL SUPL MATRL&STAF TM PHE: CPT

## 2020-12-09 RX ORDER — POTASSIUM CHLORIDE 750 MG/1
10 TABLET, FILM COATED, EXTENDED RELEASE ORAL
Refills: 0 | Status: DISCONTINUED | COMMUNITY
End: 2020-12-09

## 2020-12-09 RX ORDER — GABAPENTIN 300 MG/1
300 CAPSULE ORAL
Refills: 0 | Status: ACTIVE | COMMUNITY

## 2020-12-09 RX ORDER — POTASSIUM CHLORIDE 750 MG/1
10 TABLET, FILM COATED, EXTENDED RELEASE ORAL
Qty: 90 | Refills: 2 | Status: DISCONTINUED | COMMUNITY
End: 2020-12-09

## 2020-12-09 RX ORDER — TORSEMIDE 20 MG/1
20 TABLET ORAL DAILY
Refills: 0 | Status: DISCONTINUED | COMMUNITY
End: 2020-12-09

## 2020-12-09 RX ORDER — FUROSEMIDE 40 MG/1
40 TABLET ORAL
Qty: 45 | Refills: 2 | Status: DISCONTINUED | COMMUNITY
Start: 2018-05-08 | End: 2020-12-09

## 2020-12-09 NOTE — REASON FOR VISIT
[FreeTextEntry1] : Patient returns here cardiac revaluation after hospitalization for progressive symptoms of heart failure and angina secondary to severe aortic stenosis and CAD..\par He is status post stenting of the LAD and a permanent pacemaker.\par Now s/p TAVR on 11/20/20 with Dr. Praful Jett at [facility].\par \par . His medical history includes:\par \par 1. Aortic stenosis - severe\par \par 2. Coronary artery disease\par \par 3. Carotid stenosis - moderate\par \par 4. Pulmonary hypertension - mild\par

## 2020-12-09 NOTE — HISTORY OF PRESENT ILLNESS
[FreeTextEntry1] : Over the course of several months had been  experiencing progressive exertional dyspnea orthopnea and edema.\par \par Based on the symptoms he was hospitalized from the office on 10/13/2020.\par Cardiac catheterization demonstrated\par A high-grade heavily calcified mid LAD stenosis.\par Because of creatinine elevation and high degree of AV block, PCI was deferred for the next day.\par A permanent pacemaker was implanted and the creatinine went from 1.8-1.5.\par Next day patient had 2 stents placed to the LAD with Rotablator\par \par WIth hx of severe aortic stenosis/symptoms he was referred to for a TAVR which was successfully completed w/o complication on 11/20/20. \par \par On 11/28/20 he returned to [facility] with worsening l/e edema. L/E doppler - for DVT, Echo performed and stable with LVEF of 60-65%.He was not admitted and sent home that day on Torsemide 20 mg 2 tbs BID for 5 days then instructed to take one pill daily moving forward. PO K supplementation also taken for 5 days only. \par -DC creat. 1.49 , K 4.4. \par \par He continues to have l/e edema, 2+ tense bilaterally today. Overall since sx he feels better, Believes SOB has improved, no chest pain. States he is  watching sodium intake carefully. \par \par Other medical problems include:\par - Diabetes\par - Hiatal hernia\par - Hearing impairment\par - Chronic kidney disease \par -Kidney stones and possibly cystoscopy.

## 2020-12-09 NOTE — PHYSICAL EXAM
[Normal Conjunctiva] : the conjunctiva exhibited no abnormalities [Eyelids - No Xanthelasma] : the eyelids demonstrated no xanthelasmas [Normal Oral Mucosa] : normal oral mucosa [No Oral Pallor] : no oral pallor [No Oral Cyanosis] : no oral cyanosis [Normal Jugular Venous A Waves Present] : normal jugular venous A waves present [Normal Jugular Venous V Waves Present] : normal jugular venous V waves present [No Jugular Venous Lan A Waves] : no jugular venous lan A waves [Respiration, Rhythm And Depth] : normal respiratory rhythm and effort [Exaggerated Use Of Accessory Muscles For Inspiration] : no accessory muscle use [Auscultation Breath Sounds / Voice Sounds] : lungs were clear to auscultation bilaterally [Systolic grade ___/6] : A grade [unfilled]/6 systolic murmur was heard. [Abdomen Soft] : soft [Abdomen Tenderness] : non-tender [Abdomen Mass (___ Cm)] : no abdominal mass palpated [Nail Clubbing] : no clubbing of the fingernails [Cyanosis, Localized] : no localized cyanosis [Petechial Hemorrhages (___cm)] : no petechial hemorrhages [Skin Color & Pigmentation] : normal skin color and pigmentation [] : no rash [Skin Lesions] : no skin lesions [No Skin Ulcers] : no skin ulcer [No Xanthoma] : no  xanthoma was observed [General Appearance - Well Developed] : well developed [Normal Appearance] : normal appearance [Well Groomed] : well groomed [General Appearance - Well Nourished] : well nourished [No Deformities] : no deformities [General Appearance - In No Acute Distress] : no acute distress [Oriented To Time, Place, And Person] : oriented to person, place, and time [Affect] : the affect was normal [FreeTextEntry1] : FRANK

## 2020-12-09 NOTE — ASSESSMENT
[FreeTextEntry1] : ECG- SR at 66 bpm, 1'avb, old anterior infarct.\par \par Echo 11/28/20 ( [facility])\par LFEV 60-65%\par Mod.-Sev. mitral calfication\par Mild mitral regurgitation \par Mild- Mod. Tricuspid regurgitation \par Pulm press. 36.8\par Bioprosthesis in aortic position.  \par \par \par Lab data 11/28/20( [facility])\par Creat 1.49\par K 4.4\par \par Laboratory data 9/29/2020:\par BUN 41\par Creatinine 1.88\par Cholesterol 160\par HDL 56\par LDL 79\par \par Echocardiogram 10/13/2020:\par Peak transaortic valve gradient 73\par Mean gradient 44\par Aortic valve area 0.6 calculation\par Estimated pulmonary pressure 40 mmHg\par Mild to moderate mitral regurgitation\par Diastolic dysfunction\par Left ventricular ejection fraction 40 to 45%\par \par Cardiac catheterization 10/14/2020:\par 20 to 30% left main\par 95% calcified mid LAD >> PCI\par 50% circumflex\par 70% RCA in-stent restenosis\par \par 10/15/20\par Right heart catheterization;\par     pulmonary capillary wedge pressure 17\par     Pulmonary artery pressure at 37/15\par    Right atrial pressure 8\par Rotational atherectomy and drug-eluting stent to proximal LAD and drug-eluting stent x 2 to the mid LAD\par \par Echocardiogram 7/1/20:\par LV of 65-70%\par Severe aortic stenosis with a peak velocity 4.7\par Peak gradient of 87 mmHg\par Mean gradient 40 mm mercury\par Pulmonary pressures of 33\par \par Echocardiogram  4/9/19\par Severe aortic valve stenosis with some increase of the transvalvular gradient.\par Peak 78 mm Hg\par Mean 43 mm Hg\par Normal LV function\par Calcific mitral valve disease with mild insufficiency\par \par Carotid study 7/1/20\par bilateral tortuosity with moderate plaquing and ulcerated disease. Relative;y unchanged from prior.\par \par Impression: \par 1.  Patient with progressive symptoms of heart failure likely related to his LAD disease and  severe aortic valve stenosis.  He is clinically less short of breath s/p TAVR and LAD PCI.\par However, is continuing to have worsening lower extremity edema which prompted a return to [facility] ER where Lasix was stopped and started of Torsemide 20 mg .\par    - Echo with normal LVEF and borderline PAH.\par    - Edema today 2+ tense bilaterally \par \par 2.Chronic kidney disease with a baseline creatinine of about 1.6. More recently 1.49 which has been his lowest number in months. \par \par 3. Carotid disease with moderate bilateral stenoses unchanged from prior. Bruits noted on exam right >left.\par \par 4.  Recently abnormal renal and pelvic ultrasound with a cystoscopy planned in the near future\par \par 5. BP today controlled. ECG stable. \par \par Plan:\par 1.  Okay to follow up with urology/ Dr. Hunt.\par \par 2.  Okay to drive.\par \par 3.  Add Aldactone 25 mg p.o. daily and continue the torsemide the issue of his chronic kidney disease will certainly pose an increased risk and concern.  We will watch this carefully and hydrate as necessary when possible.\par \par 4.Med changes are as followed :\par   -Continue Torsemide 20 mg  one tablet daily\par   -Start Aldactone 25 mg one tablet daily.\par   -Repeat BMP x 1 week to reassess renal.\par \par 5. Will transfer interrogation services  to our facility with [facility]. \par \par 6.Clinical follow up in in Feb.\par

## 2020-12-24 ENCOUNTER — TRANSCRIPTION ENCOUNTER (OUTPATIENT)
Age: 85
End: 2020-12-24

## 2020-12-28 ENCOUNTER — APPOINTMENT (OUTPATIENT)
Dept: ELECTROPHYSIOLOGY | Facility: CLINIC | Age: 85
End: 2020-12-28

## 2020-12-28 PROCEDURE — 93312 ECHO TRANSESOPHAGEAL: CPT

## 2020-12-28 PROCEDURE — 84295 ASSAY OF SERUM SODIUM: CPT

## 2020-12-28 PROCEDURE — C1760: CPT

## 2020-12-28 PROCEDURE — C1887: CPT

## 2020-12-28 PROCEDURE — 83605 ASSAY OF LACTIC ACID: CPT

## 2020-12-28 PROCEDURE — 71045 X-RAY EXAM CHEST 1 VIEW: CPT

## 2020-12-28 PROCEDURE — 82435 ASSAY OF BLOOD CHLORIDE: CPT

## 2020-12-28 PROCEDURE — 82947 ASSAY GLUCOSE BLOOD QUANT: CPT

## 2020-12-28 PROCEDURE — 97116 GAIT TRAINING THERAPY: CPT

## 2020-12-28 PROCEDURE — C1725: CPT

## 2020-12-28 PROCEDURE — 97163 PT EVAL HIGH COMPLEX 45 MIN: CPT

## 2020-12-28 PROCEDURE — 71250 CT THORAX DX C-: CPT

## 2020-12-28 PROCEDURE — 36415 COLL VENOUS BLD VENIPUNCTURE: CPT

## 2020-12-28 PROCEDURE — C1726: CPT

## 2020-12-28 PROCEDURE — L8699: CPT

## 2020-12-28 PROCEDURE — 82962 GLUCOSE BLOOD TEST: CPT

## 2020-12-28 PROCEDURE — C1889: CPT

## 2020-12-28 PROCEDURE — 85018 HEMOGLOBIN: CPT

## 2020-12-28 PROCEDURE — 82330 ASSAY OF CALCIUM: CPT

## 2020-12-28 PROCEDURE — 84100 ASSAY OF PHOSPHORUS: CPT

## 2020-12-28 PROCEDURE — 82803 BLOOD GASES ANY COMBINATION: CPT

## 2020-12-28 PROCEDURE — 93320 DOPPLER ECHO COMPLETE: CPT

## 2020-12-28 PROCEDURE — 76000 FLUOROSCOPY <1 HR PHYS/QHP: CPT

## 2020-12-28 PROCEDURE — 93325 DOPPLER ECHO COLOR FLOW MAPG: CPT

## 2020-12-28 PROCEDURE — 85730 THROMBOPLASTIN TIME PARTIAL: CPT

## 2020-12-28 PROCEDURE — C1769: CPT

## 2020-12-28 PROCEDURE — C1894: CPT

## 2020-12-28 PROCEDURE — 80048 BASIC METABOLIC PNL TOTAL CA: CPT

## 2020-12-28 PROCEDURE — 85025 COMPLETE CBC W/AUTO DIFF WBC: CPT

## 2020-12-28 PROCEDURE — 83735 ASSAY OF MAGNESIUM: CPT

## 2020-12-28 PROCEDURE — 84132 ASSAY OF SERUM POTASSIUM: CPT

## 2020-12-28 PROCEDURE — 85014 HEMATOCRIT: CPT

## 2020-12-28 PROCEDURE — C8929: CPT

## 2020-12-28 PROCEDURE — 80053 COMPREHEN METABOLIC PANEL: CPT

## 2020-12-28 PROCEDURE — 85610 PROTHROMBIN TIME: CPT

## 2020-12-28 PROCEDURE — 93005 ELECTROCARDIOGRAM TRACING: CPT

## 2020-12-28 PROCEDURE — 97530 THERAPEUTIC ACTIVITIES: CPT

## 2020-12-28 PROCEDURE — 85027 COMPLETE CBC AUTOMATED: CPT

## 2021-01-07 ENCOUNTER — APPOINTMENT (OUTPATIENT)
Dept: CARDIOLOGY | Facility: CLINIC | Age: 86
End: 2021-01-07

## 2021-01-22 ENCOUNTER — APPOINTMENT (OUTPATIENT)
Dept: ELECTROPHYSIOLOGY | Facility: CLINIC | Age: 86
End: 2021-01-22
Payer: MEDICARE

## 2021-01-22 ENCOUNTER — APPOINTMENT (OUTPATIENT)
Dept: ELECTROPHYSIOLOGY | Facility: CLINIC | Age: 86
End: 2021-01-22

## 2021-01-22 PROCEDURE — 93296 REM INTERROG EVL PM/IDS: CPT

## 2021-01-22 PROCEDURE — 93294 REM INTERROG EVL PM/LDLS PM: CPT

## 2021-02-02 ENCOUNTER — NON-APPOINTMENT (OUTPATIENT)
Age: 86
End: 2021-02-02

## 2021-02-02 ENCOUNTER — APPOINTMENT (OUTPATIENT)
Dept: ELECTROPHYSIOLOGY | Facility: CLINIC | Age: 86
End: 2021-02-02

## 2021-02-02 ENCOUNTER — APPOINTMENT (OUTPATIENT)
Dept: CARDIOLOGY | Facility: CLINIC | Age: 86
End: 2021-02-02

## 2021-02-02 ENCOUNTER — APPOINTMENT (OUTPATIENT)
Dept: CARDIOLOGY | Facility: CLINIC | Age: 86
End: 2021-02-02
Payer: MEDICARE

## 2021-02-02 VITALS
HEIGHT: 64 IN | BODY MASS INDEX: 26.63 KG/M2 | HEART RATE: 61 BPM | WEIGHT: 156 LBS | SYSTOLIC BLOOD PRESSURE: 160 MMHG | TEMPERATURE: 99.1 F | DIASTOLIC BLOOD PRESSURE: 76 MMHG | RESPIRATION RATE: 12 BRPM

## 2021-02-02 DIAGNOSIS — Z01.818 ENCOUNTER FOR OTHER PREPROCEDURAL EXAMINATION: ICD-10-CM

## 2021-02-02 DIAGNOSIS — Z95.2 PRESENCE OF PROSTHETIC HEART VALVE: ICD-10-CM

## 2021-02-02 PROCEDURE — 93288 INTERROG EVL PM/LDLS PM IP: CPT

## 2021-02-02 PROCEDURE — 99072 ADDL SUPL MATRL&STAF TM PHE: CPT

## 2021-02-02 PROCEDURE — 99214 OFFICE O/P EST MOD 30 MIN: CPT | Mod: 25

## 2021-02-02 PROCEDURE — 93000 ELECTROCARDIOGRAM COMPLETE: CPT | Mod: 59

## 2021-02-02 RX ORDER — TORSEMIDE 20 MG/1
20 TABLET ORAL DAILY
Refills: 0 | Status: DISCONTINUED | COMMUNITY
End: 2021-02-02

## 2021-02-02 RX ORDER — SPIRONOLACTONE 25 MG/1
25 TABLET ORAL DAILY
Qty: 90 | Refills: 1 | Status: DISCONTINUED | COMMUNITY
Start: 2020-12-09 | End: 2021-02-02

## 2021-02-02 NOTE — PHYSICAL EXAM
[General Appearance - Well Developed] : well developed [Normal Appearance] : normal appearance [Well Groomed] : well groomed [General Appearance - Well Nourished] : well nourished [No Deformities] : no deformities [General Appearance - In No Acute Distress] : no acute distress [Normal Conjunctiva] : the conjunctiva exhibited no abnormalities [Eyelids - No Xanthelasma] : the eyelids demonstrated no xanthelasmas [Normal Oral Mucosa] : normal oral mucosa [No Oral Pallor] : no oral pallor [No Oral Cyanosis] : no oral cyanosis [Normal Jugular Venous A Waves Present] : normal jugular venous A waves present [Normal Jugular Venous V Waves Present] : normal jugular venous V waves present [No Jugular Venous Lan A Waves] : no jugular venous lan A waves [Respiration, Rhythm And Depth] : normal respiratory rhythm and effort [Exaggerated Use Of Accessory Muscles For Inspiration] : no accessory muscle use [Auscultation Breath Sounds / Voice Sounds] : lungs were clear to auscultation bilaterally [Systolic grade ___/6] : A grade [unfilled]/6 systolic murmur was heard. [Abdomen Soft] : soft [Abdomen Tenderness] : non-tender [Abdomen Mass (___ Cm)] : no abdominal mass palpated [Nail Clubbing] : no clubbing of the fingernails [Cyanosis, Localized] : no localized cyanosis [Petechial Hemorrhages (___cm)] : no petechial hemorrhages [Skin Color & Pigmentation] : normal skin color and pigmentation [] : no rash [No Skin Ulcers] : no skin ulcer [Skin Lesions] : no skin lesions [No Xanthoma] : no  xanthoma was observed [Oriented To Time, Place, And Person] : oriented to person, place, and time [Affect] : the affect was normal [FreeTextEntry1] : [place]

## 2021-02-02 NOTE — ASSESSMENT
[FreeTextEntry1] : ECG- A Paced  at 61 bpm, 1'avb, old anterior infarct.\par \par Echo 11/28/20 ( [facility])\par LFEV 60-65%\par Mod.-Sev. mitral calfication\par Mild mitral regurgitation \par Mild- Mod. Tricuspid regurgitation \par Pulm press. 36.8\par Bioprosthesis in aortic position.  \par \par \par Lab data \par 1/26/2021:\par BUN 39\par Creatinine 1.68\par Electrolytes normal\par \par 11/28/20( [facility])\par Creat 1.49\par K 4.4\par \par Laboratory data 9/29/2020:\par BUN 41\par Creatinine 1.88\par Cholesterol 160\par HDL 56\par LDL 79\par \par Pacemaker interrogation 2/2/2021:\par Atrially pacing 30% of the time\par Battery thresholds impedance sensitivity all normal.\par 4 episodes of A. fib longest 22 seconds.\par \par Echocardiogram 10/13/2020:\par Peak transaortic valve gradient 73\par Mean gradient 44\par Aortic valve area 0.6 calculation\par Estimated pulmonary pressure 40 mmHg\par Mild to moderate mitral regurgitation\par Diastolic dysfunction\par Left ventricular ejection fraction 40 to 45%\par \par Cardiac catheterization 10/14/2020:\par 20 to 30% left main\par 95% calcified mid LAD >> PCI\par 50% circumflex\par 70% RCA in-stent restenosis\par \par 10/15/20\par Right heart catheterization;\par     pulmonary capillary wedge pressure 17\par     Pulmonary artery pressure at 37/15\par    Right atrial pressure 8\par Rotational atherectomy and drug-eluting stent to proximal LAD and drug-eluting stent x 2 to the mid LAD\par \par Echocardiogram 7/1/20:\par LV of 65-70%\par Severe aortic stenosis with a peak velocity 4.7\par Peak gradient of 87 mmHg\par Mean gradient 40 mm mercury\par Pulmonary pressures of 33\par \par Echocardiogram  4/9/19\par Severe aortic valve stenosis with some increase of the transvalvular gradient.\par Peak 78 mm Hg\par Mean 43 mm Hg\par Normal LV function\par Calcific mitral valve disease with mild insufficiency\par \par Carotid study 7/1/20\par bilateral tortuosity with moderate plaquing and ulcerated disease. Relative;y unchanged from prior.\par \par Impression: \par 1.  Patient with progressive symptoms of heart failure likely related to his LAD disease and  severe aortic valve stenosis.  He is clinically less short of breath s/p TAVR and LAD PCI.\par However, is continuing to have worsening lower extremity edema \par    - Echo with normal LVEF and borderline PAH.\par    - Edema progressed to the point where he was blistering and required Unna boots.\par \par 2.Chronic kidney disease with a baseline creatinine of about 1.6. \par    Uncertainty about what diuretic regimen he is taking and how he gets about this.  In discussion with Dr. Sweeney it    is clear that the patient seems to be making his own decisions at times\par \par 3. Carotid disease with moderate bilateral stenoses unchanged from prior. Bruits noted on exam right >left.\par \par 4.  Recently abnormal renal and pelvic ultrasound with a cystoscopy planned in the near future\par \par 5. BP today controlled. ECG stable. \par \par Plan:\par 1.  Vascular follow-up today with regard to the Unna boot wrappings.\par \par 2.  We will try to get the patient to resume spironolactone 25 mg a day.\par \par 3.  Repeat BMP through [facility] office in 2 weeks.\par \par 4.  Clinical follow-up here in 1 month\par \par   -Repeat BMP x 1 week to reassess renal.\par \par 5.  6-month repeat pacemaker interrogation\par \par \par \par

## 2021-02-02 NOTE — REASON FOR VISIT
[FreeTextEntry1] : Patient returns here cardiac revaluation with regard to management of lower extremity edema\par \par He is status post a hospitalization for progressive symptoms of heart failure and angina secondary to severe aortic stenosis and CAD..\par He is status post stenting of the LAD and a permanent pacemaker.\par Pacemaker check was performed today.\par s/p TAVR on 11/20/20 with Dr. Praful Jett at [facility].\par \par . His medical history includes:\par \par 1. Aortic stenosis - severe, now status post TAVR\par \par 2. Coronary artery disease, now status post LAD stent\par \par 3. Carotid stenosis - moderate, being monitored\par \par 4. Pulmonary hypertension - mild\par

## 2021-02-02 NOTE — HISTORY OF PRESENT ILLNESS
[FreeTextEntry1] : Due to progressive exertional dyspnea orthopnea and edema. he was hospitalized from the office on 10/13/2020.\par Cardiac catheterization demonstrated\par A high-grade heavily calcified mid LAD stenosis.\par Because of creatinine elevation and high degree of AV block, PCI was deferred for the next day.\par A permanent pacemaker was implanted and the creatinine went from 1.8-1.5.\par Next day patient had 2 stents placed to the LAD with Rotablator\par \par WIth hx of severe aortic stenosis/symptoms he was referred to for a TAVR  completed w/o complication on 11/20/20. \par \par On 11/28/20 he returned to [facility] with worsening l/e edema. L/E doppler - for DVT, Echo performed and stable with LVEF of 60-65%\par Discharged that day on Torsemide 20 mg 2 tbs BID for 5 days then instructed to take one pill daily moving forward. PO K supplementation also taken for 5 days only. \par -DC creat. 1.49 , K 4.4. \par \par He continued to have l/e edema, 2+ tense bilaterally and spironolactone 25 mg daily was added.  Remains unclear if the patient ever actually took that.\par In conversation with Dr. Simpson, it would seem that the patient was stable on visits there 1 5, over, when he presented on 122 the edema had progressed and there was blistering and desquamation.. \par He was referred to vascular surgery for evaluation and Unna boots were placed 125.  Lasix was increased from 40 to 60 mg a day.  Uncertain when the patient stopped taking torsemide.\par \par Other medical problems include:\par - Diabetes\par - Hiatal hernia\par - Hearing impairment\par - Chronic kidney disease \par -Kidney stones and possibly cystoscopy.

## 2021-04-22 ENCOUNTER — APPOINTMENT (OUTPATIENT)
Dept: CARDIOLOGY | Facility: CLINIC | Age: 86
End: 2021-04-22
Payer: MEDICARE

## 2021-04-22 VITALS
HEIGHT: 64 IN | WEIGHT: 158 LBS | HEART RATE: 70 BPM | DIASTOLIC BLOOD PRESSURE: 70 MMHG | BODY MASS INDEX: 26.98 KG/M2 | TEMPERATURE: 97.2 F | RESPIRATION RATE: 12 BRPM | OXYGEN SATURATION: 93 % | SYSTOLIC BLOOD PRESSURE: 155 MMHG

## 2021-04-22 PROCEDURE — 99215 OFFICE O/P EST HI 40 MIN: CPT

## 2021-04-22 PROCEDURE — 93000 ELECTROCARDIOGRAM COMPLETE: CPT

## 2021-04-22 PROCEDURE — 99072 ADDL SUPL MATRL&STAF TM PHE: CPT

## 2021-04-22 RX ORDER — GLYBURIDE 1.25 MG/1
1.25 TABLET ORAL
Refills: 0 | Status: DISCONTINUED | COMMUNITY
End: 2021-04-22

## 2021-04-22 NOTE — REASON FOR VISIT
[FreeTextEntry1] : Patient returns here cardiac revaluation with regard to management of lower extremity edema\par Has been having this managed through the office of vascular surgeon To Marks and getting Unna boots put on every Monday\par A week that was missed resulted in a significant ballooning of his legs with blistering.\par \par He is status post a hospitalization for progressive symptoms of heart failure and angina \par He is status post stenting of the LAD and a permanent pacemaker.\par s/p TAVR on 11/20/20 with Dr. Praful Jett at [facility].\par \par His medical history includes:\par \par 1. Aortic stenosis - now status post TAVR\par \par 2. Coronary artery disease, now status post LAD stent\par \par 3. Carotid stenosis - moderate, being monitored\par \par 4. Pulmonary hypertension - mild\par \par 5.  Chronic kidney disease stage III

## 2021-04-22 NOTE — REVIEW OF SYSTEMS
[Recent Weight Gain (___ Lbs)] : recent [unfilled] ~Ulb weight gain [Loss Of Hearing] : hearing loss [see HPI] : see HPI [Shortness Of Breath] : shortness of breath [Dyspnea on exertion] : dyspnea during exertion [Lower Ext Edema] : lower extremity edema [Joint Pain] : joint pain [Joint Swelling] : joint swelling [Joint Stiffness] : joint stiffness [Dizziness] : dizziness [Negative] : Heme/Lymph

## 2021-04-22 NOTE — PHYSICAL EXAM
[Normal Appearance] : normal appearance [Well Groomed] : well groomed [No Deformities] : no deformities [General Appearance - In No Acute Distress] : no acute distress [Normal Conjunctiva] : the conjunctiva exhibited no abnormalities [Eyelids - No Xanthelasma] : the eyelids demonstrated no xanthelasmas [Normal Oral Mucosa] : normal oral mucosa [No Oral Pallor] : no oral pallor [No Oral Cyanosis] : no oral cyanosis [Normal Jugular Venous A Waves Present] : normal jugular venous A waves present [Normal Jugular Venous V Waves Present] : normal jugular venous V waves present [No Jugular Venous Lan A Waves] : no jugular venous lan A waves [Respiration, Rhythm And Depth] : normal respiratory rhythm and effort [Exaggerated Use Of Accessory Muscles For Inspiration] : no accessory muscle use [Auscultation Breath Sounds / Voice Sounds] : lungs were clear to auscultation bilaterally [Systolic grade ___/6] : A grade [unfilled]/6 systolic murmur was heard. [Abdomen Soft] : soft [Abdomen Tenderness] : non-tender [Abdomen Mass (___ Cm)] : no abdominal mass palpated [Nail Clubbing] : no clubbing of the fingernails [Cyanosis, Localized] : no localized cyanosis [Petechial Hemorrhages (___cm)] : no petechial hemorrhages [Skin Color & Pigmentation] : normal skin color and pigmentation [] : no rash [Skin Lesions] : no skin lesions [No Skin Ulcers] : no skin ulcer [No Xanthoma] : no  xanthoma was observed [Oriented To Time, Place, And Person] : oriented to person, place, and time [Affect] : the affect was normal [FreeTextEntry1] : Very [place]

## 2021-04-22 NOTE — ASSESSMENT
[FreeTextEntry1] : ECG-underlying atrial ectopic rhythm probably atypical atrial flutter.  V paced  at 70  bpm,\par \par Echo 11/28/20 ( [facility])\par LFEV 60-65%\par Mod.-Sev. mitral calfication\par Mild mitral regurgitation \par Mild- Mod. Tricuspid regurgitation \par Pulm press. 36.8\par Bioprosthesis in aortic position.  \par \par Lab data \par        1/26/21:    4/8/21\par BUN     39          45\par Creat 1.68         1.71\par A1c                   6.3\par Hgb                   11.9\par Chol                 153\par HDL                   61\par LDL                   72\par Electrolytes normal\par \par 11/28/20( [facility])\par Creat 1.49\par K 4.4\par \par Laboratory data 9/29/2020:\par BUN 41\par Creatinine 1.88\par Cholesterol 160\par HDL 56\par LDL 79\par \par Pacemaker interrogation 2/2/2021:\par Atrially pacing 30% of the time\par Battery thresholds impedance sensitivity all normal.\par 4 episodes of A. fib longest 22 seconds.\par \par Echocardiogram 10/13/2020:\par Peak transaortic valve gradient 73\par Mean gradient 44\par Aortic valve area 0.6 calculation\par Estimated pulmonary pressure 40 mmHg\par Mild to moderate mitral regurgitation\par Diastolic dysfunction\par Left ventricular ejection fraction 40 to 45%\par \par Cardiac catheterization 10/14/2020:\par 20 to 30% left main\par 95% calcified mid LAD >> PCI\par 50% circumflex\par 70% RCA in-stent restenosis\par \par 10/15/20\par Right heart catheterization;\par     pulmonary capillary wedge pressure 17\par     Pulmonary artery pressure at 37/15\par    Right atrial pressure 8\par Rotational atherectomy and drug-eluting stent to proximal LAD and drug-eluting stent x 2 to the mid LAD\par \par Echocardiogram 7/1/20:\par LV of 65-70%\par Severe aortic stenosis with a peak velocity 4.7\par Peak gradient of 87 mmHg\par Mean gradient 40 mm mercury\par Pulmonary pressures of 33\par \par Echocardiogram  4/9/19\par Severe aortic valve stenosis with some increase of the transvalvular gradient.\par Peak 78 mm Hg\par Mean 43 mm Hg\par Normal LV function\par Calcific mitral valve disease with mild insufficiency\par \par Carotid study 7/1/20\par bilateral tortuosity with moderate plaquing and ulcerated disease. Relative;y unchanged from prior.\par \par Impression: \par 1.  Patient with progressive symptoms of heart failure likely related to his   severe aortic valve stenosis complicated by renal insufficiency\par He is clinically less short of breath s/p TAVR and LAD PCI.\par However, is continuing to have worsening lower extremity edema \par    - Echo with normal LVEF and borderline PAH.\par    - Edema progressed to the point where he was blistering and requires Unna boots weekly.\par \par 2.Chronic kidney disease with a baseline creatinine of about 1.6. \par      Progress with diuresis limited by the chronic kidney disease.\par \par 3. Carotid disease with moderate bilateral stenoses unchanged from prior. Bruits noted on exam right >left.\par \par 4.  Recently abnormal renal and pelvic ultrasound with a cystoscopy planned in the near future\par \par 5. BP today controlled.\par \par 6.  ECG demonstrating underlying atypical atrial flutter.\par \par Plan:\par 1.  Vascular follow-up today with regard to the Unna boot wrappings.\par \par 2.  We will add Zaroxolyn 2.5 mg 3 times a week (Monday, Wednesday, Friday)\par \par 3.  Repeat BMP through [facility] office in 2 weeks.\par \par 4.  Clinical follow-up here in 1 month\par \par 5.  6-month repeat pacemaker interrogation\par \par \par \par

## 2021-04-23 ENCOUNTER — APPOINTMENT (OUTPATIENT)
Dept: ELECTROPHYSIOLOGY | Facility: CLINIC | Age: 86
End: 2021-04-23

## 2021-05-04 ENCOUNTER — APPOINTMENT (OUTPATIENT)
Dept: CARDIOLOGY | Facility: CLINIC | Age: 86
End: 2021-05-04
Payer: MEDICARE

## 2021-05-04 PROCEDURE — 93296 REM INTERROG EVL PM/IDS: CPT

## 2021-05-04 PROCEDURE — 93294 REM INTERROG EVL PM/LDLS PM: CPT

## 2021-05-17 LAB
ANION GAP SERPL CALC-SCNC: 14 MMOL/L
BUN SERPL-MCNC: 35 MG/DL
CALCIUM SERPL-MCNC: 9.1 MG/DL
CHLORIDE SERPL-SCNC: 101 MMOL/L
CO2 SERPL-SCNC: 23 MMOL/L
CREAT SERPL-MCNC: 1.74 MG/DL
GLUCOSE SERPL-MCNC: 119 MG/DL
POTASSIUM SERPL-SCNC: 4.1 MMOL/L
SODIUM SERPL-SCNC: 137 MMOL/L

## 2021-06-23 ENCOUNTER — APPOINTMENT (OUTPATIENT)
Dept: CARDIOLOGY | Facility: CLINIC | Age: 86
End: 2021-06-23
Payer: MEDICARE

## 2021-06-23 VITALS
TEMPERATURE: 97.2 F | HEIGHT: 64 IN | WEIGHT: 157 LBS | SYSTOLIC BLOOD PRESSURE: 110 MMHG | BODY MASS INDEX: 26.8 KG/M2 | OXYGEN SATURATION: 98 % | RESPIRATION RATE: 14 BRPM | HEART RATE: 70 BPM | DIASTOLIC BLOOD PRESSURE: 70 MMHG

## 2021-06-23 PROCEDURE — 93306 TTE W/DOPPLER COMPLETE: CPT

## 2021-06-23 PROCEDURE — 99215 OFFICE O/P EST HI 40 MIN: CPT

## 2021-06-23 PROCEDURE — 99072 ADDL SUPL MATRL&STAF TM PHE: CPT

## 2021-06-23 PROCEDURE — 93000 ELECTROCARDIOGRAM COMPLETE: CPT

## 2021-06-23 NOTE — PHYSICAL EXAM
[Normal Appearance] : normal appearance [Well Groomed] : well groomed [No Deformities] : no deformities [General Appearance - In No Acute Distress] : no acute distress [Normal Conjunctiva] : the conjunctiva exhibited no abnormalities [Eyelids - No Xanthelasma] : the eyelids demonstrated no xanthelasmas [Normal Oral Mucosa] : normal oral mucosa [No Oral Pallor] : no oral pallor [No Oral Cyanosis] : no oral cyanosis [Normal Jugular Venous A Waves Present] : normal jugular venous A waves present [Normal Jugular Venous V Waves Present] : normal jugular venous V waves present [No Jugular Venous Lan A Waves] : no jugular venous lan A waves [Respiration, Rhythm And Depth] : normal respiratory rhythm and effort [Exaggerated Use Of Accessory Muscles For Inspiration] : no accessory muscle use [Auscultation Breath Sounds / Voice Sounds] : lungs were clear to auscultation bilaterally [Systolic grade ___/6] : A grade [unfilled]/6 systolic murmur was heard. [Abdomen Soft] : soft [Abdomen Tenderness] : non-tender [Abdomen Mass (___ Cm)] : no abdominal mass palpated [Nail Clubbing] : no clubbing of the fingernails [Cyanosis, Localized] : no localized cyanosis [Petechial Hemorrhages (___cm)] : no petechial hemorrhages [Skin Color & Pigmentation] : normal skin color and pigmentation [] : no rash [Skin Lesions] : no skin lesions [No Skin Ulcers] : no skin ulcer [No Xanthoma] : no  xanthoma was observed [Oriented To Time, Place, And Person] : oriented to person, place, and time [Affect] : the affect was normal [FreeTextEntry1] : Very [place]

## 2021-06-23 NOTE — REASON FOR VISIT
[FreeTextEntry1] : Patient returns here cardiac revaluation with regard to management of lower extremity edema\par Has been having this managed through the office of vascular surgeon To Marks and getting Unna boots put on every Monday\par \par States that he has become much more profoundly short of breath over the course of the last 2 months.  Minimal activity such as walking across the room make him very short of breath.  There is no associated chest pain.  The symptoms are a departure from where he was initially after his LAD stent and the TAVR late 2020.\par \par He is status post a hospitalization for progressive symptoms of heart failure and angina \par He is status post stenting of the LAD and a permanent pacemaker.\par s/p TAVR on 11/20/20 with Dr. Praful Jett at [facility].\par \par His medical history includes:\par \par 1. Aortic stenosis - now status post TAVR\par \par 2. Coronary artery disease, now status post LAD stent\par \par 3. Carotid stenosis - moderate, being monitored\par \par 4. Pulmonary hypertension - mild\par \par 5.  Chronic kidney disease stage III

## 2021-06-23 NOTE — ASSESSMENT
[FreeTextEntry1] : ECG-underlying atrial ectopic rhythm probably atypical atrial flutter.  V paced  at 70  bpm,\par \par Echocardiogram 6/23/2021:\par Very asynchronous left ventricle secondary to pacing but overall low normal systolic function with ejection fraction 50%\par Status post TAVR with bioprosthesis normal seating and functioning.\par Moderate tricuspid sufficiency\par No evidence of any significant pulmonary hypertension.\par No pericardial effusion.\par \par Echo 11/28/20 ( [facility])\par LFEV 60-65%\par Mod.-Sev. mitral calfication\par Mild mitral regurgitation \par Mild- Mod. Tricuspid regurgitation \par Pulm press. 36.8\par Bioprosthesis in aortic position.  \par \par Lab data \par        1/26/21:    4/8/21\par BUN     39          45\par Creat 1.68         1.71\par A1c                   6.3\par Hgb                   11.9\par Chol                 153\par HDL                   61\par LDL                   72\par Electrolytes normal\par \par 11/28/20( [facility])\par Creat 1.49\par K 4.4\par \par Laboratory data 9/29/2020:\par BUN 41\par Creatinine 1.88\par Cholesterol 160\par HDL 56\par LDL 79\par \par Pacemaker interrogation 2/2/2021:\par Atrially pacing 30% of the time\par Battery thresholds impedance sensitivity all normal.\par 4 episodes of A. fib longest 22 seconds.\par \par Echocardiogram 10/13/2020:\par Peak transaortic valve gradient 73\par Mean gradient 44\par Aortic valve area 0.6 calculation\par Estimated pulmonary pressure 40 mmHg\par Mild to moderate mitral regurgitation\par Diastolic dysfunction\par Left ventricular ejection fraction 40 to 45%\par \par Cardiac catheterization 10/14/2020:\par 20 to 30% left main\par 95% calcified mid LAD >> PCI\par 50% circumflex\par 70% RCA in-stent restenosis\par \par 10/15/20\par Right heart catheterization;\par     pulmonary capillary wedge pressure 17\par     Pulmonary artery pressure at 37/15\par    Right atrial pressure 8\par Rotational atherectomy and drug-eluting stent to proximal LAD and drug-eluting stent x 2 to the mid LAD\par \par Echocardiogram 7/1/20:\par LV of 65-70%\par Severe aortic stenosis with a peak velocity 4.7\par Peak gradient of 87 mmHg\par Mean gradient 40 mm mercury\par Pulmonary pressures of 33\par \par Echocardiogram  4/9/19\par Severe aortic valve stenosis with some increase of the transvalvular gradient.\par Peak 78 mm Hg\par Mean 43 mm Hg\par Normal LV function\par Calcific mitral valve disease with mild insufficiency\par \par Carotid study 7/1/20\par bilateral tortuosity with moderate plaquing and ulcerated disease. Relative;y unchanged from prior.\par \par Impression: \par 1.  Patient with unexplained but substantial exertional dyspnea that is relatively new in onset without any associated chest pain.\par       Does not appear substantially volume overloaded with clear lungs and relatively little edema.\par       Stat echocardiogram reveals no change in left ventricular systolic function, a well-seated and functioning bioprosthetic AVR, no evidence of any increase in pulmonary artery systolic pressure.\par \par 2.  History of congestive heart failure secondary to aortic valve stenosis improving back in fall 2020 with a TAVR and an LAD stent.\par \par 3.Chronic kidney disease with a baseline creatinine of about 1.6. \par      Progress with diuresis limited by the chronic kidney disease.\par \par 3. Carotid disease with moderate bilateral stenoses unchanged from prior. Bruits noted on exam right >left.\par \par 4.  Recently abnormal renal and pelvic ultrasound with a cystoscopy planned in the near future\par \par 5. BP today controlled.\par \par 6.  ECG demonstrating underlying atypical atrial flutter.\par \par Plan:\par 1.  Left heart cath targeting the LAD to ensure that there is no in-stent restenosis accounting for the marked shortness of breath.\par \par 2.  With  Zaroxolyn 2.5 mg 3 times a week (Monday, Wednesday, Friday) and Unna boots, edema has markedly improved.\par \par 3.  CBC to be obtained shortly to ensure that he anemia does not account for the shortness of breath.\par \par 4.  Clinical follow-up here in 1 month\par \par 5.  6-month repeat pacemaker interrogation\par \par \par \par

## 2021-06-23 NOTE — HISTORY OF PRESENT ILLNESS
[FreeTextEntry1] : 10/13/2020.  Due to progressive exertional dyspnea orthopnea and edema. he was hospitalized from the office \par Cardiac catheterization- A high-grade heavily calcified mid LAD stenosis.\par                                        Next day patient had 2 stents placed to the LAD with Rotablator\par A permanent pacemaker was implanted\par Creatinine went from 1.8-1.5.eventually to 2.1 \par \par  11/20/20.  WIth hx of severe aortic stenosis/symptoms - had  TAVR  \par \par On 11/28/20 - [facility] with worsening l/e edema. L/E doppler - for DVT, \par                       Echo performed and stable with LVEF of 60-65%\par Discharged that day on Torsemide 20 mg 2 tbs BID for 5 days then instructed to take one pill daily moving forward. PO K supplementation also taken for 5 days only. \par -DC creat. 1.49 , K 4.4. \par \par Because of  2+ tense bilateral  edema and spironolactone 25 mg daily was added.  Likely never taken.\par In conversation with Dr. Simpson, it would seem that the patient was stable on visits there 1/5, When he presented on 1/ 22 the edema had progressed and there was blistering and desquamation.. \par He was referred to vascular surgery for evaluation and Unna boots were placed 1/ 25.  Lasix was increased from 40 to 60 mg a day.  \par \par He has remained on that dose of Lasix ever since.\par \par Other medical problems include:\par - Diabetes\par - Hiatal hernia\par - Hearing impairment\par - Chronic kidney disease \par -Kidney stones and possibly cystoscopy.

## 2021-07-06 ENCOUNTER — RX RENEWAL (OUTPATIENT)
Age: 86
End: 2021-07-06

## 2021-07-06 ENCOUNTER — INPATIENT (INPATIENT)
Facility: HOSPITAL | Age: 86
LOS: 10 days | Discharge: HOME | DRG: 286 | End: 2021-07-17
Attending: STUDENT IN AN ORGANIZED HEALTH CARE EDUCATION/TRAINING PROGRAM | Admitting: INTERNAL MEDICINE
Payer: MEDICARE

## 2021-07-06 ENCOUNTER — NON-APPOINTMENT (OUTPATIENT)
Age: 86
End: 2021-07-06

## 2021-07-06 VITALS
TEMPERATURE: 98 F | DIASTOLIC BLOOD PRESSURE: 67 MMHG | OXYGEN SATURATION: 99 % | HEART RATE: 72 BPM | HEIGHT: 65 IN | WEIGHT: 160.06 LBS | RESPIRATION RATE: 18 BRPM | SYSTOLIC BLOOD PRESSURE: 144 MMHG

## 2021-07-06 DIAGNOSIS — H26.9 UNSPECIFIED CATARACT: Chronic | ICD-10-CM

## 2021-07-06 DIAGNOSIS — Z98.89 OTHER SPECIFIED POSTPROCEDURAL STATES: Chronic | ICD-10-CM

## 2021-07-06 DIAGNOSIS — I20.0 UNSTABLE ANGINA: ICD-10-CM

## 2021-07-06 DIAGNOSIS — Z95.2 PRESENCE OF PROSTHETIC HEART VALVE: Chronic | ICD-10-CM

## 2021-07-06 LAB
ALBUMIN SERPL ELPH-MCNC: 3.7 G/DL — SIGNIFICANT CHANGE UP (ref 3.3–5.2)
ALP SERPL-CCNC: 68 U/L — SIGNIFICANT CHANGE UP (ref 40–120)
ALT FLD-CCNC: 29 U/L — SIGNIFICANT CHANGE UP
ANION GAP SERPL CALC-SCNC: 16 MMOL/L — SIGNIFICANT CHANGE UP (ref 5–17)
AST SERPL-CCNC: 22 U/L — SIGNIFICANT CHANGE UP
BASOPHILS # BLD AUTO: 0.03 K/UL — SIGNIFICANT CHANGE UP (ref 0–0.2)
BASOPHILS NFR BLD AUTO: 0.4 % — SIGNIFICANT CHANGE UP (ref 0–2)
BILIRUB SERPL-MCNC: 1.1 MG/DL — SIGNIFICANT CHANGE UP (ref 0.4–2)
BUN SERPL-MCNC: 78.6 MG/DL — HIGH (ref 8–20)
CALCIUM SERPL-MCNC: 9.4 MG/DL — SIGNIFICANT CHANGE UP (ref 8.6–10.2)
CHLORIDE SERPL-SCNC: 85 MMOL/L — LOW (ref 98–107)
CO2 SERPL-SCNC: 31 MMOL/L — HIGH (ref 22–29)
CREAT SERPL-MCNC: 1.92 MG/DL — HIGH (ref 0.5–1.3)
EOSINOPHIL # BLD AUTO: 0.04 K/UL — SIGNIFICANT CHANGE UP (ref 0–0.5)
EOSINOPHIL NFR BLD AUTO: 0.5 % — SIGNIFICANT CHANGE UP (ref 0–6)
GLUCOSE SERPL-MCNC: 125 MG/DL — HIGH (ref 70–99)
HCT VFR BLD CALC: 41.7 % — SIGNIFICANT CHANGE UP (ref 39–50)
HGB BLD-MCNC: 14 G/DL — SIGNIFICANT CHANGE UP (ref 13–17)
IMM GRANULOCYTES NFR BLD AUTO: 0.4 % — SIGNIFICANT CHANGE UP (ref 0–1.5)
LYMPHOCYTES # BLD AUTO: 1.3 K/UL — SIGNIFICANT CHANGE UP (ref 1–3.3)
LYMPHOCYTES # BLD AUTO: 15.5 % — SIGNIFICANT CHANGE UP (ref 13–44)
MCHC RBC-ENTMCNC: 30.6 PG — SIGNIFICANT CHANGE UP (ref 27–34)
MCHC RBC-ENTMCNC: 33.6 GM/DL — SIGNIFICANT CHANGE UP (ref 32–36)
MCV RBC AUTO: 91 FL — SIGNIFICANT CHANGE UP (ref 80–100)
MONOCYTES # BLD AUTO: 1.32 K/UL — HIGH (ref 0–0.9)
MONOCYTES NFR BLD AUTO: 15.7 % — HIGH (ref 2–14)
NEUTROPHILS # BLD AUTO: 5.69 K/UL — SIGNIFICANT CHANGE UP (ref 1.8–7.4)
NEUTROPHILS NFR BLD AUTO: 67.5 % — SIGNIFICANT CHANGE UP (ref 43–77)
NT-PROBNP SERPL-SCNC: 6323 PG/ML — HIGH (ref 0–300)
PLATELET # BLD AUTO: 259 K/UL — SIGNIFICANT CHANGE UP (ref 150–400)
POTASSIUM SERPL-MCNC: 3 MMOL/L — LOW (ref 3.5–5.3)
POTASSIUM SERPL-SCNC: 3 MMOL/L — LOW (ref 3.5–5.3)
PROT SERPL-MCNC: 8.1 G/DL — SIGNIFICANT CHANGE UP (ref 6.6–8.7)
RBC # BLD: 4.58 M/UL — SIGNIFICANT CHANGE UP (ref 4.2–5.8)
RBC # FLD: 14.5 % — SIGNIFICANT CHANGE UP (ref 10.3–14.5)
SODIUM SERPL-SCNC: 132 MMOL/L — LOW (ref 135–145)
TROPONIN T SERPL-MCNC: 0.05 NG/ML — SIGNIFICANT CHANGE UP (ref 0–0.06)
TROPONIN T SERPL-MCNC: 0.06 NG/ML — SIGNIFICANT CHANGE UP (ref 0–0.06)
WBC # BLD: 8.41 K/UL — SIGNIFICANT CHANGE UP (ref 3.8–10.5)
WBC # FLD AUTO: 8.41 K/UL — SIGNIFICANT CHANGE UP (ref 3.8–10.5)

## 2021-07-06 PROCEDURE — 71045 X-RAY EXAM CHEST 1 VIEW: CPT | Mod: 26

## 2021-07-06 PROCEDURE — 99223 1ST HOSP IP/OBS HIGH 75: CPT

## 2021-07-06 PROCEDURE — 99291 CRITICAL CARE FIRST HOUR: CPT

## 2021-07-06 PROCEDURE — 99222 1ST HOSP IP/OBS MODERATE 55: CPT

## 2021-07-06 RX ORDER — DEXTROSE 50 % IN WATER 50 %
25 SYRINGE (ML) INTRAVENOUS ONCE
Refills: 0 | Status: DISCONTINUED | OUTPATIENT
Start: 2021-07-06 | End: 2021-07-17

## 2021-07-06 RX ORDER — SODIUM CHLORIDE 9 MG/ML
1000 INJECTION, SOLUTION INTRAVENOUS
Refills: 0 | Status: DISCONTINUED | OUTPATIENT
Start: 2021-07-06 | End: 2021-07-17

## 2021-07-06 RX ORDER — SIMVASTATIN 20 MG/1
20 TABLET, FILM COATED ORAL AT BEDTIME
Refills: 0 | Status: DISCONTINUED | OUTPATIENT
Start: 2021-07-06 | End: 2021-07-17

## 2021-07-06 RX ORDER — CLOPIDOGREL BISULFATE 75 MG/1
75 TABLET, FILM COATED ORAL DAILY
Qty: 90 | Refills: 3 | Status: ACTIVE | COMMUNITY
Start: 2019-06-26 | End: 1900-01-01

## 2021-07-06 RX ORDER — METOPROLOL TARTRATE 50 MG
1 TABLET ORAL
Qty: 0 | Refills: 0 | DISCHARGE

## 2021-07-06 RX ORDER — INSULIN LISPRO 100/ML
VIAL (ML) SUBCUTANEOUS
Refills: 0 | Status: DISCONTINUED | OUTPATIENT
Start: 2021-07-06 | End: 2021-07-17

## 2021-07-06 RX ORDER — POTASSIUM CHLORIDE 20 MEQ
20 PACKET (EA) ORAL DAILY
Refills: 0 | Status: DISCONTINUED | OUTPATIENT
Start: 2021-07-06 | End: 2021-07-06

## 2021-07-06 RX ORDER — CLOPIDOGREL BISULFATE 75 MG/1
1 TABLET, FILM COATED ORAL
Qty: 0 | Refills: 0 | DISCHARGE

## 2021-07-06 RX ORDER — POTASSIUM CHLORIDE 20 MEQ
20 PACKET (EA) ORAL DAILY
Refills: 0 | Status: DISCONTINUED | OUTPATIENT
Start: 2021-07-06 | End: 2021-07-08

## 2021-07-06 RX ORDER — KETOROLAC TROMETHAMINE 30 MG/ML
15 SYRINGE (ML) INJECTION ONCE
Refills: 0 | Status: DISCONTINUED | OUTPATIENT
Start: 2021-07-06 | End: 2021-07-06

## 2021-07-06 RX ORDER — TAMSULOSIN HYDROCHLORIDE 0.4 MG/1
0.4 CAPSULE ORAL AT BEDTIME
Refills: 0 | Status: DISCONTINUED | OUTPATIENT
Start: 2021-07-06 | End: 2021-07-09

## 2021-07-06 RX ORDER — GLUCAGON INJECTION, SOLUTION 0.5 MG/.1ML
1 INJECTION, SOLUTION SUBCUTANEOUS ONCE
Refills: 0 | Status: DISCONTINUED | OUTPATIENT
Start: 2021-07-06 | End: 2021-07-17

## 2021-07-06 RX ORDER — METOPROLOL TARTRATE 50 MG
25 TABLET ORAL
Refills: 0 | Status: DISCONTINUED | OUTPATIENT
Start: 2021-07-06 | End: 2021-07-17

## 2021-07-06 RX ORDER — INSULIN LISPRO 100/ML
3 VIAL (ML) SUBCUTANEOUS
Refills: 0 | Status: DISCONTINUED | OUTPATIENT
Start: 2021-07-06 | End: 2021-07-17

## 2021-07-06 RX ORDER — ADALIMUMAB 40MG/0.8ML
0 KIT SUBCUTANEOUS
Qty: 0 | Refills: 0 | DISCHARGE

## 2021-07-06 RX ORDER — INSULIN GLARGINE 100 [IU]/ML
9 INJECTION, SOLUTION SUBCUTANEOUS AT BEDTIME
Refills: 0 | Status: DISCONTINUED | OUTPATIENT
Start: 2021-07-06 | End: 2021-07-16

## 2021-07-06 RX ORDER — DENOSUMAB 60 MG/ML
0 INJECTION SUBCUTANEOUS
Qty: 0 | Refills: 0 | DISCHARGE

## 2021-07-06 RX ORDER — HEPARIN SODIUM 5000 [USP'U]/ML
5000 INJECTION INTRAVENOUS; SUBCUTANEOUS EVERY 8 HOURS
Refills: 0 | Status: DISCONTINUED | OUTPATIENT
Start: 2021-07-06 | End: 2021-07-17

## 2021-07-06 RX ORDER — ASPIRIN/CALCIUM CARB/MAGNESIUM 324 MG
81 TABLET ORAL DAILY
Refills: 0 | Status: DISCONTINUED | OUTPATIENT
Start: 2021-07-06 | End: 2021-07-17

## 2021-07-06 RX ORDER — GABAPENTIN 400 MG/1
300 CAPSULE ORAL
Refills: 0 | Status: DISCONTINUED | OUTPATIENT
Start: 2021-07-06 | End: 2021-07-17

## 2021-07-06 RX ORDER — FUROSEMIDE 40 MG
60 TABLET ORAL ONCE
Refills: 0 | Status: COMPLETED | OUTPATIENT
Start: 2021-07-06 | End: 2021-07-06

## 2021-07-06 RX ORDER — GLIMEPIRIDE 1 MG
0.5 TABLET ORAL
Qty: 0 | Refills: 0 | DISCHARGE

## 2021-07-06 RX ORDER — AMLODIPINE BESYLATE 2.5 MG/1
5 TABLET ORAL DAILY
Refills: 0 | Status: DISCONTINUED | OUTPATIENT
Start: 2021-07-06 | End: 2021-07-17

## 2021-07-06 RX ORDER — DEXTROSE 50 % IN WATER 50 %
15 SYRINGE (ML) INTRAVENOUS ONCE
Refills: 0 | Status: DISCONTINUED | OUTPATIENT
Start: 2021-07-06 | End: 2021-07-17

## 2021-07-06 RX ORDER — DEXTROSE 50 % IN WATER 50 %
12.5 SYRINGE (ML) INTRAVENOUS ONCE
Refills: 0 | Status: DISCONTINUED | OUTPATIENT
Start: 2021-07-06 | End: 2021-07-17

## 2021-07-06 RX ORDER — CLOPIDOGREL BISULFATE 75 MG/1
75 TABLET, FILM COATED ORAL DAILY
Refills: 0 | Status: DISCONTINUED | OUTPATIENT
Start: 2021-07-06 | End: 2021-07-17

## 2021-07-06 RX ADMIN — Medication 15 MILLIGRAM(S): at 17:38

## 2021-07-06 NOTE — ED PROVIDER NOTE - CLINICAL SUMMARY MEDICAL DECISION MAKING FREE TEXT BOX
Patient presenting with SOb sent in from cardiologist for catheterization. Will evaluate with labwork, cxr, ekg. Leg wounds appear chronic in nature without significant swelling. Will admit for cath.

## 2021-07-06 NOTE — H&P ADULT - HISTORY OF PRESENT ILLNESS
89 year old man with past medical history of Coronary artery disease (s/p PCI to LAD in 10/2020 with medical management of RCA disease), Severe aortic valve stenosis (s/p 34 mm CoreValve Evolut TAVR in 11/2020), Complete heart block (s/p pacemaker), Atrial flutter (not on anticoagulation), CKD 3 (baseline Cr ~ 1.6), Hypertension, Diabetes mellitus and rheumatoid arthritis and chronic lower extremity edema (follows with vascular with unna boots) who was sent in to ER due to progressive dyspnea on minimal exertion.    The patient was recently evaluated in cardiology office with Dr. Corona in end of June with plans for left heart catheterization to evaluate LAD to ensure no in-stent restenosis as cause of the dyspnea. One of daughters present at bedside who reports he has had progressive dyspnea on minimal exertion, such as with a few steps. She feels that he is usually very active and this is a dramatic change for him. He denies any chest pain. Also with progressive leg ulcerations and edema. No palpitations, presyncope or syncope. Appears to be taking medications at home.

## 2021-07-06 NOTE — ED PROVIDER NOTE - ATTENDING CONTRIBUTION TO CARE
I performed the initial face to face bedside interview with this patient regarding history of present illness, review of symptoms and relevant past medical, social and family history.  I completed an independent physical examination.  I was the initial provider who evaluated this patient. I have signed out the follow up of any pending tests (i.e. labs, radiological studies) to the resident.  I have communicated the patient’s plan of care and disposition with the resident.     I spent 35 minutes of critical care time with this patient. This does not include time spent on separately reported billable procedures.

## 2021-07-06 NOTE — H&P ADULT - ASSESSMENT
89 year old man with past medical history of Coronary artery disease (s/p PCI to LAD in 10/2020 with medical management of RCA disease), Severe aortic valve stenosis (s/p 34 mm CoreValve Evolut TAVR in 11/2020), Complete heart block (s/p pacemaker), Atrial flutter (not on anticoagulation), CKD 3 (baseline Cr ~ 1.6), Hypertension, Diabetes mellitus and rheumatoid arthritis and chronic lower extremity edema (follows with vascular with unna boots) who was sent in to ER due to progressive dyspnea on minimal exertion.    Exertional dyspnea/Symptomatic CAD:  known CAD S/p PCI to LAD in 10/2020  No ACS  planned for LHC in am to ensure no in-stent restenosis as cause of the dyspnea.  initial troponin negative, trend,   echo  ekg  Continue home Aspirin 81 mg daily and Plavix 75 mg daily  Metoprolol tartrate 50 mg BID  NPO past midnight    Diastolic CHF  pulmonary HTN  h/o Severe AS s/p TAVR  in dec 2020  give 1 dose lasix 60 mg one dose iv and reassess in am as per cardiology  continue beta blocker metoprolol BID  hold metolazone    Aflutter:  currently rate control  not on anticoagulation,  continue telemetry   f/u with outpatient cardiology    3* Heart Block   s/p pacemaker     CKD-3   ELISE on CKD with hyponatremia and hypokalemia  baseline cr 1.6 now 1.9  would give lasix 60 iv once  hold metolazone as causing electrolyte abnormality     DM-2  Hold PO medications   Insulin sliding scale     RA/OP  Humira q 2weeks  Prolia q 6months                         89 year old man with past medical history of Coronary artery disease (s/p PCI to LAD in 10/2020 with medical management of RCA disease), Severe aortic valve stenosis (s/p 34 mm CoreValve Evolut TAVR in 11/2020), Complete heart block (s/p pacemaker), Atrial flutter (not on anticoagulation), CKD 3 (baseline Cr ~ 1.6), Hypertension, Diabetes mellitus and rheumatoid arthritis and chronic lower extremity edema (follows with vascular with unna boots) who was sent in to ER due to progressive dyspnea on minimal exertion.    Exertional dyspnea/Symptomatic CAD:  known CAD S/p PCI to LAD in 10/2020  No ACS  planned for LHC in am to ensure no in-stent restenosis as cause of the dyspnea.  initial troponin negative, trend,   echo  ekg  Continue home Aspirin 81 mg daily and Plavix 75 mg daily  Metoprolol tartrate 50 mg BID  NPO past midnight    Diastolic CHF  pulmonary HTN  h/o Severe AS s/p TAVR  in dec 2020  give 1 dose lasix 60 mg one dose iv and reassess in am as per cardiology  continue beta blocker metoprolol BID  hold metolazone    Aflutter:  currently rate control  not on anticoagulation,  continue telemetry   f/u with outpatient cardiology    3* Heart Block   s/p pacemaker     CKD-3   ELISE on CKD with hyponatremia and hypokalemia  baseline cr 1.6 now 1.9  would give lasix 60 iv once  hold metolazone as causing electrolyte abnormality  replete potassium stat and daily 20 meq  nephro consulted    B/l LE venous stasis ulcer:  follows up with vascular outpatient  ordered ace wraps    DM-2  Hold PO medications   Insulin sliding scale     RA/OP  Humira q 2weeks  Prolia q 6months                         89 year old man with past medical history of Coronary artery disease (s/p PCI to LAD in 10/2020 with medical management of RCA disease), Severe aortic valve stenosis (s/p 34 mm CoreValve Evolut TAVR in 11/2020), Complete heart block (s/p pacemaker), Atrial flutter (not on anticoagulation), CKD 3 (baseline Cr ~ 1.6), Hypertension, Diabetes mellitus and rheumatoid arthritis and chronic lower extremity edema (follows with vascular with unna boots) who was sent in to ER due to progressive dyspnea on minimal exertion.    Exertional dyspnea/Symptomatic CAD:  known CAD S/p PCI to LAD in 10/2020  No ACS  planned for LHC in am to ensure no in-stent restenosis as cause of the dyspnea.  initial troponin negative, trend,   echo  ekg  Continue home Aspirin 81 mg daily and Plavix 75 mg daily  Metoprolol tartrate 50 mg BID  NPO past midnight    Diastolic CHF  pulmonary HTN  h/o Severe AS s/p TAVR  in dec 2020  give 1 dose lasix 60 mg one dose iv and reassess in am as per cardiology  continue beta blocker metoprolol BID  hold metolazone    Aflutter:  currently rate control  not on anticoagulation,  continue telemetry   f/u with outpatient cardiology    3* Heart Block   s/p pacemaker   cardio f/u with Device interrogation    CKD-3   ELISE on CKD with hyponatremia and hypokalemia  baseline cr 1.6 now 1.9  would give lasix 60 iv once  hold metolazone as causing electrolyte abnormality  replete potassium stat and daily 20 meq  nephro consulted    B/l LE venous stasis ulcer:  follows up with vascular outpatient  ordered ace wraps    DM-2  Hold PO medications   Insulin sliding scale     RA/OP  Humira q 2weeks  Prolia q 6months

## 2021-07-06 NOTE — ED PROVIDER NOTE - NS ED ROS FT
General: Denies fever, chills  HEENT: Denies visual changes, sore throat  Neck: Denies neck pain, neck stiffness  Resp: Endorses SOB  Cardiovascular: Denies CP, palpitations  GI: Denies abdominal pain, nausea, vomiting  : Denies dysuria, hematuria, incontinence  MSK: Denies back pain  Neuro: Denies HA, dizziness, numbness  Skin: Denies rashes General: Denies fever, chills  HEENT: Denies visual changes, sore throat  Neck: Denies neck pain, neck stiffness  Resp: Endorses SOB  Cardiovascular: Denies CP, palpitations  GI: Denies abdominal pain, nausea, vomiting  : Denies dysuria, hematuria, incontinence  MSK: Denies back pain  Neuro: Denies HA, dizziness, numbness  Skin: Denies rashes.

## 2021-07-06 NOTE — H&P ADULT - NSICDXPASTSURGICALHX_GEN_ALL_CORE_FT
PAST SURGICAL HISTORY:  Cataract b/l 2001 & 2002    History of cystoscopy TURP 2010    Kidney stone removed about 50 years ago    Kidney stone had lithotripsy in 1983 & 2010    S/P angioplasty with stent 2011 - 1 coronary stent    S/P knee replacement right in January 1/28/13 & left 12/13/13    S/P TAVR (transcatheter aortic valve replacement)     Status post hip surgery

## 2021-07-06 NOTE — CONSULT NOTE ADULT - SUBJECTIVE AND OBJECTIVE BOX
MORALES THOMAS  [id number]      HPI:  Morales Thomas is a 89 year old man with past medical history of Coronary artery disease (s/p PCI to LAD in 10/2020 with medical management of RCA disease), Severe aortic valve stenosis s/p 34 mm CoreValve Evolut TAVR in 11/2020, Complete heart block (s/p pacemaker), CKD (baseline Cr ~ 1.6), Hypertension, Diabetes mellitus and rheumatoid arthritis and chronic lower extremity edema (follows with vascular with unna boots) who was referred to ER due to progressive dyspnea on minimal exertion.      The patient was recently evaluated in cardiology office with Dr. Corona in end of June with plans for left heart catherization to evaluate LAD to ensure no in-stent restenosis as cause of the dyspnea.       ALLERGIES:  No Known Allergies      PAST MEDICAL & SURGICAL HISTORY:  Coronary artery disease (s/p PCI to LAD in 10/2020 with medical management of RCA disease)  Severe aortic valve stenosis s/p 34 mm CoreValve Evolut TAVR in 11/2020  Complete heart block (s/p pacemaker)  Hypertension  Diabetes mellitus  Reumatoid arthritis  BPH (benign prostatic hypertrophy)  Kidney stones  S/P knee replacement  History of cystoscopy  Status post hip surgery      CURRENT MEDICATIONS:      SOCIAL HISTORY:      FAMILY HISTORY:  No pertinent family history in first degree relatives        ROS:  All 10 systems reviewed and positives noted in HPI    OBJECTIVE:    VITAL SIGNS:  Vital Signs Last 24 Hrs  T(C): 36.4 (06 Jul 2021 15:51), Max: 36.4 (06 Jul 2021 15:51)  T(F): 97.6 (06 Jul 2021 15:51), Max: 97.6 (06 Jul 2021 15:51)  HR: 72 (06 Jul 2021 15:51) (72 - 72)  BP: 144/67 (06 Jul 2021 15:51) (144/67 - 144/67)  BP(mean): --  RR: 18 (06 Jul 2021 15:51) (18 - 18)  SpO2: 99% (06 Jul 2021 15:51) (99% - 99%)    PHYSICAL EXAM:  General: well appearing, no distress  HEENT: sclera anicteric  Neck: supple, no carotid bruits b/l  CVS: JVP ~ 7 cm H20, RRR, s1, s2, no murmurs/rubs/gallops  Chest: unlabored respirations, clear to auscultation b/l  Abdomen: non-distended  Extremities: no lower extremity edema b/l  Neuro: awake, alert & oriented x 3  Psych: normal affect      LABS:    ECG:      Cardiac Cath (10/14/2020):  VENTRICLES: No LV gram was performed; however, a recent echocardiogram  demonstrated an EF of 40 %.  CORONARY VESSELS: The coronary circulation is right dominant.  LM:   --  LM: 20-30% disease.  LAD:   --  Mid LAD: There was a 95 % stenosis. The lesion was heavily  calcified.  CX:   --  OM1: There was a 50 % stenosis.  RCA:   --  Proximal RCA: There was a 70 % stenosis in-stent.  COMPLICATIONS: No complications occurred during the cath lab visit.  DIAGNOSTIC IMPRESSIONS: Severe mid LAD and RCA disease. Intermediate syntax  score.  Severe AS by TTE.  DIAGNOSTIC RECOMMENDATIONS: Considering abnormal cardiac enzymes, wall  motion abnormality in the anterior wall, plan for high risk PCI of LAD.  Deferred due to low GFR.  Patient also need PPM due to high grade AV block. In addition, TAVR  evaluation.  Not a surgical candidate due to advanced age.  Will plan for PCI of LAD after renal recovery.      Outpatient TTE (6/23/21):  LVEF 50-55%, dyssynchronous LV, cannot rule out some hypokinesis in the inferior/inferoseptal wall   Mild asymmetric LVH  Normal RV size and mildly reduced RV systolic function  Dilated left atrium  Mild MR  TAVR, peak velocity 1.1 m/s  Moderate to severe TR  No pericardial effusion       Home Cardiac Meds:  Metolazone 2.5 mg (M/W/F)  Furosemide 60 mg daily  Plavix 75 mg daily  Aspirin 81 mg daily  Simvastatin 20 mg bedtime  Metoprolol tartrate 50 mg BID       MORALES THOMAS  [id number]      HPI:  Morales Thomas is a 89 year old man with past medical history of Coronary artery disease (s/p PCI to LAD in 10/2020 with medical management of RCA disease), Severe aortic valve stenosis (s/p 34 mm CoreValve Evolut TAVR in 11/2020), Complete heart block (s/p pacemaker), Atrial flutter (not on anticoagulation), CKD (baseline Cr ~ 1.6), Hypertension, Diabetes mellitus and rheumatoid arthritis and chronic lower extremity edema (follows with vascular with unna boots) who was sent in to ER due to progressive dyspnea on minimal exertion.    The patient was recently evaluated in cardiology office with Dr. Corona in end of June with plans for left heart catherization to evaluate LAD to ensure no in-stent restenosis as cause of the dyspnea. One of daughters present at bedside who reports he has had progressive dyspnea on minimal exertion, such as with a few steps. She feels that he is usually very active and this is a dramatic change for him. He denies any chest pain. Also with progressive leg ulcerations and edema. No palpitations, presyncope or syncope. Appears to be taking medications at home.       ALLERGIES:  No Known Allergies      PAST MEDICAL & SURGICAL HISTORY:  Coronary artery disease (s/p PCI to LAD in 10/2020 with medical management of RCA disease)  Severe aortic valve stenosis s/p 34 mm CoreValve Evolut TAVR in 11/2020  Complete heart block (s/p pacemaker)  Hypertension  Atrial flutter  Diabetes mellitus  Reumatoid arthritis  BPH (benign prostatic hypertrophy)  Kidney stones  S/P knee replacement  History of cystoscopy  Status post hip surgery      FAMILY HISTORY:  No pertinent family history in first degree relatives      ROS:  All 10 systems reviewed and positives noted in HPI    OBJECTIVE:    VITAL SIGNS:  Vital Signs Last 24 Hrs  T(C): 36.4 (06 Jul 2021 15:51), Max: 36.4 (06 Jul 2021 15:51)  T(F): 97.6 (06 Jul 2021 15:51), Max: 97.6 (06 Jul 2021 15:51)  HR: 72 (06 Jul 2021 15:51) (72 - 72)  BP: 144/67 (06 Jul 2021 15:51) (144/67 - 144/67)  BP(mean): --  RR: 18 (06 Jul 2021 15:51) (18 - 18)  SpO2: 99% (06 Jul 2021 15:51) (99% - 99%)    PHYSICAL EXAM:  General: elderly frail man, no acute distress  HEENT: sclera anicteric  Neck: supple, no carotid bruits b/l  CVS: JVP ~ 9 cm H20, irregular, no murmurs  Chest: unlabored respirations, decreased breath sounds  Abdomen: non-distended  Extremities: chronic edema with lower extremity skin ulcerations  Neuro: awake, alert & oriented   Psych: normal affect      LABS:    CBC: WBC 8, Hgb 14, Hct 42, Plt 259   CMP: Na 132, K 3.0, BUN 79, Cr 1.9  Troponin: 0.05     ECG: none available for review      Cardiac Cath (10/2020):  CORONARY VESSELS: The coronary circulation is right dominant.  LM:   --  LM: There was a 20 % stenosis.  LAD:   --  Proximal LAD: There was a 95 % stenosis.  --  Mid LAD: There was a 95 % stenosis.  CX:   --  OM1: There was a 50 % stenosis.  RCA:   --  RCA: This vessel was not injected.  COMPLICATIONS: There were no complications.  DIAGNOSTIC IMPRESSIONS: Normal right heart pressures. Performed to assess  for need for diuretics. Wedge pressure 14-15 mmhg.  Severe LAD disease. Temporary pacemaker placed and rotational atherectomy  performed. 3 LESLYE.  DIAGNOSTIC RECOMMENDATIONS: Aspirin and plavix.  TAVR evaluation.  INTERVENTIONAL IMPRESSIONS: Normal right heart pressures. Performed to  assess for need for diuretics. Wedge pressure 14-15 mmhg.  Severe LAD disease. Temporary pacemaker placed and rotational atherectomy  performed. 3 LESLYE.        Outpatient TTE (6/23/21):  LVEF 50-55%, dyssynchronous LV, cannot rule out some hypokinesis in the inferior/inferoseptal wall   Mild asymmetric LVH  Normal RV size and mildly reduced RV systolic function  Dilated left atrium  Mild MR  TAVR, peak velocity 1.1 m/s  Moderate to severe TR  No pericardial effusion       Home Cardiac Meds:  Metolazone 2.5 mg (M/W/F)  Furosemide 60 mg daily  Plavix 75 mg daily  Aspirin 81 mg daily  Simvastatin 20 mg bedtime  Metoprolol tartrate 50 mg BID

## 2021-07-06 NOTE — ED PROVIDER NOTE - OBJECTIVE STATEMENT
Pt is a 89yo M presenting with weakness from home. He reports that the symptoms have been going on for about 2 weeks and has been progressive. They note that the SOB is worse with exertion. Patient has been following with his cardiologist and was told to go to the ER and get admitted for a catheterization. Family at bedside also notes that his chronic leg wounds appear to be worse than normal. Patient is hard of hearing but denying chest pain or pressure at this time. He states that he is feeling SOB when he moves around but is relieved with rest. Denies fevers, chills, nausea, vomiting, diarrhea Pt is a 89 year old M presenting with weakness from home. He reports that the symptoms have been going on for about 2 weeks and has been progressive. They note that the SOB is worse with exertion. Patient has been following with his cardiologist and was told to go to the ER and get admitted for a catheterization. Family at bedside also notes that his chronic leg wounds appear to be worse than normal. Patient is hard of hearing but denying chest pain or pressure at this time. He states that he is feeling SOB when he moves around but is relieved with rest. Denies fevers, chills, nausea, vomiting, diarrhea

## 2021-07-06 NOTE — ED PROVIDER NOTE - PHYSICAL EXAMINATION
General: Elderly male in no acute distress  HEENT: Normocephalic, atraumatic. Moist mucous membranes.   Eyes:  EOMI. ELOISA.  Neck: Soft and supple. Full ROM without pain.   Cardiac: Regular rate and regular rhythm. No murmurs. Pacemaker noted.   Resp: Lungs CTAB. No wheezes, rales or rhonchi.  Abd: Soft, non-tender, non-distended. No guarding or rebound.  Skin: LE venous stasis ulcers at various stages noted General: Elderly male in no acute distress  HEENT: Normocephalic, atraumatic. Moist mucous membranes.   Eyes:  EOMI. ELOISA.  Neck: Soft and supple. Full ROM without pain.   Cardiac: Regular rate and regular rhythm. No murmurs. Pacemaker noted.   Resp: Lungs CTAB. No wheezes, rales or rhonchi.  Abd: Soft, non-tender, non-distended. No guarding or rebound.  Skin: LE venous stasis ulcers at various stages noted.

## 2021-07-06 NOTE — H&P ADULT - NSHPLABSRESULTS_GEN_ALL_CORE
14.0   8.41  )-----------( 259      ( 06 Jul 2021 17:21 )             41.7             07-06    132<L>  |  85<L>  |  78.6<H>  ----------------------------<  125<H>  3.0<L>   |  31.0<H>  |  1.92<H>    Ca    9.4      06 Jul 2021 17:21    TPro  8.1  /  Alb  3.7  /  TBili  1.1  /  DBili  x   /  AST  22  /  ALT  29  /  AlkPhos  68  07-06    LIVER FUNCTIONS - ( 06 Jul 2021 17:21 )  Alb: 3.7 g/dL / Pro: 8.1 g/dL / ALK PHOS: 68 U/L / ALT: 29 U/L / AST: 22 U/L / GGT: x                   CARDIAC MARKERS ( 06 Jul 2021 17:21 )  x     / 0.05 ng/mL / x     / x     / x

## 2021-07-06 NOTE — H&P ADULT - NSHPPHYSICALEXAM_GEN_ALL_CORE
PHYSICAL EXAM:  GENERAL: NAD, well-developed  HEAD:  Atraumatic, Normocephalic  EYES: EOMI, PERRLA, conjunctiva and sclera clear  NECK: Supple, No JVD  CHEST/LUNG: Clear to auscultation bilaterally; No wheeze  HEART: Regular rate and rhythm; No murmurs, rubs, or gallops  ABDOMEN: Soft, Nontender, Nondistended; Bowel sounds present  EXTREMITIES:  2+ Peripheral Pulses, No clubbing, cyanosis, or edema  PSYCH: AAOx3  NEUROLOGY: non-focal  SKIN: No rashes or lesions PHYSICAL EXAM:  GENERAL: looks DRY  HEAD:  Atraumatic, Normocephalic  EYES: EOMI, PERRLA, conjunctiva and sclera clear  NECK: Supple, JVD 8-9 cm (wnl)  CHEST/LUNG: Clear to auscultation bilaterally; No wheeze, no crackle  HEART: rate controlled, no heave  ABDOMEN: Soft, Nontender, Nondistended; Bowel sounds present  EXTREMITIES:  b/l lower extremity venous stasis changes--erythema and skin ulceration, distal capillary refill +, pulses present weak likely due to 1+ pitting chronic LE edema  PSYCH: AAOx3  NEUROLOGY: non-focal  SKIN: le ulcer

## 2021-07-06 NOTE — H&P ADULT - NSICDXPASTMEDICALHX_GEN_ALL_CORE_FT
PAST MEDICAL HISTORY:  BPH (benign prostatic hypertrophy)     CAD (coronary artery disease)     Diabetes     Diabetes mellitus type II    Gout h/o    High cholesterol     HTN (hypertension)     Hypertension     Kidney stones     Osteoarthrosis     Pacemaker     RA (rheumatoid arthritis)     Stented coronary artery 2011, 2020    Ulcer disease stomach

## 2021-07-06 NOTE — CONSULT NOTE ADULT - ASSESSMENT
Assessment:    Moarles Nichols is a 89 year old man with past medical history of Coronary artery disease (s/p PCI to LAD in 10/2020 with medical management of RCA disease), Severe aortic valve stenosis s/p 34 mm CoreValve Evolut TAVR in 11/2020, Complete heart block (s/p pacemaker), CKD (baseline Cr ~ 1.6), Hypertension, Diabetes mellitus and rheumatoid arthritis and chronic lower extremity edema (follows with vascular with unna boots) who was referred to ER due to progressive dyspnea on minimal exertion.      The patient was recently evaluated in cardiology office with Dr. Corona in end of June with plans for left heart catherization to evaluate LAD to ensure no in-stent restenosis as cause of the dyspnea.    Assessment:  Morales Nichols is a 89 year old man with past medical history of Coronary artery disease (s/p PCI to LAD in 10/2020 with medical management of RCA disease), Severe aortic valve stenosis (s/p 34 mm CoreValve Evolut TAVR in 11/2020), Complete heart block (s/p pacemaker), Atrial flutter (not on anticoagulation), CKD (baseline Cr ~ 1.6), Hypertension, Diabetes mellitus, rheumatoid arthritis and chronic lower extremity edema (follows with vascular with unna boots) who was sent in to ER due to progressive dyspnea on minimal exertion.    The patient was recently evaluated in cardiology office with Dr. Corona in end of June with plans for left heart catherization to evaluate LAD to ensure no in-stent restenosis as cause of the dyspnea.     His progressive dyspnea on exertion may be due to progressive valvular disease such as his moderate-to-severe tricuspid regurgitation, possible pulmonary hypertension and RV dysfunction, will plan to repeat echocardiogram. Other possibilities include symptoms due to LAD disease, RCA disease (that was medically managed). Initial troponin negative, currently normotensive.      Recommendations:  [] CAD: S/p PCI to LAD in 10/2020. Continue home Aspirin 81 mg daily and Plavix 75 mg daily. Would continue home Metoprolol tartrate 50 mg BID (with hold parameters). Will plan to optimize renal function first prior to proceeding with coronary angiogram given his underlying CKD and high risk for contrast induced nephropathy, discussed with daughter, will need Nephrology consult. Check troponins x 3. Will repeat echo. Check ECG.  [] Right ventricle dysfunction/Moderate to Severe Tricuspid Regurgitation: Will check echo to re-evaluate if any worsening in disease. Check pro BNP, would dose Lasix 60 mg IVP once and monitor urine output.  [] Atrial flutter: Patient with history of this but not on anticoagulation, will review with outpatient cardiologist. Continue to monitor on telemetry.    Thank you for the consult. The patient will require inpatient admission. We will continue to follow along.    Nasra Iyer MD  Cardiology    Assessment:  Morales Nichols is a 89 year old man with past medical history of Coronary artery disease (s/p PCI to LAD in 10/2020 with medical management of RCA disease), Severe aortic valve stenosis (s/p 34 mm CoreValve Evolut TAVR in 11/2020), Complete heart block (s/p pacemaker), Atrial flutter (not on anticoagulation), CKD (baseline Cr ~ 1.6), Hypertension, Diabetes mellitus, rheumatoid arthritis and chronic lower extremity edema (follows with vascular with unna boots) who was sent in to ER due to progressive dyspnea on minimal exertion.    The patient was recently evaluated in cardiology office with Dr. Corona in end of June with plans for left heart catherization to evaluate LAD to ensure no in-stent restenosis as cause of the dyspnea.     His progressive dyspnea on exertion may be due to progressive valvular disease such as his moderate-to-severe tricuspid regurgitation, possible pulmonary hypertension and RV dysfunction, will plan to repeat echocardiogram. Other possibilities include symptoms due to LAD disease, RCA disease (that was medically managed). Initial troponin negative, currently normotensive.      Recommendations:  [] CAD: S/p PCI to LAD in 10/2020. Continue home Aspirin 81 mg daily and Plavix 75 mg daily. Would continue home Metoprolol tartrate 50 mg BID (with hold parameters). Will plan to optimize renal function first prior to proceeding with coronary angiogram given his underlying CKD and high risk for contrast induced nephropathy, discussed with daughter, will need Nephrology consult. Check troponins x 3. Repeat echo. Check ECG.  [] Right ventricle dysfunction/Moderate to Severe Tricuspid Regurgitation: Will check echo to re-evaluate if any worsening in disease. Check pro BNP, would dose Lasix 60 mg IVP once and monitor urine output.  [] Atrial flutter: Patient with history of this but not on anticoagulation, will review with outpatient cardiologist. Continue to monitor on telemetry.    Thank you for the consult. The patient will require inpatient admission. We will continue to follow along.    Nasra Iyer MD  Cardiology    Assessment:  Morales Nichols is a 89 year old man with past medical history of Coronary artery disease (s/p PCI to LAD in 10/2020 with medical management of RCA disease), Severe aortic valve stenosis (s/p 34 mm CoreValve Evolut TAVR in 11/2020), Complete heart block (s/p pacemaker), Atrial flutter (not on anticoagulation), CKD (baseline Cr ~ 1.6), Hypertension, Diabetes mellitus, rheumatoid arthritis and chronic lower extremity edema (follows with vascular with unna boots) who was sent in to ER due to progressive dyspnea on minimal exertion.    The patient was recently evaluated in cardiology office with Dr. Corona in end of June with plans for left heart catherization to evaluate LAD to ensure no in-stent restenosis as cause of the dyspnea.     His progressive dyspnea on exertion may be due to progressive valvular disease such as his moderate-to-severe tricuspid regurgitation, possible pulmonary hypertension and RV dysfunction, will plan to repeat echocardiogram. Other possibilities include symptoms due to LAD disease, RCA disease (that was medically managed). Initial troponin negative, currently normotensive.      Recommendations:  [] CAD: S/p PCI to LAD in 10/2020. Continue home Aspirin 81 mg daily and Plavix 75 mg daily. Would continue home Metoprolol tartrate 50 mg BID (with hold parameters). Will plan to optimize renal function first prior to proceeding with coronary angiogram given his underlying CKD and high risk for contrast induced nephropathy, discussed with daughter, will need Nephrology consult. Check troponins x 3. Repeat echo. Check ECG.  [] Right ventricle dysfunction/Moderate to Severe Tricuspid Regurgitation: Check echo to re-evaluate if any worsening in disease. Check pro BNP, would dose Lasix 60 mg IVP once and monitor urine output.  [] Atrial flutter: Patient with history of this but not on anticoagulation, will review with outpatient cardiologist. Continue to monitor on telemetry.    Thank you for the consult. The patient will require inpatient admission. We will continue to follow along.    Nasra Iyer MD  Cardiology

## 2021-07-07 LAB
A1C WITH ESTIMATED AVERAGE GLUCOSE RESULT: 7.1 % — HIGH (ref 4–5.6)
ALBUMIN SERPL ELPH-MCNC: 3.3 G/DL — SIGNIFICANT CHANGE UP (ref 3.3–5.2)
ALP SERPL-CCNC: 62 U/L — SIGNIFICANT CHANGE UP (ref 40–120)
ALT FLD-CCNC: 24 U/L — SIGNIFICANT CHANGE UP
ANION GAP SERPL CALC-SCNC: 14 MMOL/L — SIGNIFICANT CHANGE UP (ref 5–17)
AST SERPL-CCNC: 19 U/L — SIGNIFICANT CHANGE UP
BASOPHILS # BLD AUTO: 0.03 K/UL — SIGNIFICANT CHANGE UP (ref 0–0.2)
BASOPHILS NFR BLD AUTO: 0.5 % — SIGNIFICANT CHANGE UP (ref 0–2)
BILIRUB SERPL-MCNC: 1.4 MG/DL — SIGNIFICANT CHANGE UP (ref 0.4–2)
BUN SERPL-MCNC: 72.8 MG/DL — HIGH (ref 8–20)
CALCIUM SERPL-MCNC: 9 MG/DL — SIGNIFICANT CHANGE UP (ref 8.6–10.2)
CHLORIDE SERPL-SCNC: 92 MMOL/L — LOW (ref 98–107)
CO2 SERPL-SCNC: 33 MMOL/L — HIGH (ref 22–29)
COVID-19 SPIKE DOMAIN AB INTERP: POSITIVE
COVID-19 SPIKE DOMAIN ANTIBODY RESULT: 20.1 U/ML — HIGH
CREAT SERPL-MCNC: 1.85 MG/DL — HIGH (ref 0.5–1.3)
EOSINOPHIL # BLD AUTO: 0.09 K/UL — SIGNIFICANT CHANGE UP (ref 0–0.5)
EOSINOPHIL NFR BLD AUTO: 1.4 % — SIGNIFICANT CHANGE UP (ref 0–6)
ESTIMATED AVERAGE GLUCOSE: 157 MG/DL — HIGH (ref 68–114)
GLUCOSE BLDC GLUCOMTR-MCNC: 114 MG/DL — HIGH (ref 70–99)
GLUCOSE BLDC GLUCOMTR-MCNC: 118 MG/DL — HIGH (ref 70–99)
GLUCOSE BLDC GLUCOMTR-MCNC: 195 MG/DL — HIGH (ref 70–99)
GLUCOSE BLDC GLUCOMTR-MCNC: 81 MG/DL — SIGNIFICANT CHANGE UP (ref 70–99)
GLUCOSE BLDC GLUCOMTR-MCNC: 99 MG/DL — SIGNIFICANT CHANGE UP (ref 70–99)
GLUCOSE SERPL-MCNC: 133 MG/DL — HIGH (ref 70–99)
HCT VFR BLD CALC: 41.9 % — SIGNIFICANT CHANGE UP (ref 39–50)
HGB BLD-MCNC: 13.6 G/DL — SIGNIFICANT CHANGE UP (ref 13–17)
IMM GRANULOCYTES NFR BLD AUTO: 0.3 % — SIGNIFICANT CHANGE UP (ref 0–1.5)
LYMPHOCYTES # BLD AUTO: 1.21 K/UL — SIGNIFICANT CHANGE UP (ref 1–3.3)
LYMPHOCYTES # BLD AUTO: 18.4 % — SIGNIFICANT CHANGE UP (ref 13–44)
MCHC RBC-ENTMCNC: 30.8 PG — SIGNIFICANT CHANGE UP (ref 27–34)
MCHC RBC-ENTMCNC: 32.5 GM/DL — SIGNIFICANT CHANGE UP (ref 32–36)
MCV RBC AUTO: 94.8 FL — SIGNIFICANT CHANGE UP (ref 80–100)
MONOCYTES # BLD AUTO: 0.89 K/UL — SIGNIFICANT CHANGE UP (ref 0–0.9)
MONOCYTES NFR BLD AUTO: 13.5 % — SIGNIFICANT CHANGE UP (ref 2–14)
NEUTROPHILS # BLD AUTO: 4.33 K/UL — SIGNIFICANT CHANGE UP (ref 1.8–7.4)
NEUTROPHILS NFR BLD AUTO: 65.9 % — SIGNIFICANT CHANGE UP (ref 43–77)
PLATELET # BLD AUTO: 239 K/UL — SIGNIFICANT CHANGE UP (ref 150–400)
POTASSIUM SERPL-MCNC: 3.9 MMOL/L — SIGNIFICANT CHANGE UP (ref 3.5–5.3)
POTASSIUM SERPL-SCNC: 3.9 MMOL/L — SIGNIFICANT CHANGE UP (ref 3.5–5.3)
PROT SERPL-MCNC: 7.4 G/DL — SIGNIFICANT CHANGE UP (ref 6.6–8.7)
RBC # BLD: 4.42 M/UL — SIGNIFICANT CHANGE UP (ref 4.2–5.8)
RBC # FLD: 14.6 % — HIGH (ref 10.3–14.5)
SARS-COV-2 IGG+IGM SERPL QL IA: 20.1 U/ML — HIGH
SARS-COV-2 IGG+IGM SERPL QL IA: POSITIVE
SARS-COV-2 RNA SPEC QL NAA+PROBE: SIGNIFICANT CHANGE UP
SODIUM SERPL-SCNC: 139 MMOL/L — SIGNIFICANT CHANGE UP (ref 135–145)
TROPONIN T SERPL-MCNC: 0.06 NG/ML — SIGNIFICANT CHANGE UP (ref 0–0.06)
WBC # BLD: 6.57 K/UL — SIGNIFICANT CHANGE UP (ref 3.8–10.5)
WBC # FLD AUTO: 6.57 K/UL — SIGNIFICANT CHANGE UP (ref 3.8–10.5)

## 2021-07-07 PROCEDURE — 99223 1ST HOSP IP/OBS HIGH 75: CPT

## 2021-07-07 PROCEDURE — 99233 SBSQ HOSP IP/OBS HIGH 50: CPT

## 2021-07-07 PROCEDURE — 71045 X-RAY EXAM CHEST 1 VIEW: CPT | Mod: 26

## 2021-07-07 PROCEDURE — 93010 ELECTROCARDIOGRAM REPORT: CPT

## 2021-07-07 PROCEDURE — 99232 SBSQ HOSP IP/OBS MODERATE 35: CPT

## 2021-07-07 RX ORDER — FUROSEMIDE 40 MG
20 TABLET ORAL ONCE
Refills: 0 | Status: COMPLETED | OUTPATIENT
Start: 2021-07-07 | End: 2021-07-07

## 2021-07-07 RX ORDER — FUROSEMIDE 40 MG
40 TABLET ORAL ONCE
Refills: 0 | Status: DISCONTINUED | OUTPATIENT
Start: 2021-07-07 | End: 2021-07-07

## 2021-07-07 RX ORDER — SODIUM CHLORIDE 9 MG/ML
1000 INJECTION INTRAMUSCULAR; INTRAVENOUS; SUBCUTANEOUS
Refills: 0 | Status: DISCONTINUED | OUTPATIENT
Start: 2021-07-07 | End: 2021-07-08

## 2021-07-07 RX ADMIN — Medication 81 MILLIGRAM(S): at 12:13

## 2021-07-07 RX ADMIN — TAMSULOSIN HYDROCHLORIDE 0.4 MILLIGRAM(S): 0.4 CAPSULE ORAL at 01:44

## 2021-07-07 RX ADMIN — INSULIN GLARGINE 9 UNIT(S): 100 INJECTION, SOLUTION SUBCUTANEOUS at 22:13

## 2021-07-07 RX ADMIN — INSULIN GLARGINE 9 UNIT(S): 100 INJECTION, SOLUTION SUBCUTANEOUS at 01:43

## 2021-07-07 RX ADMIN — SODIUM CHLORIDE 75 MILLILITER(S): 9 INJECTION INTRAMUSCULAR; INTRAVENOUS; SUBCUTANEOUS at 12:13

## 2021-07-07 RX ADMIN — CLOPIDOGREL BISULFATE 75 MILLIGRAM(S): 75 TABLET, FILM COATED ORAL at 12:13

## 2021-07-07 RX ADMIN — GABAPENTIN 300 MILLIGRAM(S): 400 CAPSULE ORAL at 17:04

## 2021-07-07 RX ADMIN — SIMVASTATIN 20 MILLIGRAM(S): 20 TABLET, FILM COATED ORAL at 01:44

## 2021-07-07 RX ADMIN — Medication 20 MILLIEQUIVALENT(S): at 01:44

## 2021-07-07 RX ADMIN — SIMVASTATIN 20 MILLIGRAM(S): 20 TABLET, FILM COATED ORAL at 22:13

## 2021-07-07 RX ADMIN — HEPARIN SODIUM 5000 UNIT(S): 5000 INJECTION INTRAVENOUS; SUBCUTANEOUS at 01:44

## 2021-07-07 RX ADMIN — Medication 20 MILLIEQUIVALENT(S): at 12:13

## 2021-07-07 RX ADMIN — Medication 20 MILLIGRAM(S): at 17:04

## 2021-07-07 RX ADMIN — TAMSULOSIN HYDROCHLORIDE 0.4 MILLIGRAM(S): 0.4 CAPSULE ORAL at 22:13

## 2021-07-07 RX ADMIN — Medication 60 MILLIGRAM(S): at 01:43

## 2021-07-07 RX ADMIN — HEPARIN SODIUM 5000 UNIT(S): 5000 INJECTION INTRAVENOUS; SUBCUTANEOUS at 22:13

## 2021-07-07 RX ADMIN — Medication 25 MILLIGRAM(S): at 17:04

## 2021-07-07 NOTE — PROGRESS NOTE ADULT - ASSESSMENT
The patient is a 89 year old male with a past medical history of CAD status post PCI to the LAD in 2020, severe AS status post TAVR, complete heart block status post PPM, atrial flutter and CKD stage 3, hypertension, DM type to and chronic lower extremity edema who presented to the Er with complaints of progressively worsening dyspnea     Assessment/Plan:    1. Exertional dyspnea with a history of CAD: NPO for C today  Continue aspirin, Plavix BB and statin therapy  Telemetry monitoring  Cardiac enzymes negative x 3  Echocardiogram pending     2. Chronic diastolic CHF with pulmonary hypertension: Given a dose of IV Lasix 60mg x 1 yesterday in ER  On BB  metolazone held    3. Atrial flutter not on AC    4. History of complete heart block status post pacemaker    5. CKD stage 3 Baseline creatinine ~1.6  Stable   Spoke with nephrology gentle IVF prior to cath with Lasix    6. Bilateral lower extremity venous stasis ulcers: Wound care consult    7. Diabetes mellitus type 2; Admelog sliding scale  Monitor BSL    8. History of RA on Humira Q2 weeks  On monthly Prolia    VTE Heparin subcut held for cath today                The patient is a 89 year old male with a past medical history of CAD status post PCI to the LAD in 2020, severe AS status post TAVR, complete heart block status post PPM, atrial flutter and CKD stage 3, hypertension, DM type to and chronic lower extremity edema who presented to the Er with complaints of progressively worsening dyspnea     Assessment/Plan:    1. Exertional dyspnea with a history of CAD: LHC held today for ELISE   Spoke with cardiology, repeat BMP in AM if stable will schedule for AM   Continue aspirin, Plavix BB and statin therapy  Telemetry monitoring  Cardiac enzymes negative x 3  Echocardiogram pending     2. Acute on Chronic diastolic CHF with pulmonary hypertension: Given a dose of IV Lasix 60mg x 1 yesterday in ER  Continue IV lasix 40mg x 1 today   On BB  metolazone held    3. Atrial flutter not on AC    4. History of complete heart block status post pacemaker    5. CKD stage 3 Baseline creatinine ~1.6  Stable   Spoke with nephrology gentle IVF prior to cath with Lasix    6. Bilateral lower extremity venous stasis ulcers: Wound care consult    7. Diabetes mellitus type 2; Admelog sliding scale  Monitor BSL    8. History of RA on Humira Q2 weeks  On monthly Prolia    VTE Heparin subcut       Plan of care discussed with the patients' daughter, cardiologist and nephrologist.

## 2021-07-07 NOTE — PATIENT PROFILE ADULT - NSPROHMSYMPCOND_GEN_A_NUR
pre op teaching surgical scrub pain management instructions given to pt and his daughter    Covid swab to be done Nov 17/cardiovascular/diabetes/musculoskeletal

## 2021-07-07 NOTE — PROGRESS NOTE ADULT - SUBJECTIVE AND OBJECTIVE BOX
CC: Follow up    INTERVAL HPI/OVERNIGHT EVENTS: Patient seen and examined, laying comfortably in bed. Denies sob chest pain or palpitations.       Vital Signs Last 24 Hrs  T(C): 36.6 (07 Jul 2021 11:56), Max: 36.9 (06 Jul 2021 16:32)  T(F): 97.8 (07 Jul 2021 11:56), Max: 98.4 (06 Jul 2021 16:32)  HR: 70 (07 Jul 2021 11:56) (69 - 72)  BP: 105/58 (07 Jul 2021 11:56) (103/62 - 144/67)  BP(mean): --  RR: 18 (07 Jul 2021 11:56) (18 - 18)  SpO2: 95% (07 Jul 2021 11:56) (94% - 99%)    PHYSICAL EXAM:    GENERAL: NAD, AOX3  HEAD:  Atraumatic, Normocephalic  EYES:  conjunctiva and sclera clear  ENMT: Moist mucous membranes  NECK: Supple, No JVD  CHEST/LUNG: Clear to auscultation bilaterally; No rales, rhonchi, wheezing, or rubs  HEART: Regular rate and rhythm; No murmurs, rubs, or gallops  ABDOMEN: Soft, Nontender, Nondistended; Bowel sounds present  EXTREMITIES:  Bilateral lower extremity wounds         MEDICATIONS  (STANDING):  amLODIPine   Tablet 5 milliGRAM(s) Oral daily  aspirin  chewable 81 milliGRAM(s) Oral daily  clopidogrel Tablet 75 milliGRAM(s) Oral daily  dextrose 40% Gel 15 Gram(s) Oral once  dextrose 5%. 1000 milliLiter(s) (50 mL/Hr) IV Continuous <Continuous>  dextrose 5%. 1000 milliLiter(s) (100 mL/Hr) IV Continuous <Continuous>  dextrose 50% Injectable 25 Gram(s) IV Push once  dextrose 50% Injectable 12.5 Gram(s) IV Push once  dextrose 50% Injectable 25 Gram(s) IV Push once  gabapentin 300 milliGRAM(s) Oral two times a day  glucagon  Injectable 1 milliGRAM(s) IntraMuscular once  heparin   Injectable 5000 Unit(s) SubCutaneous every 8 hours  insulin glargine Injectable (LANTUS) 9 Unit(s) SubCutaneous at bedtime  insulin lispro (ADMELOG) corrective regimen sliding scale   SubCutaneous three times a day before meals  insulin lispro Injectable (ADMELOG) 3 Unit(s) SubCutaneous three times a day before meals  metoprolol tartrate 25 milliGRAM(s) Oral two times a day  potassium chloride    Tablet ER 20 milliEquivalent(s) Oral daily  simvastatin 20 milliGRAM(s) Oral at bedtime  sodium chloride 0.9%. 1000 milliLiter(s) (75 mL/Hr) IV Continuous <Continuous>  tamsulosin 0.4 milliGRAM(s) Oral at bedtime    MEDICATIONS  (PRN):      Allergies    No Known Allergies    Intolerances          LABS:                          13.6   6.57  )-----------( 239      ( 07 Jul 2021 06:55 )             41.9     07-07    139  |  92<L>  |  72.8<H>  ----------------------------<  133<H>  3.9   |  33.0<H>  |  1.85<H>    Ca    9.0      07 Jul 2021 06:54    TPro  7.4  /  Alb  3.3  /  TBili  1.4  /  DBili  x   /  AST  19  /  ALT  24  /  AlkPhos  62  07-07          RADIOLOGY & ADDITIONAL TESTS:

## 2021-07-07 NOTE — CONSULT NOTE ADULT - ASSESSMENT
1) CKD IV  2) BPH  3) CAD  4) DM  5) HTN    Would hydrate with NS pre/post cath; 75cc/hr  Hold NSAIDs; did get Ketoralac on 7/6 in ER;   No other contraindication for planned cardiac cath;    chip Ceja

## 2021-07-07 NOTE — CONSULT NOTE ADULT - SUBJECTIVE AND OBJECTIVE BOX
[facility] DIVISION OF KIDNEY DISEASES AND HYPERTENSION -- INITIAL CONSULT NOTE  --------------------------------------------------------------------------------  HPI:  89 year old man with past medical history of Coronary artery disease (s/p PCI to LAD in 10/2020 with medical management of RCA disease), Severe aortic valve stenosis (s/p 34 mm CoreValve Evolut TAVR in 11/2020), Complete heart block (s/p pacemaker), Atrial flutter (not on anticoagulation), CKD 3 (baseline Cr ~ 1.6), Hypertension, Diabetes mellitus and rheumatoid arthritis and chronic lower extremity edema (follows with vascular with unna boots) who was sent in to ER due to progressive dyspnea on minimal exertion.The patient was recently evaluated in cardiology office with Dr. Corona in end of June with plans for left heart catheterization to evaluate LAD to ensure no in-stent restenosis as cause of the dyspnea. One of daughters present at bedside who reports he has had progressive dyspnea on minimal exertion, such as with a few steps. She feels that he is usually very active and this is a dramatic change for him. He denies any chest pain. Also with progressive leg ulcerations and edema. No palpitations, presyncope or syncope. Appears to be taking medications at home.   Pt seen/examined; nephrology consulted for elevated SCr; SCr 1.6-2 baseline; currently at 1.9; awaiting cardiac cath; lying in bed NAD;      PAST HISTORY  --------------------------------------------------------------------------------  PAST MEDICAL & SURGICAL HISTORY:  CAD (coronary artery disease)    Hypertension    BPH (benign prostatic hypertrophy)    Kidney stones    Diabetes mellitus  type II    Ulcer disease  stomach    Osteoarthrosis    RA (rheumatoid arthritis)    Gout  h/o    Diabetes    HTN (hypertension)    High cholesterol    Stented coronary artery  2011, 2020    Pacemaker    S/P knee replacement  right in January 1/28/13 &amp; left 12/13/13    S/P angioplasty with stent  2011 - 1 coronary stent    Kidney stone  removed about 50 years ago    Kidney stone  had lithotripsy in 1983 &amp; 2010    History of cystoscopy  TURP 2010    Cataract  b/l 2001 &amp; 2002    Status post hip surgery    S/P TAVR (transcatheter aortic valve replacement)      FAMILY HISTORY:  No pertinent family history in first degree relatives      PAST SOCIAL HISTORY:    ALLERGIES & MEDICATIONS  --------------------------------------------------------------------------------  Allergies    No Known Allergies    Intolerances      Standing Inpatient Medications  amLODIPine   Tablet 5 milliGRAM(s) Oral daily  aspirin  chewable 81 milliGRAM(s) Oral daily  clopidogrel Tablet 75 milliGRAM(s) Oral daily  dextrose 40% Gel 15 Gram(s) Oral once  dextrose 5%. 1000 milliLiter(s) IV Continuous <Continuous>  dextrose 5%. 1000 milliLiter(s) IV Continuous <Continuous>  dextrose 50% Injectable 25 Gram(s) IV Push once  dextrose 50% Injectable 12.5 Gram(s) IV Push once  dextrose 50% Injectable 25 Gram(s) IV Push once  gabapentin 300 milliGRAM(s) Oral two times a day  glucagon  Injectable 1 milliGRAM(s) IntraMuscular once  heparin   Injectable 5000 Unit(s) SubCutaneous every 8 hours  insulin glargine Injectable (LANTUS) 9 Unit(s) SubCutaneous at bedtime  insulin lispro (ADMELOG) corrective regimen sliding scale   SubCutaneous three times a day before meals  insulin lispro Injectable (ADMELOG) 3 Unit(s) SubCutaneous three times a day before meals  metoprolol tartrate 25 milliGRAM(s) Oral two times a day  potassium chloride    Tablet ER 20 milliEquivalent(s) Oral daily  simvastatin 20 milliGRAM(s) Oral at bedtime  tamsulosin 0.4 milliGRAM(s) Oral at bedtime    PRN Inpatient Medications      REVIEW OF SYSTEMS  --------------------------------------------------------------------------------  Unable to obtain; uncooperative;    VITALS/PHYSICAL EXAM  --------------------------------------------------------------------------------  T(C): 36.3 (07-07-21 @ 09:39), Max: 36.9 (07-06-21 @ 16:32)  HR: 69 (07-07-21 @ 09:39) (69 - 72)  BP: 108/57 (07-07-21 @ 09:39) (103/62 - 144/67)  RR: 18 (07-07-21 @ 09:39) (18 - 18)  SpO2: 99% (07-07-21 @ 09:39) (94% - 99%)  Wt(kg): --  Height (cm): 165.1 (07-06-21 @ 15:51)  Weight (kg): 72.6 (07-06-21 @ 15:51)  BMI (kg/m2): 26.6 (07-06-21 @ 15:51)  BSA (m2): 1.8 (07-06-21 @ 15:51)      Physical Exam:  	Gen: NAD   	HEENT: PERRL, supple neck, clear oropharynx  	Pulm: dec BS  	CV: RRR, S1S2; no rub  	Back: No spinal or CVA tenderness; no sacral edema  	Abd: +BS, soft, nontender/nondistended  	: No suprapubic tenderness  	UE: Warm  	LE: open wounds/scratching marks; sloughed skin;  	Neuro: No focal deficits, intact gait  	Psych: Normal affect and mood  	Skin: LE scratch marks/open wounds; venous stasis;    LABS/STUDIES  --------------------------------------------------------------------------------              13.6   6.57  >-----------<  239      [07-07-21 @ 06:55]              41.9     139  |  92  |  72.8  ----------------------------<  133      [07-07-21 @ 06:54]  3.9   |  33.0  |  1.85        Ca     9.0     [07-07-21 @ 06:54]    TPro  7.4  /  Alb  3.3  /  TBili  1.4  /  DBili  x   /  AST  19  /  ALT  24  /  AlkPhos  62  [07-07-21 @ 06:54]        Troponin 0.06      [07-07-21 @ 06:54]    Creatinine Trend:  SCr 1.85 [07-07 @ 06:54]  SCr 1.92 [07-06 @ 17:21]    Urinalysis - [11-12-20 @ 10:39]      Color Yellow / Appearance Clear / SG 1.015 / pH 5.0      Gluc Negative / Ketone Negative  / Bili Negative / Urobili Negative       Blood Negative / Protein Negative / Leuk Est Negative / Nitrite Negative      RBC  / WBC  / Hyaline  / Gran  / Sq Epi  / Non Sq Epi  / Bacteria       TSH 2.84      [11-12-20 @ 10:15]  Lipid: chol 135, , HDL 59, LDL 53      [10-16-20 @ 21:23]

## 2021-07-07 NOTE — PROGRESS NOTE ADULT - SUBJECTIVE AND OBJECTIVE BOX
BEVERLY THOMAS  [id number]      Chief Complaint: Dyspnea on exertion/CHF/CAD    Interval History: The patient reports feeling ok this morning. Denies chest pain. Is alert and oriented to name and place.     Tele: atrial flutter, ventricular paced at 70 BPM      Current meds:  amLODIPine   Tablet 5 milliGRAM(s) Oral daily  aspirin  chewable 81 milliGRAM(s) Oral daily  clopidogrel Tablet 75 milliGRAM(s) Oral daily  dextrose 40% Gel 15 Gram(s) Oral once  dextrose 5%. 1000 milliLiter(s) IV Continuous <Continuous>  dextrose 5%. 1000 milliLiter(s) IV Continuous <Continuous>  dextrose 50% Injectable 25 Gram(s) IV Push once  dextrose 50% Injectable 12.5 Gram(s) IV Push once  dextrose 50% Injectable 25 Gram(s) IV Push once  furosemide   Injectable 40 milliGRAM(s) IV Push once  gabapentin 300 milliGRAM(s) Oral two times a day  glucagon  Injectable 1 milliGRAM(s) IntraMuscular once  heparin   Injectable 5000 Unit(s) SubCutaneous every 8 hours  insulin glargine Injectable (LANTUS) 9 Unit(s) SubCutaneous at bedtime  insulin lispro (ADMELOG) corrective regimen sliding scale   SubCutaneous three times a day before meals  insulin lispro Injectable (ADMELOG) 3 Unit(s) SubCutaneous three times a day before meals  metoprolol tartrate 25 milliGRAM(s) Oral two times a day  potassium chloride    Tablet ER 20 milliEquivalent(s) Oral daily  simvastatin 20 milliGRAM(s) Oral at bedtime  sodium chloride 0.9%. 1000 milliLiter(s) IV Continuous <Continuous>  tamsulosin 0.4 milliGRAM(s) Oral at bedtime      Objective:    Vital Signs:   T(C): 36.6 (07-07-21 @ 11:56), Max: 36.9 (07-06-21 @ 16:32)  HR: 70 (07-07-21 @ 11:56) (69 - 72)  BP: 105/58 (07-07-21 @ 11:56) (103/62 - 144/67)  RR: 18 (07-07-21 @ 11:56) (18 - 18)  SpO2: 95% (07-07-21 @ 11:56) (94% - 99%)  Wt(kg): --    PHYSICAL EXAM:  General: elderly frail man, no acute distress  HEENT: sclera anicteric  Neck: supple  CVS: JVP ~ 9 cm H20, irregular, no murmurs  Chest: unlabored respirations, decreased breath sounds  Abdomen: non-distended  Extremities: lower extremity skin ulcerations  Neuro: awake, alert & oriented   Psych: normal affect    Labs:   07 Jul 2021 06:54    139    |  92     |  72.8   ----------------------------<  133    3.9     |  33.0   |  1.85     Ca    9.0        07 Jul 2021 06:54    TPro  7.4    /  Alb  3.3    /  TBili  1.4    /  DBili  x      /  AST  19     /  ALT  24     /  AlkPhos  62     07 Jul 2021 06:54                          13.6   6.57  )-----------( 239      ( 07 Jul 2021 06:55 )             41.9       CARDIAC MARKERS ( 07 Jul 2021 06:54 )  x     / 0.06 ng/mL / x     / x     / x      CARDIAC MARKERS ( 06 Jul 2021 21:46 )  x     / 0.06 ng/mL / x     / x     / x      CARDIAC MARKERS ( 06 Jul 2021 17:21 )  x     / 0.05 ng/mL / x     / x     / x              Cardiac Cath (10/2020):  CORONARY VESSELS: The coronary circulation is right dominant.  LM:   --  LM: There was a 20 % stenosis.  LAD:   --  Proximal LAD: There was a 95 % stenosis.  --  Mid LAD: There was a 95 % stenosis.  CX:   --  OM1: There was a 50 % stenosis.  RCA:   --  RCA: This vessel was not injected.  COMPLICATIONS: There were no complications.  DIAGNOSTIC IMPRESSIONS: Normal right heart pressures. Performed to assess  for need for diuretics. Wedge pressure 14-15 mmhg.  Severe LAD disease. Temporary pacemaker placed and rotational atherectomy  performed. 3 LESLYE.  DIAGNOSTIC RECOMMENDATIONS: Aspirin and plavix.  TAVR evaluation.  INTERVENTIONAL IMPRESSIONS: Normal right heart pressures. Performed to  assess for need for diuretics. Wedge pressure 14-15 mmhg.  Severe LAD disease. Temporary pacemaker placed and rotational atherectomy  performed. 3 LESLYE.        Outpatient TTE (6/23/21):  LVEF 50-55%, dyssynchronous LV, cannot rule out some hypokinesis in the inferior/inferoseptal wall   Mild asymmetric LVH  Normal RV size and mildly reduced RV systolic function  Dilated left atrium  Mild MR  TAVR, peak velocity 1.1 m/s  Moderate to severe TR  No pericardial effusion       ECG (7/7/21): atrial flutter, ventricular paced      Home Cardiac Meds:  Metolazone 2.5 mg (M/W/F)  Furosemide 60 mg daily  Plavix 75 mg daily  Aspirin 81 mg daily  Simvastatin 20 mg bedtime  Metoprolol tartrate 50 mg BID

## 2021-07-07 NOTE — PROGRESS NOTE ADULT - ASSESSMENT
Assessment:  Morales Nichols is a 89 year old man with past medical history of Coronary artery disease (s/p PCI to LAD in 10/2020 with medical management of RCA disease), Severe aortic valve stenosis (s/p 34 mm CoreValve Evolut TAVR in 11/2020), Complete heart block (s/p pacemaker), Atrial flutter (not on anticoagulation), CKD (baseline Cr ~ 1.7), Hypertension, Diabetes mellitus, rheumatoid arthritis and chronic lower extremity edema (follows with vascular with unna boots) who was sent in to ER due to progressive dyspnea on minimal exertion.    The patient was recently evaluated in cardiology office with Dr. Corona in end of June with plans for left heart catherization to evaluate LAD to ensure no in-stent restenosis as cause of the dyspnea.     His progressive dyspnea on exertion may be due to progressive valvular disease such as his moderate-to-severe tricuspid regurgitation, possible pulmonary hypertension and RV dysfunction, will plan to repeat echocardiogram. Other possibilities include symptoms due to LAD disease, RCA disease (that was medically managed). Troponins negative.       Recommendations:  [] CAD: S/p PCI to LAD in 10/2020. Continue home Aspirin 81 mg daily and Plavix 75 mg daily. Would continue Metoprolol tartrate 25 mg BID. Will plan to optimize renal function first prior to proceeding with coronary angiogram given his underlying CKD and high risk for contrast induced nephropathy. Reviewed Nephrology consult. Review of outpatient labs indicates baseline Cr ~ 1.7, will try and optimize kidney function for possible cardiac catherization tomorrow, please keep NPO after MN tonight. Discussed with daughter, Chasidy ([phone]) and she would like to further discuss with her sister. Awaiting echo.   [] Right ventricle dysfunction/Moderate to Severe Tricuspid Regurgitation: Check echo to re-evaluate if any worsening in disease. BNP elevated, will try course of Lasix 40 mg IVP once and re-evaluate symptoms and urine output.  [] Atrial flutter: Patient with history of this but not on anticoagulation due to high risk of bleeding (as patient already on dual antiplatelets) per his cardiologist.    Thank you for the consult. We will continue to follow along.    Discussed with Dr. Marcial Iyer MD  Cardiology      Assessment:  Morales Nichols is a 89 year old man with past medical history of Coronary artery disease (s/p PCI to LAD in 10/2020 with medical management of RCA disease), Severe aortic valve stenosis (s/p 34 mm CoreValve Evolut TAVR in 11/2020), Complete heart block (s/p pacemaker), Atrial flutter (not on anticoagulation), CKD (baseline Cr ~ 1.7), Hypertension, Diabetes mellitus, rheumatoid arthritis and chronic lower extremity edema (follows with vascular with unna boots) who was sent in to ER due to progressive dyspnea on minimal exertion.    The patient was recently evaluated in cardiology office with Dr. Corona in end of June with plans for left heart catherization to evaluate LAD to ensure no in-stent restenosis as cause of the dyspnea.     His progressive dyspnea on exertion may be due to progressive valvular disease such as his moderate-to-severe tricuspid regurgitation, possible pulmonary hypertension and RV dysfunction, will plan to repeat echocardiogram. Other possibilities include symptoms due to LAD disease, RCA disease (that was medically managed). Troponins negative.       Recommendations:  [] CAD: S/p PCI to LAD in 10/2020. Continue home Aspirin 81 mg daily and Plavix 75 mg daily. Would continue Metoprolol tartrate 25 mg BID. Will plan to optimize renal function first prior to proceeding with coronary angiogram given his underlying CKD and high risk for contrast induced nephropathy. Reviewed Nephrology consult. Review of outpatient labs indicates baseline Cr ~ 1.7, will try and optimize kidney function for possible cardiac catherization tomorrow, please keep NPO after MN tonight. Discussed with daughter, Chasidy ([phone]) and she would like to further discuss with her sister. Awaiting echo.   [] Right ventricle dysfunction/Moderate to Severe Tricuspid Regurgitation: Check echo to re-evaluate if any worsening in disease. BNP elevated, received Lasix 60 mg IVP once overnight, will try course of Lasix 20 mg IVP once and re-evaluate symptoms and urine output.  [] Atrial flutter: Patient with history of this but not on anticoagulation due to high risk of bleeding (as patient already on dual antiplatelets) per his cardiologist.    Thank you for the consult. We will continue to follow along.    Discussed with Dr. Marcial Iyer MD  Cardiology      Assessment:  Morales Nichols is a 89 year old man with past medical history of Coronary artery disease (s/p PCI to LAD in 10/2020 with medical management of RCA disease), Severe aortic valve stenosis (s/p 34 mm CoreValve Evolut TAVR in 11/2020), Complete heart block (s/p pacemaker), Atrial flutter (not on anticoagulation), CKD (baseline Cr ~ 1.7), Hypertension, Diabetes mellitus, rheumatoid arthritis and chronic lower extremity edema (follows with vascular with unna boots) who was sent in to ER due to progressive dyspnea on minimal exertion.    The patient was recently evaluated in cardiology office with Dr. Corona in end of June with plans for left heart catherization to evaluate LAD to ensure no in-stent restenosis as cause of the dyspnea.     His progressive dyspnea on exertion may be due to progressive valvular disease such as his moderate-to-severe tricuspid regurgitation, possible pulmonary hypertension and RV dysfunction, will plan to repeat echocardiogram. Other possibilities include symptoms due to LAD disease, RCA disease (that was medically managed). Troponins negative.       Recommendations:  [] CAD: S/p PCI to LAD in 10/2020. Continue home Aspirin 81 mg daily and Plavix 75 mg daily. Would continue Metoprolol tartrate 25 mg BID. Will plan to optimize renal function first prior to proceeding with coronary angiogram given his underlying CKD and high risk for contrast induced nephropathy. Reviewed Nephrology consult. Review of outpatient labs indicates baseline Cr ~ 1.7, will try and optimize kidney function for possible cardiac catherization tomorrow, please keep NPO after MN tonight. Discussed with daughter, Chasidy ([phone]) and she would like to further discuss with her sister. Awaiting echo.   [] Right ventricle dysfunction/Moderate to Severe Tricuspid Regurgitation: Check echo to re-evaluate if any worsening in disease. BNP elevated, received Lasix 60 mg IVP once overnight, will try course of Lasix 20 mg IVP once and re-evaluate symptoms and urine output.  [] Atrial flutter: Patient with history of this but not on anticoagulation due to high risk of bleeding (as patient already on dual antiplatelets) per his cardiologist.    Thank you for the consult. We will continue to follow along.    Discussed with Dr. Marcial Iyer MD  Cardiology      Assessment:  Morales Nichols is a 89 year old man with past medical history of Coronary artery disease (s/p PCI to LAD in 10/2020 with medical management of RCA disease), Severe aortic valve stenosis (s/p 34 mm CoreValve Evolut TAVR in 11/2020), Complete heart block (s/p pacemaker), Atrial flutter (not on anticoagulation), CKD (baseline Cr ~ 1.7), Hypertension, Diabetes mellitus, rheumatoid arthritis and chronic lower extremity edema (follows with vascular with unna boots) who was sent in to ER due to progressive dyspnea on minimal exertion.    The patient was recently evaluated in cardiology office with Dr. Corona in end of June with plans for left heart catherization to evaluate LAD to ensure no in-stent restenosis as cause of the dyspnea.     His progressive dyspnea on exertion may be due to progressive valvular disease such as his moderate-to-severe tricuspid regurgitation, possible pulmonary hypertension and RV dysfunction, will plan to repeat echocardiogram. Other possibilities include symptoms due to LAD disease, RCA disease (that was medically managed). Troponins negative.       Recommendations:  [] CAD: S/p PCI to LAD in 10/2020. Continue home Aspirin 81 mg daily and Plavix 75 mg daily. Would continue Metoprolol tartrate 25 mg BID. Will plan to optimize renal function first prior to proceeding with coronary angiogram given his underlying CKD and high risk for contrast induced nephropathy. Reviewed Nephrology consult. Review of outpatient labs indicates baseline Cr ~ 1.7, will try and optimize kidney function for possible cardiac catherization tomorrow, please keep NPO after MN tonight. Discussed with daughter, Chasidy ([phone]) and she would like to further discuss with her sister. Awaiting echo.   [] Right ventricle dysfunction/Moderate to Severe Tricuspid Regurgitation: Check echo to re-evaluate if any worsening in disease. BNP elevated, received Lasix 60 mg IVP once overnight, will try course of Lasix 20 mg IVP once and re-evaluate symptoms and urine output.  [] Atrial flutter: Patient with history of this but not on anticoagulation due to high risk of bleeding (as patient already on dual antiplatelets) per his cardiologist.    Thank you for the consult. We will continue to follow along.    Discussed with Dr. Ceja     Update:  TTE images reviewed, my report:  LVEF 60-65%  Mildly dilated RV size with normal systolic function, pacer wire visualized  Biatrial enlargement   Severe mitral annular calcification, Mild to moderate MR  Severe TR, RVSP ~ 25 mmHg   TAVR well seated, peak velocity 1.1 m/s, mild AR  Trace KS   IVC not well visualized  No pericardial effusion      Nasra Iyer MD  Cardiology

## 2021-07-07 NOTE — PATIENT PROFILE ADULT - NSPROGENBLOODRESTRICT_GEN_A_NUR
Back Pain    Back pain is very common in adults. The cause of back pain is rarely dangerous and the pain often gets better over time. The cause of your back pain may not be known and may include strain of muscles or ligaments, degeneration of the spinal disks, or arthritis. Occasionally the pain may radiate down your leg(s). Over-the-counter medicines to reduce pain and inflammation are often the most helpful. Stretching and remaining active frequently helps the healing process.     SEEK IMMEDIATE MEDICAL CARE IF YOU HAVE ANY OF THE FOLLOWING SYMPTOMS: bowel or bladder control problems, unusual weakness or numbness in your arms or legs, nausea or vomiting, abdominal pain, fever, dizziness/lightheadedness.
none

## 2021-07-08 LAB
ALBUMIN SERPL ELPH-MCNC: 3 G/DL — LOW (ref 3.3–5.2)
ANION GAP SERPL CALC-SCNC: 13 MMOL/L — SIGNIFICANT CHANGE UP (ref 5–17)
ANION GAP SERPL CALC-SCNC: 15 MMOL/L — SIGNIFICANT CHANGE UP (ref 5–17)
ANION GAP SERPL CALC-SCNC: 16 MMOL/L — SIGNIFICANT CHANGE UP (ref 5–17)
BUN SERPL-MCNC: 63 MG/DL — HIGH (ref 8–20)
BUN SERPL-MCNC: 69.5 MG/DL — HIGH (ref 8–20)
BUN SERPL-MCNC: 73.5 MG/DL — HIGH (ref 8–20)
CALCIUM SERPL-MCNC: 8.6 MG/DL — SIGNIFICANT CHANGE UP (ref 8.6–10.2)
CALCIUM SERPL-MCNC: 8.6 MG/DL — SIGNIFICANT CHANGE UP (ref 8.6–10.2)
CALCIUM SERPL-MCNC: 8.8 MG/DL — SIGNIFICANT CHANGE UP (ref 8.6–10.2)
CHLORIDE SERPL-SCNC: 92 MMOL/L — LOW (ref 98–107)
CHLORIDE SERPL-SCNC: 95 MMOL/L — LOW (ref 98–107)
CHLORIDE SERPL-SCNC: 95 MMOL/L — LOW (ref 98–107)
CO2 SERPL-SCNC: 29 MMOL/L — SIGNIFICANT CHANGE UP (ref 22–29)
CREAT SERPL-MCNC: 1.59 MG/DL — HIGH (ref 0.5–1.3)
CREAT SERPL-MCNC: 1.77 MG/DL — HIGH (ref 0.5–1.3)
CREAT SERPL-MCNC: 1.78 MG/DL — HIGH (ref 0.5–1.3)
GLUCOSE BLDC GLUCOMTR-MCNC: 112 MG/DL — HIGH (ref 70–99)
GLUCOSE BLDC GLUCOMTR-MCNC: 171 MG/DL — HIGH (ref 70–99)
GLUCOSE BLDC GLUCOMTR-MCNC: 67 MG/DL — LOW (ref 70–99)
GLUCOSE BLDC GLUCOMTR-MCNC: 73 MG/DL — SIGNIFICANT CHANGE UP (ref 70–99)
GLUCOSE BLDC GLUCOMTR-MCNC: 78 MG/DL — SIGNIFICANT CHANGE UP (ref 70–99)
GLUCOSE BLDC GLUCOMTR-MCNC: 89 MG/DL — SIGNIFICANT CHANGE UP (ref 70–99)
GLUCOSE SERPL-MCNC: 140 MG/DL — HIGH (ref 70–99)
GLUCOSE SERPL-MCNC: 78 MG/DL — SIGNIFICANT CHANGE UP (ref 70–99)
GLUCOSE SERPL-MCNC: 79 MG/DL — SIGNIFICANT CHANGE UP (ref 70–99)
HCT VFR BLD CALC: 40 % — SIGNIFICANT CHANGE UP (ref 39–50)
HGB BLD-MCNC: 12.8 G/DL — LOW (ref 13–17)
MAGNESIUM SERPL-MCNC: 2.6 MG/DL — SIGNIFICANT CHANGE UP (ref 1.8–2.6)
MCHC RBC-ENTMCNC: 30 PG — SIGNIFICANT CHANGE UP (ref 27–34)
MCHC RBC-ENTMCNC: 32 GM/DL — SIGNIFICANT CHANGE UP (ref 32–36)
MCV RBC AUTO: 93.7 FL — SIGNIFICANT CHANGE UP (ref 80–100)
PHOSPHATE SERPL-MCNC: 2.1 MG/DL — LOW (ref 2.4–4.7)
PLATELET # BLD AUTO: 239 K/UL — SIGNIFICANT CHANGE UP (ref 150–400)
POTASSIUM SERPL-MCNC: 2.6 MMOL/L — CRITICAL LOW (ref 3.5–5.3)
POTASSIUM SERPL-MCNC: 3 MMOL/L — LOW (ref 3.5–5.3)
POTASSIUM SERPL-MCNC: 3 MMOL/L — LOW (ref 3.5–5.3)
POTASSIUM SERPL-SCNC: 2.6 MMOL/L — CRITICAL LOW (ref 3.5–5.3)
POTASSIUM SERPL-SCNC: 3 MMOL/L — LOW (ref 3.5–5.3)
POTASSIUM SERPL-SCNC: 3 MMOL/L — LOW (ref 3.5–5.3)
RBC # BLD: 4.27 M/UL — SIGNIFICANT CHANGE UP (ref 4.2–5.8)
RBC # FLD: 14.8 % — HIGH (ref 10.3–14.5)
SODIUM SERPL-SCNC: 134 MMOL/L — LOW (ref 135–145)
SODIUM SERPL-SCNC: 139 MMOL/L — SIGNIFICANT CHANGE UP (ref 135–145)
SODIUM SERPL-SCNC: 139 MMOL/L — SIGNIFICANT CHANGE UP (ref 135–145)
WBC # BLD: 7.11 K/UL — SIGNIFICANT CHANGE UP (ref 3.8–10.5)
WBC # FLD AUTO: 7.11 K/UL — SIGNIFICANT CHANGE UP (ref 3.8–10.5)

## 2021-07-08 PROCEDURE — 76770 US EXAM ABDO BACK WALL COMP: CPT | Mod: 26

## 2021-07-08 PROCEDURE — 99233 SBSQ HOSP IP/OBS HIGH 50: CPT

## 2021-07-08 RX ORDER — DEXTROSE 50 % IN WATER 50 %
12.5 SYRINGE (ML) INTRAVENOUS ONCE
Refills: 0 | Status: COMPLETED | OUTPATIENT
Start: 2021-07-08 | End: 2021-07-08

## 2021-07-08 RX ORDER — POTASSIUM CHLORIDE 20 MEQ
20 PACKET (EA) ORAL
Refills: 0 | Status: DISCONTINUED | OUTPATIENT
Start: 2021-07-08 | End: 2021-07-08

## 2021-07-08 RX ORDER — SODIUM CHLORIDE 9 MG/ML
1000 INJECTION INTRAMUSCULAR; INTRAVENOUS; SUBCUTANEOUS
Refills: 0 | Status: DISCONTINUED | OUTPATIENT
Start: 2021-07-08 | End: 2021-07-08

## 2021-07-08 RX ORDER — POTASSIUM CHLORIDE 20 MEQ
40 PACKET (EA) ORAL ONCE
Refills: 0 | Status: COMPLETED | OUTPATIENT
Start: 2021-07-08 | End: 2021-07-08

## 2021-07-08 RX ORDER — POTASSIUM CHLORIDE 20 MEQ
10 PACKET (EA) ORAL
Refills: 0 | Status: COMPLETED | OUTPATIENT
Start: 2021-07-08 | End: 2021-07-08

## 2021-07-08 RX ORDER — SODIUM CHLORIDE 9 MG/ML
1000 INJECTION, SOLUTION INTRAVENOUS
Refills: 0 | Status: DISCONTINUED | OUTPATIENT
Start: 2021-07-08 | End: 2021-07-08

## 2021-07-08 RX ADMIN — GABAPENTIN 300 MILLIGRAM(S): 400 CAPSULE ORAL at 18:36

## 2021-07-08 RX ADMIN — HEPARIN SODIUM 5000 UNIT(S): 5000 INJECTION INTRAVENOUS; SUBCUTANEOUS at 20:55

## 2021-07-08 RX ADMIN — TAMSULOSIN HYDROCHLORIDE 0.4 MILLIGRAM(S): 0.4 CAPSULE ORAL at 20:55

## 2021-07-08 RX ADMIN — Medication 81 MILLIGRAM(S): at 11:07

## 2021-07-08 RX ADMIN — INSULIN GLARGINE 9 UNIT(S): 100 INJECTION, SOLUTION SUBCUTANEOUS at 20:55

## 2021-07-08 RX ADMIN — SIMVASTATIN 20 MILLIGRAM(S): 20 TABLET, FILM COATED ORAL at 20:55

## 2021-07-08 RX ADMIN — Medication 40 MILLIEQUIVALENT(S): at 20:55

## 2021-07-08 RX ADMIN — HEPARIN SODIUM 5000 UNIT(S): 5000 INJECTION INTRAVENOUS; SUBCUTANEOUS at 06:10

## 2021-07-08 RX ADMIN — Medication 100 MILLIEQUIVALENT(S): at 15:55

## 2021-07-08 RX ADMIN — Medication 100 MILLIEQUIVALENT(S): at 16:55

## 2021-07-08 RX ADMIN — Medication 25 MILLIGRAM(S): at 18:36

## 2021-07-08 RX ADMIN — Medication 12.5 GRAM(S): at 08:34

## 2021-07-08 RX ADMIN — CLOPIDOGREL BISULFATE 75 MILLIGRAM(S): 75 TABLET, FILM COATED ORAL at 11:07

## 2021-07-08 RX ADMIN — GABAPENTIN 300 MILLIGRAM(S): 400 CAPSULE ORAL at 06:10

## 2021-07-08 RX ADMIN — Medication 100 MILLIEQUIVALENT(S): at 11:07

## 2021-07-08 RX ADMIN — SODIUM CHLORIDE 30 MILLILITER(S): 9 INJECTION, SOLUTION INTRAVENOUS at 11:07

## 2021-07-08 NOTE — PROGRESS NOTE ADULT - SUBJECTIVE AND OBJECTIVE BOX
CC: follow up    INTERVAL HPI/OVERNIGHT EVENTS: Patient seen and examined, complaints of a productive cough. Denies chest pain sob or palpitations      Vital Signs Last 24 Hrs  T(C): 36.6 (08 Jul 2021 07:56), Max: 37.1 (08 Jul 2021 04:05)  T(F): 97.9 (08 Jul 2021 07:56), Max: 98.7 (08 Jul 2021 04:05)  HR: 70 (08 Jul 2021 07:56) (67 - 70)  BP: 100/56 (08 Jul 2021 07:56) (92/51 - 105/58)  BP(mean): --  RR: 18 (08 Jul 2021 07:56) (18 - 18)  SpO2: 97% (08 Jul 2021 07:56) (95% - 97%)    PHYSICAL EXAM:    GENERAL: NAD, AOX3  HEAD:  Atraumatic, Normocephalic  EYES:  conjunctiva and sclera clear  ENMT: Moist mucous membranes  NECK: Supple, No JVD  CHEST/LUNG: Clear to auscultation bilaterally; No rales, rhonchi, wheezing, or rubs  HEART: Regular rate and rhythm; No murmurs, rubs, or gallops  ABDOMEN: Soft, Nontender, Nondistended; Bowel sounds present  EXTREMITIES:  Bilateral lower extremity venous stasis ulcers        MEDICATIONS  (STANDING):  amLODIPine   Tablet 5 milliGRAM(s) Oral daily  aspirin  chewable 81 milliGRAM(s) Oral daily  clopidogrel Tablet 75 milliGRAM(s) Oral daily  dextrose 40% Gel 15 Gram(s) Oral once  dextrose 5%. 1000 milliLiter(s) (50 mL/Hr) IV Continuous <Continuous>  dextrose 5%. 1000 milliLiter(s) (100 mL/Hr) IV Continuous <Continuous>  dextrose 5%. 1000 milliLiter(s) (30 mL/Hr) IV Continuous <Continuous>  dextrose 50% Injectable 25 Gram(s) IV Push once  dextrose 50% Injectable 12.5 Gram(s) IV Push once  dextrose 50% Injectable 25 Gram(s) IV Push once  gabapentin 300 milliGRAM(s) Oral two times a day  glucagon  Injectable 1 milliGRAM(s) IntraMuscular once  heparin   Injectable 5000 Unit(s) SubCutaneous every 8 hours  insulin glargine Injectable (LANTUS) 9 Unit(s) SubCutaneous at bedtime  insulin lispro (ADMELOG) corrective regimen sliding scale   SubCutaneous three times a day before meals  insulin lispro Injectable (ADMELOG) 3 Unit(s) SubCutaneous three times a day before meals  metoprolol tartrate 25 milliGRAM(s) Oral two times a day  potassium chloride  10 mEq/100 mL IVPB 10 milliEquivalent(s) IV Intermittent every 1 hour  simvastatin 20 milliGRAM(s) Oral at bedtime  tamsulosin 0.4 milliGRAM(s) Oral at bedtime    MEDICATIONS  (PRN):      Allergies    No Known Allergies    Intolerances          LABS:                          12.8   7.11  )-----------( 239      ( 08 Jul 2021 07:25 )             40.0     07-08    139  |  95<L>  |  73.5<H>  ----------------------------<  78  3.0<L>   |  29.0  |  1.78<H>    Ca    8.8      08 Jul 2021 07:25  Mg     2.6     07-08    TPro  7.4  /  Alb  3.3  /  TBili  1.4  /  DBili  x   /  AST  19  /  ALT  24  /  AlkPhos  62  07-07          RADIOLOGY & ADDITIONAL TESTS:

## 2021-07-08 NOTE — PROGRESS NOTE ADULT - SUBJECTIVE AND OBJECTIVE BOX
HPI:    89 year old man with past medical history of Coronary artery disease (s/p PCI to LAD in 10/2020 , on  medical management of RCA disease), Severe aortic valve stenosis (s/p 34 mm CoreValve Evolute TAVR in 2020), Complete heart block (s/p pacemaker), Atrial flutter (not on anticoagulation), CKD 3 (baseline Cr ~ 1.6 mg., ), Hypertension, Diabetes mellitus and rheumatoid arthritis and chronic lower extremity edema (follows with vascular with unna boots) who was sent in to ER due to progressive dyspnea on minimal exertion.The patient was recently evaluated in cardiology office with Dr. Corona in end of  with plans for left heart catheterization to evaluate LAD to ensure no in-stent restenosis as cause of the dyspnea. One of daughters present at bedside who reports he has had progressive dyspnea on minimal exertion, such as with a few steps. She feels that he is usually very active and this is a dramatic change for him. He denies any chest pain. Also with progressive leg ulcerations and edema. No palpitations, presyncope or syncope. Appears to be taking medications at home.   Pt seen/examined; nephrology consulted for elevated SCr;     SCr 1.6-2 mg.,  baseline; currently at 1.9; awaiting cardiac cath; lying in bed NAD;    PAST HISTORY  --------------------------------------------------------------------------------  PAST MEDICAL & SURGICAL HISTORY:    CAD (coronary artery disease)    BPH (benign prostatic hypertrophy)    Kidney stones    Diabetes mellitus-2    Ulcer disease    Osteoarthrosis    RA (rheumatoid arthritis)    Gout    HTN (hypertension)    High cholesterol    Stented coronary artery  2020    Pacemaker    S/P knee replacement  right in 13 &amp; left 13    S/P angioplasty with stent   -  coronary stent    Kidney stone  removed about 50 years ago    Kidney stone  had lithotripsy in  &amp;     History of cystoscopy  TURP     Cataract  b/l  &amp;     Status post hip surgery    S/P TAVR (transcatheter aortic valve replacement)      FAMILY HISTORY:  No pertinent family history in first degree relatives    PAST SOCIAL HISTORY: Non Alcoholic,     ALLERGIES & MEDICATIONS  --------------------------------------------------------------------------------  Allergies    No Known Allergies    Intolerances- None,     Standing Inpatient Medications  amLODIPine   Tablet 5 milliGRAM(s) Oral daily  aspirin  chewable 81 milliGRAM(s) Oral daily  clopidogrel Tablet 75 milliGRAM(s) Oral daily  dextrose 40% Gel 15 Gram(s) Oral once  dextrose 5%. 1000 milliLiter(s) IV Continuous <Continuous>  dextrose 5%. 1000 milliLiter(s) IV Continuous <Continuous>  dextrose 50% Injectable 25 Gram(s) IV Push once  dextrose 50% Injectable 12.5 Gram(s) IV Push once  dextrose 50% Injectable 25 Gram(s) IV Push once  gabapentin 300 milliGRAM(s) Oral two times a day  glucagon  Injectable 1 milliGRAM(s) IntraMuscular once  heparin   Injectable 5000 Unit(s) SubCutaneous every 8 hours  insulin glargine Injectable (LANTUS) 9 Unit(s) SubCutaneous at bedtime  insulin lispro (ADMELOG) corrective regimen sliding scale   SubCutaneous three times a day before meals  insulin lispro Injectable (ADMELOG) 3 Unit(s) SubCutaneous three times a day before meals  metoprolol tartrate 25 milliGRAM(s) Oral two times a day  potassium chloride    Tablet ER 20 milliEquivalent(s) Oral daily  simvastatin 20 milliGRAM(s) Oral at bedtime  tamsulosin 0.4 milliGRAM(s) Oral at bedtime    PRN Inpatient Medications- None,     REVIEW OF SYSTEMS  --------------------------------------------------------------------------------  Unable to obtain; uncooperative;    VITALS/PHYSICAL EXAM:    ICU Vital Signs Last 48 Hrs.    T(C): 36.6 (2021 07:56), Max: 37.1 (2021 04:05)  T(F): 97.9 (2021 07:56), Max: 98.7 (2021 04:05)  HR: 70 (2021 07:56) (67 - 70)  BP: 100/56 (2021 07:56) (92/51 - 108/57)  RR: 18 (2021 07:56) (18 - 18)  SpO2: 97% (2021 07:56) (95% - 99%)  --------------------------------------------------------------------------------  T(C): 36.3 (21 @ 09:39), Max: 36.9 (21 @ 16:32)  HR: 69 (21 @ 09:39) (69 - 72)  BP: 108/57 (21 @ 09:39) (103/62 - 144/67)  RR: 18 (21 @ 09:39) (18 - 18)  SpO2: 99% (21 @ 09:39) (94% - 99%)    Height (cm): 165.1 (21 @ 15:51)  Weight (kg): 72.6 (21 @ 15:51)  BMI (kg/m2): 26.6 (21 @ 15:51)  BSA (m2): 1.8 (21 @ 15:51)    Physical Exam:  	Gen: NAD   	HEENT: PERRL, supple neck, clear oropharynx  	Pulm: dec BS  	CV: RRR, S1S2; no rub  	Back: No spinal or CVA tenderness; no sacral edema  	Abd: +BS, soft, nontender/nondistended  	: No suprapubic tenderness  	UE: Warm  	LE: open wounds/scratching marks; sloughed skin;  	Neuro: No focal deficits, intact gait  	Psych: Normal affect and mood  	Skin: LE scratch marks/open wounds; venous stasis;    LABS/STUDIES:    139    |  95<L>  |  73.5<H>  ----------------------------<  78     Ca:8.8   (2021 07:25)  3.0<L>   |  29.0   |  1.78<H>    eGFR if Non : 33 *L*      TPro  7.4    /  Alb  3.3    /  TBili  1.4    /  DBili  x      /  AST  19     /  ALT  24     /  AlkPhos  62     2021 06:54                        12.8<L>  7.11  )-----------( 239      ( 2021 07:25 )             40.0     M.6 mg/dL  ( 07:25)    --------------------------------------------------------------------------------              13.6   6.57  >-----------<  239      [21 @ 06:55]              41.9     139  |  92  |  72.8  ----------------------------<  133      [21 @ 06:54]  3.9   |  33.0  |  1.85        Ca     9.0     [21 @ 06:54]    TPro  7.4  /  Alb  3.3  /  TBili  1.4  /  DBili  x   /  AST  19  /  ALT  24  /  AlkPhos  62  [21 @ 06:54]    Troponin 0.06      [21 @ 06:54]    Creatinine Trend:  SCr 1.85 [ 06:54]  SCr 1.92 [ 17:21]    Urinalysis - [20 @ 10:39]      Color Yellow / Appearance Clear / SG 1.015 / pH 5.0      Gluc Negative / Ketone Negative  / Bili Negative / Urobili Negative       Blood Negative / Protein Negative / Leuk Est Negative / Nitrite Negative      RBC  / WBC  / Hyaline  / Gran  / Sq Epi  / Non Sq Epi  / Bacteria     TSH 2.84      [20 @ 10:15]  Lipid: chol 135, , HDL 59, LDL 53      [10-16-20 @ 21:23]    POCT Blood Glucose.: 112 mg/dL (21 @ 09:02)  Creatinine, Serum: 1.78 mg/dL (21 @ 07:25)   Historical Values  Creatinine, Serum: 1.78 mg/dL (21 @ 07:25)   Creatinine, Serum: 1.85 mg/dL (21 @ 06:54)   Creatinine, Serum: 1.92 mg/dL (21 @ 17:21)   Creatinine, Serum: 1.49 mg/dL (20 @ 13:43)   Xray Chest 1 View- PORTABLE-Routine (Xray Chest 1 View- PORTABLE-Routine in AM.) (21 @ 06:51)      EXAM:  XR CHEST PORTABLE ROUTINE 1V                          PROCEDURE DATE:  2021      INTERPRETATION:  Portable chest radiograph    CLINICAL INFORMATION: Dyspnea, shortness of breath.    TECHNIQUE:  Portable  AP view of the chest was obtained.    COMPARISON: 2021 chest available for review.    FINDINGS:    The lungs are clear of airspace consolidations or effusions. No pneumothorax.    There is a RIGHT paracardiac soft tissue density indicating intrathoracic gastric herniation confirmed on prior 2020 chest CT.    The  heart is enlarged in transverse diameter. No hilar mass. Status post TAVR procedure.  Cardiac device wire leads are within right atrium and right ventricle. Cardiac device wire leads are within right atrium and right ventricle.     Visualized osseous structures are intact.    IMPRESSION:   Cardiomegaly.  Retrocardiac intrathoracic gastric herniation.  .   No gross airspace consolidation.    12 Lead ECG (21 @ 07:23)     Ventricular Rate 70 BPM    Atrial Rate 77 BPM    QRS Duration 194 ms    Q-T Interval 530 ms    QTC Calculation(Bazett) 572 ms    R Axis -69 degrees    T Axis 96 degrees    Diagnosis Line Ventricular-paced rhythm  Abnormal ECG    Confirmed by TYLER HONG ([id number]) on 2021 9:00:29 AM  Serum Pro-Brain Natriuretic Peptide: 6323: NT-proBNP Interpretive comments:   Acute Congestive Heart Failure is unlikely if NT-proBNP is less than 300   pg/mL, for any age.   Consider Acute Congestive Heart Failure if:   AGE NT-proBNP Result   ___ ________________   < 50year > 450 pg/mL   50 - 75 years > 900 pg/mL   > 75 years > 1800 pg/ml   All results require clinical correlation. Consider obtaining a baseline or   "dry" NT-proBNP level when the patient is stabilized, so that subsequent   levels can be related to that. Patients with recurrent CHF may have   elevated   NT-proBNP levels. Acute failure episodes generally produce levels at   least 25   % greater than base line levels. The above values are derived from a large   multi-center international study, "CHARLIE Thompson, et al, European Heart   Journal,   2006; 27:330-337. pg/mL (21 @ 21:46)       Historical Values  Serum Pro-Brain Natriuretic Peptide: 6323: NT-proBNP Interpretive comments:   Acute Congestive Heart Failure is unlikely if NT-proBNP is less than 300   pg/mL, for any age.   Consider Acute Congestive Heart Failure if:   AGE NT-proBNP Result   ___ ________________   < 50year > 450 pg/mL   50 - 75 years > 900 pg/mL   > 75 years > 1800 pg/ml   All results require clinical correlation. Consider obtaining a baseline or   "dry" NT-proBNP level when the patient is stabilized, so that subsequent   levels can be related to that. Patients with recurrent CHF may have   elevated   NT-proBNP levels. Acute failure episodes generally produce levels at   least 25   % greater than base line levels. The above values are derived from a large   multi-center international study, "CHARLIE Thompson, et al, European Heart   Journal,   2006; 27:330-337. pg/mL (21 @ 21:46)   Serum Pro-Brain Natriuretic Peptide: 2937 pg/mL (20 @ 13:43)   Serum Pro-Brain Natriuretic Peptide: 48828 pg/mL (20 @ 10:15)   Serum Pro-Brain Natriuretic Peptide: 84224 pg/mL (10.16.20 @ 21:23)   Serum Pro-Brain Natriuretic Peptide: 50607: NT-proBNP Interpretive comments:   Acute Congestive Heart Failure is unlikely if NT-proBNP is less than 300   pg/mL, for any age.   Consider Acute Congestive Heart Failure if:   AGE NT-proBNP Result   ___ ________________   < 50year > 450 pg/mL   50 - 75 years > 900 pg/mL   > 75 years > 1800 pg/ml   All results require clinical correlation. Consider obtaining a baseline or   "dry" NT-proBNP level when the patient is stabilized, so that subsequent   levels can be related to that. Patients with recurrent CHF may have   elevated   NT-proBNP levels. Acute failure episodes generally produce levels at   least 25   % greater than base line levels. The above values are derived from a large   multi-center international study, "Nemesioi, JL, et al, European Heart   Journal,   2006; 27:330-337. pg/mL (10.13.20 @ 17:01)   < end of copied text >  < end of copied text >    1) CKD IV  2) BPH  3) CAD  4) DM  5) HTN    Would hydrate with NS pre/post cath; 75cc/hr  Hold NSAIDs; did get Ketoralac on  in ER;   No other contraindication for planned cardiac cath;

## 2021-07-08 NOTE — PROGRESS NOTE ADULT - ASSESSMENT
The patient is a 89 year old male with a past medical history of CAD status post PCI to the LAD in 2020, severe AS status post TAVR, complete heart block status post PPM, atrial flutter and CKD stage 3, hypertension, DM type to and chronic lower extremity edema who presented to the Er with complaints of progressively worsening dyspnea     Assessment/Plan:    1. Exertional dyspnea with a history of CAD: ELISE improved  Cath today  Continue aspirin, Plavix BB and statin therapy  Telemetry monitoring  Cardiac enzymes negative x 3  Echocardiogram reviewed with Ef of 55-60% with grade 2 diastolic dysfunction mod- severe TR and mod MR, status post TVR    2. Acute on Chronic diastolic CHF with pulmonary hypertension: Given a dose of IV Lasix yesterday  Resume lasix post cath  On BB  metolazone held    3. Atrial flutter not on AC due to bleeding risk     4. History of complete heart block status post pacemaker    5. CKD stage 3 Baseline creatinine ~1.6  Stable   Spoke with nephrology gentle IVF prior to cath    6. Bilateral lower extremity venous stasis ulcers: Wound care consult    7. Diabetes mellitus type 2; IV dextrose 5% while NPo for cath, BSL low this morning  DC flluids once able to take PO   Admelog sliding scale  Monitor BSL    8. History of RA on Humira Q2 weeks  On monthly Prolia    VTE Heparin subcut

## 2021-07-08 NOTE — PROGRESS NOTE ADULT - SUBJECTIVE AND OBJECTIVE BOX
MORALES THOMAS  [id number]        Chief Complaint: follow up dyspnea      Subjective: no cp/sob/palps      24 hour Tele: Aflutter, paced        amLODIPine   Tablet 5 milliGRAM(s) Oral daily  aspirin  chewable 81 milliGRAM(s) Oral daily  clopidogrel Tablet 75 milliGRAM(s) Oral daily  dextrose 40% Gel 15 Gram(s) Oral once  dextrose 5%. 1000 milliLiter(s) IV Continuous <Continuous>  dextrose 5%. 1000 milliLiter(s) IV Continuous <Continuous>  dextrose 50% Injectable 25 Gram(s) IV Push once  dextrose 50% Injectable 12.5 Gram(s) IV Push once  dextrose 50% Injectable 25 Gram(s) IV Push once  gabapentin 300 milliGRAM(s) Oral two times a day  glucagon  Injectable 1 milliGRAM(s) IntraMuscular once  heparin   Injectable 5000 Unit(s) SubCutaneous every 8 hours  insulin glargine Injectable (LANTUS) 9 Unit(s) SubCutaneous at bedtime  insulin lispro (ADMELOG) corrective regimen sliding scale   SubCutaneous three times a day before meals  insulin lispro Injectable (ADMELOG) 3 Unit(s) SubCutaneous three times a day before meals  metoprolol tartrate 25 milliGRAM(s) Oral two times a day  potassium chloride    Tablet ER 20 milliEquivalent(s) Oral every 2 hours  simvastatin 20 milliGRAM(s) Oral at bedtime  sodium chloride 0.9%. 1000 milliLiter(s) IV Continuous <Continuous>  tamsulosin 0.4 milliGRAM(s) Oral at bedtime          Physical Exam:  T(C): 36.6 (07-08-21 @ 07:56), Max: 37.1 (07-08-21 @ 04:05)  HR: 70 (07-08-21 @ 07:56) (67 - 70)  BP: 100/56 (07-08-21 @ 07:56) (92/51 - 105/58)  RR: 18 (07-08-21 @ 07:56) (18 - 18)  SpO2: 97% (07-08-21 @ 07:56) (95% - 97%)  Wt(kg): --  General: elderly frail appearing Comfortable in NAD  HEENT: dry MM, poor dentition   Resp: CTA bilaterally  CVS: nl s1s2, rrr, mild JVD, II/VI systolic murmur  Abd: soft, NT, ND, BS+  Ext: No c/c/e  Neuro A&O x3  Psych: Normal affect    I&O's Summary    07 Jul 2021 07:01  -  08 Jul 2021 07:00  --------------------------------------------------------  IN: 0 mL / OUT: 670 mL / NET: -670 mL          Labs:   08 Jul 2021 07:25    139    |  95     |  73.5   ----------------------------<  78     3.0     |  29.0   |  1.78     Ca    8.8        08 Jul 2021 07:25  Mg     2.6       08 Jul 2021 07:25    TPro  7.4    /  Alb  3.3    /  TBili  1.4    /  DBili  x      /  AST  19     /  ALT  24     /  AlkPhos  62     07 Jul 2021 06:54                          12.8   7.11  )-----------( 239      ( 08 Jul 2021 07:25 )             40.0       CARDIAC MARKERS ( 07 Jul 2021 06:54 )  x     / 0.06 ng/mL / x     / x     / x      CARDIAC MARKERS ( 06 Jul 2021 21:46 )  x     / 0.06 ng/mL / x     / x     / x      CARDIAC MARKERS ( 06 Jul 2021 17:21 )  x     / 0.05 ng/mL / x     / x     / x              Cardiac Cath (10/2020):  CORONARY VESSELS: The coronary circulation is right dominant.  LM:   --  LM: There was a 20 % stenosis.  LAD:   --  Proximal LAD: There was a 95 % stenosis.  --  Mid LAD: There was a 95 % stenosis.  CX:   --  OM1: There was a 50 % stenosis.  RCA:   --  RCA: This vessel was not injected.  COMPLICATIONS: There were no complications.  DIAGNOSTIC IMPRESSIONS: Normal right heart pressures. Performed to assess  for need for diuretics. Wedge pressure 14-15 mmhg.  Severe LAD disease. Temporary pacemaker placed and rotational atherectomy  performed. 3 LESLYE.  DIAGNOSTIC RECOMMENDATIONS: Aspirin and plavix.  TAVR evaluation.  INTERVENTIONAL IMPRESSIONS: Normal right heart pressures. Performed to  assess for need for diuretics. Wedge pressure 14-15 mmhg.  Severe LAD disease. Temporary pacemaker placed and rotational atherectomy  performed. 3 LESLYE.        Outpatient TTE (6/23/21):  LVEF 50-55%, dyssynchronous LV, cannot rule out some hypokinesis in the inferior/inferoseptal wall   Mild asymmetric LVH  Normal RV size and mildly reduced RV systolic function  Dilated left atrium  Mild MR  TAVR, peak velocity 1.1 m/s  Moderate to severe TR  No pericardial effusion       ECG (7/7/21): atrial flutter, ventricular paced      Home Cardiac Meds:  Metolazone 2.5 mg (M/W/F)  Furosemide 60 mg daily  Plavix 75 mg daily  Aspirin 81 mg daily  Simvastatin 20 mg bedtime  Metoprolol tartrate 50 mg BID    ECHO 7/2021   1. Left ventricular ejection fraction, by visual estimation, is 55 to 60%.   2. Normal global left ventricular systolic function.   3. Moderate to severe right atrial enlargement.   4. Severely enlarged left atrium.   5. Elevated mean left atrial pressure.   6. Spectral Doppler shows pseudonormal pattern of left ventricular myocardial filling    7. Mildly enlarged right ventricle.   8. Moderate mitral annular calcification.   9. Moderate mitral valve regurgitation.  10. Thickening of the anterior and posterior mitral valve leaflets.  11. Moderate-severe tricuspid regurgitation.  12. Bioprosthesis in the aortic position.  13. Mild aortic regurgitation.  14. S/P TAVR.normal function bioAVR.      Assessment and Plan:   · Assessment	  Assessment:  Morales Thomas is a 89 year old man with past medical history of Coronary artery disease (s/p PCI to LAD in 10/2020 with medical management of RCA disease), Severe aortic valve stenosis (s/p 34 mm CoreValve Evolut TAVR in 11/2020), Complete heart block (s/p pacemaker), Atrial flutter (not on anticoagulation), CKD (baseline Cr ~ 1.7), Hypertension, Diabetes mellitus, rheumatoid arthritis and chronic lower extremity edema (follows with vascular with unna boots) who was sent in to ER due to progressive dyspnea on minimal exertion.    -The patient was recently evaluated in cardiology office with Dr. Corona in end of June with plans for left heart catherization to evaluate LAD to ensure no in-stent restenosis as cause of the dyspnea.     -His progressive dyspnea on exertion could be ischemic, will plan on cath today.   -Also component chronic HFpEF, appears intravascularly depleted on exam but likely increased LVEDP. Will review echo, report shows elevate LAP/moderate to severe TR, moderate MR, severe LAE and well functioning BioAVR, RVE/ANDRZEJ. HAs HFpEF and developing right sided failure as well     Recommendations:  1. Renal function stable today and at baseline, seen by nephro as well. Will plan for cath today to evaluate if CAD causing his dyspnea. can obtain LVEDP as well. Suspect component chronic HFpEF is contributing to symptoms  2. Continue fluids for cath, will need to be back on  lasix after   3. Continue DAPT/statin  4. No A/C for AF due to high bleed risk and on DAPT          Reid Marcial MD MORALES THOMAS  [id number]        Chief Complaint: follow up dyspnea      Subjective: no cp/sob/palps      24 hour Tele: Aflutter, paced        amLODIPine   Tablet 5 milliGRAM(s) Oral daily  aspirin  chewable 81 milliGRAM(s) Oral daily  clopidogrel Tablet 75 milliGRAM(s) Oral daily  dextrose 40% Gel 15 Gram(s) Oral once  dextrose 5%. 1000 milliLiter(s) IV Continuous <Continuous>  dextrose 5%. 1000 milliLiter(s) IV Continuous <Continuous>  dextrose 50% Injectable 25 Gram(s) IV Push once  dextrose 50% Injectable 12.5 Gram(s) IV Push once  dextrose 50% Injectable 25 Gram(s) IV Push once  gabapentin 300 milliGRAM(s) Oral two times a day  glucagon  Injectable 1 milliGRAM(s) IntraMuscular once  heparin   Injectable 5000 Unit(s) SubCutaneous every 8 hours  insulin glargine Injectable (LANTUS) 9 Unit(s) SubCutaneous at bedtime  insulin lispro (ADMELOG) corrective regimen sliding scale   SubCutaneous three times a day before meals  insulin lispro Injectable (ADMELOG) 3 Unit(s) SubCutaneous three times a day before meals  metoprolol tartrate 25 milliGRAM(s) Oral two times a day  potassium chloride    Tablet ER 20 milliEquivalent(s) Oral every 2 hours  simvastatin 20 milliGRAM(s) Oral at bedtime  sodium chloride 0.9%. 1000 milliLiter(s) IV Continuous <Continuous>  tamsulosin 0.4 milliGRAM(s) Oral at bedtime          Physical Exam:  T(C): 36.6 (07-08-21 @ 07:56), Max: 37.1 (07-08-21 @ 04:05)  HR: 70 (07-08-21 @ 07:56) (67 - 70)  BP: 100/56 (07-08-21 @ 07:56) (92/51 - 105/58)  RR: 18 (07-08-21 @ 07:56) (18 - 18)  SpO2: 97% (07-08-21 @ 07:56) (95% - 97%)  Wt(kg): --  General: elderly frail appearing Comfortable in NAD  HEENT: dry MM, poor dentition   Resp: CTA bilaterally  CVS: nl s1s2, rrr, mild JVD, II/VI systolic murmur  Abd: soft, NT, ND, BS+  Ext: No c/c/e  Neuro A&O x3  Psych: Normal affect    I&O's Summary    07 Jul 2021 07:01  -  08 Jul 2021 07:00  --------------------------------------------------------  IN: 0 mL / OUT: 670 mL / NET: -670 mL          Labs:   08 Jul 2021 07:25    139    |  95     |  73.5   ----------------------------<  78     3.0     |  29.0   |  1.78     Ca    8.8        08 Jul 2021 07:25  Mg     2.6       08 Jul 2021 07:25    TPro  7.4    /  Alb  3.3    /  TBili  1.4    /  DBili  x      /  AST  19     /  ALT  24     /  AlkPhos  62     07 Jul 2021 06:54                          12.8   7.11  )-----------( 239      ( 08 Jul 2021 07:25 )             40.0       CARDIAC MARKERS ( 07 Jul 2021 06:54 )  x     / 0.06 ng/mL / x     / x     / x      CARDIAC MARKERS ( 06 Jul 2021 21:46 )  x     / 0.06 ng/mL / x     / x     / x      CARDIAC MARKERS ( 06 Jul 2021 17:21 )  x     / 0.05 ng/mL / x     / x     / x              Cardiac Cath (10/2020):  CORONARY VESSELS: The coronary circulation is right dominant.  LM:   --  LM: There was a 20 % stenosis.  LAD:   --  Proximal LAD: There was a 95 % stenosis.  --  Mid LAD: There was a 95 % stenosis.  CX:   --  OM1: There was a 50 % stenosis.  RCA:   --  RCA: This vessel was not injected.  COMPLICATIONS: There were no complications.  DIAGNOSTIC IMPRESSIONS: Normal right heart pressures. Performed to assess  for need for diuretics. Wedge pressure 14-15 mmhg.  Severe LAD disease. Temporary pacemaker placed and rotational atherectomy  performed. 3 LESLYE.  DIAGNOSTIC RECOMMENDATIONS: Aspirin and plavix.  TAVR evaluation.  INTERVENTIONAL IMPRESSIONS: Normal right heart pressures. Performed to  assess for need for diuretics. Wedge pressure 14-15 mmhg.  Severe LAD disease. Temporary pacemaker placed and rotational atherectomy  performed. 3 LESLYE.        Outpatient TTE (6/23/21):  LVEF 50-55%, dyssynchronous LV, cannot rule out some hypokinesis in the inferior/inferoseptal wall   Mild asymmetric LVH  Normal RV size and mildly reduced RV systolic function  Dilated left atrium  Mild MR  TAVR, peak velocity 1.1 m/s  Moderate to severe TR  No pericardial effusion       ECG (7/7/21): atrial flutter, ventricular paced      Home Cardiac Meds:  Metolazone 2.5 mg (M/W/F)  Furosemide 60 mg daily  Plavix 75 mg daily  Aspirin 81 mg daily  Simvastatin 20 mg bedtime  Metoprolol tartrate 50 mg BID    ECHO 7/2021   1. Left ventricular ejection fraction, by visual estimation, is 55 to 60%.   2. Normal global left ventricular systolic function.   3. Moderate to severe right atrial enlargement.   4. Severely enlarged left atrium.   5. Elevated mean left atrial pressure.   6. Spectral Doppler shows pseudonormal pattern of left ventricular myocardial filling    7. Mildly enlarged right ventricle.   8. Moderate mitral annular calcification.   9. Moderate mitral valve regurgitation.  10. Thickening of the anterior and posterior mitral valve leaflets.  11. Moderate-severe tricuspid regurgitation.  12. Bioprosthesis in the aortic position.  13. Mild aortic regurgitation.  14. S/P TAVR.normal function bioAVR.      Assessment and Plan:   · Assessment	  Assessment:  Morales Thomas is a 89 year old man with past medical history of Coronary artery disease (s/p PCI to LAD in 10/2020 with medical management of RCA disease), Severe aortic valve stenosis (s/p 34 mm CoreValve Evolut TAVR in 11/2020), Complete heart block (s/p pacemaker), Atrial flutter (not on anticoagulation), CKD (baseline Cr ~ 1.7), Hypertension, Diabetes mellitus, rheumatoid arthritis and chronic lower extremity edema (follows with vascular with unna boots) who was sent in to ER due to progressive dyspnea on minimal exertion.    -The patient was recently evaluated in cardiology office with Dr. Corona in end of June with plans for left heart catherization to evaluate LAD to ensure no in-stent restenosis as cause of the dyspnea.     -His progressive dyspnea on exertion could be ischemic, will plan on cath today.   -Also component chronic HFpEF, appears intravascularly depleted on exam but likely increased LVEDP. Will review echo, report shows elevate LAP/moderate to severe TR, moderate MR, severe LAE and well functioning BioAVR, RVE/ANDRZEJ. HAs HFpEF and developing right sided failure as well     Recommendations:  1. Renal function stable today and at baseline, seen by nephro as well. Will plan for cath today to evaluate if CAD causing his dyspnea. can obtain LVEDP as well. Suspect component chronic HFpEF is contributing to symptoms. Patient in agreement with plan and wants cath done. Also spoke to daughter on the phone who agrees as well.   2. Continue fluids for cath, will need to be back on  lasix after   3. Continue DAPT/statin  4. No A/C for AF due to high bleed risk and on DAPT          Reid Marcial MD

## 2021-07-08 NOTE — PROGRESS NOTE ADULT - ASSESSMENT
BG Improved,    Will Replete K +    Maintain K+ > 4 Meq., & Mg ++ > 2 mg.,    US Kidneys & Bladder - P :    To maintain 0.9% Normal saline,     C - P ,  D/W Fabiola Leyva RN,

## 2021-07-09 LAB
ANION GAP SERPL CALC-SCNC: 11 MMOL/L — SIGNIFICANT CHANGE UP (ref 5–17)
ANION GAP SERPL CALC-SCNC: 13 MMOL/L — SIGNIFICANT CHANGE UP (ref 5–17)
BUN SERPL-MCNC: 61.4 MG/DL — HIGH (ref 8–20)
BUN SERPL-MCNC: 65.7 MG/DL — HIGH (ref 8–20)
CALCIUM SERPL-MCNC: 8.7 MG/DL — SIGNIFICANT CHANGE UP (ref 8.6–10.2)
CALCIUM SERPL-MCNC: 9 MG/DL — SIGNIFICANT CHANGE UP (ref 8.6–10.2)
CHLORIDE SERPL-SCNC: 95 MMOL/L — LOW (ref 98–107)
CHLORIDE SERPL-SCNC: 96 MMOL/L — LOW (ref 98–107)
CO2 SERPL-SCNC: 28 MMOL/L — SIGNIFICANT CHANGE UP (ref 22–29)
CO2 SERPL-SCNC: 30 MMOL/L — HIGH (ref 22–29)
CREAT SERPL-MCNC: 1.77 MG/DL — HIGH (ref 0.5–1.3)
CREAT SERPL-MCNC: 1.93 MG/DL — HIGH (ref 0.5–1.3)
GLUCOSE BLDC GLUCOMTR-MCNC: 112 MG/DL — HIGH (ref 70–99)
GLUCOSE BLDC GLUCOMTR-MCNC: 121 MG/DL — HIGH (ref 70–99)
GLUCOSE BLDC GLUCOMTR-MCNC: 135 MG/DL — HIGH (ref 70–99)
GLUCOSE BLDC GLUCOMTR-MCNC: 90 MG/DL — SIGNIFICANT CHANGE UP (ref 70–99)
GLUCOSE SERPL-MCNC: 112 MG/DL — HIGH (ref 70–99)
GLUCOSE SERPL-MCNC: 91 MG/DL — SIGNIFICANT CHANGE UP (ref 70–99)
HCT VFR BLD CALC: 39.4 % — SIGNIFICANT CHANGE UP (ref 39–50)
HGB BLD-MCNC: 12.9 G/DL — LOW (ref 13–17)
MAGNESIUM SERPL-MCNC: 2.6 MG/DL — SIGNIFICANT CHANGE UP (ref 1.6–2.6)
MCHC RBC-ENTMCNC: 30.3 PG — SIGNIFICANT CHANGE UP (ref 27–34)
MCHC RBC-ENTMCNC: 32.7 GM/DL — SIGNIFICANT CHANGE UP (ref 32–36)
MCV RBC AUTO: 92.5 FL — SIGNIFICANT CHANGE UP (ref 80–100)
PLATELET # BLD AUTO: 260 K/UL — SIGNIFICANT CHANGE UP (ref 150–400)
POTASSIUM SERPL-MCNC: 3.6 MMOL/L — SIGNIFICANT CHANGE UP (ref 3.5–5.3)
POTASSIUM SERPL-MCNC: 3.7 MMOL/L — SIGNIFICANT CHANGE UP (ref 3.5–5.3)
POTASSIUM SERPL-SCNC: 3.6 MMOL/L — SIGNIFICANT CHANGE UP (ref 3.5–5.3)
POTASSIUM SERPL-SCNC: 3.7 MMOL/L — SIGNIFICANT CHANGE UP (ref 3.5–5.3)
RBC # BLD: 4.26 M/UL — SIGNIFICANT CHANGE UP (ref 4.2–5.8)
RBC # FLD: 15 % — HIGH (ref 10.3–14.5)
SODIUM SERPL-SCNC: 135 MMOL/L — SIGNIFICANT CHANGE UP (ref 135–145)
SODIUM SERPL-SCNC: 138 MMOL/L — SIGNIFICANT CHANGE UP (ref 135–145)
WBC # BLD: 6.36 K/UL — SIGNIFICANT CHANGE UP (ref 3.8–10.5)
WBC # FLD AUTO: 6.36 K/UL — SIGNIFICANT CHANGE UP (ref 3.8–10.5)

## 2021-07-09 PROCEDURE — 99232 SBSQ HOSP IP/OBS MODERATE 35: CPT

## 2021-07-09 PROCEDURE — 99152 MOD SED SAME PHYS/QHP 5/>YRS: CPT

## 2021-07-09 PROCEDURE — 99232 SBSQ HOSP IP/OBS MODERATE 35: CPT | Mod: 25

## 2021-07-09 PROCEDURE — 99233 SBSQ HOSP IP/OBS HIGH 50: CPT

## 2021-07-09 PROCEDURE — 93458 L HRT ARTERY/VENTRICLE ANGIO: CPT | Mod: 26

## 2021-07-09 RX ORDER — IPRATROPIUM/ALBUTEROL SULFATE 18-103MCG
3 AEROSOL WITH ADAPTER (GRAM) INHALATION ONCE
Refills: 0 | Status: COMPLETED | OUTPATIENT
Start: 2021-07-09 | End: 2021-07-09

## 2021-07-09 RX ORDER — SODIUM CHLORIDE 9 MG/ML
1000 INJECTION INTRAMUSCULAR; INTRAVENOUS; SUBCUTANEOUS
Refills: 0 | Status: COMPLETED | OUTPATIENT
Start: 2021-07-09 | End: 2021-07-09

## 2021-07-09 RX ORDER — TAMSULOSIN HYDROCHLORIDE 0.4 MG/1
0.4 CAPSULE ORAL ONCE
Refills: 0 | Status: COMPLETED | OUTPATIENT
Start: 2021-07-09 | End: 2021-07-09

## 2021-07-09 RX ORDER — TAMSULOSIN HYDROCHLORIDE 0.4 MG/1
0.8 CAPSULE ORAL AT BEDTIME
Refills: 0 | Status: DISCONTINUED | OUTPATIENT
Start: 2021-07-10 | End: 2021-07-17

## 2021-07-09 RX ADMIN — Medication 25 MILLIGRAM(S): at 05:06

## 2021-07-09 RX ADMIN — SODIUM CHLORIDE 60 MILLILITER(S): 9 INJECTION INTRAMUSCULAR; INTRAVENOUS; SUBCUTANEOUS at 11:25

## 2021-07-09 RX ADMIN — Medication 81 MILLIGRAM(S): at 14:38

## 2021-07-09 RX ADMIN — GABAPENTIN 300 MILLIGRAM(S): 400 CAPSULE ORAL at 05:06

## 2021-07-09 RX ADMIN — Medication 0: at 11:18

## 2021-07-09 RX ADMIN — CLOPIDOGREL BISULFATE 75 MILLIGRAM(S): 75 TABLET, FILM COATED ORAL at 14:38

## 2021-07-09 RX ADMIN — Medication 3 UNIT(S): at 18:03

## 2021-07-09 RX ADMIN — AMLODIPINE BESYLATE 5 MILLIGRAM(S): 2.5 TABLET ORAL at 05:06

## 2021-07-09 RX ADMIN — TAMSULOSIN HYDROCHLORIDE 0.4 MILLIGRAM(S): 0.4 CAPSULE ORAL at 12:25

## 2021-07-09 RX ADMIN — SIMVASTATIN 20 MILLIGRAM(S): 20 TABLET, FILM COATED ORAL at 21:56

## 2021-07-09 RX ADMIN — Medication 25 MILLIGRAM(S): at 18:03

## 2021-07-09 RX ADMIN — INSULIN GLARGINE 9 UNIT(S): 100 INJECTION, SOLUTION SUBCUTANEOUS at 21:56

## 2021-07-09 RX ADMIN — GABAPENTIN 300 MILLIGRAM(S): 400 CAPSULE ORAL at 18:03

## 2021-07-09 RX ADMIN — HEPARIN SODIUM 5000 UNIT(S): 5000 INJECTION INTRAVENOUS; SUBCUTANEOUS at 05:06

## 2021-07-09 NOTE — PROGRESS NOTE ADULT - SUBJECTIVE AND OBJECTIVE BOX
S/p cardiac cath.   Patent LAD stents with mild to moderate instent restenosis.   RCA with moderate disease.     LVEDP normal- not volume overloaded.

## 2021-07-09 NOTE — PROGRESS NOTE ADULT - SUBJECTIVE AND OBJECTIVE BOX
Department of Cardiology                                                                  [facility]/[place] [address] [address] [id number]                                                            Telephone: [phone]. Fax:[phone]                                                    Pre- Procedure Note: Left Heart Cardiac Catheterization      89 year old man with past medical history of Coronary artery disease (s/p PCI to LAD in 10/2020 with medical management of RCA disease), Severe aortic valve stenosis (s/p 34 mm CoreValve Evolut TAVR in 2020), Complete heart block (s/p pacemaker), Atrial flutter (not on anticoagulation), CKD (baseline Cr ~ 1.7), Hypertension, Diabetes mellitus, rheumatoid arthritis and chronic lower extremity edema (follows with vascular with unna boots) who was sent in to ER due to progressive dyspnea on minimal exertion.  -The patient was recently evaluated in cardiology office with Dr. Corona in end of  with plans for left heart catherization to evaluate LAD to ensure no in-stent restenosis as cause of the dyspnea.     Hospital course-pt with profound hypokalemia which was supplemented and acute on chronic renal insufficiency and seen by Nephrology. Pt medically optimized for [facility] at this time      MEDICATIONS  (STANDING):  amLODIPine   Tablet 5 milliGRAM(s) Oral daily  aspirin  chewable 81 milliGRAM(s) Oral daily  clopidogrel Tablet 75 milliGRAM(s) Oral daily  dextrose 40% Gel 15 Gram(s) Oral once  dextrose 5%. 1000 milliLiter(s) (50 mL/Hr) IV Continuous <Continuous>  dextrose 5%. 1000 milliLiter(s) (100 mL/Hr) IV Continuous <Continuous>  dextrose 50% Injectable 25 Gram(s) IV Push once  dextrose 50% Injectable 12.5 Gram(s) IV Push once  dextrose 50% Injectable 25 Gram(s) IV Push once  gabapentin 300 milliGRAM(s) Oral two times a day  glucagon  Injectable 1 milliGRAM(s) IntraMuscular once  heparin   Injectable 5000 Unit(s) SubCutaneous every 8 hours  insulin glargine Injectable (LANTUS) 9 Unit(s) SubCutaneous at bedtime  insulin lispro (ADMELOG) corrective regimen sliding scale   SubCutaneous three times a day before meals  insulin lispro Injectable (ADMELOG) 3 Unit(s) SubCutaneous three times a day before meals  metoprolol tartrate 25 milliGRAM(s) Oral two times a day  simvastatin 20 milliGRAM(s) Oral at bedtime  tamsulosin 0.4 milliGRAM(s) Oral at bedtime    MEDICATIONS  (PRN):        ASA:  3  Mallampati: 2  Bleeding Risk: 2.4%  Creatinine: 1.93  GFR:    T(C): 36.7 (21 @ 14:33), Max: 37.1 (21 @ 00:09)  HR: 90 (21 @ 14:33) (65 - 90)  BP: 100/44 (21 @ 14:33) (95/54 - 128/68)  RR: 18 (21 @ 14:33) (18 - 20)  SpO2: 96% (21 @ 14:33) (94% - 97%)  Wt(kg): --    I&O's Summary    2021 07:01  -  2021 15:21  --------------------------------------------------------  IN: 120 mL / OUT: 650 mL / NET: -530 mL        Daily     Daily Weight in k.6 (2021 05:30)    Constitutional: A & O x 3  HEENT:   Normal oral mucosa, PERRL, EOMI	  Cardiovascular: Normal S1 S2, 2/6 syst murmur   Respiratory: Diminished at bases L>R  Gastrointestinal:  Soft, Non-tender, + BS	  Skin: Multistaged ecchymoses    Neurologic: Non-focal  Extremities: Tr BLE edema  Vascular: Peripheral pulses palpable 1+ bilaterally    TELEMETRY: V paced	    	    DIAGNOSTIC TESTING:  [x ] Echocardiogram: < from: TTE Echo Complete w/o Contrast w/ Doppler (21 @ 15:41) >  EXAM:  ECHO TTE WO CON COMP W DOPP      PROCEDURE DATE:  2021   .      INTERPRETATION:  TRANSTHORACIC ECHOCARDIOGRAM REPORT        Patient Name:   BEVERLY THOMAS Patient Location: [facility] Rec #:  [id number]        Accession #:[id number]  Account #:                      Height:           65.0 in 165.0 cm  YOB: 1931        Weight:           158.7 lb 72.00 kg  Patient Age:    89 years        BSA:              1.79 m²  Patient Gender: M               BP:     122/65 mmHg      Date of Exam:        2021 3:41:51 PM  Sonographer:         Antoine Sandoval  Referring Physician: Nasra Iyer    Procedure:     2D Echo/Doppler/Color Doppler Complete.  Indications:   Cardiomyopathy, unspecified - I42.9  Diagnosis:     Nonrheumatic mitral (valve) insufficiency - I34.0  Study Details: Technically difficult study. Study quality was adversely affected                 due to body habitus.        2D AND M-MODE MEASUREMENTS (normal ranges within parentheses):  Left                 Normal   Aorta/Left            Normal  Ventricle:                    Atrium:  IVSd (2D):    0.97  (0.7-1.1) Aortic Root    2.82  (2.4-3.7)                 cm             (2D):           cm  LVPWd (2D):   0.88  (0.7-1.1) Left Atrium    3.69  (1.9-4.0)                 cm             (2D):           cm  LVIDd (2D):   4.07  (3.4-5.7) LA Volume      66.8                 cm             Index         ml/m²  LVIDs (2D):   2.79            Right Ventricle:                 cm    RVd (2D):        4.75 cm  LV FS (2D):   31.4   (>25%)                  %  Relative Wall 0.43   (<0.42)  Thickness    LV DIASTOLIC FUNCTION:  MV Peak E: 1.03 m/s E/e' Ratio: 19.10  MV Peak A: 0.38 m/s Decel Time: 229 msec  E/A Ratio: 2.71    SPECTRAL DOPPLER ANALYSIS (where applicable):  Mitral Valve:  MV P1/2 Time: 66.41 msec  MV Area, PHT: 3.31 cm²    Aortic Valve: AoV Max Jaren: 0.95 m/s AoV Peak PG: 3.6 mmHg AoV Mean P.0 mmHg    LVOT Vmax: 0.68 m/s LVOT VTI: 0.131 m LVOT Diameter:    AoVArea, Vmax:  AoV Area, VTI:  AoV Area, Vmn:  Ao VTI: 0.188  Aortic Insufficiency:  AI Half-time: 446 msec    Tricuspid Valve and PA/RV Systolic Pressure: TR Max Velocity: 2.49 m/s RA Pressure: 8 mmHg RVSP/PASP: 32.8 mmHg    Pulmonic Valve:  PV Max Velocity: 0.86 m/s PV Max P.9 mmHg PV Mean PG:      PHYSICIAN INTERPRETATION:  Left Ventricle: The left ventricular internal cavity size is normal.  Global LV systolic function was normal. Left ventricular ejection fraction, by visual estimation, is55 to 60%. Spectral Doppler shows pseudonormal pattern of left ventricular myocardial filling (Grade II diastolic dysfunction). Elevated mean left atrial pressure.  Right Ventricle: The right ventricular size is mildly enlarged. RV systolic function is normal.  Left Atrium: Severely enlarged left atrium.  Right Atrium: Moderate to severe right atrial enlargement.  Pericardium: There is no evidence of pericardial effusion.  Mitral Valve: Thickening of the anterior and posterior mitral valve leaflets. There is moderate mitral annular calcification. Moderate mitral valve regurgitation is seen.  Tricuspid Valve: The tricuspid valve is normal in structure. Moderate-severe tricuspid regurgitation is visualized.  Aortic Valve: Mild aortic valve regurgitation is seen. S/P TAVR.normal function bioAVR.  Pulmonic Valve: The pulmonic valve was not well visualized. Trace pulmonic valve regurgitation.  Aorta: The aortic root is normal in size and structure.  Pulmonary Artery: The pulmonary artery is ofnormal size and origin.  Venous: The pulmonary veins appear normal. The inferior vena cava was normal sized, with respiratory size variation less than 50%.  Additional Comments: A pacer wire is visualized in the right atrium and right ventricle.      Summary:   1. Left ventricular ejection fraction, by visual estimation, is 55 to 60%.   2. Normal global left ventricular systolic function.   3. Moderate to severe right atrial enlargement.   4. Severely enlarged left atrium.   5. Elevated mean leftatrial pressure.   6. Spectral Doppler shows pseudonormal pattern of left ventricular myocardial filling (Grade II diastolic dysfunction).   7. Mildly enlarged right ventricle.   8. Moderate mitral annular calcification.   9. Moderate mitral valve regurgitation.  10. Thickening of the anterior and posterior mitral valve leaflets.  11. Moderate-severe tricuspid regurgitation.  12. Bioprosthesis in the aortic position.  13. Mild aortic regurgitation.  14. S/P TAVR.normal function bioAVR.      [x ]  Catheterization:  < from: Cardiac Cath Lab - Adult (20 @ 07:13) >  [facility]  Department of Cardiology  [address] [place] [address]  [phone]  Cath Lab Report -- Comprehensive Report  Patient: BEVERLY THOMAS  Study date: 2020  Account number: [id number]  MR number: [id number]  : 1931  Gender: Male  Race: W  Case Physician(s):  Alvino Jett  Referring Physician:  Osmar Corona M.D.  INDICATIONS: Aortic valve stenosis.  HISTORY: 89 yo man with known severe aortic stenosis, KJ=0.6 cm2 and CAD.  Underwent PCI to the LAD in the past. Has NYHA 3/4 with an STS 6.3%. No  history of previous myocardial infarction. Aortic valve: history of severe  stenosis. The patient has a history of coronary artery disease. The  patient has hypertension and renal failure (not requiring dialysis).  PROCEDURE:  --  Left subclavian angiography.  --  Aortography.  --  Cardiac Fluoro.  --  Intubation-Non-cath Physician.  --  Art Access-Left Subclavian.  --  Temporary Pacemaker-Int.  --  Aortic Valvuloplasty.  --  Transcatheter Aortic Valve Replacement.  --  Hemostasis with Angioseal-Intervention.  TECHNIQUE: The risks and alternatives of the procedures and conscious  sedation were explained to the patient and informed consent was obtained.  Cardiac catheterization performed electively.  Local anesthetic given. Right femoral artery access. Right femoral vein  access. Left subclavian angiography. A catheter was positioned.  Aortography. A catheter was placed and contrast was injected. Cardiac  Fluoro. Intubation-Non-cath Physician. Art Access-Left Subclavian.  RADIATION EXPOSURE: 10.9 min. Temporary Pacemaker-Int. Aortic  Valvuloplasty. Transcatheter Aortic Valve Replacement. Hemostasis with  Angioseal-Intervention.  CONTRAST GIVEN: Visipaque 90 ml.  AORTA: Ascending aorta: Normal.  ARCH VESSELS: Left subclavian: Angiography showed minor luminal  irregularities.  COMPLICATIONS: There were no complications.  SUMMARY:  Summary: Addendum 20: Case reconfirmed to associate images to DMS  report  DIAGNOSTIC IMPRESSIONS: Severe Aortic Stenosis  Moderate Aortic Insufficiency  Normal Aortic Root  Normal Lt Subclavian Artery  Successful Insertion and Removal of Temporary Pacemaker  Successful TAVR via Lt Subclavian Artery Corevalve Evolut R 34 mm  Successful Rt SFA Closure Device (AngioSeal 6F)  DIAGNOSTIC RECOMMENDATIONS: The patient should continue with the present  medications. Patient management should include aggressive medical therapy,  close monitoring of BUN and creatinine, and resumption of all previous  activities in 5 days. Medical management is recommended.  INTERVENTIONAL IMPRESSIONS: Severe Aortic Stenosis  Moderate Aortic Insufficiency  Normal Aortic Root  Normal Lt Subclavian Artery  Successful Insertion and Removal of Temporary Pacemaker  Successful TAVR via Lt Subclavian Artery Corevalve Evolut R 34 mm  Successful Rt SFA Closure Device (AngioSeal 6F)  Prepared and signed by  Alvino Rowland MD  Signed 2020 12:05:48  HEMODYNAMIC TABLES  Pressures:  Baseline  Pressures:  - HR: 48  Pressures:  - Rhythm:  Pressures:  -- Arterial (S/D/M): 95/44/64  Outputs:  Baseline  Outputs:  -- CALCULATIONS[age]  Outputs:  -- CALCULATIONS: Body Surface Area: 1.77  Outputs:  -- CALCULATIONS: Height in cm: 165.00  Outputs:  -- CALCULATIONS: Sex: Male  Outputs:  -- CALCULATIONS: Weight in k.30    < end of copied text >      LABS:	 	                        12.9   6.36  )-----------( 260      ( 2021 05:41 )             39.4     07-09    135  |  96<L>  |  65.7<H>  ----------------------------<  112<H>  3.7   |  28.0  |  1.93<H>    Ca    8.7      2021 05:41  Phos  2.1     07-  Mg     2.6     -    TPro  x   /  Alb  3.0<L>  /  TBili  x   /  DBili  x   /  AST  x   /  ALT  x   /  AlkPhos  x         ASSESSMENT: 89y/o M with progressive SOB and diastolic HF presents for [facility] for further evaluation      -consent to be obtained  -procedure discussed with patient; risks and benefits explained, questions answered  -labs and ECG reviewed

## 2021-07-09 NOTE — PROGRESS NOTE ADULT - SUBJECTIVE AND OBJECTIVE BOX
[facility] DIVISION OF KIDNEY DISEASES AND HYPERTENSION -- FOLLOW UP NOTE  --------------------------------------------------------------------------------  Chief Complaint:  ELISE on CKD    24 hour events/subjective:  Seen/examined  US renal reviewed;      PAST HISTORY  --------------------------------------------------------------------------------  No significant changes to PMH, PSH, FHx, SHx, unless otherwise noted    ALLERGIES & MEDICATIONS  --------------------------------------------------------------------------------  Allergies    No Known Allergies    Intolerances      Standing Inpatient Medications  amLODIPine   Tablet 5 milliGRAM(s) Oral daily  aspirin  chewable 81 milliGRAM(s) Oral daily  clopidogrel Tablet 75 milliGRAM(s) Oral daily  dextrose 40% Gel 15 Gram(s) Oral once  dextrose 5%. 1000 milliLiter(s) IV Continuous <Continuous>  dextrose 5%. 1000 milliLiter(s) IV Continuous <Continuous>  dextrose 50% Injectable 25 Gram(s) IV Push once  dextrose 50% Injectable 12.5 Gram(s) IV Push once  dextrose 50% Injectable 25 Gram(s) IV Push once  gabapentin 300 milliGRAM(s) Oral two times a day  glucagon  Injectable 1 milliGRAM(s) IntraMuscular once  heparin   Injectable 5000 Unit(s) SubCutaneous every 8 hours  insulin glargine Injectable (LANTUS) 9 Unit(s) SubCutaneous at bedtime  insulin lispro (ADMELOG) corrective regimen sliding scale   SubCutaneous three times a day before meals  insulin lispro Injectable (ADMELOG) 3 Unit(s) SubCutaneous three times a day before meals  metoprolol tartrate 25 milliGRAM(s) Oral two times a day  simvastatin 20 milliGRAM(s) Oral at bedtime  tamsulosin 0.4 milliGRAM(s) Oral at bedtime    PRN Inpatient Medications      REVIEW OF SYSTEMS  --------------------------------------------------------------------------------  Gen: No weight changes, fatigue, fevers/chills, weakness  Skin: No rashes  Head/Eyes/Ears/Mouth: No headache; Normal hearing; Normal vision w/o blurriness; No sinus pain/discomfort, sore throat  Respiratory: No dyspnea, cough, wheezing, hemoptysis  CV: No chest pain, PND, orthopnea  GI: No abdominal pain, diarrhea, constipation, nausea, vomiting, melena, hematochezia  : No increased frequency, dysuria, hematuria, nocturia  MSK: No joint pain/swelling; no back pain; no edema  Neuro: No dizziness/lightheadedness, weakness, seizures, numbness, tingling  Heme: No easy bruising or bleeding  Endo: No heat/cold intolerance  Psych: No significant nervousness, anxiety, stress, depression    All other systems were reviewed and are negative, except as noted.    VITALS/PHYSICAL EXAM  --------------------------------------------------------------------------------  T(C): 36.6 (07-09-21 @ 08:50), Max: 37.1 (07-09-21 @ 00:09)  HR: 65 (07-09-21 @ 08:50) (65 - 72)  BP: 95/54 (07-09-21 @ 08:50) (95/54 - 128/68)  RR: 18 (07-09-21 @ 08:50) (18 - 20)  SpO2: 95% (07-09-21 @ 08:50) (94% - 97%)  Wt(kg): --        07-09-21 @ 07:01  -  07-09-21 @ 13:47  --------------------------------------------------------  IN: 120 mL / OUT: 650 mL / NET: -530 mL      Physical Exam:  	Gen: NAD, well-appearing  	HEENT: PERRL, supple neck, clear oropharynx  	Pulm: CTA B/L  	CV: RRR, S1S2; no rub  	Back: No spinal or CVA tenderness; no sacral edema  	Abd: +BS, soft, nontender/nondistended  	: No suprapubic tenderness  	UE: Warm, FROM, no clubbing, intact strength; no edema; no asterixis  	LE: Warm, FROM, no clubbing, intact strength; no edema  	Neuro: No focal deficits, intact gait  	Psych: Normal affect and mood  	Skin: Warm, without rashes  	Vascular access:    LABS/STUDIES  --------------------------------------------------------------------------------              12.9   6.36  >-----------<  260      [07-09-21 @ 05:41]              39.4     135  |  96  |  65.7  ----------------------------<  112      [07-09-21 @ 05:41]  3.7   |  28.0  |  1.93        Ca     8.7     [07-09-21 @ 05:41]      Mg     2.6     [07-09-21 @ 05:41]      Phos  2.1     [07-08-21 @ 12:47]    TPro  x   /  Alb  3.0  /  TBili  x   /  DBili  x   /  AST  x   /  ALT  x   /  AlkPhos  x   [07-08-21 @ 12:47]          Creatinine Trend:  SCr 1.93 [07-09 @ 05:41]  SCr 1.59 [07-08 @ 18:18]  SCr 1.77 [07-08 @ 12:47]  SCr 1.78 [07-08 @ 07:25]  SCr 1.85 [07-07 @ 06:54]        TSH 2.84      [11-12-20 @ 10:15]  Lipid: chol 135, , HDL 59, LDL 53      [10-16-20 @ 21:23]

## 2021-07-09 NOTE — PROGRESS NOTE ADULT - ASSESSMENT
1) CKD IV  2) BPH  3) CAD  4) DM  5) HTN    Distended bladder on US;  Would increase flomax to 0.8mg qhs  no jaqueline hydronephrosis  Signing off  Please recall if needed  SCr stable  Outpatient follow up    chip Ceja

## 2021-07-09 NOTE — PROGRESS NOTE ADULT - SUBJECTIVE AND OBJECTIVE BOX
BEVERLY THOMAS  [id number]      Chief Complaint: Dyspnea on exertion/CHF/CAD    Interval History: Appears patient's cardiac catherization was cancelled yesterday due to low potassium. He was seen and examined at bedside in holding area today and reports feeling ok.     Tele: atrial flutter, ventricular paced at 70 BPM      Current meds:   amLODIPine   Tablet 5 milliGRAM(s) Oral daily  aspirin  chewable 81 milliGRAM(s) Oral daily  clopidogrel Tablet 75 milliGRAM(s) Oral daily  dextrose 40% Gel 15 Gram(s) Oral once  dextrose 5%. 1000 milliLiter(s) IV Continuous <Continuous>  dextrose 5%. 1000 milliLiter(s) IV Continuous <Continuous>  dextrose 50% Injectable 25 Gram(s) IV Push once  dextrose 50% Injectable 12.5 Gram(s) IV Push once  dextrose 50% Injectable 25 Gram(s) IV Push once  gabapentin 300 milliGRAM(s) Oral two times a day  glucagon  Injectable 1 milliGRAM(s) IntraMuscular once  heparin   Injectable 5000 Unit(s) SubCutaneous every 8 hours  insulin glargine Injectable (LANTUS) 9 Unit(s) SubCutaneous at bedtime  insulin lispro (ADMELOG) corrective regimen sliding scale   SubCutaneous three times a day before meals  insulin lispro Injectable (ADMELOG) 3 Unit(s) SubCutaneous three times a day before meals  metoprolol tartrate 25 milliGRAM(s) Oral two times a day  simvastatin 20 milliGRAM(s) Oral at bedtime  tamsulosin 0.4 milliGRAM(s) Oral at bedtime      Objective:     Vital Signs:   T(C): 36.4 (07-09-21 @ 15:04), Max: 37.1 (07-09-21 @ 00:09)  HR: 69 (07-09-21 @ 15:04) (65 - 90)  BP: 117/54 (07-09-21 @ 15:04) (95/54 - 128/68)  RR: 18 (07-09-21 @ 15:04) (18 - 20)  SpO2: 100% (07-09-21 @ 15:04) (94% - 100%)  Wt(kg): --    PHYSICAL EXAM:  General: elderly frail man, no acute distress  HEENT: sclera anicteric  Neck: supple  CVS: JVP ~ 9 cm H20, irregular, no murmurs  Chest: unlabored respirations, decreased breath sounds  Abdomen: non-distended  Extremities: lower extremity skin ulcerations/wrapped  Neuro: awake, alert & oriented   Psych: normal affect    Labs:   09 Jul 2021 05:41    135    |  96     |  65.7   ----------------------------<  112    3.7     |  28.0   |  1.93     Ca    8.7        09 Jul 2021 05:41  Phos  2.1       08 Jul 2021 12:47  Mg     2.6       09 Jul 2021 05:41    TPro  x      /  Alb  3.0    /  TBili  x      /  DBili  x      /  AST  x      /  ALT  x      /  AlkPhos  x      08 Jul 2021 12:47                          12.9   6.36  )-----------( 260      ( 09 Jul 2021 05:41 )             39.4           Cardiac Cath (10/2020):  CORONARY VESSELS: The coronary circulation is right dominant.  LM:   --  LM: There was a 20 % stenosis.  LAD:   --  Proximal LAD: There was a 95 % stenosis.  --  Mid LAD: There was a 95 % stenosis.  CX:   --  OM1: There was a 50 % stenosis.  RCA:   --  RCA: This vessel was not injected.  COMPLICATIONS: There were no complications.  DIAGNOSTIC IMPRESSIONS: Normal right heart pressures. Performed to assess  for need for diuretics. Wedge pressure 14-15 mmhg.  Severe LAD disease. Temporary pacemaker placed and rotational atherectomy  performed. 3 LESLYE.  DIAGNOSTIC RECOMMENDATIONS: Aspirin and plavix.  TAVR evaluation.  INTERVENTIONAL IMPRESSIONS: Normal right heart pressures. Performed to  assess for need for diuretics. Wedge pressure 14-15 mmhg.  Severe LAD disease. Temporary pacemaker placed and rotational atherectomy  performed. 3 LESLYE.        Outpatient TTE (6/23/21):  LVEF 50-55%, dyssynchronous LV, cannot rule out some hypokinesis in the inferior/inferoseptal wall   Mild asymmetric LVH  Normal RV size and mildly reduced RV systolic function  Dilated left atrium  Mild MR  TAVR, peak velocity 1.1 m/s  Moderate to severe TR  No pericardial effusion     TTE (7/8/21):  TTE images reviewed, my report:  LVEF 60-65%  Mildly dilated RV size with normal systolic function, pacer wire visualized  Biatrial enlargement   Severe mitral annular calcification, Mild to moderate MR  Severe TR, RVSP ~ 25 mmHg   TAVR well seated, peak velocity 1.1 m/s, mild AR  Trace NM   IVC not well visualized  No pericardial effusion      ECG (7/7/21): atrial flutter, ventricular paced      Home Cardiac Meds:  Metolazone 2.5 mg (M/W/F)  Furosemide 60 mg daily  Plavix 75 mg daily  Aspirin 81 mg daily  Simvastatin 20 mg bedtime  Metoprolol tartrate 50 mg BID

## 2021-07-09 NOTE — PROGRESS NOTE ADULT - SUBJECTIVE AND OBJECTIVE BOX
CC Follow up    INTERVAL HPI/OVERNIGHT EVENTS: Patient seen and examined, no acute complaints overnight. Cath was cancelled last night for hypokalemia. NPO this morning for cath. Denies sob, chest pain or palpitations. SBP in th 90s this morning.       Vital Signs Last 24 Hrs  T(C): 36.6 (09 Jul 2021 08:50), Max: 37.1 (09 Jul 2021 00:09)  T(F): 97.8 (09 Jul 2021 08:50), Max: 98.7 (09 Jul 2021 00:09)  HR: 65 (09 Jul 2021 08:50) (65 - 72)  BP: 95/54 (09 Jul 2021 08:50) (95/54 - 128/68)  BP(mean): 68 (09 Jul 2021 08:50) (68 - 68)  RR: 18 (09 Jul 2021 08:50) (18 - 20)  SpO2: 95% (09 Jul 2021 08:50) (94% - 97%)    PHYSICAL EXAM:    GENERAL: NAD, AOX2  HEAD:  Atraumatic, Normocephalic  EYES: conjunctiva and sclera clear  ENMT: Moist mucous membranes  NECK: Supple, No JVD  CHEST/LUNG: Clear to auscultation bilaterally; No rales, rhonchi, wheezing, or rubs  HEART: Regular rate and rhythm; No murmurs, rubs, or gallops  ABDOMEN: Soft, Nontender, Nondistended; Bowel sounds present  EXTREMITIES:  Bilateral lower extremity venous ulcers         MEDICATIONS  (STANDING):  amLODIPine   Tablet 5 milliGRAM(s) Oral daily  aspirin  chewable 81 milliGRAM(s) Oral daily  clopidogrel Tablet 75 milliGRAM(s) Oral daily  dextrose 40% Gel 15 Gram(s) Oral once  dextrose 5%. 1000 milliLiter(s) (50 mL/Hr) IV Continuous <Continuous>  dextrose 5%. 1000 milliLiter(s) (100 mL/Hr) IV Continuous <Continuous>  dextrose 50% Injectable 25 Gram(s) IV Push once  dextrose 50% Injectable 12.5 Gram(s) IV Push once  dextrose 50% Injectable 25 Gram(s) IV Push once  gabapentin 300 milliGRAM(s) Oral two times a day  glucagon  Injectable 1 milliGRAM(s) IntraMuscular once  heparin   Injectable 5000 Unit(s) SubCutaneous every 8 hours  insulin glargine Injectable (LANTUS) 9 Unit(s) SubCutaneous at bedtime  insulin lispro (ADMELOG) corrective regimen sliding scale   SubCutaneous three times a day before meals  insulin lispro Injectable (ADMELOG) 3 Unit(s) SubCutaneous three times a day before meals  metoprolol tartrate 25 milliGRAM(s) Oral two times a day  simvastatin 20 milliGRAM(s) Oral at bedtime  sodium chloride 0.9%. 1000 milliLiter(s) (60 mL/Hr) IV Continuous <Continuous>  tamsulosin 0.4 milliGRAM(s) Oral at bedtime    MEDICATIONS  (PRN):      Allergies    No Known Allergies    Intolerances          LABS:                          12.9   6.36  )-----------( 260      ( 09 Jul 2021 05:41 )             39.4     07-09    135  |  96<L>  |  65.7<H>  ----------------------------<  112<H>  3.7   |  28.0  |  1.93<H>    Ca    8.7      09 Jul 2021 05:41  Phos  2.1     07-08  Mg     2.6     07-09    TPro  x   /  Alb  3.0<L>  /  TBili  x   /  DBili  x   /  AST  x   /  ALT  x   /  AlkPhos  x   07-08          RADIOLOGY & ADDITIONAL TESTS:

## 2021-07-09 NOTE — PROGRESS NOTE ADULT - ASSESSMENT
The patient is a 89 year old male with a past medical history of CAD status post PCI to the LAD in 2020, severe AS status post TAVR, complete heart block status post PPM, atrial flutter and CKD stage 3, hypertension, DM type to and chronic lower extremity edema who presented to the Er with complaints of progressively worsening dyspnea     Assessment/Plan:    1. Exertional dyspnea with a history of CAD:   NPO FOr cath today pending follow up BMP, light breakfast this morning Spoke with cardiology   Continue aspirin, Plavix BB and statin therapy  Telemetry monitoring  Cardiac enzymes negative x 3  Echocardiogram reviewed with Ef of 55-60% with grade 2 diastolic dysfunction mod- severe TR and mod MR, status post TVR    2. Acute on Chronic diastolic CHF with pulmonary hypertension: Improved  Asymptomatic   Resume lasix post cath  On BB  metolazone held    3. Atrial flutter not on AC due to bleeding risk     4. History of complete heart block status post pacemaker    5. ELISE  CKD stage 3 Baseline creatinine ~1.6 Gentle IV hydration this morning, hypotensive  Repeat BMP this afternoon     6. Bilateral lower extremity venous stasis ulcers: Wound care consult    7. Diabetes mellitus type 2; BSL stable   Admelog sliding scale  Monitor BSL    8. History of RA on Humira Q2 weeks  On monthly Prolia    VTE Heparin subcut  The patient is a 89 year old male with a past medical history of CAD status post PCI to the LAD in 2020, severe AS status post TAVR, complete heart block status post PPM, atrial flutter and CKD stage 3, hypertension, DM type to and chronic lower extremity edema who presented to the Er with complaints of progressively worsening dyspnea     Assessment/Plan:    1. Exertional dyspnea with a history of CAD:   NPO FOr cath today pending follow up BMP, light breakfast this morning Spoke with cardiology   Continue aspirin, Plavix BB and statin therapy  Telemetry monitoring  Cardiac enzymes negative x 3  Echocardiogram reviewed with Ef of 55-60% with grade 2 diastolic dysfunction mod- severe TR and mod MR, status post TVR    2. Acute on Chronic diastolic CHF with pulmonary hypertension: Improved  Asymptomatic   Resume lasix post cath  On BB  metolazone held    3. Atrial flutter not on AC due to bleeding risk     4. History of complete heart block status post pacemaker    5. ELISE  CKD stage 3 Baseline creatinine ~1.6 Gentle IV hydration this morning, hypotensive  Repeat BMP this afternoon     History of BPH; Bladder scan with 650ml- Straight cath x 1, increase flomax to 0.8 QHS    6. Bilateral lower extremity venous stasis ulcers: Wound care consult    7. Diabetes mellitus type 2; BSL stable   Admelog sliding scale  Monitor BSL    8. History of RA on Humira Q2 weeks  On monthly Prolia    VTE Heparin subcut

## 2021-07-09 NOTE — PROGRESS NOTE ADULT - SUBJECTIVE AND OBJECTIVE BOX
Department of Cardiology                                                                  [facility]/[place] [address] [address] [facility]                                                            Telephone: [phone]. Fax:[phone]                                                    Post- Procedure Note: Left Heart Cardiac Catheterization       89 year old man with past medical history of Coronary artery disease (s/p PCI to LAD in 10/2020 with medical management of RCA disease), Severe aortic valve stenosis (s/p 34 mm CoreValve Evolut TAVR in 11/2020), Complete heart block (s/p pacemaker), Atrial flutter (not on anticoagulation), CKD (baseline Cr ~ 1.7), Hypertension, Diabetes mellitus, rheumatoid arthritis and chronic lower extremity edema (follows with vascular with unna boots) who was sent in to ER due to progressive dyspnea on minimal exertion.  Now s/p left heart catheterization with attempted RRA access and right groin approach closed with Angioseal after 2 failed Mynx with Dr. Doshi: Patent LAD stents with mild to moderate instent restenosis. RCA with moderate disease.   LVEDP normal- not volume overloaded.   Pt tolerated procedure well    Medications given:  Versed 0.5 mg  Fentanyl 25 mcg  Heparin 4000 units  Omnipaque 64 cc       PAST MEDICAL & SURGICAL HISTORY:  CAD (coronary artery disease)    Hypertension    BPH (benign prostatic hypertrophy)    Kidney stones    Diabetes mellitus  type II    Ulcer disease  stomach    Osteoarthrosis    RA (rheumatoid arthritis)    Gout  h/o    Diabetes    HTN (hypertension)    High cholesterol    Stented coronary artery  2011, 2020    Pacemaker    S/P knee replacement  right in January 1/28/13 &amp; left 12/13/13    S/P angioplasty with stent  2011 - 1 coronary stent    Kidney stone  removed about 50 years ago    Kidney stone  had lithotripsy in 1983 &amp; 2010    History of cystoscopy  TURP 2010    Cataract  b/l 2001 &amp; 2002    Status post hip surgery    S/P TAVR (transcatheter aortic valve replacement)      MEDICATIONS  (STANDING):  amLODIPine   Tablet 5 milliGRAM(s) Oral daily  aspirin  chewable 81 milliGRAM(s) Oral daily  clopidogrel Tablet 75 milliGRAM(s) Oral daily  dextrose 40% Gel 15 Gram(s) Oral once  dextrose 5%. 1000 milliLiter(s) (50 mL/Hr) IV Continuous <Continuous>  dextrose 5%. 1000 milliLiter(s) (100 mL/Hr) IV Continuous <Continuous>  dextrose 50% Injectable 25 Gram(s) IV Push once  dextrose 50% Injectable 12.5 Gram(s) IV Push once  dextrose 50% Injectable 25 Gram(s) IV Push once  gabapentin 300 milliGRAM(s) Oral two times a day  glucagon  Injectable 1 milliGRAM(s) IntraMuscular once  heparin   Injectable 5000 Unit(s) SubCutaneous every 8 hours  insulin glargine Injectable (LANTUS) 9 Unit(s) SubCutaneous at bedtime  insulin lispro (ADMELOG) corrective regimen sliding scale   SubCutaneous three times a day before meals  insulin lispro Injectable (ADMELOG) 3 Unit(s) SubCutaneous three times a day before meals  metoprolol tartrate 25 milliGRAM(s) Oral two times a day  simvastatin 20 milliGRAM(s) Oral at bedtime  tamsulosin 0.4 milliGRAM(s) Oral at bedtime    MEDICATIONS  (PRN):      Objective:  Vital Signs Last 24 Hrs  T(C): 36.4 (09 Jul 2021 15:04), Max: 37.1 (09 Jul 2021 00:09)  T(F): 97.6 (09 Jul 2021 15:04), Max: 98.7 (09 Jul 2021 00:09)  HR: 69 (09 Jul 2021 15:04) (65 - 90)  BP: 117/54 (09 Jul 2021 15:04) (95/54 - 128/68)  BP(mean): 68 (09 Jul 2021 08:50) (68 - 68)  RR: 18 (09 Jul 2021 15:04) (18 - 20)  SpO2: 100% (09 Jul 2021 15:04) (94% - 100%)    CM: Vpaced  Neuro: A&OX3, CN 2-12 intact  HEENT: NC, AT  Lungs: diminished atb bases  CV: S1, S2, 2/6 systm murmur  Abd: Soft  Right Groin: Soft, no bleeding, no hematoma. R radial band in place-no bleeding, hematoma or ecchymosis  Extremity: + distal pulses                        12.9   6.36  )-----------( 260      ( 09 Jul 2021 05:41 )             39.4     07-09    135  |  96<L>  |  65.7<H>  ----------------------------<  112<H>  3.7   |  28.0  |  1.93<H>    Ca    8.7      09 Jul 2021 05:41  Phos  2.1     07-08  Mg     2.6     07-09    TPro  x   /  Alb  3.0<L>  /  TBili  x   /  DBili  x   /  AST  x   /  ALT  x   /  AlkPhos  x   07-08      0y  Male is now s/p left heart catheterization with nonobstructive CAD and normal LVEDP    Pt is already in-patient  -post cardiac cath orders  -radial and groin precautions  -bedrest x 2 hours post procedure  -continue current medical therapy  -Monitor BUN/Cr  -Management per Hospitalist

## 2021-07-09 NOTE — PROGRESS NOTE ADULT - ASSESSMENT
Assessment:  Morales Nichols is a 89 year old man with past medical history of Coronary artery disease (s/p PCI to LAD in 10/2020 with medical management of RCA disease), Severe aortic valve stenosis (s/p 34 mm CoreValve Evolut TAVR in 11/2020), Complete heart block (s/p pacemaker), Atrial flutter (not on anticoagulation), CKD (baseline Cr ~ 1.7), Hypertension, Diabetes mellitus, rheumatoid arthritis and chronic lower extremity edema (follows with vascular with unna boots) who was sent in to ER due to progressive dyspnea on minimal exertion.    The patient was recently evaluated in cardiology office with Dr. Corona in end of June with plans for left heart catherization to evaluate LAD to ensure no in-stent restenosis as cause of the dyspnea.     His progressive dyspnea on exertion may be due to progressive valvular disease such as his moderate-to-severe tricuspid regurgitation, possible pulmonary hypertension and RV dysfunction, will plan to repeat echocardiogram. Other possibilities include symptoms due to LAD disease, RCA disease (that was medically managed). Troponins negative. Echo with preserved LV and RV systolic function and severe tricuspid regurgitation.       Recommendations:  [] CAD: S/p PCI to LAD in 10/2020. Continue home Aspirin 81 mg daily and Plavix 75 mg daily. Would continue Metoprolol tartrate 25 mg BID. Review of outpatient labs indicates baseline Cr ~ 1.7, appears catherization was cancelled yesterday due to hypokalemia and patient was put on schedule today, was examined in holding area. Previously discussed with daughter, Chasidy ([phone]) regarding cardiac cath.   [] Right ventricle dysfunction/Moderate to Severe Tricuspid Regurgitation: Echo with severe tricuspid regurgitation, however no pulmonary hypertension at this time. Patient receiving IV fluids for cath, will monitor volume status.   [] Atrial flutter: Patient with history of this but not on anticoagulation due to high risk of bleeding (as patient already on dual antiplatelets) per his cardiologist.    Thank you for the consult. We will continue to follow along.      Nasra Iyer MD  Cardiology

## 2021-07-10 LAB
ANION GAP SERPL CALC-SCNC: 11 MMOL/L — SIGNIFICANT CHANGE UP (ref 5–17)
APPEARANCE UR: ABNORMAL
BACTERIA # UR AUTO: ABNORMAL
BILIRUB UR-MCNC: NEGATIVE — SIGNIFICANT CHANGE UP
BUN SERPL-MCNC: 62.2 MG/DL — HIGH (ref 8–20)
CALCIUM SERPL-MCNC: 8.9 MG/DL — SIGNIFICANT CHANGE UP (ref 8.6–10.2)
CHLORIDE SERPL-SCNC: 95 MMOL/L — LOW (ref 98–107)
CO2 SERPL-SCNC: 29 MMOL/L — SIGNIFICANT CHANGE UP (ref 22–29)
COLOR SPEC: YELLOW — SIGNIFICANT CHANGE UP
CREAT ?TM UR-MCNC: 70 MG/DL — SIGNIFICANT CHANGE UP
CREAT SERPL-MCNC: 1.72 MG/DL — HIGH (ref 0.5–1.3)
DIFF PNL FLD: ABNORMAL
EPI CELLS # UR: SIGNIFICANT CHANGE UP
GLUCOSE BLDC GLUCOMTR-MCNC: 186 MG/DL — HIGH (ref 70–99)
GLUCOSE BLDC GLUCOMTR-MCNC: 188 MG/DL — HIGH (ref 70–99)
GLUCOSE BLDC GLUCOMTR-MCNC: 71 MG/DL — SIGNIFICANT CHANGE UP (ref 70–99)
GLUCOSE BLDC GLUCOMTR-MCNC: 90 MG/DL — SIGNIFICANT CHANGE UP (ref 70–99)
GLUCOSE SERPL-MCNC: 157 MG/DL — HIGH (ref 70–99)
GLUCOSE UR QL: NEGATIVE MG/DL — SIGNIFICANT CHANGE UP
HCT VFR BLD CALC: 40.6 % — SIGNIFICANT CHANGE UP (ref 39–50)
HGB BLD-MCNC: 12.8 G/DL — LOW (ref 13–17)
KETONES UR-MCNC: NEGATIVE — SIGNIFICANT CHANGE UP
LEUKOCYTE ESTERASE UR-ACNC: ABNORMAL
MCHC RBC-ENTMCNC: 29.8 PG — SIGNIFICANT CHANGE UP (ref 27–34)
MCHC RBC-ENTMCNC: 31.5 GM/DL — LOW (ref 32–36)
MCV RBC AUTO: 94.6 FL — SIGNIFICANT CHANGE UP (ref 80–100)
NITRITE UR-MCNC: NEGATIVE — SIGNIFICANT CHANGE UP
OSMOLALITY UR: 430 MOSM/KG — SIGNIFICANT CHANGE UP (ref 300–1000)
PH UR: 6.5 — SIGNIFICANT CHANGE UP (ref 5–8)
PLATELET # BLD AUTO: 265 K/UL — SIGNIFICANT CHANGE UP (ref 150–400)
POTASSIUM SERPL-MCNC: 3.8 MMOL/L — SIGNIFICANT CHANGE UP (ref 3.5–5.3)
POTASSIUM SERPL-SCNC: 3.8 MMOL/L — SIGNIFICANT CHANGE UP (ref 3.5–5.3)
POTASSIUM UR-SCNC: 27 MMOL/L — SIGNIFICANT CHANGE UP
PROT ?TM UR-MCNC: 30 MG/DL — HIGH (ref 0–12)
PROT UR-MCNC: 30 MG/DL
PROT/CREAT UR-RTO: 0.4 RATIO — HIGH
RBC # BLD: 4.29 M/UL — SIGNIFICANT CHANGE UP (ref 4.2–5.8)
RBC # FLD: 15.2 % — HIGH (ref 10.3–14.5)
RBC CASTS # UR COMP ASSIST: ABNORMAL /HPF (ref 0–4)
SODIUM SERPL-SCNC: 135 MMOL/L — SIGNIFICANT CHANGE UP (ref 135–145)
SODIUM UR-SCNC: 38 MMOL/L — SIGNIFICANT CHANGE UP
SP GR SPEC: 1 — LOW (ref 1.01–1.02)
UROBILINOGEN FLD QL: 8 MG/DL
WBC # BLD: 7.77 K/UL — SIGNIFICANT CHANGE UP (ref 3.8–10.5)
WBC # FLD AUTO: 7.77 K/UL — SIGNIFICANT CHANGE UP (ref 3.8–10.5)
WBC UR QL: >50

## 2021-07-10 PROCEDURE — 99233 SBSQ HOSP IP/OBS HIGH 50: CPT

## 2021-07-10 RX ORDER — FUROSEMIDE 40 MG
40 TABLET ORAL DAILY
Refills: 0 | Status: DISCONTINUED | OUTPATIENT
Start: 2021-07-10 | End: 2021-07-17

## 2021-07-10 RX ORDER — POLYETHYLENE GLYCOL 3350 17 G/17G
17 POWDER, FOR SOLUTION ORAL DAILY
Refills: 0 | Status: DISCONTINUED | OUTPATIENT
Start: 2021-07-10 | End: 2021-07-17

## 2021-07-10 RX ORDER — CEFTRIAXONE 500 MG/1
1000 INJECTION, POWDER, FOR SOLUTION INTRAMUSCULAR; INTRAVENOUS EVERY 24 HOURS
Refills: 0 | Status: COMPLETED | OUTPATIENT
Start: 2021-07-10 | End: 2021-07-13

## 2021-07-10 RX ORDER — SENNA PLUS 8.6 MG/1
2 TABLET ORAL AT BEDTIME
Refills: 0 | Status: DISCONTINUED | OUTPATIENT
Start: 2021-07-10 | End: 2021-07-17

## 2021-07-10 RX ADMIN — GABAPENTIN 300 MILLIGRAM(S): 400 CAPSULE ORAL at 17:29

## 2021-07-10 RX ADMIN — Medication 25 MILLIGRAM(S): at 05:58

## 2021-07-10 RX ADMIN — Medication 3 UNIT(S): at 08:20

## 2021-07-10 RX ADMIN — INSULIN GLARGINE 9 UNIT(S): 100 INJECTION, SOLUTION SUBCUTANEOUS at 22:07

## 2021-07-10 RX ADMIN — POLYETHYLENE GLYCOL 3350 17 GRAM(S): 17 POWDER, FOR SOLUTION ORAL at 05:58

## 2021-07-10 RX ADMIN — GABAPENTIN 300 MILLIGRAM(S): 400 CAPSULE ORAL at 05:58

## 2021-07-10 RX ADMIN — Medication 0: at 12:27

## 2021-07-10 RX ADMIN — TAMSULOSIN HYDROCHLORIDE 0.8 MILLIGRAM(S): 0.4 CAPSULE ORAL at 22:08

## 2021-07-10 RX ADMIN — Medication 1: at 17:26

## 2021-07-10 RX ADMIN — AMLODIPINE BESYLATE 5 MILLIGRAM(S): 2.5 TABLET ORAL at 05:58

## 2021-07-10 RX ADMIN — Medication 25 MILLIGRAM(S): at 17:26

## 2021-07-10 RX ADMIN — SIMVASTATIN 20 MILLIGRAM(S): 20 TABLET, FILM COATED ORAL at 22:08

## 2021-07-10 RX ADMIN — Medication 3 UNIT(S): at 17:26

## 2021-07-10 RX ADMIN — Medication 3 UNIT(S): at 12:28

## 2021-07-10 RX ADMIN — HEPARIN SODIUM 5000 UNIT(S): 5000 INJECTION INTRAVENOUS; SUBCUTANEOUS at 05:58

## 2021-07-10 RX ADMIN — SENNA PLUS 2 TABLET(S): 8.6 TABLET ORAL at 22:08

## 2021-07-10 RX ADMIN — HEPARIN SODIUM 5000 UNIT(S): 5000 INJECTION INTRAVENOUS; SUBCUTANEOUS at 14:01

## 2021-07-10 RX ADMIN — CLOPIDOGREL BISULFATE 75 MILLIGRAM(S): 75 TABLET, FILM COATED ORAL at 08:19

## 2021-07-10 RX ADMIN — Medication 1: at 08:19

## 2021-07-10 RX ADMIN — Medication 40 MILLIGRAM(S): at 14:01

## 2021-07-10 RX ADMIN — Medication 81 MILLIGRAM(S): at 08:19

## 2021-07-10 RX ADMIN — HEPARIN SODIUM 5000 UNIT(S): 5000 INJECTION INTRAVENOUS; SUBCUTANEOUS at 22:07

## 2021-07-10 NOTE — PROVIDER CONTACT NOTE (OTHER) - SITUATION
Patient has been straight cath'd 2x prior for retention, had an incomplete void prior to, MD advised patient can have a urinary cath placed, order pending, safety maintained, will cont to monitor.

## 2021-07-10 NOTE — PROGRESS NOTE ADULT - ASSESSMENT
89 year old male with a past medical history of CAD status post PCI to the LAD in 2020, severe AS status post TAVR, complete heart block status post PPM, atrial flutter and CKD stage 3, hypertension, DM type to and chronic lower extremity edema who presented to the Er with complaints of progressively worsening dyspnea     Exertional dyspnea with a history of CAD  - s/p Cardiac cath 7/9/21 showing Patent LAD stents with mild to moderate instent restenosis. RCA with moderate disease.   - Continue aspirin, Plavix BB and statin therapy  - Echocardiogram with EF of 55-60% with grade 2 diastolic dysfunction mod-severe TR and mod MR, status post TVR  - Will get CT Chest to rule out lung pathology     Acute on Chronic diastolic CHF with pulmonary hypertension  - Chronic   - Resume lasix today   - On BB  - Hold Metolazone     Atrial flutter   - not on AC due to bleeding risk     History of complete heart block status post pacemaker    ELISE on CKD stage 3   - Baseline creatinine ~1.6   - Improving   - Monitor BMP, resumed diuresis today     History of BPH  - Straight cath x 2 yesterday  - Continue flomax to 0.8 QHS    Bilateral lower extremity venous stasis ulcers  - Wound care consult    Diabetes mellitus type 2  - BG stable   - Admelog sliding scale    History of RA   - on Humira Q2 weeks  - On monthly Prolia    VTE Heparin subcut   - Awaiting PT evaluation

## 2021-07-10 NOTE — PROGRESS NOTE ADULT - SUBJECTIVE AND OBJECTIVE BOX
[facility] Division of Hospital Medicine    Chief Complaint: LACEY    SUBJECTIVE / OVERNIGHT EVENTS:  Pt says that he had a BM this morning and feels much better as his Abdominal pain is resolved   Also feels very out of breath after returning from toilet     Patient denies chest pain, abd pain, N/V, fever, chills, dysuria or any other complaints. All remainder ROS negative.     MEDICATIONS  (STANDING):  amLODIPine   Tablet 5 milliGRAM(s) Oral daily  aspirin  chewable 81 milliGRAM(s) Oral daily  clopidogrel Tablet 75 milliGRAM(s) Oral daily  dextrose 40% Gel 15 Gram(s) Oral once  dextrose 5%. 1000 milliLiter(s) (50 mL/Hr) IV Continuous <Continuous>  dextrose 5%. 1000 milliLiter(s) (100 mL/Hr) IV Continuous <Continuous>  dextrose 50% Injectable 25 Gram(s) IV Push once  dextrose 50% Injectable 12.5 Gram(s) IV Push once  dextrose 50% Injectable 25 Gram(s) IV Push once  gabapentin 300 milliGRAM(s) Oral two times a day  glucagon  Injectable 1 milliGRAM(s) IntraMuscular once  heparin   Injectable 5000 Unit(s) SubCutaneous every 8 hours  insulin glargine Injectable (LANTUS) 9 Unit(s) SubCutaneous at bedtime  insulin lispro (ADMELOG) corrective regimen sliding scale   SubCutaneous three times a day before meals  insulin lispro Injectable (ADMELOG) 3 Unit(s) SubCutaneous three times a day before meals  metoprolol tartrate 25 milliGRAM(s) Oral two times a day  senna 2 Tablet(s) Oral at bedtime  simvastatin 20 milliGRAM(s) Oral at bedtime  tamsulosin 0.8 milliGRAM(s) Oral at bedtime    MEDICATIONS  (PRN):  polyethylene glycol 3350 17 Gram(s) Oral daily PRN Constipation        I&O's Summary    09 Jul 2021 07:01  -  10 Jul 2021 07:00  --------------------------------------------------------  IN: 120 mL / OUT: 1175 mL / NET: -1055 mL        PHYSICAL EXAM:  Vital Signs Last 24 Hrs  T(C): 36.7 (10 Jul 2021 08:00), Max: 36.7 (09 Jul 2021 14:33)  T(F): 98 (10 Jul 2021 08:00), Max: 98 (09 Jul 2021 14:33)  HR: 70 (10 Jul 2021 08:00) (68 - 90)  BP: 100/66 (10 Jul 2021 08:00) (99/57 - 148/63)  BP(mean): --  RR: 18 (10 Jul 2021 08:00) (16 - 18)  SpO2: 95% (10 Jul 2021 08:00) (91% - 100%)        CONSTITUTIONAL: NAD, elderly man lying in bed   ENMT: Moist oral mucosa, several missing teeth   RESPIRATORY: Good respiratory effort; lungs are clear to auscultation bilaterally, no wheezing   CARDIOVASCULAR: Regular rate and rhythm, normal S1 and S2  ABDOMEN: Nontender to palpation, normoactive bowel sounds  MUSCULOSKELETAL:  No clubbing or cyanosis of digits, + lower extremity edema b/l   PSYCH: A+O to person, place, not time; affect appropriate  NEUROLOGY: No gross sensory or motor deficits   SKIN: LE venous stasis ulcerations b/l     LABS:                        12.8   7.77  )-----------( 265      ( 10 Jul 2021 06:52 )             40.6     07-10    135  |  95<L>  |  62.2<H>  ----------------------------<  157<H>  3.8   |  29.0  |  1.72<H>    Ca    8.9      10 Jul 2021 06:52  Mg     2.6     07-09            CAPILLARY BLOOD GLUCOSE      POCT Blood Glucose.: 90 mg/dL (10 Jul 2021 11:43)  POCT Blood Glucose.: 186 mg/dL (10 Jul 2021 07:59)  POCT Blood Glucose.: 90 mg/dL (09 Jul 2021 21:54)  POCT Blood Glucose.: 121 mg/dL (09 Jul 2021 18:01)

## 2021-07-10 NOTE — PROGRESS NOTE ADULT - SUBJECTIVE AND OBJECTIVE BOX
Tarsha Lozano M.D., F.A.C.C.                                                                                            [facility]                                                                                                [address] [place] [address]                                                                                                         [phone]  COVERING FOR IZZY Nichols is a 89 year old man with past medical history of Coronary artery disease (s/p PCI to LAD in 10/2020 with medical management of RCA disease), Severe aortic valve stenosis (s/p 34 mm CoreValve Evolut TAVR in 11/2020), Complete heart block (s/p pacemaker), Atrial flutter (not on anticoagulation), CKD (baseline Cr ~ 1.7), Hypertension, Diabetes mellitus, rheumatoid arthritis and chronic lower extremity edema (follows with vascular with unna boots) who was sent in to ER due to progressive dyspnea on minimal exertion.  The patient was recently evaluated in cardiology office with Dr. Corona in end of June with plans for left heart catherization to evaluate LAD to ensure no in-stent restenosis as cause of the dyspnea.  ECHO  (see below) with well functioning TAVR  Had cardiac cath yesterday - Patent LAD stents with mild to moderate instent restenosis.   RCA with moderate disease.   Stable overnight  Cr stable  Tele with V Paced          MEDICATIONS  (STANDING):  amLODIPine   Tablet 5 milliGRAM(s) Oral daily  aspirin  chewable 81 milliGRAM(s) Oral daily  clopidogrel Tablet 75 milliGRAM(s) Oral daily  dextrose 40% Gel 15 Gram(s) Oral once  dextrose 5%. 1000 milliLiter(s) (50 mL/Hr) IV Continuous <Continuous>  dextrose 5%. 1000 milliLiter(s) (100 mL/Hr) IV Continuous <Continuous>  dextrose 50% Injectable 25 Gram(s) IV Push once  dextrose 50% Injectable 12.5 Gram(s) IV Push once  dextrose 50% Injectable 25 Gram(s) IV Push once  gabapentin 300 milliGRAM(s) Oral two times a day  glucagon  Injectable 1 milliGRAM(s) IntraMuscular once  heparin   Injectable 5000 Unit(s) SubCutaneous every 8 hours  insulin glargine Injectable (LANTUS) 9 Unit(s) SubCutaneous at bedtime  insulin lispro (ADMELOG) corrective regimen sliding scale   SubCutaneous three times a day before meals  insulin lispro Injectable (ADMELOG) 3 Unit(s) SubCutaneous three times a day before meals  metoprolol tartrate 25 milliGRAM(s) Oral two times a day  senna 2 Tablet(s) Oral at bedtime  simvastatin 20 milliGRAM(s) Oral at bedtime  tamsulosin 0.8 milliGRAM(s) Oral at bedtime    MEDICATIONS  (PRN):  polyethylene glycol 3350 17 Gram(s) Oral daily PRN Constipation      Vital Signs Last 24 Hrs  T(C): 36.7 (10 Jul 2021 08:00), Max: 36.7 (09 Jul 2021 14:33)  T(F): 98 (10 Jul 2021 08:00), Max: 98 (09 Jul 2021 14:33)  HR: 70 (10 Jul 2021 08:00) (68 - 90)  BP: 100/66 (10 Jul 2021 08:00) (99/57 - 148/63)  BP(mean): --  RR: 18 (10 Jul 2021 08:00) (16 - 18)  SpO2: 95% (10 Jul 2021 08:00) (91% - 100%)    I&O's Detail    09 Jul 2021 07:01  -  10 Jul 2021 07:00  --------------------------------------------------------  IN:    IV PiggyBack: 120 mL  Total IN: 120 mL    OUT:    Indwelling Catheter - Urethral (mL): 650 mL    Intermittent Catheterization - Urethral (mL): 525 mL  Total OUT: 1175 mL    Total NET: -1055 mL          Constitutional:    NC/AT: Normal     HEENT:   Normal     Neck:  No JVD, bruits or thyromegaly    Respiratory:  Clear after coughing    Cardiovascular:  RR without murmur, rub or gallop.    Gastrointestinal: Soft without hepatosplenomegaly.    Extremities: without cyanosis, clubbing or edema. Legs wrapped    Neurological:  Oriented   x 3      . No gross sensory or motor defects.    Skin: Clear     Psychiatric: Normal affect.                              12.8   7.77  )-----------( 265      ( 10 Jul 2021 06:52 )             40.6     07-10    135  |  95<L>  |  62.2<H>  ----------------------------<  157<H>  3.8   |  29.0  |  1.72<H>    Ca    8.9      10 Jul 2021 06:52  Phos  2.1     07-08  Mg     2.6     07-09    TPro  x   /  Alb  3.0<L>  /  TBili  x   /  DBili  x   /  AST  x   /  ALT  x   /  AlkPhos  x   07-08        Cardiac Cath (10/2020):  CORONARY VESSELS: The coronary circulation is right dominant.  LM:   --  LM: There was a 20 % stenosis.  LAD:   --  Proximal LAD: There was a 95 % stenosis.  --  Mid LAD: There was a 95 % stenosis.  CX:   --  OM1: There was a 50 % stenosis.  RCA:   --  RCA: This vessel was not injected.  COMPLICATIONS: There were no complications.  DIAGNOSTIC IMPRESSIONS: Normal right heart pressures. Performed to assess  for need for diuretics. Wedge pressure 14-15 mmhg.  Severe LAD disease. Temporary pacemaker placed and rotational atherectomy  performed. 3 LESLYE.  DIAGNOSTIC RECOMMENDATIONS: Aspirin and plavix.  TAVR evaluation.  INTERVENTIONAL IMPRESSIONS: Normal right heart pressures. Performed to  assess for need for diuretics. Wedge pressure 14-15 mmhg.  Severe LAD disease. Temporary pacemaker placed and rotational atherectomy  performed. 3 LESLYE.        Outpatient TTE (6/23/21):  LVEF 50-55%, dyssynchronous LV, cannot rule out some hypokinesis in the inferior/inferoseptal wall   Mild asymmetric LVH  Normal RV size and mildly reduced RV systolic function  Dilated left atrium  Mild MR  TAVR, peak velocity 1.1 m/s  Moderate to severe TR  No pericardial effusion     TTE (7/8/21):  TTE images reviewed, my report:  LVEF 60-65%  Mildly dilated RV size with normal systolic function, pacer wire visualized  Biatrial enlargement   Severe mitral annular calcification, Mild to moderate MR  Severe TR, RVSP ~ 25 mmHg   TAVR well seated, peak velocity 1.1 m/s, mild AR  Trace NH   IVC not well visualized  No pericardial effusion      ECG (7/7/21): atrial flutter, ventricular paced          IMPRESSION:    LACEY  TAVR  CAD/hx of stenting   PM    No evidence of instent restenosis and TAVR functioning properly  Not in overt CHF  Med Rx of LACEY  Would get OOB and follow   Judicious diuretics following renal fx   May need outpt oxygen if sat low

## 2021-07-10 NOTE — PROGRESS NOTE ADULT - SUBJECTIVE AND OBJECTIVE BOX
[facility]                          [address] [place] [address]                       Phone: [phone]. Fax:[phone]                      ________________________________________________           Reason for follow up/Overnight events: Patient sp C - no intervention 21    HPI:  89 year old man with past medical history of Coronary artery disease (s/p PCI to LAD in 10/2020 with medical management of RCA disease), Severe aortic valve stenosis (s/p 34 mm CoreValve Evolut TAVR in 2020), Complete heart block (s/p pacemaker), Atrial flutter (not on anticoagulation), CKD 3 (baseline Cr ~ 1.6), Hypertension, Diabetes mellitus and rheumatoid arthritis and chronic lower extremity edema (follows with vascular with unna boots) who was sent in to ER due to progressive dyspnea on minimal exertion.    The patient was recently evaluated in cardiology office with Dr. Corona in end of  with plans for left heart catheterization to evaluate LAD to ensure no in-stent restenosis as cause of the dyspnea. One of daughters present at bedside who reports he has had progressive dyspnea on minimal exertion, such as with a few steps. She feels that he is usually very active and this is a dramatic change for him. He denies any chest pain. Also with progressive leg ulcerations and edema. No palpitations, presyncope or syncope. Appears to be taking medications at home.  (2021 20:47)      ROS: All review of systems negative unless indicated otherwise below.     Vital Signs Last 24 Hrs  T(C): 36.7 (10 Jul 2021 08:00), Max: 36.7 (2021 14:33)  T(F): 98 (10 Jul 2021 08:00), Max: 98 (2021 14:33)  HR: 70 (10 Jul 2021 08:00) (68 - 90)  BP: 100/66 (10 Jul 2021 08:00) (99/57 - 148/63)    RR: 18 (10 Jul 2021 08:00) (16 - 18)  SpO2: 95% (10 Jul 2021 08:00) (91% - 100%)                                                   DAILY WEIGHTS - 48 HOUR TREND     Daily Weight in k (10 Jul 2021 05:30), Weight in k.6 (2021 05:30)                             INTAKE AND OUTPUT - 48 HOUR TREND     21 @ 07:01  -  07-10-21 @ 07:00  --------------------------------------------------------  IN:  Total IN: 0 mL    OUT:    Indwelling Catheter - Urethral (mL): 650 mL    Intermittent Catheterization - Urethral (mL): 525 mL  Total OUT: 1175 mL    Total NET: -1175 mL                                                           LAB RESULTS                 COMPLETE BLOOD COUNT( 10 Jul 2021 06:52 )                            12.8 g/dL<L>  7.77 K/uL )---------------( 265 K/uL                        40.6 %                                     CHEMISTRY                 Basic Metabolic Panel (07-10-21 @ 06:52)    135  |  95<L>  |  62.2<H>  ----------------------------<  157<H>  3.8   |  29.0  |  1.72<H>    Ca    8.9      10 Jul 2021 06:52  Phos  2.1     07-08  Mg     2.6     07-09                    Liver Functions (21 @ 12:47))  TPro  x   /  Alb  3.0  /  TBili  x   /  DBili  x   /  AST  x   /  ALT  x   /  AlkPhos  x                                  Cardiac Enzymes   ( 2021 06:54 )  Troponin T  0.06 ,  CPK  X    , CKMB  X    , BNP X        , ( 2021 21:46 )  Troponin T  0.06 ,  CPK  X    , CKMB  X    , BNP 6323<H>    , ( 2021 17:21 )  Troponin T  0.05 ,  CPK  X    , CKMB  X    , BNP X                    HOME MEDICATIONS:  adalimumab 40 mg/0.8 mL subcutaneous solution: subcutaneous every 2 weeks (2021 21:20)  aspirin 81 mg oral delayed release capsule:  orally  (2021 16:37)  glimepiride 1 mg oral tablet: 0.5 milligram(s) orally once a day (2021 21:20)  glimepiride 1 mg oral tablet: 0.5 tab(s) orally once a day (2021 16:37)  Lasix: 60 milligram(s) orally once a day (2021 21:20)  metOLazone 2.5 mg oral tablet: 1 tab(s) orally 1 times a day (3 days a week) (2021 21:20)  Metoprolol Tartrate 25 mg oral tablet: 1 tab(s) orally 2 times a day (2021 21:20)  Plavix 75 mg oral tablet: 1 tab(s) orally once a day (2021 21:20)  Prolia 60 mg/mL subcutaneous solution:  (2021 21:17)  simvastatin 20 mg oral tablet: 1 tab(s) orally once a day (at bedtime) (2021 21:17)  tamsulosin 0.4 mg oral capsule: 1 cap(s) orally once a day (at bedtime) (2021 21:17)                             Current Admission Active Medications    amLODIPine   Tablet 5 milliGRAM(s) Oral daily  aspirin  chewable 81 milliGRAM(s) Oral daily  clopidogrel Tablet 75 milliGRAM(s) Oral daily  dextrose 40% Gel 15 Gram(s) Oral once  dextrose 5%. 1000 milliLiter(s) (50 mL/Hr) IV Continuous <Continuous>  dextrose 5%. 1000 milliLiter(s) (100 mL/Hr) IV Continuous <Continuous>  dextrose 50% Injectable 25 Gram(s) IV Push once  dextrose 50% Injectable 12.5 Gram(s) IV Push once  dextrose 50% Injectable 25 Gram(s) IV Push once  gabapentin 300 milliGRAM(s) Oral two times a day  glucagon  Injectable 1 milliGRAM(s) IntraMuscular once  heparin   Injectable 5000 Unit(s) SubCutaneous every 8 hours  insulin glargine Injectable (LANTUS) 9 Unit(s) SubCutaneous at bedtime  insulin lispro (ADMELOG) corrective regimen sliding scale   SubCutaneous three times a day before meals  insulin lispro Injectable (ADMELOG) 3 Unit(s) SubCutaneous three times a day before meals  metoprolol tartrate 25 milliGRAM(s) Oral two times a day  polyethylene glycol 3350 17 Gram(s) Oral daily PRN Constipation  senna 2 Tablet(s) Oral at bedtime  simvastatin 20 milliGRAM(s) Oral at bedtime  tamsulosin 0.8 milliGRAM(s) Oral at bedtime                        PHYSICAL EXAM:    GENERAL: NAD  NECK: Supple, No JVD  NERVOUS SYSTEM:  Alert & Oriented X3, non focal neuro exam.   CHEST/LUNG: clear lungs, No rales, rhonchi, wheezing, or rubs  HEART: Regular rate and rhythm; s1 and s2 auscultated, No murmurs, rubs, or gallops  ABDOMEN: Soft, Nontender, Nondistended; Bowel sounds present and normoactive.   EXTREMITIES:  2+ Peripheral Pulses, No clubbing, cyanosis, or edema; R radial and R femoral arterial sites without s/s bleeding or hematoma, dressings removed sites OMAR     ASSESSMENT/PLAN:    Coronary artery disease  Now s/p left heart catheterization with attempted RRA access and right groin approach closed with Angioseal after 2 failed Mynx with Dr. Doshi: Patent LAD stents with mild to moderate instent restenosis. RCA with moderate disease. No interventions at this time.  LVEDP normal- not volume overloaded.     -Continue current medical therapy

## 2021-07-11 LAB
ANION GAP SERPL CALC-SCNC: 13 MMOL/L — SIGNIFICANT CHANGE UP (ref 5–17)
BASOPHILS # BLD AUTO: 0.02 K/UL — SIGNIFICANT CHANGE UP (ref 0–0.2)
BASOPHILS NFR BLD AUTO: 0.3 % — SIGNIFICANT CHANGE UP (ref 0–2)
BUN SERPL-MCNC: 52.7 MG/DL — HIGH (ref 8–20)
CALCIUM SERPL-MCNC: 8.5 MG/DL — LOW (ref 8.6–10.2)
CHLORIDE SERPL-SCNC: 98 MMOL/L — SIGNIFICANT CHANGE UP (ref 98–107)
CO2 SERPL-SCNC: 26 MMOL/L — SIGNIFICANT CHANGE UP (ref 22–29)
CREAT SERPL-MCNC: 1.69 MG/DL — HIGH (ref 0.5–1.3)
EOSINOPHIL # BLD AUTO: 0.09 K/UL — SIGNIFICANT CHANGE UP (ref 0–0.5)
EOSINOPHIL NFR BLD AUTO: 1.5 % — SIGNIFICANT CHANGE UP (ref 0–6)
GLUCOSE BLDC GLUCOMTR-MCNC: 153 MG/DL — HIGH (ref 70–99)
GLUCOSE BLDC GLUCOMTR-MCNC: 90 MG/DL — SIGNIFICANT CHANGE UP (ref 70–99)
GLUCOSE BLDC GLUCOMTR-MCNC: 97 MG/DL — SIGNIFICANT CHANGE UP (ref 70–99)
GLUCOSE BLDC GLUCOMTR-MCNC: 97 MG/DL — SIGNIFICANT CHANGE UP (ref 70–99)
GLUCOSE SERPL-MCNC: 84 MG/DL — SIGNIFICANT CHANGE UP (ref 70–99)
HCT VFR BLD CALC: 37.4 % — LOW (ref 39–50)
HGB BLD-MCNC: 11.9 G/DL — LOW (ref 13–17)
IMM GRANULOCYTES NFR BLD AUTO: 0.2 % — SIGNIFICANT CHANGE UP (ref 0–1.5)
LYMPHOCYTES # BLD AUTO: 1.13 K/UL — SIGNIFICANT CHANGE UP (ref 1–3.3)
LYMPHOCYTES # BLD AUTO: 18.6 % — SIGNIFICANT CHANGE UP (ref 13–44)
MCHC RBC-ENTMCNC: 30.1 PG — SIGNIFICANT CHANGE UP (ref 27–34)
MCHC RBC-ENTMCNC: 31.8 GM/DL — LOW (ref 32–36)
MCV RBC AUTO: 94.4 FL — SIGNIFICANT CHANGE UP (ref 80–100)
MONOCYTES # BLD AUTO: 0.75 K/UL — SIGNIFICANT CHANGE UP (ref 0–0.9)
MONOCYTES NFR BLD AUTO: 12.3 % — SIGNIFICANT CHANGE UP (ref 2–14)
NEUTROPHILS # BLD AUTO: 4.09 K/UL — SIGNIFICANT CHANGE UP (ref 1.8–7.4)
NEUTROPHILS NFR BLD AUTO: 67.1 % — SIGNIFICANT CHANGE UP (ref 43–77)
PLATELET # BLD AUTO: 254 K/UL — SIGNIFICANT CHANGE UP (ref 150–400)
POTASSIUM SERPL-MCNC: 3.9 MMOL/L — SIGNIFICANT CHANGE UP (ref 3.5–5.3)
POTASSIUM SERPL-SCNC: 3.9 MMOL/L — SIGNIFICANT CHANGE UP (ref 3.5–5.3)
RBC # BLD: 3.96 M/UL — LOW (ref 4.2–5.8)
RBC # FLD: 15.4 % — HIGH (ref 10.3–14.5)
SODIUM SERPL-SCNC: 137 MMOL/L — SIGNIFICANT CHANGE UP (ref 135–145)
UUN UR-MCNC: 758 MG/DL — SIGNIFICANT CHANGE UP
WBC # BLD: 6.09 K/UL — SIGNIFICANT CHANGE UP (ref 3.8–10.5)
WBC # FLD AUTO: 6.09 K/UL — SIGNIFICANT CHANGE UP (ref 3.8–10.5)

## 2021-07-11 PROCEDURE — 71250 CT THORAX DX C-: CPT | Mod: 26

## 2021-07-11 PROCEDURE — 99233 SBSQ HOSP IP/OBS HIGH 50: CPT

## 2021-07-11 RX ADMIN — AMLODIPINE BESYLATE 5 MILLIGRAM(S): 2.5 TABLET ORAL at 06:04

## 2021-07-11 RX ADMIN — INSULIN GLARGINE 9 UNIT(S): 100 INJECTION, SOLUTION SUBCUTANEOUS at 22:25

## 2021-07-11 RX ADMIN — Medication 1: at 17:06

## 2021-07-11 RX ADMIN — Medication 3 UNIT(S): at 17:06

## 2021-07-11 RX ADMIN — GABAPENTIN 300 MILLIGRAM(S): 400 CAPSULE ORAL at 17:02

## 2021-07-11 RX ADMIN — CLOPIDOGREL BISULFATE 75 MILLIGRAM(S): 75 TABLET, FILM COATED ORAL at 08:46

## 2021-07-11 RX ADMIN — Medication 81 MILLIGRAM(S): at 08:46

## 2021-07-11 RX ADMIN — GABAPENTIN 300 MILLIGRAM(S): 400 CAPSULE ORAL at 06:05

## 2021-07-11 RX ADMIN — Medication 3 UNIT(S): at 08:46

## 2021-07-11 RX ADMIN — HEPARIN SODIUM 5000 UNIT(S): 5000 INJECTION INTRAVENOUS; SUBCUTANEOUS at 22:24

## 2021-07-11 RX ADMIN — CEFTRIAXONE 100 MILLIGRAM(S): 500 INJECTION, POWDER, FOR SOLUTION INTRAMUSCULAR; INTRAVENOUS at 06:04

## 2021-07-11 RX ADMIN — Medication 25 MILLIGRAM(S): at 06:04

## 2021-07-11 RX ADMIN — Medication 40 MILLIGRAM(S): at 06:04

## 2021-07-11 RX ADMIN — SENNA PLUS 2 TABLET(S): 8.6 TABLET ORAL at 22:24

## 2021-07-11 RX ADMIN — Medication 25 MILLIGRAM(S): at 17:02

## 2021-07-11 RX ADMIN — TAMSULOSIN HYDROCHLORIDE 0.8 MILLIGRAM(S): 0.4 CAPSULE ORAL at 22:24

## 2021-07-11 RX ADMIN — HEPARIN SODIUM 5000 UNIT(S): 5000 INJECTION INTRAVENOUS; SUBCUTANEOUS at 17:02

## 2021-07-11 RX ADMIN — HEPARIN SODIUM 5000 UNIT(S): 5000 INJECTION INTRAVENOUS; SUBCUTANEOUS at 06:04

## 2021-07-11 RX ADMIN — Medication 3 UNIT(S): at 12:14

## 2021-07-11 RX ADMIN — SIMVASTATIN 20 MILLIGRAM(S): 20 TABLET, FILM COATED ORAL at 22:24

## 2021-07-11 NOTE — PHYSICAL THERAPY INITIAL EVALUATION ADULT - ADDITIONAL COMMENTS
Pt. may be a poor historian. Pt. reporting he lives with his wife and daughter in a house with 1 ANA and 6 inside to the bathroom with rail. Pt. reports he was modified independent with a RW PTA and owns other DME, but is unable to recall.

## 2021-07-11 NOTE — PROGRESS NOTE ADULT - ASSESSMENT
89 year old male with a past medical history of CAD status post PCI to the LAD in 2020, severe AS status post TAVR, complete heart block status post PPM, atrial flutter and CKD stage 3, hypertension, DM type to and chronic lower extremity edema who presented to the Er with complaints of progressively worsening dyspnea     Exertional dyspnea with a history of CAD  - Multifactorial: CAD, CHF   - s/p Cardiac cath 7/9/21 showing Patent LAD stents with mild to moderate instent restenosis. RCA with moderate disease.   - Continue aspirin, Plavix BB and statin therapy  - Echocardiogram with EF of 55-60% with grade 2 diastolic dysfunction mod-severe TR and mod MR, status post TVR  - CT Chest: No focal consolidation. Trace bilateral pleural effusion, decreased since 11/21/2020. Small nodular density at the right proximal mainstem bronchus near the karen, which was noted on 11/21/2020 and may be related to mucus/secretion. Follow-up may be obtained to exclude potential endobronchial lesion/neoplasm.    Acute on Chronic diastolic CHF with pulmonary hypertension  - Chronic   - Resumed lasix   - On BB  - Hold Metolazone     UTI  - UA positive   - UCx sent   - c/w Ceftriaxone     Atrial flutter   - not on AC due to bleeding risk     History of complete heart block status post pacemaker    ELISE on CKD stage 3   - Baseline creatinine ~1.6   - Improving   - Monitor BMP, resumed diuresis     History of BPH  - Straight cath x 2   - Now on Villa catheter   - Continue flomax 0.8 QHS    Bilateral lower extremity venous stasis ulcers  - Wound care consult    Diabetes mellitus type 2  - BG stable   - Admelog sliding scale    History of RA   - on Humira Q2 weeks  - On monthly Prolia    VTE Heparin subcut   Dispo: Awaiting PT evaluation     Spoke to daughter Chasidy today

## 2021-07-11 NOTE — PHYSICAL THERAPY INITIAL EVALUATION ADULT - PERTINENT HX OF CURRENT PROBLEM, REHAB EVAL
89 year old male with a past medical history of CAD status post PCI to the LAD in 2020, severe AS status post TAVR, complete heart block status post PPM, atrial flutter and CKD stage 3, hypertension, DM type to and chronic lower extremity edema who presented to the Er with complaints of progressively worsening dyspnea

## 2021-07-11 NOTE — PROGRESS NOTE ADULT - SUBJECTIVE AND OBJECTIVE BOX
[facility] Division of Hospital Medicine    Chief Complaint: LACEY    SUBJECTIVE / OVERNIGHT EVENTS:  Pt says he feels OK this morning   Villa placed yesterday as he was retaining urine and had straight cath x 2     Patient denies chest pain, abd pain, N/V, fever, chills, dysuria or any other complaints. All remainder ROS negative.     MEDICATIONS  (STANDING):  amLODIPine   Tablet 5 milliGRAM(s) Oral daily  aspirin  chewable 81 milliGRAM(s) Oral daily  cefTRIAXone   IVPB 1000 milliGRAM(s) IV Intermittent every 24 hours  clopidogrel Tablet 75 milliGRAM(s) Oral daily  dextrose 40% Gel 15 Gram(s) Oral once  dextrose 5%. 1000 milliLiter(s) (50 mL/Hr) IV Continuous <Continuous>  dextrose 5%. 1000 milliLiter(s) (100 mL/Hr) IV Continuous <Continuous>  dextrose 50% Injectable 25 Gram(s) IV Push once  dextrose 50% Injectable 12.5 Gram(s) IV Push once  dextrose 50% Injectable 25 Gram(s) IV Push once  furosemide    Tablet 40 milliGRAM(s) Oral daily  gabapentin 300 milliGRAM(s) Oral two times a day  glucagon  Injectable 1 milliGRAM(s) IntraMuscular once  heparin   Injectable 5000 Unit(s) SubCutaneous every 8 hours  insulin glargine Injectable (LANTUS) 9 Unit(s) SubCutaneous at bedtime  insulin lispro (ADMELOG) corrective regimen sliding scale   SubCutaneous three times a day before meals  insulin lispro Injectable (ADMELOG) 3 Unit(s) SubCutaneous three times a day before meals  metoprolol tartrate 25 milliGRAM(s) Oral two times a day  senna 2 Tablet(s) Oral at bedtime  simvastatin 20 milliGRAM(s) Oral at bedtime  tamsulosin 0.8 milliGRAM(s) Oral at bedtime    MEDICATIONS  (PRN):  polyethylene glycol 3350 17 Gram(s) Oral daily PRN Constipation        I&O's Summary    10 Jul 2021 07:01  -  2021 07:00  --------------------------------------------------------  IN: 0 mL / OUT: 380 mL / NET: -380 mL        PHYSICAL EXAM:  Vital Signs Last 24 Hrs  T(C): 36.8 (2021 08:38), Max: 36.8 (2021 08:38)  T(F): 98.2 (2021 08:38), Max: 98.2 (2021 08:38)  HR: 70 (2021 08:38) (66 - 72)  BP: 97/58 (2021 08:38) (97/58 - 129/61)  BP(mean): 82 (10 Jul 2021 13:56) (82 - 82)  RR: 20 (2021 08:38) (18 - 20)  SpO2: 97% (2021 08:38) (97% - 100%)      CONSTITUTIONAL: NAD, elderly man lying in bed   ENMT: Moist oral mucosa, several missing teeth   RESPIRATORY: Good respiratory effort; lungs are clear to auscultation bilaterally, no wheezing   CARDIOVASCULAR: Regular rate and rhythm, normal S1 and S2  ABDOMEN: Nontender to palpation  MUSCULOSKELETAL:  No clubbing or cyanosis of digits, + lower extremity edema b/l   PSYCH: A+O to person, place, not time; affect appropriate  NEUROLOGY: No gross sensory or motor deficits   SKIN: LE venous stasis ulcerations b/l       LABS:                        11.9   6.09  )-----------( 254      ( 2021 07:08 )             37.4     07-11    137  |  98  |  52.7<H>  ----------------------------<  84  3.9   |  26.0  |  1.69<H>    Ca    8.5<L>      2021 07:08            Urinalysis Basic - ( 10 Jul 2021 19:28 )    Color: Yellow / Appearance: Cloudy / S.005 / pH: x  Gluc: x / Ketone: Negative  / Bili: Negative / Urobili: 8 mg/dL   Blood: x / Protein: 30 mg/dL / Nitrite: Negative   Leuk Esterase: Moderate / RBC: 3-5 /HPF / WBC >50   Sq Epi: x / Non Sq Epi: Occasional / Bacteria: Moderate        CAPILLARY BLOOD GLUCOSE      POCT Blood Glucose.: 97 mg/dL (2021 12:04)  POCT Blood Glucose.: 90 mg/dL (2021 08:14)  POCT Blood Glucose.: 71 mg/dL (10 Jul 2021 20:54)  POCT Blood Glucose.: 188 mg/dL (10 Jul 2021 16:38)

## 2021-07-11 NOTE — PROGRESS NOTE ADULT - SUBJECTIVE AND OBJECTIVE BOX
Tarsha Lozano M.D., F.A.C.C.                                                                                            [facility]                                                                                                [address] [place] [address]                                                                                                     [phone]  COVERING FOR IZZY Nichols is a 89 year old man with past medical history of Coronary artery disease (s/p PCI to LAD in 10/2020 with medical management of RCA disease), Severe aortic valve stenosis (s/p 34 mm CoreValve Evolut TAVR in 11/2020), Complete heart block (s/p pacemaker), Atrial flutter (not on anticoagulation), CKD (baseline Cr ~ 1.7), Hypertension, Diabetes mellitus, rheumatoid arthritis and chronic lower extremity edema (follows with vascular with unna boots) who was sent in to ER due to progressive dyspnea on minimal exertion.  The patient was recently evaluated in cardiology office with Dr. Corona in end of June with plans for left heart catherization to evaluate LAD to ensure no in-stent restenosis as cause of the dyspnea.  ECHO  (see below) with well functioning TAVR  Had cardiac cath - Patent LAD stents with mild to moderate instent restenosis.   RCA with moderate disease.   Stable overnight  Cr stable  Tele with V Paced  Pt reportedly still with LACEY but sat fine  Restarted on Lasix only yesterday (renal fx stable)          MEDICATIONS  (STANDING):  amLODIPine   Tablet 5 milliGRAM(s) Oral daily  aspirin  chewable 81 milliGRAM(s) Oral daily  cefTRIAXone   IVPB 1000 milliGRAM(s) IV Intermittent every 24 hours  clopidogrel Tablet 75 milliGRAM(s) Oral daily  dextrose 40% Gel 15 Gram(s) Oral once  dextrose 5%. 1000 milliLiter(s) (50 mL/Hr) IV Continuous <Continuous>  dextrose 5%. 1000 milliLiter(s) (100 mL/Hr) IV Continuous <Continuous>  dextrose 50% Injectable 25 Gram(s) IV Push once  dextrose 50% Injectable 12.5 Gram(s) IV Push once  dextrose 50% Injectable 25 Gram(s) IV Push once  furosemide    Tablet 40 milliGRAM(s) Oral daily  gabapentin 300 milliGRAM(s) Oral two times a day  glucagon  Injectable 1 milliGRAM(s) IntraMuscular once  heparin   Injectable 5000 Unit(s) SubCutaneous every 8 hours  insulin glargine Injectable (LANTUS) 9 Unit(s) SubCutaneous at bedtime  insulin lispro (ADMELOG) corrective regimen sliding scale   SubCutaneous three times a day before meals  insulin lispro Injectable (ADMELOG) 3 Unit(s) SubCutaneous three times a day before meals  metoprolol tartrate 25 milliGRAM(s) Oral two times a day  senna 2 Tablet(s) Oral at bedtime  simvastatin 20 milliGRAM(s) Oral at bedtime  tamsulosin 0.8 milliGRAM(s) Oral at bedtime    MEDICATIONS  (PRN):  polyethylene glycol 3350 17 Gram(s) Oral daily PRN Constipation      Vital Signs Last 24 Hrs  T(C): 36.8 (11 Jul 2021 08:38), Max: 36.8 (11 Jul 2021 08:38)  T(F): 98.2 (11 Jul 2021 08:38), Max: 98.2 (11 Jul 2021 08:38)  HR: 70 (11 Jul 2021 08:38) (66 - 72)  BP: 97/58 (11 Jul 2021 08:38) (97/58 - 129/61)  BP(mean): 82 (10 Jul 2021 13:56) (82 - 82)  RR: 20 (11 Jul 2021 08:38) (18 - 20)  SpO2: 97% (11 Jul 2021 08:38) (97% - 100%)    I&O's Detail    10 Jul 2021 07:01  -  11 Jul 2021 07:00  --------------------------------------------------------  IN:  Total IN: 0 mL    OUT:    Intermittent Catheterization - Urethral (mL): 380 mL  Total OUT: 380 mL    Total NET: -380 mL          Constitutional:    NC/AT: Normal     HEENT: Normal     Neck:  No JVD, bruits or thyromegaly    Respiratory:  Clear without rales or rhonchi    Cardiovascular:  RR without murmur, rub or gallop.    Gastrointestinal: Soft without hepatosplenomegaly.    Extremities: without cyanosis, clubbing or edema.    Neurological:  Oriented   x    3   . No gross sensory or motor defects.  Deaf    Skin: Normal     Psychiatric: Normal affect.                              11.9   6.09  )-----------( 254      ( 11 Jul 2021 07:08 )             37.4     07-11    137  |  98  |  52.7<H>  ----------------------------<  84  3.9   |  26.0  |  1.69<H>    Ca    8.5<L>      11 Jul 2021 07:08          Cardiac Cath (10/2020):  CORONARY VESSELS: The coronary circulation is right dominant.  LM:   --  LM: There was a 20 % stenosis.  LAD:   --  Proximal LAD: There was a 95 % stenosis.  --  Mid LAD: There was a 95 % stenosis.  CX:   --  OM1: There was a 50 % stenosis.  RCA:   --  RCA: This vessel was not injected.  COMPLICATIONS: There were no complications.  DIAGNOSTIC IMPRESSIONS: Normal right heart pressures. Performed to assess  for need for diuretics. Wedge pressure 14-15 mmhg.  Severe LAD disease. Temporary pacemaker placed and rotational atherectomy  performed. 3 LESLYE.  DIAGNOSTIC RECOMMENDATIONS: Aspirin and plavix.  TAVR evaluation.  INTERVENTIONAL IMPRESSIONS: Normal right heart pressures. Performed to  assess for need for diuretics. Wedge pressure 14-15 mmhg.  Severe LAD disease. Temporary pacemaker placed and rotational atherectomy  performed. 3 LESLYE.        Outpatient TTE (6/23/21):  LVEF 50-55%, dyssynchronous LV, cannot rule out some hypokinesis in the inferior/inferoseptal wall   Mild asymmetric LVH  Normal RV size and mildly reduced RV systolic function  Dilated left atrium  Mild MR  TAVR, peak velocity 1.1 m/s  Moderate to severe TR  No pericardial effusion     TTE (7/8/21):  TTE images reviewed, my report:  LVEF 60-65%  Mildly dilated RV size with normal systolic function, pacer wire visualized  Biatrial enlargement   Severe mitral annular calcification, Mild to moderate MR  Severe TR, RVSP ~ 25 mmHg   TAVR well seated, peak velocity 1.1 m/s, mild AR  Trace MO   IVC not well visualized  No pericardial effusion      ECG (7/7/21): atrial flutter, ventricular paced          IMPRESSION:    LACEY  TAVR  CAD/hx of stenting   PM    No evidence of instent restenosis and TAVR functioning properly  Not in overt CHF - chest clearer (Lasix restarted) and renal fx stable.   Would get OOB with PT  Follow sat with activity (reportedly has been fine despite complaints of LACEY)  LICMA will be following pt                             IMPRESSION:

## 2021-07-12 LAB
ANION GAP SERPL CALC-SCNC: 14 MMOL/L — SIGNIFICANT CHANGE UP (ref 5–17)
BASOPHILS # BLD AUTO: 0.02 K/UL — SIGNIFICANT CHANGE UP (ref 0–0.2)
BASOPHILS NFR BLD AUTO: 0.4 % — SIGNIFICANT CHANGE UP (ref 0–2)
BUN SERPL-MCNC: 45.2 MG/DL — HIGH (ref 8–20)
CALCIUM SERPL-MCNC: 8.2 MG/DL — LOW (ref 8.6–10.2)
CHLORIDE SERPL-SCNC: 97 MMOL/L — LOW (ref 98–107)
CO2 SERPL-SCNC: 24 MMOL/L — SIGNIFICANT CHANGE UP (ref 22–29)
CREAT SERPL-MCNC: 1.58 MG/DL — HIGH (ref 0.5–1.3)
EOSINOPHIL # BLD AUTO: 0.13 K/UL — SIGNIFICANT CHANGE UP (ref 0–0.5)
EOSINOPHIL NFR BLD AUTO: 2.3 % — SIGNIFICANT CHANGE UP (ref 0–6)
GLUCOSE BLDC GLUCOMTR-MCNC: 138 MG/DL — HIGH (ref 70–99)
GLUCOSE BLDC GLUCOMTR-MCNC: 142 MG/DL — HIGH (ref 70–99)
GLUCOSE BLDC GLUCOMTR-MCNC: 182 MG/DL — HIGH (ref 70–99)
GLUCOSE BLDC GLUCOMTR-MCNC: 83 MG/DL — SIGNIFICANT CHANGE UP (ref 70–99)
GLUCOSE SERPL-MCNC: 97 MG/DL — SIGNIFICANT CHANGE UP (ref 70–99)
HCT VFR BLD CALC: 35.8 % — LOW (ref 39–50)
HGB BLD-MCNC: 11.5 G/DL — LOW (ref 13–17)
IMM GRANULOCYTES NFR BLD AUTO: 0.4 % — SIGNIFICANT CHANGE UP (ref 0–1.5)
LYMPHOCYTES # BLD AUTO: 1.42 K/UL — SIGNIFICANT CHANGE UP (ref 1–3.3)
LYMPHOCYTES # BLD AUTO: 25.2 % — SIGNIFICANT CHANGE UP (ref 13–44)
MCHC RBC-ENTMCNC: 30.3 PG — SIGNIFICANT CHANGE UP (ref 27–34)
MCHC RBC-ENTMCNC: 32.1 GM/DL — SIGNIFICANT CHANGE UP (ref 32–36)
MCV RBC AUTO: 94.2 FL — SIGNIFICANT CHANGE UP (ref 80–100)
MONOCYTES # BLD AUTO: 0.72 K/UL — SIGNIFICANT CHANGE UP (ref 0–0.9)
MONOCYTES NFR BLD AUTO: 12.8 % — SIGNIFICANT CHANGE UP (ref 2–14)
NEUTROPHILS # BLD AUTO: 3.32 K/UL — SIGNIFICANT CHANGE UP (ref 1.8–7.4)
NEUTROPHILS NFR BLD AUTO: 58.9 % — SIGNIFICANT CHANGE UP (ref 43–77)
PLATELET # BLD AUTO: 251 K/UL — SIGNIFICANT CHANGE UP (ref 150–400)
POTASSIUM SERPL-MCNC: 3.7 MMOL/L — SIGNIFICANT CHANGE UP (ref 3.5–5.3)
POTASSIUM SERPL-SCNC: 3.7 MMOL/L — SIGNIFICANT CHANGE UP (ref 3.5–5.3)
RBC # BLD: 3.8 M/UL — LOW (ref 4.2–5.8)
RBC # FLD: 15.3 % — HIGH (ref 10.3–14.5)
SODIUM SERPL-SCNC: 135 MMOL/L — SIGNIFICANT CHANGE UP (ref 135–145)
WBC # BLD: 5.63 K/UL — SIGNIFICANT CHANGE UP (ref 3.8–10.5)
WBC # FLD AUTO: 5.63 K/UL — SIGNIFICANT CHANGE UP (ref 3.8–10.5)

## 2021-07-12 PROCEDURE — 99232 SBSQ HOSP IP/OBS MODERATE 35: CPT

## 2021-07-12 PROCEDURE — 99233 SBSQ HOSP IP/OBS HIGH 50: CPT

## 2021-07-12 RX ADMIN — Medication 0: at 12:28

## 2021-07-12 RX ADMIN — SENNA PLUS 2 TABLET(S): 8.6 TABLET ORAL at 21:52

## 2021-07-12 RX ADMIN — Medication 1: at 17:37

## 2021-07-12 RX ADMIN — Medication 25 MILLIGRAM(S): at 05:15

## 2021-07-12 RX ADMIN — Medication 40 MILLIGRAM(S): at 05:15

## 2021-07-12 RX ADMIN — Medication 81 MILLIGRAM(S): at 08:20

## 2021-07-12 RX ADMIN — CLOPIDOGREL BISULFATE 75 MILLIGRAM(S): 75 TABLET, FILM COATED ORAL at 08:20

## 2021-07-12 RX ADMIN — GABAPENTIN 300 MILLIGRAM(S): 400 CAPSULE ORAL at 17:41

## 2021-07-12 RX ADMIN — TAMSULOSIN HYDROCHLORIDE 0.8 MILLIGRAM(S): 0.4 CAPSULE ORAL at 21:52

## 2021-07-12 RX ADMIN — SIMVASTATIN 20 MILLIGRAM(S): 20 TABLET, FILM COATED ORAL at 21:52

## 2021-07-12 RX ADMIN — HEPARIN SODIUM 5000 UNIT(S): 5000 INJECTION INTRAVENOUS; SUBCUTANEOUS at 14:37

## 2021-07-12 RX ADMIN — Medication 3 UNIT(S): at 12:29

## 2021-07-12 RX ADMIN — Medication 3 UNIT(S): at 17:38

## 2021-07-12 RX ADMIN — CEFTRIAXONE 100 MILLIGRAM(S): 500 INJECTION, POWDER, FOR SOLUTION INTRAMUSCULAR; INTRAVENOUS at 05:15

## 2021-07-12 RX ADMIN — INSULIN GLARGINE 9 UNIT(S): 100 INJECTION, SOLUTION SUBCUTANEOUS at 21:52

## 2021-07-12 RX ADMIN — HEPARIN SODIUM 5000 UNIT(S): 5000 INJECTION INTRAVENOUS; SUBCUTANEOUS at 05:15

## 2021-07-12 RX ADMIN — HEPARIN SODIUM 5000 UNIT(S): 5000 INJECTION INTRAVENOUS; SUBCUTANEOUS at 21:52

## 2021-07-12 RX ADMIN — GABAPENTIN 300 MILLIGRAM(S): 400 CAPSULE ORAL at 05:15

## 2021-07-12 RX ADMIN — AMLODIPINE BESYLATE 5 MILLIGRAM(S): 2.5 TABLET ORAL at 05:15

## 2021-07-12 RX ADMIN — Medication 25 MILLIGRAM(S): at 21:52

## 2021-07-12 NOTE — PROGRESS NOTE ADULT - ASSESSMENT
89 year old male with a past medical history of CAD status post PCI to the LAD in 2020, severe AS status post TAVR, complete heart block status post PPM, atrial flutter and CKD stage 3, hypertension, DM type to and chronic lower extremity edema who presented to the Er with complaints of progressively worsening dyspnea     Exertional dyspnea with a history of CAD  - Multifactorial: CAD, CHF   - s/p Cardiac cath 7/9/21 showing Patent LAD stents with mild to moderate instent restenosis. RCA with moderate disease.   - Continue aspirin, Plavix BB and statin therapy  - Echocardiogram with EF of 55-60% with grade 2 diastolic dysfunction mod-severe TR and mod MR, status post TVR  - CT Chest: No focal consolidation. Trace bilateral pleural effusion, decreased since 11/21/2020. Small nodular density at the right proximal mainstem bronchus near the karen, which was noted on 11/21/2020 and may be related to mucus/secretion. Follow-up may be obtained to exclude potential endobronchial lesion/neoplasm.  - Discussed with Cardiology, will consider pacemaker interrogation     Acute on Chronic diastolic CHF with pulmonary hypertension  - Chronic   - Resumed lasix   - On BB  - Hold Metolazone     UTI  - UA positive   - UCx sent   - c/w Ceftriaxone     Atrial flutter   - not on AC due to bleeding risk     History of complete heart block status post pacemaker    ELISE on CKD stage 3   - Baseline creatinine ~1.6   - Improving   - Monitor BMP, resumed diuresis     History of BPH  - c/w Villa catheter   - Continue flomax 0.8 QHS    Bilateral lower extremity venous stasis ulcers  - Wound care consult    Diabetes mellitus type 2  - BG stable   - Admelog sliding scale    History of RA   - on Humira Q2 weeks  - On monthly Prolia    VTE Heparin subcut   Dispo: PT evaluation recommends [facility]     Spoke to daughter Chasidy today

## 2021-07-12 NOTE — PROGRESS NOTE ADULT - ASSESSMENT
Assessment:  Morales Nichols is a 89 year old man with past medical history of Coronary artery disease (s/p PCI to LAD in 10/2020 with medical management of RCA disease), Severe aortic valve stenosis (s/p 34 mm CoreValve Evolut TAVR in 11/2020), Complete heart block (s/p pacemaker), Atrial flutter (not on anticoagulation), CKD (baseline Cr ~ 1.7), Hypertension, Diabetes mellitus, rheumatoid arthritis and chronic lower extremity edema (follows with vascular with unna boots) who was sent in to ER due to progressive dyspnea on minimal exertion.    The patient was recently evaluated in cardiology office with Dr. Corona in end of June with plans for left heart catherization to evaluate LAD to ensure no in-stent restenosis as cause of the dyspnea.     His progressive dyspnea on exertion may be due to progressive valvular disease such as his moderate-to-severe tricuspid regurgitation, possible pulmonary hypertension and RV dysfunction, will plan to repeat echocardiogram. Other possibilities include symptoms due to LAD disease, RCA disease (that was medically managed). Troponins negative. Echo with preserved LV and RV systolic function and severe tricuspid regurgitation.       Recommendations:  [] CAD: S/p PCI to LAD in 10/2020. Cardiac catherization here with no significant re-stenosis and so interventional cardiology recommends medical management. Continue home Aspirin 81 mg daily and Plavix 75 mg daily. Would continue Metoprolol tartrate 25 mg BID. Continue statin.   [] CKD: Cr improved and stable at 1.6 today   [] Right ventricle dysfunction/Moderate to Severe Tricuspid Regurgitation: Echo with severe tricuspid regurgitation, however no pulmonary hypertension at this time. Has been restarted on lower dose of Lasix 40 mg PO daily, and patient reports no dyspnea and renal function stable. Will need close outpatient followup.   [] Atrial flutter: Patient with history of this but not on anticoagulation due to high risk of bleeding (as patient already on dual antiplatelets) per his cardiologist.    Thank you for the consult. We will continue to follow along.    Recommend physical therapy and out of bed to chair       Nasra Iyer MD  Cardiology

## 2021-07-12 NOTE — PROGRESS NOTE ADULT - SUBJECTIVE AND OBJECTIVE BOX
BEVERLY THOMAS  [id number]        Chief Complaint: Dyspnea on exertion/CHF/CAD    Interval History: The patient reports feeling well, denies dyspnea.     Tele: atrial fibrillation 60s BPM      Current meds:   amLODIPine   Tablet 5 milliGRAM(s) Oral daily  aspirin  chewable 81 milliGRAM(s) Oral daily  cefTRIAXone   IVPB 1000 milliGRAM(s) IV Intermittent every 24 hours  clopidogrel Tablet 75 milliGRAM(s) Oral daily  dextrose 40% Gel 15 Gram(s) Oral once  dextrose 5%. 1000 milliLiter(s) IV Continuous <Continuous>  dextrose 5%. 1000 milliLiter(s) IV Continuous <Continuous>  dextrose 50% Injectable 25 Gram(s) IV Push once  dextrose 50% Injectable 12.5 Gram(s) IV Push once  dextrose 50% Injectable 25 Gram(s) IV Push once  furosemide    Tablet 40 milliGRAM(s) Oral daily  gabapentin 300 milliGRAM(s) Oral two times a day  glucagon  Injectable 1 milliGRAM(s) IntraMuscular once  heparin   Injectable 5000 Unit(s) SubCutaneous every 8 hours  insulin glargine Injectable (LANTUS) 9 Unit(s) SubCutaneous at bedtime  insulin lispro (ADMELOG) corrective regimen sliding scale   SubCutaneous three times a day before meals  insulin lispro Injectable (ADMELOG) 3 Unit(s) SubCutaneous three times a day before meals  metoprolol tartrate 25 milliGRAM(s) Oral two times a day  polyethylene glycol 3350 17 Gram(s) Oral daily PRN  senna 2 Tablet(s) Oral at bedtime  simvastatin 20 milliGRAM(s) Oral at bedtime  tamsulosin 0.8 milliGRAM(s) Oral at bedtime      Objective:     Vital Signs:   T(C): 36.5 (07-12-21 @ 08:30), Max: 36.7 (07-11-21 @ 15:51)  HR: 87 (07-12-21 @ 08:30) (69 - 87)  BP: 105/63 (07-12-21 @ 08:30) (105/63 - 141/62)  RR: 18 (07-12-21 @ 08:30) (18 - 18)  SpO2: 94% (07-12-21 @ 08:30) (94% - 96%)  Wt(kg): --    PHYSICAL EXAM:  General: elderly frail man, no acute distress  HEENT: sclera anicteric  Neck: supple  CVS: JVP ~ 9 cm H20, irregular, no murmurs  Chest: unlabored respirations, decreased breath sounds  Abdomen: non-distended  Extremities: lower extremity skin ulcerations/wrapped  Neuro: awake, alert & oriented   Psych: normal affect      Labs:   12 Jul 2021 06:29    135    |  97     |  45.2   ----------------------------<  97     3.7     |  24.0   |  1.58     Ca    8.2        12 Jul 2021 06:29                            11.5   5.63  )-----------( 251      ( 12 Jul 2021 06:29 )             35.8             Cardiac Cath (10/2020):  CORONARY VESSELS: The coronary circulation is right dominant.  LM:   --  LM: There was a 20 % stenosis.  LAD:   --  Proximal LAD: There was a 95 % stenosis.  --  Mid LAD: There was a 95 % stenosis.  CX:   --  OM1: There was a 50 % stenosis.  RCA:   --  RCA: This vessel was not injected.  COMPLICATIONS: There were no complications.  DIAGNOSTIC IMPRESSIONS: Normal right heart pressures. Performed to assess  for need for diuretics. Wedge pressure 14-15 mmhg.  Severe LAD disease. Temporary pacemaker placed and rotational atherectomy  performed. 3 LESLYE.  DIAGNOSTIC RECOMMENDATIONS: Aspirin and plavix.  TAVR evaluation.  INTERVENTIONAL IMPRESSIONS: Normal right heart pressures. Performed to  assess for need for diuretics. Wedge pressure 14-15 mmhg.  Severe LAD disease. Temporary pacemaker placed and rotational atherectomy  performed. 3 LESLYE.        Outpatient TTE (6/23/21):  LVEF 50-55%, dyssynchronous LV, cannot rule out some hypokinesis in the inferior/inferoseptal wall   Mild asymmetric LVH  Normal RV size and mildly reduced RV systolic function  Dilated left atrium  Mild MR  TAVR, peak velocity 1.1 m/s  Moderate to severe TR  No pericardial effusion     TTE (7/8/21):  TTE images reviewed, my report:  LVEF 60-65%  Mildly dilated RV size with normal systolic function, pacer wire visualized  Biatrial enlargement   Severe mitral annular calcification, Mild to moderate MR  Severe TR, RVSP ~ 25 mmHg   TAVR well seated, peak velocity 1.1 m/s, mild AR  Trace VA   IVC not well visualized  No pericardial effusion      ECG (7/7/21): atrial flutter, ventricular paced    Cardiac catherization (7/9/21):  INTERVENTIONAL IMPRESSIONS: Difficult to engage coronary vasculature due to  Core Valve. Coronary artery disease with 40-50% instent restenosis of left  anterior descending artery.  Left main with 30-40% stenosis  RCA with 50-60% stenosis.  Normal LVEDP.  INTERVENTIONAL RECOMMENDATIONS: Continue with DAPT with aspirin and plavix.  Continue with optimal lipid control with statin.  Continue with metoprolol, torsemide and metolazone.        Home Cardiac Meds:  Metolazone 2.5 mg (M/W/F)  Furosemide 60 mg daily  Plavix 75 mg daily  Aspirin 81 mg daily  Simvastatin 20 mg bedtime  Metoprolol tartrate 50 mg BID

## 2021-07-12 NOTE — PROGRESS NOTE ADULT - SUBJECTIVE AND OBJECTIVE BOX
[facility] Division of Hospital Medicine    Chief Complaint: LACEY    SUBJECTIVE / OVERNIGHT EVENTS:  Pt says he feels well  We discussed his PT evaluation but pt wants me to discuss it with his daughter     Patient denies chest pain, abd pain, N/V, fever, chills, dysuria or any other complaints. All remainder ROS negative.     MEDICATIONS  (STANDING):  amLODIPine   Tablet 5 milliGRAM(s) Oral daily  aspirin  chewable 81 milliGRAM(s) Oral daily  cefTRIAXone   IVPB 1000 milliGRAM(s) IV Intermittent every 24 hours  clopidogrel Tablet 75 milliGRAM(s) Oral daily  dextrose 40% Gel 15 Gram(s) Oral once  dextrose 5%. 1000 milliLiter(s) (50 mL/Hr) IV Continuous <Continuous>  dextrose 5%. 1000 milliLiter(s) (100 mL/Hr) IV Continuous <Continuous>  dextrose 50% Injectable 25 Gram(s) IV Push once  dextrose 50% Injectable 12.5 Gram(s) IV Push once  dextrose 50% Injectable 25 Gram(s) IV Push once  furosemide    Tablet 40 milliGRAM(s) Oral daily  gabapentin 300 milliGRAM(s) Oral two times a day  glucagon  Injectable 1 milliGRAM(s) IntraMuscular once  heparin   Injectable 5000 Unit(s) SubCutaneous every 8 hours  insulin glargine Injectable (LANTUS) 9 Unit(s) SubCutaneous at bedtime  insulin lispro (ADMELOG) corrective regimen sliding scale   SubCutaneous three times a day before meals  insulin lispro Injectable (ADMELOG) 3 Unit(s) SubCutaneous three times a day before meals  metoprolol tartrate 25 milliGRAM(s) Oral two times a day  senna 2 Tablet(s) Oral at bedtime  simvastatin 20 milliGRAM(s) Oral at bedtime  tamsulosin 0.8 milliGRAM(s) Oral at bedtime    MEDICATIONS  (PRN):  polyethylene glycol 3350 17 Gram(s) Oral daily PRN Constipation        I&O's Summary    2021 07:01  -  2021 07:00  --------------------------------------------------------  IN: 0 mL / OUT: 1300 mL / NET: -1300 mL        PHYSICAL EXAM:  Vital Signs Last 24 Hrs  T(C): 36.5 (2021 08:30), Max: 36.7 (2021 15:51)  T(F): 97.7 (2021 08:30), Max: 98 (2021 15:51)  HR: 87 (2021 08:30) (69 - 87)  BP: 105/63 (2021 08:30) (105/63 - 141/62)  BP(mean): 77 (2021 08:30) (77 - 77)  RR: 18 (2021 08:30) (18 - 18)  SpO2: 94% (2021 08:30) (94% - 96%)      CONSTITUTIONAL: NAD, elderly man lying in bed   ENMT: Moist oral mucosa, several missing teeth   RESPIRATORY: Good respiratory effort; lungs are clear to auscultation bilaterally  CARDIOVASCULAR: Regular rate and rhythm, normal S1 and S2  ABDOMEN: Nontender to palpation  MUSCULOSKELETAL: + lower extremity edema b/l   PSYCH: A+O to person, place, not time; affect appropriate  NEUROLOGY: No gross sensory or motor deficits   SKIN: LE venous stasis ulcerations b/l       LABS:                        11.5   5.63  )-----------( 251      ( 2021 06:29 )             35.8     07-12    135  |  97<L>  |  45.2<H>  ----------------------------<  97  3.7   |  24.0  |  1.58<H>    Ca    8.2<L>      2021 06:29            Urinalysis Basic - ( 10 Jul 2021 19:28 )    Color: Yellow / Appearance: Cloudy / S.005 / pH: x  Gluc: x / Ketone: Negative  / Bili: Negative / Urobili: 8 mg/dL   Blood: x / Protein: 30 mg/dL / Nitrite: Negative   Leuk Esterase: Moderate / RBC: 3-5 /HPF / WBC >50   Sq Epi: x / Non Sq Epi: Occasional / Bacteria: Moderate        CAPILLARY BLOOD GLUCOSE      POCT Blood Glucose.: 142 mg/dL (2021 11:52)  POCT Blood Glucose.: 83 mg/dL (2021 08:19)  POCT Blood Glucose.: 97 mg/dL (2021 22:18)  POCT Blood Glucose.: 153 mg/dL (2021 17:05)

## 2021-07-13 LAB
ANION GAP SERPL CALC-SCNC: 13 MMOL/L — SIGNIFICANT CHANGE UP (ref 5–17)
BASOPHILS # BLD AUTO: 0.02 K/UL — SIGNIFICANT CHANGE UP (ref 0–0.2)
BASOPHILS NFR BLD AUTO: 0.4 % — SIGNIFICANT CHANGE UP (ref 0–2)
BUN SERPL-MCNC: 39 MG/DL — HIGH (ref 8–20)
CALCIUM SERPL-MCNC: 8.4 MG/DL — LOW (ref 8.6–10.2)
CHLORIDE SERPL-SCNC: 99 MMOL/L — SIGNIFICANT CHANGE UP (ref 98–107)
CO2 SERPL-SCNC: 24 MMOL/L — SIGNIFICANT CHANGE UP (ref 22–29)
CREAT SERPL-MCNC: 1.43 MG/DL — HIGH (ref 0.5–1.3)
CULTURE RESULTS: SIGNIFICANT CHANGE UP
EOSINOPHIL # BLD AUTO: 0.08 K/UL — SIGNIFICANT CHANGE UP (ref 0–0.5)
EOSINOPHIL NFR BLD AUTO: 1.6 % — SIGNIFICANT CHANGE UP (ref 0–6)
GLUCOSE BLDC GLUCOMTR-MCNC: 119 MG/DL — HIGH (ref 70–99)
GLUCOSE BLDC GLUCOMTR-MCNC: 127 MG/DL — HIGH (ref 70–99)
GLUCOSE BLDC GLUCOMTR-MCNC: 134 MG/DL — HIGH (ref 70–99)
GLUCOSE BLDC GLUCOMTR-MCNC: 179 MG/DL — HIGH (ref 70–99)
GLUCOSE SERPL-MCNC: 121 MG/DL — HIGH (ref 70–99)
HCT VFR BLD CALC: 36.7 % — LOW (ref 39–50)
HGB BLD-MCNC: 11.8 G/DL — LOW (ref 13–17)
IMM GRANULOCYTES NFR BLD AUTO: 0.2 % — SIGNIFICANT CHANGE UP (ref 0–1.5)
LYMPHOCYTES # BLD AUTO: 1.07 K/UL — SIGNIFICANT CHANGE UP (ref 1–3.3)
LYMPHOCYTES # BLD AUTO: 22.1 % — SIGNIFICANT CHANGE UP (ref 13–44)
MCHC RBC-ENTMCNC: 30.4 PG — SIGNIFICANT CHANGE UP (ref 27–34)
MCHC RBC-ENTMCNC: 32.2 GM/DL — SIGNIFICANT CHANGE UP (ref 32–36)
MCV RBC AUTO: 94.6 FL — SIGNIFICANT CHANGE UP (ref 80–100)
MONOCYTES # BLD AUTO: 0.55 K/UL — SIGNIFICANT CHANGE UP (ref 0–0.9)
MONOCYTES NFR BLD AUTO: 11.3 % — SIGNIFICANT CHANGE UP (ref 2–14)
NEUTROPHILS # BLD AUTO: 3.12 K/UL — SIGNIFICANT CHANGE UP (ref 1.8–7.4)
NEUTROPHILS NFR BLD AUTO: 64.4 % — SIGNIFICANT CHANGE UP (ref 43–77)
PLATELET # BLD AUTO: 255 K/UL — SIGNIFICANT CHANGE UP (ref 150–400)
POTASSIUM SERPL-MCNC: 3.8 MMOL/L — SIGNIFICANT CHANGE UP (ref 3.5–5.3)
POTASSIUM SERPL-SCNC: 3.8 MMOL/L — SIGNIFICANT CHANGE UP (ref 3.5–5.3)
RBC # BLD: 3.88 M/UL — LOW (ref 4.2–5.8)
RBC # FLD: 15.4 % — HIGH (ref 10.3–14.5)
SODIUM SERPL-SCNC: 135 MMOL/L — SIGNIFICANT CHANGE UP (ref 135–145)
SPECIMEN SOURCE: SIGNIFICANT CHANGE UP
WBC # BLD: 4.85 K/UL — SIGNIFICANT CHANGE UP (ref 3.8–10.5)
WBC # FLD AUTO: 4.85 K/UL — SIGNIFICANT CHANGE UP (ref 3.8–10.5)

## 2021-07-13 PROCEDURE — 93970 EXTREMITY STUDY: CPT | Mod: 26

## 2021-07-13 PROCEDURE — 93923 UPR/LXTR ART STDY 3+ LVLS: CPT | Mod: 26

## 2021-07-13 PROCEDURE — 99233 SBSQ HOSP IP/OBS HIGH 50: CPT

## 2021-07-13 PROCEDURE — 99232 SBSQ HOSP IP/OBS MODERATE 35: CPT

## 2021-07-13 PROCEDURE — 99222 1ST HOSP IP/OBS MODERATE 55: CPT | Mod: GC

## 2021-07-13 RX ADMIN — CLOPIDOGREL BISULFATE 75 MILLIGRAM(S): 75 TABLET, FILM COATED ORAL at 10:24

## 2021-07-13 RX ADMIN — Medication 3 UNIT(S): at 12:14

## 2021-07-13 RX ADMIN — GABAPENTIN 300 MILLIGRAM(S): 400 CAPSULE ORAL at 05:50

## 2021-07-13 RX ADMIN — Medication 3 UNIT(S): at 18:25

## 2021-07-13 RX ADMIN — Medication 40 MILLIGRAM(S): at 05:49

## 2021-07-13 RX ADMIN — SENNA PLUS 2 TABLET(S): 8.6 TABLET ORAL at 21:14

## 2021-07-13 RX ADMIN — SIMVASTATIN 20 MILLIGRAM(S): 20 TABLET, FILM COATED ORAL at 21:14

## 2021-07-13 RX ADMIN — Medication 81 MILLIGRAM(S): at 10:24

## 2021-07-13 RX ADMIN — TAMSULOSIN HYDROCHLORIDE 0.8 MILLIGRAM(S): 0.4 CAPSULE ORAL at 21:14

## 2021-07-13 RX ADMIN — Medication 3 UNIT(S): at 08:14

## 2021-07-13 RX ADMIN — HEPARIN SODIUM 5000 UNIT(S): 5000 INJECTION INTRAVENOUS; SUBCUTANEOUS at 21:14

## 2021-07-13 RX ADMIN — Medication 25 MILLIGRAM(S): at 18:25

## 2021-07-13 RX ADMIN — INSULIN GLARGINE 9 UNIT(S): 100 INJECTION, SOLUTION SUBCUTANEOUS at 21:14

## 2021-07-13 RX ADMIN — CEFTRIAXONE 100 MILLIGRAM(S): 500 INJECTION, POWDER, FOR SOLUTION INTRAMUSCULAR; INTRAVENOUS at 05:50

## 2021-07-13 RX ADMIN — Medication 25 MILLIGRAM(S): at 10:24

## 2021-07-13 RX ADMIN — GABAPENTIN 300 MILLIGRAM(S): 400 CAPSULE ORAL at 18:25

## 2021-07-13 RX ADMIN — HEPARIN SODIUM 5000 UNIT(S): 5000 INJECTION INTRAVENOUS; SUBCUTANEOUS at 05:50

## 2021-07-13 RX ADMIN — HEPARIN SODIUM 5000 UNIT(S): 5000 INJECTION INTRAVENOUS; SUBCUTANEOUS at 14:32

## 2021-07-13 NOTE — PROGRESS NOTE ADULT - SUBJECTIVE AND OBJECTIVE BOX
BEVERLY THOMAS  [id number]      Chief Complaint: Dyspnea on exertion/CHF/CAD    Interval History: The patient reports his breathing is relatively stable, occasional episodes of dyspnea. Denies angina.     Tele: atrial fibrillation, ventricular paced at 70s BPM      Current meds:   amLODIPine   Tablet 5 milliGRAM(s) Oral daily  aspirin  chewable 81 milliGRAM(s) Oral daily  clopidogrel Tablet 75 milliGRAM(s) Oral daily  dextrose 40% Gel 15 Gram(s) Oral once  dextrose 5%. 1000 milliLiter(s) IV Continuous <Continuous>  dextrose 5%. 1000 milliLiter(s) IV Continuous <Continuous>  dextrose 50% Injectable 25 Gram(s) IV Push once  dextrose 50% Injectable 25 Gram(s) IV Push once  dextrose 50% Injectable 12.5 Gram(s) IV Push once  furosemide    Tablet 40 milliGRAM(s) Oral daily  gabapentin 300 milliGRAM(s) Oral two times a day  glucagon  Injectable 1 milliGRAM(s) IntraMuscular once  heparin   Injectable 5000 Unit(s) SubCutaneous every 8 hours  insulin glargine Injectable (LANTUS) 9 Unit(s) SubCutaneous at bedtime  insulin lispro (ADMELOG) corrective regimen sliding scale   SubCutaneous three times a day before meals  insulin lispro Injectable (ADMELOG) 3 Unit(s) SubCutaneous three times a day before meals  metoprolol tartrate 25 milliGRAM(s) Oral two times a day  polyethylene glycol 3350 17 Gram(s) Oral daily PRN  senna 2 Tablet(s) Oral at bedtime  simvastatin 20 milliGRAM(s) Oral at bedtime  tamsulosin 0.8 milliGRAM(s) Oral at bedtime      Objective:     Vital Signs:   T(C): 36.8 (07-13-21 @ 08:01), Max: 36.8 (07-13-21 @ 08:01)  HR: 70 (07-13-21 @ 10:15) (70 - 72)  BP: 98/57 (07-13-21 @ 10:15) (90/55 - 154/83)  RR: 18 (07-13-21 @ 08:01) (18 - 18)  SpO2: 97% (07-13-21 @ 08:01) (94% - 97%)  Wt(kg): --      PHYSICAL EXAM:  General: elderly frail man, no acute distress  HEENT: sclera anicteric  Neck: supple  CVS: JVP ~ 9 cm H20, irregular, no murmurs  Chest: unlabored respirations, decreased breath sounds  Abdomen: non-distended  Extremities: lower extremity skin ulcerations/wrapped  Neuro: awake, alert & oriented   Psych: normal affect        Labs:   13 Jul 2021 06:19    135    |  99     |  39.0   ----------------------------<  121    3.8     |  24.0   |  1.43     Ca    8.4        13 Jul 2021 06:19                            11.8   4.85  )-----------( 255      ( 13 Jul 2021 06:19 )             36.7               Cardiac Cath (10/2020):  CORONARY VESSELS: The coronary circulation is right dominant.  LM:   --  LM: There was a 20 % stenosis.  LAD:   --  Proximal LAD: There was a 95 % stenosis.  --  Mid LAD: There was a 95 % stenosis.  CX:   --  OM1: There was a 50 % stenosis.  RCA:   --  RCA: This vessel was not injected.  COMPLICATIONS: There were no complications.  DIAGNOSTIC IMPRESSIONS: Normal right heart pressures. Performed to assess  for need for diuretics. Wedge pressure 14-15 mmhg.  Severe LAD disease. Temporary pacemaker placed and rotational atherectomy  performed. 3 LESLYE.  DIAGNOSTIC RECOMMENDATIONS: Aspirin and plavix.  TAVR evaluation.  INTERVENTIONAL IMPRESSIONS: Normal right heart pressures. Performed to  assess for need for diuretics. Wedge pressure 14-15 mmhg.  Severe LAD disease. Temporary pacemaker placed and rotational atherectomy  performed. 3 LESLYE.        Outpatient TTE (6/23/21):  LVEF 50-55%, dyssynchronous LV, cannot rule out some hypokinesis in the inferior/inferoseptal wall   Mild asymmetric LVH  Normal RV size and mildly reduced RV systolic function  Dilated left atrium  Mild MR  TAVR, peak velocity 1.1 m/s  Moderate to severe TR  No pericardial effusion     TTE (7/8/21):  TTE images reviewed, my report:  LVEF 60-65%  Mildly dilated RV size with normal systolic function, pacer wire visualized  Biatrial enlargement   Severe mitral annular calcification, Mild to moderate MR  Severe TR, RVSP ~ 25 mmHg   TAVR well seated, peak velocity 1.1 m/s, mild AR  Trace DE   IVC not well visualized  No pericardial effusion      ECG (7/7/21): atrial flutter, ventricular paced    Cardiac catherization (7/9/21):  INTERVENTIONAL IMPRESSIONS: Difficult to engage coronary vasculature due to  Core Valve. Coronary artery disease with 40-50% instent restenosis of left  anterior descending artery.  Left main with 30-40% stenosis  RCA with 50-60% stenosis.  Normal LVEDP.  INTERVENTIONAL RECOMMENDATIONS: Continue with DAPT with aspirin and plavix.  Continue with optimal lipid control with statin.  Continue with metoprolol, torsemide and metolazone.        Home Cardiac Meds:  Metolazone 2.5 mg (M/W/F)  Furosemide 60 mg daily  Plavix 75 mg daily  Aspirin 81 mg daily  Simvastatin 20 mg bedtime  Metoprolol tartrate 50 mg BID

## 2021-07-13 NOTE — DIETITIAN INITIAL EVALUATION ADULT. - OTHER INFO
Pt with h/o CAD s/p PCI to the LAD in 2020, severe AS status post TAVR, complete heart block status post PPM, atrial flutter and CKD stage 3, hypertension, DM type to and chronic lower extremity edema who presented to the Er with complaints of progressively worsening dyspnea.

## 2021-07-13 NOTE — PROGRESS NOTE ADULT - ASSESSMENT
89 year old male with a past medical history of CAD status post PCI to the LAD in 2020, severe AS status post TAVR, complete heart block status post PPM, atrial flutter and CKD stage 3, hypertension, DM type to and chronic lower extremity edema who presented to the Er with complaints of progressively worsening dyspnea  CT Chest: No focal consolidation. Trace bilateral pleural effusion, decreased since 11/21/2020. Small nodular density at the right proximal mainstem bronchus near the karen, which was noted on 11/21/2020 and may be related to mucus/secretion. Follow-up may be obtained to exclude potential endobronchial lesion/neoplasm.     Exertional dyspnea with a history of CAD  - Multifactorial: CAD, CHF   - s/p Cardiac cath 7/9/21 showing Patent LAD stents with mild to moderate instent restenosis. RCA with moderate disease.   - Continue aspirin, Plavix BB and statin therapy  - Echocardiogram with EF of 55-60% with grade 2 diastolic dysfunction mod-severe TR and mod MR, status post TVR  - CT Chst noted   - Discussed with Cardiology, plan for pacemaker interrogation     Acute on Chronic diastolic CHF with pulmonary hypertension  - Chronic   - Resumed lasix   - On BB  - Hold Metolazone     UTI  - UA positive   - UCx pending   - C/W Ceftriaxone     Atrial flutter   - not on AC due to bleeding risk     History of complete heart block status post pacemaker  - Pacemaker interrogation     ELISE on CKD stage 3   - Baseline creatinine ~1.6   - Improving   - Monitor BMP    History of BPH  - c/w Villa catheter   - Continue flomax 0.8 QHS    Bilateral lower extremity venous stasis ulcers  - Consulted Vascular surgery     Diabetes mellitus type 2  - BG stable   - Admelog sliding scale    History of RA   - on Humira Q2 weeks  - On monthly Prolia    VTE Heparin subcut   Dispo: PT evaluation recommends [facility]     Spoke to daughter Chasidy today

## 2021-07-13 NOTE — DIETITIAN INITIAL EVALUATION ADULT. - PERTINENT LABORATORY DATA
07-13 Na135 mmol/L Glu 121 mg/dL<H> K+ 3.8 mmol/L Cr  1.43 mg/dL<H> BUN 39.0 mg/dL<H> Phos n/a   Alb n/a   PAB n/a

## 2021-07-13 NOTE — CONSULT NOTE ADULT - ATTENDING COMMENTS
Patient with atypical LE ulcers  Palpable pedal pulses  Treated with Unna boots without significant improvement  No evidence of infection    Recommend SONIA/PVRs  Venous duplex  Rheumatology workup for vasculitis/autoimmune disorders  Calcium level  ESR/CRP  Will follow

## 2021-07-13 NOTE — CONSULT NOTE ADULT - ASSESSMENT
89 year old male with history of venous stasis/edema managed with unna boots ~4 years ago. admitted with SOB. and found to have chronic non healing bilateral lower extremities wounds.   no neurodeficits    -recommend LE venous duplex to rule out DVT  -SONIA/PVRs  -daily dressing change to bilateral lower extremity with xeroform over right posterior calf and wrapped with kerlix/ace wrap

## 2021-07-13 NOTE — PROGRESS NOTE ADULT - ASSESSMENT
Assessment:  Morales Nichols is a 89 year old man with past medical history of Coronary artery disease (s/p PCI to LAD in 10/2020 with medical management of RCA disease), Severe aortic valve stenosis (s/p 34 mm CoreValve Evolut TAVR in 11/2020), Complete heart block (s/p pacemaker), Atrial flutter (not on anticoagulation), CKD (baseline Cr ~ 1.7), Hypertension, Diabetes mellitus, rheumatoid arthritis and chronic lower extremity edema (follows with vascular with unna boots) who was sent in to ER due to progressive dyspnea on minimal exertion.    The patient was recently evaluated in cardiology office with Dr. Corona in end of June with plans for left heart catherization to evaluate LAD to ensure no in-stent restenosis as cause of the dyspnea.     His progressive dyspnea on exertion may be due to progressive valvular disease such as his moderate-to-severe tricuspid regurgitation, possible pulmonary hypertension and RV dysfunction, will plan to repeat echocardiogram. Other possibilities include symptoms due to LAD disease, RCA disease (that was medically managed). Troponins negative. Echo with preserved LV and RV systolic function and severe tricuspid regurgitation.       Recommendations:  [] CAD: S/p PCI to LAD in 10/2020. Cardiac catherization here with no significant re-stenosis and so interventional cardiology recommends medical management. Continue home Aspirin 81 mg daily and Plavix 75 mg daily. Would continue Metoprolol tartrate 25 mg BID. Continue statin.   [] CKD: Cr continues to improve, Cr 1.4 today  [] Right ventricle dysfunction/Moderate to Severe Tricuspid Regurgitation: Echo with severe tricuspid regurgitation, however no pulmonary hypertension at this time. Has been restarted on lower dose of Lasix 40 mg PO daily and renal function stable on this dose.   [] Atrial flutter: Patient with history of this but not on anticoagulation due to high risk of bleeding (as patient already on dual antiplatelets) per his cardiologist. Will have ICD interrogated.  [] Lower extremity skin ulcerations: Would consult Vascular inpatient    Thank you for the consult. We will continue to follow along.    Discussed with Dr. Barrientos. Will discuss with daughter, Chasidy     Recommend physical therapy and out of bed to chair       Nasra Iyer MD  Cardiology  Assessment:  Morales Nichols is a 89 year old man with past medical history of Coronary artery disease (s/p PCI to LAD in 10/2020 with medical management of RCA disease), Severe aortic valve stenosis (s/p 34 mm CoreValve Evolut TAVR in 11/2020), Complete heart block (s/p pacemaker), Atrial flutter (not on anticoagulation), CKD (baseline Cr ~ 1.7), Hypertension, Diabetes mellitus, rheumatoid arthritis and chronic lower extremity edema (follows with vascular with unna boots) who was sent in to ER due to progressive dyspnea on minimal exertion.    The patient was recently evaluated in cardiology office with Dr. Corona in end of June with plans for left heart catherization to evaluate LAD to ensure no in-stent restenosis as cause of the dyspnea.     His progressive dyspnea on exertion may be due to progressive valvular disease such as his moderate-to-severe tricuspid regurgitation, possible pulmonary hypertension and RV dysfunction, will plan to repeat echocardiogram. Other possibilities include symptoms due to LAD disease, RCA disease (that was medically managed). Troponins negative. Echo with preserved LV and RV systolic function and severe tricuspid regurgitation.       Recommendations:  [] CAD: S/p PCI to LAD in 10/2020. Cardiac catherization here with no significant re-stenosis and so interventional cardiology recommends medical management. Continue home Aspirin 81 mg daily and Plavix 75 mg daily. Would continue Metoprolol tartrate 25 mg BID. Continue statin.   [] CKD: Cr continues to improve, Cr 1.4 today  [] Right ventricle dysfunction/Moderate to Severe Tricuspid Regurgitation: Echo with severe tricuspid regurgitation, however no pulmonary hypertension at this time. Has been restarted on lower dose of Lasix 40 mg PO daily and renal function stable on this dose.   [] Atrial flutter: Patient with history of this but not on anticoagulation due to high risk of bleeding (as patient already on dual antiplatelets) per his cardiologist. Will have ICD interrogated.  [] Lower extremity skin ulcerations: Would consult Vascular inpatient    Thank you for the consult. We will continue to follow along.    Discussed with Dr. Barrientos. Discussed with daughter, Chasidy ([phone])     Recommend physical therapy and out of bed to chair       Nasra Iyer MD  Cardiology

## 2021-07-13 NOTE — PROGRESS NOTE ADULT - SUBJECTIVE AND OBJECTIVE BOX
[facility] Division of Hospital Medicine    Chief Complaint: LACEY    SUBJECTIVE / OVERNIGHT EVENTS:  Pt says he feels good  Offers no complaints     Patient denies chest pain, abd pain, N/V, fever, chills, dysuria or any other complaints. All remainder ROS negative.     MEDICATIONS  (STANDING):  amLODIPine   Tablet 5 milliGRAM(s) Oral daily  aspirin  chewable 81 milliGRAM(s) Oral daily  clopidogrel Tablet 75 milliGRAM(s) Oral daily  dextrose 40% Gel 15 Gram(s) Oral once  dextrose 5%. 1000 milliLiter(s) (50 mL/Hr) IV Continuous <Continuous>  dextrose 5%. 1000 milliLiter(s) (100 mL/Hr) IV Continuous <Continuous>  dextrose 50% Injectable 25 Gram(s) IV Push once  dextrose 50% Injectable 25 Gram(s) IV Push once  dextrose 50% Injectable 12.5 Gram(s) IV Push once  furosemide    Tablet 40 milliGRAM(s) Oral daily  gabapentin 300 milliGRAM(s) Oral two times a day  glucagon  Injectable 1 milliGRAM(s) IntraMuscular once  heparin   Injectable 5000 Unit(s) SubCutaneous every 8 hours  insulin glargine Injectable (LANTUS) 9 Unit(s) SubCutaneous at bedtime  insulin lispro (ADMELOG) corrective regimen sliding scale   SubCutaneous three times a day before meals  insulin lispro Injectable (ADMELOG) 3 Unit(s) SubCutaneous three times a day before meals  metoprolol tartrate 25 milliGRAM(s) Oral two times a day  senna 2 Tablet(s) Oral at bedtime  simvastatin 20 milliGRAM(s) Oral at bedtime  tamsulosin 0.8 milliGRAM(s) Oral at bedtime    MEDICATIONS  (PRN):  polyethylene glycol 3350 17 Gram(s) Oral daily PRN Constipation        I&O's Summary    13 Jul 2021 07:01  -  13 Jul 2021 12:56  --------------------------------------------------------  IN: 0 mL / OUT: 1010 mL / NET: -1010 mL        PHYSICAL EXAM:  Vital Signs Last 24 Hrs  T(C): 36.8 (13 Jul 2021 08:01), Max: 36.8 (13 Jul 2021 08:01)  T(F): 98.2 (13 Jul 2021 08:01), Max: 98.2 (13 Jul 2021 08:01)  HR: 70 (13 Jul 2021 10:15) (70 - 72)  BP: 98/57 (13 Jul 2021 10:15) (90/55 - 154/83)  BP(mean): --  RR: 18 (13 Jul 2021 08:01) (18 - 18)  SpO2: 97% (13 Jul 2021 08:01) (94% - 97%)      CONSTITUTIONAL: NAD, elderly man lying in bed   ENMT: Moist oral mucosa, several missing teeth   RESPIRATORY: Good respiratory effort; lungs are clear to auscultation bilaterally  CARDIOVASCULAR: Regular rate and rhythm, normal S1 and S2  ABDOMEN: Nontender to palpation  MUSCULOSKELETAL: + lower extremity edema b/l   PSYCH: A+O to person, place, not time; affect appropriate  NEUROLOGY: No gross sensory or motor deficits   SKIN: LE venous stasis ulcerations b/l, covered in wraps       LABS:                        11.8   4.85  )-----------( 255      ( 13 Jul 2021 06:19 )             36.7     07-13    135  |  99  |  39.0<H>  ----------------------------<  121<H>  3.8   |  24.0  |  1.43<H>    Ca    8.4<L>      13 Jul 2021 06:19                CAPILLARY BLOOD GLUCOSE      POCT Blood Glucose.: 127 mg/dL (13 Jul 2021 12:06)  POCT Blood Glucose.: 119 mg/dL (13 Jul 2021 08:00)  POCT Blood Glucose.: 138 mg/dL (12 Jul 2021 21:04)  POCT Blood Glucose.: 182 mg/dL (12 Jul 2021 16:53)

## 2021-07-13 NOTE — DIETITIAN INITIAL EVALUATION ADULT. - ORAL INTAKE PTA/DIET HISTORY
Pt sleeping at time of interview.  Pt with fair po intake per breakfast tray this morning.  Unable to complete full interview at this time; RD to follow up as time permits.

## 2021-07-14 LAB
ANION GAP SERPL CALC-SCNC: 13 MMOL/L — SIGNIFICANT CHANGE UP (ref 5–17)
BASOPHILS # BLD AUTO: 0.03 K/UL — SIGNIFICANT CHANGE UP (ref 0–0.2)
BASOPHILS NFR BLD AUTO: 0.6 % — SIGNIFICANT CHANGE UP (ref 0–2)
BUN SERPL-MCNC: 37.7 MG/DL — HIGH (ref 8–20)
CALCIUM SERPL-MCNC: 8.7 MG/DL — SIGNIFICANT CHANGE UP (ref 8.6–10.2)
CHLORIDE SERPL-SCNC: 100 MMOL/L — SIGNIFICANT CHANGE UP (ref 98–107)
CO2 SERPL-SCNC: 24 MMOL/L — SIGNIFICANT CHANGE UP (ref 22–29)
CREAT SERPL-MCNC: 1.45 MG/DL — HIGH (ref 0.5–1.3)
CRP SERPL-MCNC: 61 MG/L — HIGH
EOSINOPHIL # BLD AUTO: 0.12 K/UL — SIGNIFICANT CHANGE UP (ref 0–0.5)
EOSINOPHIL NFR BLD AUTO: 2.2 % — SIGNIFICANT CHANGE UP (ref 0–6)
ERYTHROCYTE [SEDIMENTATION RATE] IN BLOOD: 48 MM/HR — HIGH (ref 0–20)
GLUCOSE BLDC GLUCOMTR-MCNC: 106 MG/DL — HIGH (ref 70–99)
GLUCOSE BLDC GLUCOMTR-MCNC: 107 MG/DL — HIGH (ref 70–99)
GLUCOSE BLDC GLUCOMTR-MCNC: 171 MG/DL — HIGH (ref 70–99)
GLUCOSE BLDC GLUCOMTR-MCNC: 94 MG/DL — SIGNIFICANT CHANGE UP (ref 70–99)
GLUCOSE SERPL-MCNC: 105 MG/DL — HIGH (ref 70–99)
HCT VFR BLD CALC: 38.8 % — LOW (ref 39–50)
HGB BLD-MCNC: 12.4 G/DL — LOW (ref 13–17)
IMM GRANULOCYTES NFR BLD AUTO: 0 % — SIGNIFICANT CHANGE UP (ref 0–1.5)
LYMPHOCYTES # BLD AUTO: 1.31 K/UL — SIGNIFICANT CHANGE UP (ref 1–3.3)
LYMPHOCYTES # BLD AUTO: 24.2 % — SIGNIFICANT CHANGE UP (ref 13–44)
MCHC RBC-ENTMCNC: 30.4 PG — SIGNIFICANT CHANGE UP (ref 27–34)
MCHC RBC-ENTMCNC: 32 GM/DL — SIGNIFICANT CHANGE UP (ref 32–36)
MCV RBC AUTO: 95.1 FL — SIGNIFICANT CHANGE UP (ref 80–100)
MONOCYTES # BLD AUTO: 0.61 K/UL — SIGNIFICANT CHANGE UP (ref 0–0.9)
MONOCYTES NFR BLD AUTO: 11.3 % — SIGNIFICANT CHANGE UP (ref 2–14)
NEUTROPHILS # BLD AUTO: 3.35 K/UL — SIGNIFICANT CHANGE UP (ref 1.8–7.4)
NEUTROPHILS NFR BLD AUTO: 61.7 % — SIGNIFICANT CHANGE UP (ref 43–77)
PLATELET # BLD AUTO: 322 K/UL — SIGNIFICANT CHANGE UP (ref 150–400)
POTASSIUM SERPL-MCNC: 4.2 MMOL/L — SIGNIFICANT CHANGE UP (ref 3.5–5.3)
POTASSIUM SERPL-SCNC: 4.2 MMOL/L — SIGNIFICANT CHANGE UP (ref 3.5–5.3)
RBC # BLD: 4.08 M/UL — LOW (ref 4.2–5.8)
RBC # FLD: 15.4 % — HIGH (ref 10.3–14.5)
SODIUM SERPL-SCNC: 137 MMOL/L — SIGNIFICANT CHANGE UP (ref 135–145)
WBC # BLD: 5.42 K/UL — SIGNIFICANT CHANGE UP (ref 3.8–10.5)
WBC # FLD AUTO: 5.42 K/UL — SIGNIFICANT CHANGE UP (ref 3.8–10.5)

## 2021-07-14 PROCEDURE — 99233 SBSQ HOSP IP/OBS HIGH 50: CPT

## 2021-07-14 PROCEDURE — 99232 SBSQ HOSP IP/OBS MODERATE 35: CPT

## 2021-07-14 RX ORDER — CEFTRIAXONE 500 MG/1
1000 INJECTION, POWDER, FOR SOLUTION INTRAMUSCULAR; INTRAVENOUS EVERY 24 HOURS
Refills: 0 | Status: DISCONTINUED | OUTPATIENT
Start: 2021-07-14 | End: 2021-07-17

## 2021-07-14 RX ADMIN — INSULIN GLARGINE 9 UNIT(S): 100 INJECTION, SOLUTION SUBCUTANEOUS at 21:45

## 2021-07-14 RX ADMIN — Medication 25 MILLIGRAM(S): at 17:29

## 2021-07-14 RX ADMIN — HEPARIN SODIUM 5000 UNIT(S): 5000 INJECTION INTRAVENOUS; SUBCUTANEOUS at 13:40

## 2021-07-14 RX ADMIN — HEPARIN SODIUM 5000 UNIT(S): 5000 INJECTION INTRAVENOUS; SUBCUTANEOUS at 05:08

## 2021-07-14 RX ADMIN — Medication 3 UNIT(S): at 08:53

## 2021-07-14 RX ADMIN — Medication 25 MILLIGRAM(S): at 05:08

## 2021-07-14 RX ADMIN — HEPARIN SODIUM 5000 UNIT(S): 5000 INJECTION INTRAVENOUS; SUBCUTANEOUS at 21:45

## 2021-07-14 RX ADMIN — Medication 40 MILLIGRAM(S): at 05:08

## 2021-07-14 RX ADMIN — SIMVASTATIN 20 MILLIGRAM(S): 20 TABLET, FILM COATED ORAL at 21:45

## 2021-07-14 RX ADMIN — GABAPENTIN 300 MILLIGRAM(S): 400 CAPSULE ORAL at 17:29

## 2021-07-14 RX ADMIN — CLOPIDOGREL BISULFATE 75 MILLIGRAM(S): 75 TABLET, FILM COATED ORAL at 08:53

## 2021-07-14 RX ADMIN — TAMSULOSIN HYDROCHLORIDE 0.8 MILLIGRAM(S): 0.4 CAPSULE ORAL at 21:45

## 2021-07-14 RX ADMIN — Medication 3 UNIT(S): at 12:45

## 2021-07-14 RX ADMIN — SENNA PLUS 2 TABLET(S): 8.6 TABLET ORAL at 21:45

## 2021-07-14 RX ADMIN — CEFTRIAXONE 100 MILLIGRAM(S): 500 INJECTION, POWDER, FOR SOLUTION INTRAMUSCULAR; INTRAVENOUS at 11:14

## 2021-07-14 RX ADMIN — Medication 81 MILLIGRAM(S): at 08:53

## 2021-07-14 RX ADMIN — GABAPENTIN 300 MILLIGRAM(S): 400 CAPSULE ORAL at 05:07

## 2021-07-14 RX ADMIN — AMLODIPINE BESYLATE 5 MILLIGRAM(S): 2.5 TABLET ORAL at 05:08

## 2021-07-14 RX ADMIN — Medication 3 UNIT(S): at 17:29

## 2021-07-14 NOTE — PROGRESS NOTE ADULT - ASSESSMENT
89 year old male with a past medical history of CAD status post PCI to the LAD in 2020, severe AS status post TAVR, complete heart block status post PPM, atrial flutter and CKD stage 3, hypertension, DM type to and chronic lower extremity edema who presented to the Er with complaints of progressively worsening dyspnea  CT Chest: No focal consolidation. Trace bilateral pleural effusion, decreased since 11/21/2020. Small nodular density at the right proximal mainstem bronchus near the karen, which was noted on 11/21/2020 and may be related to mucus/secretion. Follow-up may be obtained to exclude potential endobronchial lesion/neoplasm.     Exertional dyspnea with a history of CAD  - Multifactorial: CAD, CHF   - s/p Cardiac cath 7/9/21 showing Patent LAD stents with mild to moderate instent restenosis. RCA with moderate disease.   - Continue aspirin, Plavix BB and statin therapy  - Echocardiogram with EF of 55-60% with grade 2 diastolic dysfunction mod-severe TR and mod MR, status post TVR  - CT Chest noted   - Discussed with Cardiology, pacemaker interrogation today     Acute on Chronic diastolic CHF with pulmonary hypertension  - Chronic   - c/w lasix   - On BB  - Hold Metolazone     UTI  - UA positive   - UCx growing Strep agalactiae   - C/W Ceftriaxone     Atrial flutter   - not on AC due to bleeding risk     History of complete heart block status post pacemaker  - Pacemaker interrogation     ELISE on CKD stage 3   - Baseline creatinine ~1.6   - Improving   - Monitor BMP    History of BPH  - c/w Villa catheter   - Continue flomax 0.8 QHS    Bilateral lower extremity venous stasis ulcers   - non healing wounds   - Normal SONIA and US LE   - Consulted Vascular surgery, recommend Dermatology consult   - Dermatology called, awaiting call back     Diabetes mellitus type 2  - BG stable   - Admelog sliding scale    History of RA   - on Humira Q2 weeks  - On monthly Prolia    VTE Heparin subcut   Dispo: PT evaluation recommends [facility]     Spoke to daughter Chasidy today

## 2021-07-14 NOTE — PROGRESS NOTE ADULT - ASSESSMENT
89 year old male with history of venous stasis/edema managed with unna boots ~4 years ago. admitted with SOB. and found to have chronic non healing bilateral lower extremities wounds.   no neurodeficits, normal ABIs    -daily dressing change to bilateral lower extremity with xeroform over right posterior calf and wrapped with kerlix/ace wrap  -Rheum workup  - Ca 8.4 CRP, ESR pending   89 year old male with history of venous stasis/edema managed with unna boots ~4 years ago. admitted with SOB. and found to have chronic non healing bilateral lower extremities wounds.   no neurodeficits, normal ABIs, normal LE US.     -daily dressing change to bilateral lower extremity with xeroform over right posterior calf and wrapped with kerlix/ace wrap per nursing.   -Recommend rheumatology workup  -Recommend dermatology evaluation  - Ca 8.4 CRP, ESR pending  -No vascular surgery intervention indicated.   -Please reconsult for any further questions.

## 2021-07-14 NOTE — PROGRESS NOTE ADULT - SUBJECTIVE AND OBJECTIVE BOX
HPI/OVERNIGHT EVENTS: Patient seen and examined at bedside this AM. ABIs completed, normal, denies pain, SOB, fever, ESR, CRP pending. otherwise stable.    Vital Signs Last 24 Hrs  T(C): 36.7 (14 Jul 2021 05:06), Max: 36.8 (13 Jul 2021 08:01)  T(F): 98 (14 Jul 2021 05:06), Max: 98.2 (13 Jul 2021 08:01)  HR: 71 (14 Jul 2021 05:06) (70 - 77)  BP: 112/54 (14 Jul 2021 05:06) (91/53 - 154/83)  BP(mean): --  RR: 18 (14 Jul 2021 05:06) (18 - 18)  SpO2: 95% (14 Jul 2021 05:06) (95% - 98%)    I&O's Detail    13 Jul 2021 07:01  -  14 Jul 2021 06:36  --------------------------------------------------------  IN:    Oral Fluid: 480 mL  Total IN: 480 mL    OUT:    Indwelling Catheter - Urethral (mL): 1200 mL    Voided (mL): 10 mL  Total OUT: 1210 mL    Total NET: -730 mL          Constitutional: patient resting comfortably in bed, in no acute distress  HEENT: EOMI, PERRLA, MMM.  Respiratory: Non labored breathing on RA  Cardiovascular: RRR  Gastrointestinal: Abdomen soft, non-tender, non-distended, no rebound tenderness / guarding  Extremities: No peripheral edema, mild hyperpigmentation from ankle to knee bilaterally. multiple dry nonhealing ulcers almost circumferential lower legs. no ulcers over dorsum/plantar aspect of feet. no toe ulcerations. area on right posterior calf with mild oozing from fresh layer of epidermis missing.  Vascular: palpable pops, P bilaterally cap refill <3s    LABS:                        11.8   4.85  )-----------( 255      ( 13 Jul 2021 06:19 )             36.7     07-13    135  |  99  |  39.0<H>  ----------------------------<  121<H>  3.8   |  24.0  |  1.43<H>    Ca    8.4<L>      13 Jul 2021 06:19            MEDICATIONS  (STANDING):  amLODIPine   Tablet 5 milliGRAM(s) Oral daily  aspirin  chewable 81 milliGRAM(s) Oral daily  clopidogrel Tablet 75 milliGRAM(s) Oral daily  dextrose 40% Gel 15 Gram(s) Oral once  dextrose 5%. 1000 milliLiter(s) (50 mL/Hr) IV Continuous <Continuous>  dextrose 5%. 1000 milliLiter(s) (100 mL/Hr) IV Continuous <Continuous>  dextrose 50% Injectable 25 Gram(s) IV Push once  dextrose 50% Injectable 12.5 Gram(s) IV Push once  dextrose 50% Injectable 25 Gram(s) IV Push once  furosemide    Tablet 40 milliGRAM(s) Oral daily  gabapentin 300 milliGRAM(s) Oral two times a day  glucagon  Injectable 1 milliGRAM(s) IntraMuscular once  heparin   Injectable 5000 Unit(s) SubCutaneous every 8 hours  insulin glargine Injectable (LANTUS) 9 Unit(s) SubCutaneous at bedtime  insulin lispro (ADMELOG) corrective regimen sliding scale   SubCutaneous three times a day before meals  insulin lispro Injectable (ADMELOG) 3 Unit(s) SubCutaneous three times a day before meals  metoprolol tartrate 25 milliGRAM(s) Oral two times a day  senna 2 Tablet(s) Oral at bedtime  simvastatin 20 milliGRAM(s) Oral at bedtime  tamsulosin 0.8 milliGRAM(s) Oral at bedtime    MEDICATIONS  (PRN):  polyethylene glycol 3350 17 Gram(s) Oral daily PRN Constipation      MICRO:   Cultures     STUDIES:   SONIA:  R 1.09 Toe 0.95,  L 1.01 T 0.84

## 2021-07-14 NOTE — PROGRESS NOTE ADULT - ASSESSMENT
Assessment:  Morales Nichols is a 89 year old man with past medical history of Coronary artery disease (s/p PCI to LAD in 10/2020 with medical management of RCA disease), Severe aortic valve stenosis (s/p 34 mm CoreValve Evolut TAVR in 11/2020), Complete heart block (s/p pacemaker), Atrial flutter (not on anticoagulation), CKD (baseline Cr ~ 1.7), Hypertension, Diabetes mellitus, rheumatoid arthritis and chronic lower extremity edema (follows with vascular with unna boots) who was sent in to ER due to progressive dyspnea on minimal exertion.    The patient was recently evaluated in cardiology office with Dr. Corona in end of June with plans for left heart catherization to evaluate LAD to ensure no in-stent restenosis as cause of the dyspnea.     His progressive dyspnea on exertion may be due to progressive valvular disease such as his moderate-to-severe tricuspid regurgitation, possible pulmonary hypertension and RV dysfunction, will plan to repeat echocardiogram. Other possibilities include symptoms due to LAD disease, RCA disease (that was medically managed). Troponins negative. Echo with preserved LV and RV systolic function and severe tricuspid regurgitation.       Recommendations:  [] CAD: S/p PCI to LAD in 10/2020. Cardiac catherization here with no significant re-stenosis and so interventional cardiology recommends medical management. Continue home Aspirin 81 mg daily and Plavix 75 mg daily. Would continue Metoprolol tartrate 25 mg BID. Continue statin.   [] CKD: Cr stable  [] Right ventricle dysfunction/Moderate to Severe Tricuspid Regurgitation: Echo with severe tricuspid regurgitation, however no pulmonary hypertension at this time. Has been restarted on lower dose of Lasix 40 mg PO daily and renal function stable on this dose.   [] Atrial flutter: Patient with history of this but not on anticoagulation due to high risk of bleeding (as patient already on dual antiplatelets) per his cardiologist. ICD interrogated, will have Cardiac EP review prior to discharge, appears he has high atrial fibrillation burden which may be contributing to symptoms.  [] Lower extremity skin ulcerations: Follow up Vascular    Thank you for the consult. We will continue to follow along.    Discussed with daughter, Chasidy ([phone]) on 7/13    Recommend physical therapy and out of bed to chair       Nasra Iyer MD  Cardiology

## 2021-07-14 NOTE — PROGRESS NOTE ADULT - SUBJECTIVE AND OBJECTIVE BOX
[facility] Division of Hospital Medicine    Chief Complaint: LACEY    SUBJECTIVE / OVERNIGHT EVENTS:  Pt says he feels well but his legs bother him   Wants to know when he can go home     Patient denies chest pain, abd pain, N/V, fever, chills, dysuria or any other complaints. All remainder ROS negative.     MEDICATIONS  (STANDING):  amLODIPine   Tablet 5 milliGRAM(s) Oral daily  aspirin  chewable 81 milliGRAM(s) Oral daily  cefTRIAXone   IVPB 1000 milliGRAM(s) IV Intermittent every 24 hours  clopidogrel Tablet 75 milliGRAM(s) Oral daily  dextrose 40% Gel 15 Gram(s) Oral once  dextrose 5%. 1000 milliLiter(s) (50 mL/Hr) IV Continuous <Continuous>  dextrose 5%. 1000 milliLiter(s) (100 mL/Hr) IV Continuous <Continuous>  dextrose 50% Injectable 25 Gram(s) IV Push once  dextrose 50% Injectable 12.5 Gram(s) IV Push once  dextrose 50% Injectable 25 Gram(s) IV Push once  furosemide    Tablet 40 milliGRAM(s) Oral daily  gabapentin 300 milliGRAM(s) Oral two times a day  glucagon  Injectable 1 milliGRAM(s) IntraMuscular once  heparin   Injectable 5000 Unit(s) SubCutaneous every 8 hours  insulin glargine Injectable (LANTUS) 9 Unit(s) SubCutaneous at bedtime  insulin lispro (ADMELOG) corrective regimen sliding scale   SubCutaneous three times a day before meals  insulin lispro Injectable (ADMELOG) 3 Unit(s) SubCutaneous three times a day before meals  metoprolol tartrate 25 milliGRAM(s) Oral two times a day  senna 2 Tablet(s) Oral at bedtime  simvastatin 20 milliGRAM(s) Oral at bedtime  tamsulosin 0.8 milliGRAM(s) Oral at bedtime    MEDICATIONS  (PRN):  polyethylene glycol 3350 17 Gram(s) Oral daily PRN Constipation        I&O's Summary    13 Jul 2021 07:01  -  14 Jul 2021 07:00  --------------------------------------------------------  IN: 480 mL / OUT: 1210 mL / NET: -730 mL    14 Jul 2021 07:01  -  14 Jul 2021 14:23  --------------------------------------------------------  IN: 550 mL / OUT: 700 mL / NET: -150 mL        PHYSICAL EXAM:  Vital Signs Last 24 Hrs  T(C): 36.6 (14 Jul 2021 10:10), Max: 36.7 (13 Jul 2021 18:18)  T(F): 97.9 (14 Jul 2021 10:10), Max: 98 (13 Jul 2021 18:18)  HR: 71 (14 Jul 2021 10:10) (70 - 77)  BP: 118/54 (14 Jul 2021 10:10) (91/53 - 123/57)  BP(mean): --  RR: 18 (14 Jul 2021 10:10) (18 - 18)  SpO2: 98% (14 Jul 2021 10:10) (95% - 98%)      CONSTITUTIONAL: NAD, elderly man lying in bed   ENMT: Moist oral mucosa, several missing teeth   RESPIRATORY: Good respiratory effort; lungs are clear to auscultation bilaterally  CARDIOVASCULAR: Regular rate and rhythm, normal S1 and S2  ABDOMEN: Nontender to palpation, Villa with blood tinged urine   MUSCULOSKELETAL: + lower extremity edema b/l   PSYCH: A+O to person, place, not time; affect appropriate  NEUROLOGY: No gross sensory or motor deficits   SKIN: LE venous stasis ulcerations b/l, covered in wraps       LABS:                        12.4   5.42  )-----------( 322      ( 14 Jul 2021 07:05 )             38.8     07-14    137  |  100  |  37.7<H>  ----------------------------<  105<H>  4.2   |  24.0  |  1.45<H>    Ca    8.7      14 Jul 2021 07:05          CAPILLARY BLOOD GLUCOSE      POCT Blood Glucose.: 94 mg/dL (14 Jul 2021 12:05)  POCT Blood Glucose.: 107 mg/dL (14 Jul 2021 08:12)  POCT Blood Glucose.: 179 mg/dL (13 Jul 2021 21:03)  POCT Blood Glucose.: 134 mg/dL (13 Jul 2021 18:07)

## 2021-07-14 NOTE — PROGRESS NOTE ADULT - SUBJECTIVE AND OBJECTIVE BOX
BEVERLY THOMAS  [id number]      Chief Complaint: Dyspnea on exertion/CHF/CAD    Interval History: The patient reports feeling ok today. Breathing is stable.     Tele: atrial fibrillation, ventricular paced at 70s BPM      Current meds:   amLODIPine   Tablet 5 milliGRAM(s) Oral daily  aspirin  chewable 81 milliGRAM(s) Oral daily  cefTRIAXone   IVPB 1000 milliGRAM(s) IV Intermittent every 24 hours  clopidogrel Tablet 75 milliGRAM(s) Oral daily  dextrose 40% Gel 15 Gram(s) Oral once  dextrose 5%. 1000 milliLiter(s) IV Continuous <Continuous>  dextrose 5%. 1000 milliLiter(s) IV Continuous <Continuous>  dextrose 50% Injectable 25 Gram(s) IV Push once  dextrose 50% Injectable 12.5 Gram(s) IV Push once  dextrose 50% Injectable 25 Gram(s) IV Push once  furosemide    Tablet 40 milliGRAM(s) Oral daily  gabapentin 300 milliGRAM(s) Oral two times a day  glucagon  Injectable 1 milliGRAM(s) IntraMuscular once  heparin   Injectable 5000 Unit(s) SubCutaneous every 8 hours  insulin glargine Injectable (LANTUS) 9 Unit(s) SubCutaneous at bedtime  insulin lispro (ADMELOG) corrective regimen sliding scale   SubCutaneous three times a day before meals  insulin lispro Injectable (ADMELOG) 3 Unit(s) SubCutaneous three times a day before meals  metoprolol tartrate 25 milliGRAM(s) Oral two times a day  polyethylene glycol 3350 17 Gram(s) Oral daily PRN  senna 2 Tablet(s) Oral at bedtime  simvastatin 20 milliGRAM(s) Oral at bedtime  tamsulosin 0.8 milliGRAM(s) Oral at bedtime      Objective:     Vital Signs:   T(C): 36.6 (07-14-21 @ 10:10), Max: 36.7 (07-13-21 @ 18:18)  HR: 71 (07-14-21 @ 10:10) (70 - 77)  BP: 118/54 (07-14-21 @ 10:10) (91/53 - 123/57)  RR: 18 (07-14-21 @ 10:10) (18 - 18)  SpO2: 98% (07-14-21 @ 10:10) (95% - 98%)  Wt(kg): --    PHYSICAL EXAM:  General: elderly frail man, no acute distress  HEENT: sclera anicteric  Neck: supple  CVS: JVP ~ 9 cm H20, irregular, no murmurs  Chest: unlabored respirations, decreased breath sounds  Abdomen: non-distended  Extremities: lower extremity skin ulcerations/wrapped  Neuro: awake, alert & oriented   Psych: normal affect      Labs:   14 Jul 2021 07:05    137    |  100    |  37.7   ----------------------------<  105    4.2     |  24.0   |  1.45     Ca    8.7        14 Jul 2021 07:05                            12.4   5.42  )-----------( 322      ( 14 Jul 2021 07:05 )             38.8               Cardiac Cath (10/2020):  CORONARY VESSELS: The coronary circulation is right dominant.  LM:   --  LM: There was a 20 % stenosis.  LAD:   --  Proximal LAD: There was a 95 % stenosis.  --  Mid LAD: There was a 95 % stenosis.  CX:   --  OM1: There was a 50 % stenosis.  RCA:   --  RCA: This vessel was not injected.  COMPLICATIONS: There were no complications.  DIAGNOSTIC IMPRESSIONS: Normal right heart pressures. Performed to assess  for need for diuretics. Wedge pressure 14-15 mmhg.  Severe LAD disease. Temporary pacemaker placed and rotational atherectomy  performed. 3 LESLYE.  DIAGNOSTIC RECOMMENDATIONS: Aspirin and plavix.  TAVR evaluation.  INTERVENTIONAL IMPRESSIONS: Normal right heart pressures. Performed to  assess for need for diuretics. Wedge pressure 14-15 mmhg.  Severe LAD disease. Temporary pacemaker placed and rotational atherectomy  performed. 3 LESLYE.        Outpatient TTE (6/23/21):  LVEF 50-55%, dyssynchronous LV, cannot rule out some hypokinesis in the inferior/inferoseptal wall   Mild asymmetric LVH  Normal RV size and mildly reduced RV systolic function  Dilated left atrium  Mild MR  TAVR, peak velocity 1.1 m/s  Moderate to severe TR  No pericardial effusion     TTE (7/8/21):  TTE images reviewed, my report:  LVEF 60-65%  Mildly dilated RV size with normal systolic function, pacer wire visualized  Biatrial enlargement   Severe mitral annular calcification, Mild to moderate MR  Severe TR, RVSP ~ 25 mmHg   TAVR well seated, peak velocity 1.1 m/s, mild AR  Trace IN   IVC not well visualized  No pericardial effusion      ECG (7/7/21): atrial flutter, ventricular paced    Cardiac catherization (7/9/21):  INTERVENTIONAL IMPRESSIONS: Difficult to engage coronary vasculature due to  Core Valve. Coronary artery disease with 40-50% instent restenosis of left  anterior descending artery.  Left main with 30-40% stenosis  RCA with 50-60% stenosis.  Normal LVEDP.  INTERVENTIONAL RECOMMENDATIONS: Continue with DAPT with aspirin and plavix.  Continue with optimal lipid control with statin.  Continue with metoprolol, torsemide and metolazone.        Home Cardiac Meds:  Metolazone 2.5 mg (M/W/F)  Furosemide 60 mg daily  Plavix 75 mg daily  Aspirin 81 mg daily  Simvastatin 20 mg bedtime  Metoprolol tartrate 50 mg BID

## 2021-07-15 LAB
ANION GAP SERPL CALC-SCNC: 12 MMOL/L — SIGNIFICANT CHANGE UP (ref 5–17)
BASOPHILS # BLD AUTO: 0.03 K/UL — SIGNIFICANT CHANGE UP (ref 0–0.2)
BASOPHILS NFR BLD AUTO: 0.6 % — SIGNIFICANT CHANGE UP (ref 0–2)
BUN SERPL-MCNC: 36.5 MG/DL — HIGH (ref 8–20)
CALCIUM SERPL-MCNC: 8.7 MG/DL — SIGNIFICANT CHANGE UP (ref 8.6–10.2)
CHLORIDE SERPL-SCNC: 104 MMOL/L — SIGNIFICANT CHANGE UP (ref 98–107)
CO2 SERPL-SCNC: 24 MMOL/L — SIGNIFICANT CHANGE UP (ref 22–29)
CREAT SERPL-MCNC: 1.55 MG/DL — HIGH (ref 0.5–1.3)
EOSINOPHIL # BLD AUTO: 0.07 K/UL — SIGNIFICANT CHANGE UP (ref 0–0.5)
EOSINOPHIL NFR BLD AUTO: 1.4 % — SIGNIFICANT CHANGE UP (ref 0–6)
GLUCOSE BLDC GLUCOMTR-MCNC: 138 MG/DL — HIGH (ref 70–99)
GLUCOSE BLDC GLUCOMTR-MCNC: 200 MG/DL — HIGH (ref 70–99)
GLUCOSE BLDC GLUCOMTR-MCNC: 89 MG/DL — SIGNIFICANT CHANGE UP (ref 70–99)
GLUCOSE BLDC GLUCOMTR-MCNC: 96 MG/DL — SIGNIFICANT CHANGE UP (ref 70–99)
GLUCOSE SERPL-MCNC: 105 MG/DL — HIGH (ref 70–99)
HCT VFR BLD CALC: 38.2 % — LOW (ref 39–50)
HGB BLD-MCNC: 12.4 G/DL — LOW (ref 13–17)
IMM GRANULOCYTES NFR BLD AUTO: 0.4 % — SIGNIFICANT CHANGE UP (ref 0–1.5)
LYMPHOCYTES # BLD AUTO: 1.41 K/UL — SIGNIFICANT CHANGE UP (ref 1–3.3)
LYMPHOCYTES # BLD AUTO: 27.8 % — SIGNIFICANT CHANGE UP (ref 13–44)
MAGNESIUM SERPL-MCNC: 2.1 MG/DL — SIGNIFICANT CHANGE UP (ref 1.6–2.6)
MCHC RBC-ENTMCNC: 30.2 PG — SIGNIFICANT CHANGE UP (ref 27–34)
MCHC RBC-ENTMCNC: 32.5 GM/DL — SIGNIFICANT CHANGE UP (ref 32–36)
MCV RBC AUTO: 93.2 FL — SIGNIFICANT CHANGE UP (ref 80–100)
MONOCYTES # BLD AUTO: 0.63 K/UL — SIGNIFICANT CHANGE UP (ref 0–0.9)
MONOCYTES NFR BLD AUTO: 12.4 % — SIGNIFICANT CHANGE UP (ref 2–14)
NEUTROPHILS # BLD AUTO: 2.92 K/UL — SIGNIFICANT CHANGE UP (ref 1.8–7.4)
NEUTROPHILS NFR BLD AUTO: 57.4 % — SIGNIFICANT CHANGE UP (ref 43–77)
PHOSPHATE SERPL-MCNC: 2.4 MG/DL — SIGNIFICANT CHANGE UP (ref 2.4–4.7)
PLATELET # BLD AUTO: 335 K/UL — SIGNIFICANT CHANGE UP (ref 150–400)
POTASSIUM SERPL-MCNC: 4.4 MMOL/L — SIGNIFICANT CHANGE UP (ref 3.5–5.3)
POTASSIUM SERPL-SCNC: 4.4 MMOL/L — SIGNIFICANT CHANGE UP (ref 3.5–5.3)
RBC # BLD: 4.1 M/UL — LOW (ref 4.2–5.8)
RBC # FLD: 15.5 % — HIGH (ref 10.3–14.5)
SODIUM SERPL-SCNC: 140 MMOL/L — SIGNIFICANT CHANGE UP (ref 135–145)
WBC # BLD: 5.08 K/UL — SIGNIFICANT CHANGE UP (ref 3.8–10.5)
WBC # FLD AUTO: 5.08 K/UL — SIGNIFICANT CHANGE UP (ref 3.8–10.5)

## 2021-07-15 PROCEDURE — 99232 SBSQ HOSP IP/OBS MODERATE 35: CPT

## 2021-07-15 RX ADMIN — Medication 25 MILLIGRAM(S): at 17:11

## 2021-07-15 RX ADMIN — CLOPIDOGREL BISULFATE 75 MILLIGRAM(S): 75 TABLET, FILM COATED ORAL at 08:22

## 2021-07-15 RX ADMIN — Medication 25 MILLIGRAM(S): at 06:00

## 2021-07-15 RX ADMIN — AMLODIPINE BESYLATE 5 MILLIGRAM(S): 2.5 TABLET ORAL at 06:00

## 2021-07-15 RX ADMIN — INSULIN GLARGINE 9 UNIT(S): 100 INJECTION, SOLUTION SUBCUTANEOUS at 21:18

## 2021-07-15 RX ADMIN — SENNA PLUS 2 TABLET(S): 8.6 TABLET ORAL at 21:18

## 2021-07-15 RX ADMIN — TAMSULOSIN HYDROCHLORIDE 0.8 MILLIGRAM(S): 0.4 CAPSULE ORAL at 21:17

## 2021-07-15 RX ADMIN — SIMVASTATIN 20 MILLIGRAM(S): 20 TABLET, FILM COATED ORAL at 21:18

## 2021-07-15 RX ADMIN — Medication 81 MILLIGRAM(S): at 08:22

## 2021-07-15 RX ADMIN — CEFTRIAXONE 100 MILLIGRAM(S): 500 INJECTION, POWDER, FOR SOLUTION INTRAMUSCULAR; INTRAVENOUS at 21:18

## 2021-07-15 RX ADMIN — GABAPENTIN 300 MILLIGRAM(S): 400 CAPSULE ORAL at 17:11

## 2021-07-15 RX ADMIN — HEPARIN SODIUM 5000 UNIT(S): 5000 INJECTION INTRAVENOUS; SUBCUTANEOUS at 21:18

## 2021-07-15 RX ADMIN — GABAPENTIN 300 MILLIGRAM(S): 400 CAPSULE ORAL at 06:00

## 2021-07-15 RX ADMIN — HEPARIN SODIUM 5000 UNIT(S): 5000 INJECTION INTRAVENOUS; SUBCUTANEOUS at 05:59

## 2021-07-15 RX ADMIN — Medication 3 UNIT(S): at 08:21

## 2021-07-15 RX ADMIN — Medication 3 UNIT(S): at 17:12

## 2021-07-15 RX ADMIN — Medication 40 MILLIGRAM(S): at 06:00

## 2021-07-15 RX ADMIN — HEPARIN SODIUM 5000 UNIT(S): 5000 INJECTION INTRAVENOUS; SUBCUTANEOUS at 14:00

## 2021-07-15 RX ADMIN — Medication 1: at 17:12

## 2021-07-15 RX ADMIN — Medication 3 UNIT(S): at 12:13

## 2021-07-15 NOTE — PROGRESS NOTE ADULT - SUBJECTIVE AND OBJECTIVE BOX
[facility] Division of Hospital Medicine    Chief Complaint: LACEY    SUBJECTIVE / OVERNIGHT EVENTS:  Pt says he feels well   Wants to go home     Patient denies chest pain, abd pain, N/V, fever, chills, dysuria or any other complaints. All remainder ROS negative.     MEDICATIONS  (STANDING):  amLODIPine   Tablet 5 milliGRAM(s) Oral daily  aspirin  chewable 81 milliGRAM(s) Oral daily  cefTRIAXone   IVPB 1000 milliGRAM(s) IV Intermittent every 24 hours  clopidogrel Tablet 75 milliGRAM(s) Oral daily  dextrose 40% Gel 15 Gram(s) Oral once  dextrose 5%. 1000 milliLiter(s) (50 mL/Hr) IV Continuous <Continuous>  dextrose 5%. 1000 milliLiter(s) (100 mL/Hr) IV Continuous <Continuous>  dextrose 50% Injectable 25 Gram(s) IV Push once  dextrose 50% Injectable 12.5 Gram(s) IV Push once  dextrose 50% Injectable 25 Gram(s) IV Push once  furosemide    Tablet 40 milliGRAM(s) Oral daily  gabapentin 300 milliGRAM(s) Oral two times a day  glucagon  Injectable 1 milliGRAM(s) IntraMuscular once  heparin   Injectable 5000 Unit(s) SubCutaneous every 8 hours  insulin glargine Injectable (LANTUS) 9 Unit(s) SubCutaneous at bedtime  insulin lispro (ADMELOG) corrective regimen sliding scale   SubCutaneous three times a day before meals  insulin lispro Injectable (ADMELOG) 3 Unit(s) SubCutaneous three times a day before meals  metoprolol tartrate 25 milliGRAM(s) Oral two times a day  senna 2 Tablet(s) Oral at bedtime  simvastatin 20 milliGRAM(s) Oral at bedtime  tamsulosin 0.8 milliGRAM(s) Oral at bedtime    MEDICATIONS  (PRN):  polyethylene glycol 3350 17 Gram(s) Oral daily PRN Constipation        I&O's Summary    14 Jul 2021 07:01  -  15 Jul 2021 07:00  --------------------------------------------------------  IN: 1200 mL / OUT: 1500 mL / NET: -300 mL    15 Jul 2021 07:01  -  15 Jul 2021 20:16  --------------------------------------------------------  IN: 720 mL / OUT: 1450 mL / NET: -730 mL        PHYSICAL EXAM:  Vital Signs Last 24 Hrs  T(C): 36.6 (15 Jul 2021 16:43), Max: 36.7 (15 Jul 2021 05:54)  T(F): 97.8 (15 Jul 2021 16:43), Max: 98 (15 Jul 2021 05:54)  HR: 70 (15 Jul 2021 17:04) (70 - 74)  BP: 124/53 (15 Jul 2021 17:04) (103/57 - 134/110)  BP(mean): --  RR: 18 (15 Jul 2021 16:43) (16 - 20)  SpO2: 100% (15 Jul 2021 16:43) (90% - 100%)      CONSTITUTIONAL: NAD, elderly man lying in bed   ENMT: Moist oral mucosa, several missing teeth   RESPIRATORY: Good respiratory effort; lungs are clear to auscultation bilaterally  CARDIOVASCULAR: Regular rate and rhythm, normal S1 and S2  ABDOMEN: Nontender to palpation, Villa with clear urine   MUSCULOSKELETAL: + lower extremity edema b/l   PSYCH: A+O to person, place, not time; affect appropriate  NEUROLOGY: No gross sensory or motor deficits   SKIN: LE venous stasis ulcerations b/l, covered in wraps    LABS:                        12.4   5.08  )-----------( 335      ( 15 Jul 2021 05:35 )             38.2     07-15    140  |  104  |  36.5<H>  ----------------------------<  105<H>  4.4   |  24.0  |  1.55<H>    Ca    8.7      15 Jul 2021 05:35  Phos  2.4     07-15  Mg     2.1     07-15                CAPILLARY BLOOD GLUCOSE      POCT Blood Glucose.: 200 mg/dL (15 Jul 2021 17:09)  POCT Blood Glucose.: 89 mg/dL (15 Jul 2021 12:11)  POCT Blood Glucose.: 96 mg/dL (15 Jul 2021 08:20)  POCT Blood Glucose.: 171 mg/dL (14 Jul 2021 21:16)

## 2021-07-15 NOTE — PROGRESS NOTE ADULT - ASSESSMENT
89 year old male with a past medical history of CAD status post PCI to the LAD in 2020, severe AS status post TAVR, complete heart block status post PPM, atrial flutter and CKD stage 3, hypertension, DM type to and chronic lower extremity edema who presented to the Er with complaints of progressively worsening dyspnea  CT Chest: No focal consolidation. Trace bilateral pleural effusion, decreased since 11/21/2020. Small nodular density at the right proximal mainstem bronchus near the karen, which was noted on 11/21/2020 and may be related to mucus/secretion. Follow-up may be obtained to exclude potential endobronchial lesion/neoplasm.     Exertional dyspnea with a history of CAD  - Multifactorial: CAD, CHF   - s/p Cardiac cath 7/9/21 showing Patent LAD stents with mild to moderate instent restenosis. RCA with moderate disease.   - Continue aspirin, Plavix BB and statin therapy  - Echocardiogram with EF of 55-60% with grade 2 diastolic dysfunction mod-severe TR and mod MR, status post TVR  - CT Chest noted   - Discussed with Cardiology, pacemaker interrogation showed high A fib burden   - Cardiology discussed with EP, will f/u as outpt     Acute on Chronic diastolic CHF with pulmonary hypertension  - Chronic   - c/w lasix   - On BB  - Hold Metolazone     UTI  - UA positive   - UCx growing Strep agalactiae   - C/W Ceftriaxone     Atrial flutter   - not on AC due to bleeding risk     History of complete heart block status post pacemaker  - Pacemaker interrogation     ELISE on CKD stage 3   - Baseline creatinine ~1.6   - Improving   - Monitor BMP    History of BPH  - Has Villa catheter, TOV today   - Continue flomax 0.8 QHS    Bilateral lower extremity venous stasis ulcers   - non healing wounds   - Normal SONIA and US LE   - Consulted Vascular surgery, recommend Dermatology consult   - Dermatology called and rec outpt work up     Diabetes mellitus type 2  - BG stable   - Admelog sliding scale    History of RA   - on Humira Q2 weeks  - On monthly Prolia    VTE Heparin subcut   Dispo: PT evaluation recommends [facility], Pt ready for DC, CM/SW aware     Spoke to daughter Chasidy today

## 2021-07-15 NOTE — PROGRESS NOTE ADULT - SUBJECTIVE AND OBJECTIVE BOX
BEVERLY THOMAS  [id number]      Chief Complaint:  CHF/CAD/AFL    Interval History:  Patient with some LACEY/SOB still but stable.  Has ambulated some.  Denies CP/palps.    Tele:  Mostly V-paced with some A-pacing.  Rates around 60.      amLODIPine   Tablet 5 milliGRAM(s) Oral daily  aspirin  chewable 81 milliGRAM(s) Oral daily  cefTRIAXone   IVPB 1000 milliGRAM(s) IV Intermittent every 24 hours  clopidogrel Tablet 75 milliGRAM(s) Oral daily  dextrose 40% Gel 15 Gram(s) Oral once  dextrose 5%. 1000 milliLiter(s) IV Continuous <Continuous>  dextrose 5%. 1000 milliLiter(s) IV Continuous <Continuous>  dextrose 50% Injectable 25 Gram(s) IV Push once  dextrose 50% Injectable 12.5 Gram(s) IV Push once  dextrose 50% Injectable 25 Gram(s) IV Push once  furosemide    Tablet 40 milliGRAM(s) Oral daily  gabapentin 300 milliGRAM(s) Oral two times a day  glucagon  Injectable 1 milliGRAM(s) IntraMuscular once  heparin   Injectable 5000 Unit(s) SubCutaneous every 8 hours  insulin glargine Injectable (LANTUS) 9 Unit(s) SubCutaneous at bedtime  insulin lispro (ADMELOG) corrective regimen sliding scale   SubCutaneous three times a day before meals  insulin lispro Injectable (ADMELOG) 3 Unit(s) SubCutaneous three times a day before meals  metoprolol tartrate 25 milliGRAM(s) Oral two times a day  polyethylene glycol 3350 17 Gram(s) Oral daily PRN  senna 2 Tablet(s) Oral at bedtime  simvastatin 20 milliGRAM(s) Oral at bedtime  tamsulosin 0.8 milliGRAM(s) Oral at bedtime          Physical Exam:  T(C): 36.6 (07-15-21 @ 09:24), Max: 36.7 (07-15-21 @ 05:54)  HR: 70 (07-15-21 @ 09:24) (70 - 76)  BP: 134/110 (07-15-21 @ 09:24) (103/57 - 134/110)  RR: 20 (07-15-21 @ 09:24) (16 - 20)  SpO2: 90% (07-15-21 @ 09:24) (90% - 99%)  Wt(kg): --  General: Comfortable in NAD  Neck: No JVD  CVS: nl s1s2, no s3, RRR  Pulm: CTA b/l  Abd: soft, non-tender  Ext: No c/c/e  Neuro A&O x3  Psych: Normal affect      Labs:   15 Jul 2021 05:35    140    |  104    |  36.5   ----------------------------<  105    4.4     |  24.0   |  1.55     Ca    8.7        15 Jul 2021 05:35  Phos  2.4       15 Jul 2021 05:36  Mg     2.1       15 Jul 2021 05:36                            12.4   5.08  )-----------( 335      ( 15 Jul 2021 05:35 )             38.2           Cardiac Cath (10/2020):  CORONARY VESSELS: The coronary circulation is right dominant.  LM:   --  LM: There was a 20 % stenosis.  LAD:   --  Proximal LAD: There was a 95 % stenosis.  --  Mid LAD: There was a 95 % stenosis.  CX:   --  OM1: There was a 50 % stenosis.  RCA:   --  RCA: This vessel was not injected.  COMPLICATIONS: There were no complications.  DIAGNOSTIC IMPRESSIONS: Normal right heart pressures. Performed to assess  for need for diuretics. Wedge pressure 14-15 mmhg.  Severe LAD disease. Temporary pacemaker placed and rotational atherectomy  performed. 3 LESLYE.  DIAGNOSTIC RECOMMENDATIONS: Aspirin and plavix.  TAVR evaluation.  INTERVENTIONAL IMPRESSIONS: Normal right heart pressures. Performed to  assess for need for diuretics. Wedge pressure 14-15 mmhg.  Severe LAD disease. Temporary pacemaker placed and rotational atherectomy  performed. 3 LESLYE.        Outpatient TTE (6/23/21):  LVEF 50-55%, dyssynchronous LV, cannot rule out some hypokinesis in the inferior/inferoseptal wall   Mild asymmetric LVH  Normal RV size and mildly reduced RV systolic function  Dilated left atrium  Mild MR  TAVR, peak velocity 1.1 m/s  Moderate to severe TR  No pericardial effusion     TTE (7/8/21):  TTE images reviewed, my report:  LVEF 60-65%  Mildly dilated RV size with normal systolic function, pacer wire visualized  Biatrial enlargement   Severe mitral annular calcification, Mild to moderate MR  Severe TR, RVSP ~ 25 mmHg   TAVR well seated, peak velocity 1.1 m/s, mild AR  Trace VA   IVC not well visualized  No pericardial effusion        Cardiac catherization (7/9/21):  INTERVENTIONAL IMPRESSIONS: Difficult to engage coronary vasculature due to  Core Valve. Coronary artery disease with 40-50% instent restenosis of left  anterior descending artery.  Left main with 30-40% stenosis  RCA with 50-60% stenosis.  Normal LVEDP.  INTERVENTIONAL RECOMMENDATIONS: Continue with DAPT with aspirin and plavix.  Continue with optimal lipid control with statin.  Continue with metoprolol, torsemide and metolazone.          Assessment:  89 year old man with past medical history of Coronary artery disease (s/p PCI to LAD in 10/2020 with medical management of RCA disease), Severe aortic valve stenosis (s/p 34 mm CoreValve Evolut TAVR in 11/2020), Complete heart block (s/p pacemaker), Atrial flutter (not on anticoagulation), CKD (baseline Cr ~ 1.7), Hypertension, Diabetes mellitus, rheumatoid arthritis and chronic lower extremity edema (follows with vascular with unna boots) who was sent in to ER due to progressive dyspnea on minimal exertion.  The patient was recently evaluated in cardiology office with Dr. Corona in end of June with plans for left heart catherization to evaluate LAD to ensure no in-stent restenosis as cause of the dyspnea. Troponins negative. Echo with preserved LV and RV systolic function and severe tricuspid regurgitation.   -Cath with no significant new dz.  -ICD interrogated with high AF burden.      Plan:  1. Continue Lasix po daily.  2. Continue other current CV meds at current doses.  3. D/w EP and will f/u as OP but options limited.  4. No additional CV w/u now.  5. Ambulate/PT.  6. D/c planning.

## 2021-07-16 ENCOUNTER — TRANSCRIPTION ENCOUNTER (OUTPATIENT)
Age: 86
End: 2021-07-16

## 2021-07-16 LAB
ANION GAP SERPL CALC-SCNC: 9 MMOL/L — SIGNIFICANT CHANGE UP (ref 5–17)
BASOPHILS # BLD AUTO: 0.03 K/UL — SIGNIFICANT CHANGE UP (ref 0–0.2)
BASOPHILS NFR BLD AUTO: 0.6 % — SIGNIFICANT CHANGE UP (ref 0–2)
BUN SERPL-MCNC: 34.2 MG/DL — HIGH (ref 8–20)
CALCIUM SERPL-MCNC: 8.8 MG/DL — SIGNIFICANT CHANGE UP (ref 8.6–10.2)
CHLORIDE SERPL-SCNC: 104 MMOL/L — SIGNIFICANT CHANGE UP (ref 98–107)
CO2 SERPL-SCNC: 24 MMOL/L — SIGNIFICANT CHANGE UP (ref 22–29)
CREAT SERPL-MCNC: 1.46 MG/DL — HIGH (ref 0.5–1.3)
EOSINOPHIL # BLD AUTO: 0.12 K/UL — SIGNIFICANT CHANGE UP (ref 0–0.5)
EOSINOPHIL NFR BLD AUTO: 2.2 % — SIGNIFICANT CHANGE UP (ref 0–6)
GLUCOSE BLDC GLUCOMTR-MCNC: 116 MG/DL — HIGH (ref 70–99)
GLUCOSE BLDC GLUCOMTR-MCNC: 153 MG/DL — HIGH (ref 70–99)
GLUCOSE BLDC GLUCOMTR-MCNC: 81 MG/DL — SIGNIFICANT CHANGE UP (ref 70–99)
GLUCOSE BLDC GLUCOMTR-MCNC: 98 MG/DL — SIGNIFICANT CHANGE UP (ref 70–99)
GLUCOSE SERPL-MCNC: 94 MG/DL — SIGNIFICANT CHANGE UP (ref 70–99)
HCT VFR BLD CALC: 37.8 % — LOW (ref 39–50)
HGB BLD-MCNC: 12 G/DL — LOW (ref 13–17)
IMM GRANULOCYTES NFR BLD AUTO: 0.4 % — SIGNIFICANT CHANGE UP (ref 0–1.5)
LYMPHOCYTES # BLD AUTO: 1.54 K/UL — SIGNIFICANT CHANGE UP (ref 1–3.3)
LYMPHOCYTES # BLD AUTO: 28.8 % — SIGNIFICANT CHANGE UP (ref 13–44)
MCHC RBC-ENTMCNC: 29.9 PG — SIGNIFICANT CHANGE UP (ref 27–34)
MCHC RBC-ENTMCNC: 31.7 GM/DL — LOW (ref 32–36)
MCV RBC AUTO: 94.3 FL — SIGNIFICANT CHANGE UP (ref 80–100)
MONOCYTES # BLD AUTO: 0.7 K/UL — SIGNIFICANT CHANGE UP (ref 0–0.9)
MONOCYTES NFR BLD AUTO: 13.1 % — SIGNIFICANT CHANGE UP (ref 2–14)
NEUTROPHILS # BLD AUTO: 2.93 K/UL — SIGNIFICANT CHANGE UP (ref 1.8–7.4)
NEUTROPHILS NFR BLD AUTO: 54.9 % — SIGNIFICANT CHANGE UP (ref 43–77)
PLATELET # BLD AUTO: 337 K/UL — SIGNIFICANT CHANGE UP (ref 150–400)
POTASSIUM SERPL-MCNC: 4.4 MMOL/L — SIGNIFICANT CHANGE UP (ref 3.5–5.3)
POTASSIUM SERPL-SCNC: 4.4 MMOL/L — SIGNIFICANT CHANGE UP (ref 3.5–5.3)
RBC # BLD: 4.01 M/UL — LOW (ref 4.2–5.8)
RBC # FLD: 15.5 % — HIGH (ref 10.3–14.5)
SODIUM SERPL-SCNC: 137 MMOL/L — SIGNIFICANT CHANGE UP (ref 135–145)
WBC # BLD: 5.34 K/UL — SIGNIFICANT CHANGE UP (ref 3.8–10.5)
WBC # FLD AUTO: 5.34 K/UL — SIGNIFICANT CHANGE UP (ref 3.8–10.5)

## 2021-07-16 PROCEDURE — 99232 SBSQ HOSP IP/OBS MODERATE 35: CPT

## 2021-07-16 RX ORDER — SENNA PLUS 8.6 MG/1
2 TABLET ORAL
Qty: 0 | Refills: 0 | DISCHARGE
Start: 2021-07-16

## 2021-07-16 RX ORDER — FUROSEMIDE 40 MG
60 TABLET ORAL
Qty: 0 | Refills: 0 | DISCHARGE

## 2021-07-16 RX ORDER — FUROSEMIDE 40 MG
40 TABLET ORAL
Qty: 0 | Refills: 0 | DISCHARGE

## 2021-07-16 RX ORDER — POLYETHYLENE GLYCOL 3350 17 G/17G
17 POWDER, FOR SOLUTION ORAL
Qty: 0 | Refills: 0 | DISCHARGE
Start: 2021-07-16

## 2021-07-16 RX ADMIN — Medication 40 MILLIGRAM(S): at 06:15

## 2021-07-16 RX ADMIN — SIMVASTATIN 20 MILLIGRAM(S): 20 TABLET, FILM COATED ORAL at 21:13

## 2021-07-16 RX ADMIN — SENNA PLUS 2 TABLET(S): 8.6 TABLET ORAL at 21:16

## 2021-07-16 RX ADMIN — AMLODIPINE BESYLATE 5 MILLIGRAM(S): 2.5 TABLET ORAL at 06:15

## 2021-07-16 RX ADMIN — Medication 81 MILLIGRAM(S): at 13:13

## 2021-07-16 RX ADMIN — Medication 25 MILLIGRAM(S): at 17:50

## 2021-07-16 RX ADMIN — CEFTRIAXONE 100 MILLIGRAM(S): 500 INJECTION, POWDER, FOR SOLUTION INTRAMUSCULAR; INTRAVENOUS at 13:13

## 2021-07-16 RX ADMIN — TAMSULOSIN HYDROCHLORIDE 0.8 MILLIGRAM(S): 0.4 CAPSULE ORAL at 21:13

## 2021-07-16 RX ADMIN — CLOPIDOGREL BISULFATE 75 MILLIGRAM(S): 75 TABLET, FILM COATED ORAL at 13:13

## 2021-07-16 RX ADMIN — GABAPENTIN 300 MILLIGRAM(S): 400 CAPSULE ORAL at 17:50

## 2021-07-16 RX ADMIN — HEPARIN SODIUM 5000 UNIT(S): 5000 INJECTION INTRAVENOUS; SUBCUTANEOUS at 13:12

## 2021-07-16 RX ADMIN — GABAPENTIN 300 MILLIGRAM(S): 400 CAPSULE ORAL at 06:15

## 2021-07-16 RX ADMIN — HEPARIN SODIUM 5000 UNIT(S): 5000 INJECTION INTRAVENOUS; SUBCUTANEOUS at 21:12

## 2021-07-16 RX ADMIN — Medication 25 MILLIGRAM(S): at 06:15

## 2021-07-16 RX ADMIN — HEPARIN SODIUM 5000 UNIT(S): 5000 INJECTION INTRAVENOUS; SUBCUTANEOUS at 06:15

## 2021-07-16 RX ADMIN — Medication 3 UNIT(S): at 13:11

## 2021-07-16 NOTE — PROGRESS NOTE ADULT - ASSESSMENT
89 year old male with a past medical history of CAD status post PCI to the LAD in 2020, severe AS status post TAVR, complete heart block status post PPM, atrial flutter and CKD stage 3, hypertension, DM type to and chronic lower extremity edema who presented to the Er with complaints of progressively worsening dyspnea  CT Chest: No focal consolidation. Trace bilateral pleural effusion, decreased since 11/21/2020. Small nodular density at the right proximal mainstem bronchus near the karen, which was noted on 11/21/2020 and may be related to mucus/secretion. Follow-up may be obtained to exclude potential endobronchial lesion/neoplasm.     Exertional dyspnea with a history of CAD  - Multifactorial: CAD, CHF   - s/p Cardiac cath 7/9/21 showing Patent LAD stents with mild to moderate instent restenosis. RCA with moderate disease.   - Continue aspirin, Plavix BB and statin therapy  - Echocardiogram with EF of 55-60% with grade 2 diastolic dysfunction mod-severe TR and mod MR, status post TVR  - CT Chest noted   - Discussed with Cardiology, pacemaker interrogation showed high A fib burden   - Cardiology discussed with EP, will f/u as outpt     Acute on Chronic diastolic CHF with pulmonary hypertension  - Chronic   - c/w lasix   - On BB  - Hold Metolazone     UTI  - UA positive   - UCx growing Strep agalactiae   - C/W Ceftriaxone to complete 7 days tomorrow     Atrial flutter   - not on AC due to bleeding risk     History of complete heart block status post pacemaker  - Pacemaker interrogation     ELISE on CKD stage 3   - Baseline creatinine ~1.6   - Improving   - Monitor BMP    History of BPH  - Passed TOV, now voiding   - Continue flomax 0.8 QHS    Bilateral lower extremity venous stasis ulcers   - non healing wounds   - Normal SONIA and US LE   - Consulted Vascular surgery, recommend Dermatology consult   - Dermatology called and rec outpt work up     Diabetes mellitus type 2  - BG stable   - Admelog sliding scale    History of RA   - on Humira Q2 weeks  - On monthly Prolia    VTE Heparin subcut   Dispo: PT evaluation recommends CORY, Pt ready for DC awaiting Auth, CM/SW aware

## 2021-07-16 NOTE — CHART NOTE - NSCHARTNOTEFT_GEN_A_CORE
Source: Patient [x ]  Family [ ]   other [x ] EMR and staff     Current Diet: Diet, Consistent Carbohydrate Renal/No Snacks:   DASH/TLC {Sodium & Cholesterol Restricted} (DASH)  No Concentrated Potassium (07-06-21 @ 21:53)      Patient reports [ ] nausea  [ ] vomiting [ ] diarrhea [ ] constipation  [x ]chewing problems [ ] swallowing issues  [ ] other:     PO intake:  < 50% [ ]   50-75%  [x ]   %  [ ]  other :    Source for PO intake [x ] Patient [ ] family [ ] chart [ ] staff [ x] other observed Lunch tray     Current Weight:   7/16: 65.4 kg   7/13: 69.1 kg  7/10: 65 kg     % Weight Change: Unsure of accuracy of weights secondary to inconsistency, will continue to monitor weights for trends. (Trace edema to B/L legs)      Pertinent Medications: MEDICATIONS  (STANDING):  amLODIPine   Tablet 5 milliGRAM(s) Oral daily  aspirin  chewable 81 milliGRAM(s) Oral daily  cefTRIAXone   IVPB 1000 milliGRAM(s) IV Intermittent every 24 hours  clopidogrel Tablet 75 milliGRAM(s) Oral daily  dextrose 40% Gel 15 Gram(s) Oral once  dextrose 5%. 1000 milliLiter(s) (50 mL/Hr) IV Continuous <Continuous>  dextrose 5%. 1000 milliLiter(s) (100 mL/Hr) IV Continuous <Continuous>  dextrose 50% Injectable 25 Gram(s) IV Push once  dextrose 50% Injectable 12.5 Gram(s) IV Push once  dextrose 50% Injectable 25 Gram(s) IV Push once  furosemide    Tablet 40 milliGRAM(s) Oral daily  gabapentin 300 milliGRAM(s) Oral two times a day  glucagon  Injectable 1 milliGRAM(s) IntraMuscular once  heparin   Injectable 5000 Unit(s) SubCutaneous every 8 hours  insulin glargine Injectable (LANTUS) 9 Unit(s) SubCutaneous at bedtime  insulin lispro (ADMELOG) corrective regimen sliding scale   SubCutaneous three times a day before meals  insulin lispro Injectable (ADMELOG) 3 Unit(s) SubCutaneous three times a day before meals  metoprolol tartrate 25 milliGRAM(s) Oral two times a day  senna 2 Tablet(s) Oral at bedtime  simvastatin 20 milliGRAM(s) Oral at bedtime  tamsulosin 0.8 milliGRAM(s) Oral at bedtime    MEDICATIONS  (PRN):  polyethylene glycol 3350 17 Gram(s) Oral daily PRN Constipation    Pertinent Labs: CBC Full  -  ( 16 Jul 2021 05:38 )  WBC Count : 5.34 K/uL  RBC Count : 4.01 M/uL  Hemoglobin : 12.0 g/dL  Hematocrit : 37.8 %  Platelet Count - Automated : 337 K/uL  Mean Cell Volume : 94.3 fl  Mean Cell Hemoglobin : 29.9 pg  Mean Cell Hemoglobin Concentration : 31.7 gm/dL  Auto Neutrophil # : 2.93 K/uL  Auto Lymphocyte # : 1.54 K/uL  Auto Monocyte # : 0.70 K/uL  Auto Eosinophil # : 0.12 K/uL  Auto Basophil # : 0.03 K/uL  Auto Neutrophil % : 54.9 %  Auto Lymphocyte % : 28.8 %  Auto Monocyte % : 13.1 %  Auto Eosinophil % : 2.2 %  Auto Basophil % : 0.6 %        Skin: Surgical site to R groin, Bilateral lower extremity venous stasis ulcers       Nutrition focused physical exam conducted - found signs of malnutrition [ ]absent [x ]present    Subcutaneous fat loss: severe[x ] Orbital fat pads region, [x ]Buccal fat region, [ x]Triceps region,  [x ]Ribs region    Muscle wasting: severe [x ]Temples region, [x ]Clavicle region, [x ]Shoulder region, [ ]Scapula region, [ ]Interosseous region,  [ ]thigh region, [ ]Calf region    Estimated Needs:   [x ] no change since previous assessment  [ ] recalculated:     Current Nutrition Diagnosis:  Pt remains at high nutrition risk secondary to severe (chronic) protein calorie malnutrition related to inability to meet sufficient protein energy requirements in setting of advanced age Acute on Chronic diastolic CHF with pulmonary hypertension as evidenced by physical signs of severe muscle wasting and fat loss, + fluid accumulation. Pt very [place], unable to provide weight history at this time. Pt with missing teeth, reports needing softer foods and meats. Pt reports eating fairly well, consuming 50-75% at meals, confirmed by observation of lunch tray. Recommendations below:         Recommendations:   1. RX: MVI and Vit. C daily (500mg).  2. Check weight daily to monitor trends   3. Encourage/Assistance with meals.   4. Glucerna shake TID (220 kcal, 10gm protein per shake)   5. Add soft to diet order     Monitoring and Evaluation:   [x ] PO intake [ x] Tolerance to diet prescription [X] Weights  [X] Follow up per protocol [X] Labs:
Contacted by RN for pt with UA+   Patient seen and examined at bedside.  He has no complaints at this time   Noted to have urinary retention for which a tena was placed earlier today   Denies CP, SOB, abd pain, n/v/d    Vital Signs  T(C): 36.7 (07-10-21 @ 22:01), Max: 36.7 (07-10-21 @ 08:00)  HR: 70 (07-10-21 @ 22:01) (70 - 86)  BP: 119/56 (07-10-21 @ 22:01) (100/66 - 148/63)  RR: 18 (07-10-21 @ 22:01) (18 - 18)  SpO2: 100% (07-10-21 @ 22:01) (91% - 100%)    Physical Exam:  Gen: AOx3, laying in bed comfortably in NAD  CV: RRR +S1S2  Lungs: CTA b/l, unlabored respirations on RA  Abd: +bowel sounds, soft/nt/nd    A/P: UTI  afebrile, nontoxic appearing, no leukocytosis  UA noted to have significant WBCs, occasional epithelial cells, mod leuk esterases although w/o nitrates   urine cx ordered  Rocephin initiated   RN to monitor, assess and escalate to PA PRN.  Will continue to monitor.

## 2021-07-16 NOTE — DISCHARGE NOTE PROVIDER - NSDCMRMEDTOKEN_GEN_ALL_CORE_FT
adalimumab 40 mg/0.8 mL subcutaneous solution: subcutaneous every 2 weeks  amLODIPine 5 mg oral tablet: 1 tab(s) orally once a day  aspirin 81 mg oral delayed release capsule:  orally   gabapentin 300 mg oral capsule: 1 cap(s) orally 2 times a day  glimepiride 1 mg oral tablet: 0.5 milligram(s) orally once a day  glimepiride 1 mg oral tablet: 0.5 tab(s) orally once a day  Lasix: 60 milligram(s) orally once a day  metOLazone 2.5 mg oral tablet: 1 tab(s) orally 1 times a day (3 days a week)  Metoprolol Tartrate 25 mg oral tablet: 1 tab(s) orally 2 times a day  Plavix 75 mg oral tablet: 1 tab(s) orally once a day  potassium chloride 20 mEq oral tablet, extended release: 1 tab(s) orally once a day   Prolia 60 mg/mL subcutaneous solution:   simvastatin 20 mg oral tablet: 1 tab(s) orally once a day (at bedtime)  tamsulosin 0.4 mg oral capsule: 1 cap(s) orally once a day (at bedtime)  torsemide 20 mg oral tablet: 2 tab(s) orally every 12 hours   adalimumab 40 mg/0.8 mL subcutaneous solution: subcutaneous every 2 weeks  amLODIPine 5 mg oral tablet: 1 tab(s) orally once a day  aspirin 81 mg oral delayed release capsule:  orally   gabapentin 300 mg oral capsule: 1 cap(s) orally 2 times a day  glimepiride 1 mg oral tablet: 0.5 milligram(s) orally once a day  glimepiride 1 mg oral tablet: 0.5 tab(s) orally once a day  Lasix: 40 milligram(s) orally once a day  Metoprolol Tartrate 25 mg oral tablet: 1 tab(s) orally 2 times a day  Plavix 75 mg oral tablet: 1 tab(s) orally once a day  polyethylene glycol 3350 oral powder for reconstitution: 17 gram(s) orally once a day, As needed, Constipation  potassium chloride 20 mEq oral tablet, extended release: 1 tab(s) orally once a day   Prolia 60 mg/mL subcutaneous solution:   senna oral tablet: 2 tab(s) orally once a day (at bedtime)  simvastatin 20 mg oral tablet: 1 tab(s) orally once a day (at bedtime)  tamsulosin 0.4 mg oral capsule: 2 cap(s) orally once a day (at bedtime)   adalimumab 40 mg/0.8 mL subcutaneous solution: subcutaneous every 2 weeks  amLODIPine 5 mg oral tablet: 1 tab(s) orally once a day  aspirin 81 mg oral delayed release capsule:  orally   gabapentin 300 mg oral capsule: 1 cap(s) orally 2 times a day  glimepiride 1 mg oral tablet: 0.5 tab(s) orally once a day  Lasix: 40 milligram(s) orally once a day  Metoprolol Tartrate 25 mg oral tablet: 1 tab(s) orally 2 times a day  Plavix 75 mg oral tablet: 1 tab(s) orally once a day  polyethylene glycol 3350 oral powder for reconstitution: 17 gram(s) orally once a day, As needed, Constipation  Prolia 60 mg/mL subcutaneous solution:   senna oral tablet: 2 tab(s) orally once a day (at bedtime)  simvastatin 20 mg oral tablet: 1 tab(s) orally once a day (at bedtime)  tamsulosin 0.4 mg oral capsule: 2 cap(s) orally once a day (at bedtime)

## 2021-07-16 NOTE — DIETITIAN NUTRITION RISK NOTIFICATION - TREATMENT: THE FOLLOWING DIET HAS BEEN RECOMMENDED
Diet, Consistent Carbohydrate Renal/No Snacks:   DASH/TLC {Sodium & Cholesterol Restricted} (DASH)  No Concentrated Potassium (07-06-21 @ 21:53) [Active]

## 2021-07-16 NOTE — DISCHARGE NOTE PROVIDER - PROVIDER RX CONTACT NUMBER
Problem: Psychosis Goal: *STG: Decreased delusional thinking         Goal met by 10/17/2017  Variance: Patient Condition    Pt. Pacing on unit  Disorganized  Unable to state where he is      Paranoid  Labile       TDO  Gretel   Scheduled for 10/13/2017      Goal: *STG/LTG: Complies with medication therapy  Variance: Patient Condition   Pt. Has been non medication compliant for several months       Goal: Interventions  Variance: Patient Condition  Will continue to monitor  On 15 min. Checks for safety  Assess thought process      lability  Inform pt.  About TDO process     Medication compliance     Effectiveness         [address] [facility] [address]  Master Treatment Plan Aileen Pritchard        Date Treatment Plan Initiated: 10/12/2017      Treatment Plan Modalities:    Type of Modality Amount  (x minutes) Frequency (x/week) Duration (x days) Name of Responsible Staff   Community & wrap-up meetings to encourage peer interactions    15    7    1      Gerilyn Leyden , BHT   Group psychotherapy to assist in building coping skills and internal controls    60    7    1    Karson Lux LCSW   Therapeutic activity groups to build coping skills    60    7    1    Karson Lux LCSW   Psychoeducation in group setting to address:   Medication education    15    7    1     inkolay Villar Asa, RN   Coping skills    20    7    1     Isak Baez RN   Relaxation techniques           Symptom management           Discharge planning    15    7    1     Nathalie Felder    Spirituality     60    7    1    Chaplain Pete RODRIGUEZ    60    7    1    Volunteer from [employer]    60    7    1    Volunteer from [address] [place] [address] medication management    15    7    1    Dr. Rogelio Kaba meeting/discharge planning                     [phone]

## 2021-07-16 NOTE — DISCHARGE NOTE PROVIDER - HOSPITAL COURSE
89 year old male with a past medical history of CAD status post PCI to the LAD in 2020, severe AS status post TAVR, complete heart block status post PPM, atrial flutter and CKD stage 3, hypertension, DM type to and chronic lower extremity edema who presented to the Er with complaints of progressively worsening dyspnea. Pt was admitted for LACEY.   CT Chest: No focal consolidation. Trace bilateral pleural effusion, decreased since 11/21/2020. Small nodular density at the right proximal mainstem bronchus near the karen, which was noted on 11/21/2020 and may be related to mucus/secretion. Follow-up may be obtained to exclude potential endobronchial lesion/neoplasm.  Exertional dyspnea with a history of CAD - Multifactorial: CAD, CHF. s/p Cardiac cath 7/9/21 showing Patent LAD stents with mild to moderate instent restenosis. RCA with moderate disease. Had an Echocardiogram with EF of 55-60% with grade 2 diastolic dysfunction mod-severe TR and mod MR, status post TVR. Pacemaker interrogation showed high A fib burden so Cardiology discussed with EP, rec outpt f/u.   Acute on Chronic diastolic CHF with pulmonary hypertension - continue with Lasix   UTI - UA positive, UCx growing Strep agalactiae. C/W Ceftriaxone to complete 7 days tomorrow   Atrial flutter - not on AC due to bleeding risk   Bilateral lower extremity venous stasis ulcers - non healing wounds, Normal SONIA and US LE. Consulted Vascular surgery, recommend Dermatology consult. Dermatology called and rec outpt work up        89 year old male with a past medical history of CAD status post PCI to the LAD in 2020, severe AS status post TAVR, complete heart block status post PPM, atrial flutter and CKD stage 3, hypertension, DM type to and chronic lower extremity edema who presented to the Er with complaints of progressively worsening dyspnea. Pt was admitted for LACEY.   CT Chest: No focal consolidation. Trace bilateral pleural effusion, decreased since 11/21/2020. Small nodular density at the right proximal mainstem bronchus near the karen, which was noted on 11/21/2020 and may be related to mucus/secretion. Follow-up may be obtained to exclude potential endobronchial lesion/neoplasm.  Exertional dyspnea with a history of CAD - Multifactorial: CAD, CHF. s/p Cardiac cath 7/9/21 showing Patent LAD stents with mild to moderate instent restenosis. RCA with moderate disease. Had an Echocardiogram with EF of 55-60% with grade 2 diastolic dysfunction mod-severe TR and mod MR, status post TVR. Pacemaker interrogation showed high A fib burden so Cardiology discussed with EP, rec outpt f/u.   Acute on Chronic diastolic CHF with pulmonary hypertension - continue with Lasix   UTI - UA positive, UCx growing Strep agalactiae. Completed a 7 day course   Atrial flutter - not on AC due to bleeding risk   Bilateral lower extremity venous stasis ulcers - non healing wounds, Normal SONIA and US LE. Consulted Vascular surgery, recommend Dermatology consult. Dermatology called and rec outpt work up       Time spent on patients discharge 32 minutes

## 2021-07-16 NOTE — DISCHARGE NOTE PROVIDER - CARE PROVIDER_API CALL
Gwen Iyer)  Cardiovascular Disease; Internal Medicine  [address] [facility], [address]  [place] [address]  Phone: [phone]  Fax: [phone]  Follow Up Time:     Melo Bryson ()  Internal Medicine  [address] [place] [address], [address] [place] [address]  Phone: [phone]  Fax: [phone]  Follow Up Time:     Tyrell Anne)  Dermatology  [facility] [place] [address]  Phone: [phone]  Fax: [phone]  Follow Up Time:    Gwen Iyer)  Cardiovascular Disease; Internal Medicine  [address] [facility], [address]  [place] [address]  Phone: [phone]  Fax: ()-  Follow Up Time:     Melo Bryson ()  Internal Medicine  [address] [facility], [address]nd [place] [address]  Phone: [phone]  Fax: [phone]  Follow Up Time:     Tyrell Anne)  Dermatology  [address] [place] [address]  Phone: [phone]  Fax: [phone]  Follow Up Time:     ManvarSingh, Pallavi B (MD)  Vascular Surgery  [address] [facility], [address]st [place] [address]  Phone: [phone]  Fax: [phone]  Follow Up Time: 2 weeks

## 2021-07-16 NOTE — DISCHARGE NOTE PROVIDER - CARE PROVIDERS DIRECT ADDRESSES
,DirectAddress_Unknown,[email or web address],DirectAddress_Unknown ,DirectAddress_Unknown,[email or web address],DirectAddress_Unknown,[email or web address]

## 2021-07-16 NOTE — DISCHARGE NOTE PROVIDER - DETAILS OF MALNUTRITION DIAGNOSIS/DIAGNOSES
This patient has been assessed with a concern for Malnutrition and was treated during this hospitalization for the following Nutrition diagnosis/diagnoses:     -  07/16/2021: Severe protein-calorie malnutrition   -  07/16/2021: Underweight (BMI < 19)

## 2021-07-16 NOTE — DISCHARGE NOTE PROVIDER - PROVIDER TOKENS
PROVIDER:[TOKEN:[[id number]]],PROVIDER:[TOKEN:[[id number]]],PROVIDER:[TOKEN:[[id number]]] PROVIDER:[TOKEN:[[id number]]],PROVIDER:[TOKEN:[[id number]]],PROVIDER:[TOKEN:[[id number]]],PROVIDER:[TOKEN:[[id number]],FOLLOWUP:[2 weeks]]

## 2021-07-16 NOTE — PROGRESS NOTE ADULT - SUBJECTIVE AND OBJECTIVE BOX
[facility] Division of Hospital Medicine    Chief Complaint: LACEY    SUBJECTIVE / OVERNIGHT EVENTS:  Pt says he feels well   Passed TOV yesterday   No complaints today     Patient denies chest pain, abd pain, N/V, fever, chills, dysuria or any other complaints. All remainder ROS negative.     MEDICATIONS  (STANDING):  amLODIPine   Tablet 5 milliGRAM(s) Oral daily  aspirin  chewable 81 milliGRAM(s) Oral daily  cefTRIAXone   IVPB 1000 milliGRAM(s) IV Intermittent every 24 hours  clopidogrel Tablet 75 milliGRAM(s) Oral daily  dextrose 40% Gel 15 Gram(s) Oral once  dextrose 5%. 1000 milliLiter(s) (50 mL/Hr) IV Continuous <Continuous>  dextrose 5%. 1000 milliLiter(s) (100 mL/Hr) IV Continuous <Continuous>  dextrose 50% Injectable 25 Gram(s) IV Push once  dextrose 50% Injectable 12.5 Gram(s) IV Push once  dextrose 50% Injectable 25 Gram(s) IV Push once  furosemide    Tablet 40 milliGRAM(s) Oral daily  gabapentin 300 milliGRAM(s) Oral two times a day  glucagon  Injectable 1 milliGRAM(s) IntraMuscular once  heparin   Injectable 5000 Unit(s) SubCutaneous every 8 hours  insulin lispro (ADMELOG) corrective regimen sliding scale   SubCutaneous three times a day before meals  insulin lispro Injectable (ADMELOG) 3 Unit(s) SubCutaneous three times a day before meals  metoprolol tartrate 25 milliGRAM(s) Oral two times a day  senna 2 Tablet(s) Oral at bedtime  simvastatin 20 milliGRAM(s) Oral at bedtime  tamsulosin 0.8 milliGRAM(s) Oral at bedtime    MEDICATIONS  (PRN):  polyethylene glycol 3350 17 Gram(s) Oral daily PRN Constipation        I&O's Summary    15 Jul 2021 07:01  -  16 Jul 2021 07:00  --------------------------------------------------------  IN: 1020 mL / OUT: 2100 mL / NET: -1080 mL    16 Jul 2021 07:01  -  16 Jul 2021 20:00  --------------------------------------------------------  IN: 660 mL / OUT: 1000 mL / NET: -340 mL        PHYSICAL EXAM:  Vital Signs Last 24 Hrs  T(C): 36.4 (16 Jul 2021 15:28), Max: 36.8 (15 Jul 2021 21:10)  T(F): 97.6 (16 Jul 2021 15:28), Max: 98.2 (15 Jul 2021 21:10)  HR: 70 (16 Jul 2021 15:28) (70 - 73)  BP: 113/59 (16 Jul 2021 15:28) (109/71 - 129/66)  BP(mean): --  RR: 18 (16 Jul 2021 15:28) (16 - 18)  SpO2: 98% (16 Jul 2021 15:28) (97% - 99%)      CONSTITUTIONAL: NAD, elderly man lying in bed   ENMT: Moist oral mucosa, several missing teeth   RESPIRATORY: Good respiratory effort; lungs are clear to auscultation bilaterally  CARDIOVASCULAR: Regular rate and rhythm, normal S1 and S2  ABDOMEN: Nontender to palpation  MUSCULOSKELETAL: + lower extremity edema b/l   PSYCH: A+O to person, place, not time; affect appropriate  NEUROLOGY: No gross sensory or motor deficits   SKIN: LE venous stasis ulcerations b/l, covered in wraps    LABS:                        12.0   5.34  )-----------( 337      ( 16 Jul 2021 05:38 )             37.8     07-16    137  |  104  |  34.2<H>  ----------------------------<  94  4.4   |  24.0  |  1.46<H>    Ca    8.8      16 Jul 2021 05:38  Phos  2.4     07-15  Mg     2.1     07-15                CAPILLARY BLOOD GLUCOSE      POCT Blood Glucose.: 98 mg/dL (16 Jul 2021 17:03)  POCT Blood Glucose.: 116 mg/dL (16 Jul 2021 12:07)  POCT Blood Glucose.: 81 mg/dL (16 Jul 2021 08:04)  POCT Blood Glucose.: 138 mg/dL (15 Jul 2021 21:16)

## 2021-07-16 NOTE — DISCHARGE NOTE PROVIDER - NSDCCPCAREPLAN_GEN_ALL_CORE_FT
PRINCIPAL DISCHARGE DIAGNOSIS  Diagnosis: CAD (coronary artery disease)  Assessment and Plan of Treatment: s/p Cardiac cath 7/9/21 showing Patent LAD stents with mild to moderate instent restenosis. RCA with moderate disease.      SECONDARY DISCHARGE DIAGNOSES  Diagnosis: LACEY (dyspnea on exertion)  Assessment and Plan of Treatment: Multifactorial: CAD, CHF.   Echocardiogram with EF of 55-60% with grade 2 diastolic dysfunction mod-severe TR and mod MR, status post TVR.   Pacemaker interrogation showed high A fib burden so Cardiology discussed with EP, rec outpt f/u.    Diagnosis: Atrial flutter  Assessment and Plan of Treatment: not on AC due to bleeding risk    Diagnosis: Ulcers of both lower extremities  Assessment and Plan of Treatment: non healing wounds, Normal SONIA and US LE. Consulted Vascular surgery, recommend Dermatology consult. Dermatology called and rec outpt work up    Diagnosis: Acute on chronic diastolic congestive heart failure  Assessment and Plan of Treatment: Acute on Chronic diastolic CHF with pulmonary hypertension - continue with Lasix

## 2021-07-17 ENCOUNTER — TRANSCRIPTION ENCOUNTER (OUTPATIENT)
Age: 86
End: 2021-07-17

## 2021-07-17 VITALS
HEART RATE: 70 BPM | RESPIRATION RATE: 18 BRPM | TEMPERATURE: 98 F | SYSTOLIC BLOOD PRESSURE: 120 MMHG | OXYGEN SATURATION: 92 % | DIASTOLIC BLOOD PRESSURE: 71 MMHG

## 2021-07-17 LAB
GLUCOSE BLDC GLUCOMTR-MCNC: 111 MG/DL — HIGH (ref 70–99)
GLUCOSE BLDC GLUCOMTR-MCNC: 152 MG/DL — HIGH (ref 70–99)
GLUCOSE BLDC GLUCOMTR-MCNC: 73 MG/DL — SIGNIFICANT CHANGE UP (ref 70–99)
SARS-COV-2 RNA SPEC QL NAA+PROBE: SIGNIFICANT CHANGE UP

## 2021-07-17 PROCEDURE — 85027 COMPLETE CBC AUTOMATED: CPT

## 2021-07-17 PROCEDURE — 86769 SARS-COV-2 COVID-19 ANTIBODY: CPT

## 2021-07-17 PROCEDURE — 85025 COMPLETE CBC W/AUTO DIFF WBC: CPT

## 2021-07-17 PROCEDURE — 97110 THERAPEUTIC EXERCISES: CPT

## 2021-07-17 PROCEDURE — 96375 TX/PRO/DX INJ NEW DRUG ADDON: CPT

## 2021-07-17 PROCEDURE — U0005: CPT

## 2021-07-17 PROCEDURE — 84484 ASSAY OF TROPONIN QUANT: CPT

## 2021-07-17 PROCEDURE — 71045 X-RAY EXAM CHEST 1 VIEW: CPT

## 2021-07-17 PROCEDURE — 87635 SARS-COV-2 COVID-19 AMP PRB: CPT

## 2021-07-17 PROCEDURE — 99153 MOD SED SAME PHYS/QHP EA: CPT

## 2021-07-17 PROCEDURE — C1887: CPT

## 2021-07-17 PROCEDURE — 82962 GLUCOSE BLOOD TEST: CPT

## 2021-07-17 PROCEDURE — 76770 US EXAM ABDO BACK WALL COMP: CPT

## 2021-07-17 PROCEDURE — 93005 ELECTROCARDIOGRAM TRACING: CPT

## 2021-07-17 PROCEDURE — 84300 ASSAY OF URINE SODIUM: CPT

## 2021-07-17 PROCEDURE — 85652 RBC SED RATE AUTOMATED: CPT

## 2021-07-17 PROCEDURE — 80069 RENAL FUNCTION PANEL: CPT

## 2021-07-17 PROCEDURE — 96372 THER/PROPH/DIAG INJ SC/IM: CPT | Mod: XU

## 2021-07-17 PROCEDURE — 80048 BASIC METABOLIC PNL TOTAL CA: CPT

## 2021-07-17 PROCEDURE — 93970 EXTREMITY STUDY: CPT

## 2021-07-17 PROCEDURE — C1894: CPT

## 2021-07-17 PROCEDURE — 97116 GAIT TRAINING THERAPY: CPT

## 2021-07-17 PROCEDURE — 84100 ASSAY OF PHOSPHORUS: CPT

## 2021-07-17 PROCEDURE — U0003: CPT

## 2021-07-17 PROCEDURE — 83880 ASSAY OF NATRIURETIC PEPTIDE: CPT

## 2021-07-17 PROCEDURE — 81001 URINALYSIS AUTO W/SCOPE: CPT

## 2021-07-17 PROCEDURE — C1760: CPT

## 2021-07-17 PROCEDURE — 80053 COMPREHEN METABOLIC PANEL: CPT

## 2021-07-17 PROCEDURE — 99239 HOSP IP/OBS DSCHRG MGMT >30: CPT

## 2021-07-17 PROCEDURE — 96374 THER/PROPH/DIAG INJ IV PUSH: CPT

## 2021-07-17 PROCEDURE — 99152 MOD SED SAME PHYS/QHP 5/>YRS: CPT

## 2021-07-17 PROCEDURE — 82570 ASSAY OF URINE CREATININE: CPT

## 2021-07-17 PROCEDURE — 86140 C-REACTIVE PROTEIN: CPT

## 2021-07-17 PROCEDURE — 93306 TTE W/DOPPLER COMPLETE: CPT

## 2021-07-17 PROCEDURE — 84540 ASSAY OF URINE/UREA-N: CPT

## 2021-07-17 PROCEDURE — 83735 ASSAY OF MAGNESIUM: CPT

## 2021-07-17 PROCEDURE — 36415 COLL VENOUS BLD VENIPUNCTURE: CPT

## 2021-07-17 PROCEDURE — 83935 ASSAY OF URINE OSMOLALITY: CPT

## 2021-07-17 PROCEDURE — 87086 URINE CULTURE/COLONY COUNT: CPT

## 2021-07-17 PROCEDURE — 97530 THERAPEUTIC ACTIVITIES: CPT

## 2021-07-17 PROCEDURE — 71250 CT THORAX DX C-: CPT

## 2021-07-17 PROCEDURE — 84133 ASSAY OF URINE POTASSIUM: CPT

## 2021-07-17 PROCEDURE — C1769: CPT

## 2021-07-17 PROCEDURE — 83036 HEMOGLOBIN GLYCOSYLATED A1C: CPT

## 2021-07-17 PROCEDURE — 93923 UPR/LXTR ART STDY 3+ LVLS: CPT

## 2021-07-17 PROCEDURE — 99285 EMERGENCY DEPT VISIT HI MDM: CPT | Mod: 25

## 2021-07-17 PROCEDURE — 84156 ASSAY OF PROTEIN URINE: CPT

## 2021-07-17 PROCEDURE — 93458 L HRT ARTERY/VENTRICLE ANGIO: CPT

## 2021-07-17 RX ORDER — GLIMEPIRIDE 1 MG
0.5 TABLET ORAL
Qty: 0 | Refills: 0 | DISCHARGE

## 2021-07-17 RX ADMIN — CEFTRIAXONE 100 MILLIGRAM(S): 500 INJECTION, POWDER, FOR SOLUTION INTRAMUSCULAR; INTRAVENOUS at 12:31

## 2021-07-17 RX ADMIN — HEPARIN SODIUM 5000 UNIT(S): 5000 INJECTION INTRAVENOUS; SUBCUTANEOUS at 14:00

## 2021-07-17 RX ADMIN — Medication 81 MILLIGRAM(S): at 09:00

## 2021-07-17 RX ADMIN — Medication 40 MILLIGRAM(S): at 05:30

## 2021-07-17 RX ADMIN — CLOPIDOGREL BISULFATE 75 MILLIGRAM(S): 75 TABLET, FILM COATED ORAL at 09:00

## 2021-07-17 RX ADMIN — Medication 3 UNIT(S): at 17:28

## 2021-07-17 RX ADMIN — GABAPENTIN 300 MILLIGRAM(S): 400 CAPSULE ORAL at 05:30

## 2021-07-17 RX ADMIN — GABAPENTIN 300 MILLIGRAM(S): 400 CAPSULE ORAL at 17:28

## 2021-07-17 RX ADMIN — AMLODIPINE BESYLATE 5 MILLIGRAM(S): 2.5 TABLET ORAL at 05:30

## 2021-07-17 RX ADMIN — HEPARIN SODIUM 5000 UNIT(S): 5000 INJECTION INTRAVENOUS; SUBCUTANEOUS at 05:31

## 2021-07-17 RX ADMIN — Medication 25 MILLIGRAM(S): at 05:30

## 2021-07-17 RX ADMIN — Medication 1: at 17:28

## 2021-07-17 RX ADMIN — Medication 25 MILLIGRAM(S): at 17:28

## 2021-07-17 RX ADMIN — Medication 3 UNIT(S): at 08:58

## 2021-07-17 NOTE — PROGRESS NOTE ADULT - ASSESSMENT
89 year old male with a past medical history of CAD status post PCI to the LAD in 2020, severe AS status post TAVR, complete heart block status post PPM, atrial flutter and CKD stage 3, hypertension, DM type to and chronic lower extremity edema who presented to the Er with complaints of progressively worsening dyspnea  CT Chest: No focal consolidation. Trace bilateral pleural effusion, decreased since 11/21/2020. Small nodular density at the right proximal mainstem bronchus near the karen, which was noted on 11/21/2020 and may be related to mucus/secretion. Follow-up may be obtained to exclude potential endobronchial lesion/neoplasm.     Exertional dyspnea with a history of CAD  - Multifactorial: CAD, CHF   - s/p Cardiac cath 7/9/21 showing Patent LAD stents with mild to moderate instent restenosis. RCA with moderate disease.   - Continue aspirin, Plavix BB and statin therapy  - Echocardiogram with EF of 55-60% with grade 2 diastolic dysfunction mod-severe TR and mod MR, status post TVR  - CT Chest noted   - Discussed with Cardiology, pacemaker interrogation showed high A fib burden   - Cardiology discussed with EP, will f/u as outpt     Acute on Chronic diastolic CHF with pulmonary hypertension  - Chronic   - c/w lasix   - On BB  - Hold Metolazone     UTI  - UA positive   - UCx growing Strep agalactiae   - C/W Ceftriaxone to complete 7 days - Stops today    Atrial flutter   - not on AC due to bleeding risk     History of complete heart block status post pacemaker  - Pacemaker interrogation     ELISE on CKD stage 3   - Baseline creatinine ~1.6   - Improving   - Monitor BMP    History of BPH  - Passed TOV, now voiding   - Continue flomax 0.8 QHS    Bilateral lower extremity venous stasis ulcers   - non healing wounds   - Normal SONIA and US LE   - Consulted Vascular surgery, recommend Dermatology consult   - Dermatology called and rec outpt work up     Diabetes mellitus type 2  - BG stable   - Admelog sliding scale    History of RA   - on Humira Q2 weeks  - On monthly Prolia    VTE Heparin subcut     Spoke to patients daughter Chasidy and updated and all questions answered  Dispo: [facility] plan to [facility]

## 2021-07-17 NOTE — PROGRESS NOTE ADULT - NUTRITIONAL ASSESSMENT
This patient has been assessed with a concern for Malnutrition and has been determined to have a diagnosis/diagnoses of Severe protein-calorie malnutrition and Underweight (BMI < 19).    This patient is being managed with:   Diet Consistent Carbohydrate Renal/No Snacks-  DASH/TLC {Sodium & Cholesterol Restricted} (DASH)  No Concentrated Potassium  Entered: Jul 6 2021  9:51PM    
This patient has been assessed with a concern for Malnutrition and has been determined to have a diagnosis/diagnoses of Severe protein-calorie malnutrition and Underweight (BMI < 19).    This patient is being managed with:   Diet Consistent Carbohydrate Renal/No Snacks-  DASH/TLC {Sodium & Cholesterol Restricted} (DASH)  No Concentrated Potassium  Entered: Jul 6 2021  9:51PM

## 2021-07-17 NOTE — PROGRESS NOTE ADULT - PROVIDER SPECIALTY LIST ADULT
Cardiology
Cardiology
Hospitalist
Nephrology
Cardiology
Hospitalist
Cardiology
Hospitalist
Nephrology
Vascular Surgery
Cardiology
Hospitalist
Hospitalist
Intervent Cardiology
Cardiology
Cardiology
Hospitalist

## 2021-07-17 NOTE — PROGRESS NOTE ADULT - NSICDXPILOT_GEN_ALL_CORE
[place]

## 2021-07-17 NOTE — DISCHARGE NOTE NURSING/CASE MANAGEMENT/SOCIAL WORK - PATIENT PORTAL LINK FT
You can access the FollowMyHealth Patient Portal offered by [facility] by registering at the following website: [email or web address]. By joining [facility]’s FollowMyHealth portal, you will also be able to view your health information using other applications (apps) compatible with our system.

## 2021-07-17 NOTE — PROGRESS NOTE ADULT - SUBJECTIVE AND OBJECTIVE BOX
[facility] Division of Hospital Medicine    Chief Complaint:  Patient is a 89y old  Male who presents with a chief complaint of Shortness of breath (16 Jul 2021 20:05)      SUBJECTIVE / OVERNIGHT EVENTS:  Patient was seen and examined at bedside. Resting with no complaints.   Patient denies chest pain, SOB, abd pain, N/V, fever, chills, dysuria or any other complaints. All remainder ROS negative.     MEDICATIONS  (STANDING):  amLODIPine   Tablet 5 milliGRAM(s) Oral daily  aspirin  chewable 81 milliGRAM(s) Oral daily  cefTRIAXone   IVPB 1000 milliGRAM(s) IV Intermittent every 24 hours  clopidogrel Tablet 75 milliGRAM(s) Oral daily  dextrose 40% Gel 15 Gram(s) Oral once  dextrose 5%. 1000 milliLiter(s) (50 mL/Hr) IV Continuous <Continuous>  dextrose 5%. 1000 milliLiter(s) (100 mL/Hr) IV Continuous <Continuous>  dextrose 50% Injectable 25 Gram(s) IV Push once  dextrose 50% Injectable 12.5 Gram(s) IV Push once  dextrose 50% Injectable 25 Gram(s) IV Push once  furosemide    Tablet 40 milliGRAM(s) Oral daily  gabapentin 300 milliGRAM(s) Oral two times a day  glucagon  Injectable 1 milliGRAM(s) IntraMuscular once  heparin   Injectable 5000 Unit(s) SubCutaneous every 8 hours  insulin lispro (ADMELOG) corrective regimen sliding scale   SubCutaneous three times a day before meals  insulin lispro Injectable (ADMELOG) 3 Unit(s) SubCutaneous three times a day before meals  metoprolol tartrate 25 milliGRAM(s) Oral two times a day  senna 2 Tablet(s) Oral at bedtime  simvastatin 20 milliGRAM(s) Oral at bedtime  tamsulosin 0.8 milliGRAM(s) Oral at bedtime    MEDICATIONS  (PRN):  polyethylene glycol 3350 17 Gram(s) Oral daily PRN Constipation        I&O's Summary    16 Jul 2021 07:01  -  17 Jul 2021 07:00  --------------------------------------------------------  IN: 860 mL / OUT: 1825 mL / NET: -965 mL    17 Jul 2021 07:01  -  17 Jul 2021 13:44  --------------------------------------------------------  IN: 0 mL / OUT: 200 mL / NET: -200 mL        PHYSICAL EXAM:  Vital Signs Last 24 Hrs  T(C): 36.6 (17 Jul 2021 10:04), Max: 36.8 (17 Jul 2021 05:25)  T(F): 97.8 (17 Jul 2021 10:04), Max: 98.3 (17 Jul 2021 05:25)  HR: 70 (17 Jul 2021 10:04) (70 - 70)  BP: 98/64 (17 Jul 2021 10:04) (98/64 - 125/61)  BP(mean): --  RR: 18 (17 Jul 2021 10:04) (16 - 18)  SpO2: 98% (17 Jul 2021 10:04) (98% - 99%)      Constitutional: NAD, Resting, Poor dentition   ENT: Supple, No JVD  Lungs: CTA B/L, Non-labored breathing  Cardio: RRR, S1/S2, No murmur  Abdomen: Soft, Nontender, Nondistended; Bowel sounds present  Extremities: No calf tenderness, pitting edema, B/L LE wrapped  Musculoskeletal:   No clubbing or cyanosis of digits; no joint swelling or tenderness to palpation  Psych: Calm, cooperative affect appropriate  Neuro: Awake and alert  Skin: No rashes; no palpable lesions    LABS:                        12.0   5.34  )-----------( 337      ( 16 Jul 2021 05:38 )             37.8     07-16    137  |  104  |  34.2<H>  ----------------------------<  94  4.4   |  24.0  |  1.46<H>    Ca    8.8      16 Jul 2021 05:38                CAPILLARY BLOOD GLUCOSE      POCT Blood Glucose.: 73 mg/dL (17 Jul 2021 12:22)  POCT Blood Glucose.: 111 mg/dL (17 Jul 2021 08:31)  POCT Blood Glucose.: 153 mg/dL (16 Jul 2021 21:00)  POCT Blood Glucose.: 98 mg/dL (16 Jul 2021 17:03)        RADIOLOGY REVIEWED

## 2021-07-23 ENCOUNTER — APPOINTMENT (OUTPATIENT)
Dept: ELECTROPHYSIOLOGY | Facility: CLINIC | Age: 86
End: 2021-07-23

## 2021-07-30 ENCOUNTER — APPOINTMENT (OUTPATIENT)
Dept: DERMATOLOGY | Facility: CLINIC | Age: 86
End: 2021-07-30
Payer: MEDICARE

## 2021-07-30 PROCEDURE — 99204 OFFICE O/P NEW MOD 45 MIN: CPT

## 2021-08-07 ENCOUNTER — INPATIENT (INPATIENT)
Facility: HOSPITAL | Age: 86
LOS: 6 days | Discharge: HOME | DRG: 202 | End: 2021-08-14
Attending: HOSPITALIST | Admitting: INTERNAL MEDICINE
Payer: MEDICARE

## 2021-08-07 VITALS
RESPIRATION RATE: 14 BRPM | HEART RATE: 120 BPM | OXYGEN SATURATION: 96 % | SYSTOLIC BLOOD PRESSURE: 120 MMHG | WEIGHT: 149.91 LBS | HEIGHT: 65 IN | TEMPERATURE: 98 F | DIASTOLIC BLOOD PRESSURE: 64 MMHG

## 2021-08-07 DIAGNOSIS — Z98.89 OTHER SPECIFIED POSTPROCEDURAL STATES: Chronic | ICD-10-CM

## 2021-08-07 DIAGNOSIS — J20.5 ACUTE BRONCHITIS DUE TO RESPIRATORY SYNCYTIAL VIRUS: ICD-10-CM

## 2021-08-07 DIAGNOSIS — R06.03 ACUTE RESPIRATORY DISTRESS: ICD-10-CM

## 2021-08-07 DIAGNOSIS — Z95.2 PRESENCE OF PROSTHETIC HEART VALVE: Chronic | ICD-10-CM

## 2021-08-07 DIAGNOSIS — E11.22 TYPE 2 DIABETES MELLITUS WITH DIABETIC CHRONIC KIDNEY DISEASE: ICD-10-CM

## 2021-08-07 DIAGNOSIS — I25.10 ATHEROSCLEROTIC HEART DISEASE OF NATIVE CORONARY ARTERY WITHOUT ANGINA PECTORIS: ICD-10-CM

## 2021-08-07 DIAGNOSIS — E78.00 PURE HYPERCHOLESTEROLEMIA, UNSPECIFIED: ICD-10-CM

## 2021-08-07 DIAGNOSIS — N40.0 BENIGN PROSTATIC HYPERPLASIA WITHOUT LOWER URINARY TRACT SYMPTOMS: ICD-10-CM

## 2021-08-07 DIAGNOSIS — I10 ESSENTIAL (PRIMARY) HYPERTENSION: ICD-10-CM

## 2021-08-07 DIAGNOSIS — H26.9 UNSPECIFIED CATARACT: Chronic | ICD-10-CM

## 2021-08-07 LAB
ALBUMIN SERPL ELPH-MCNC: 3.8 G/DL — SIGNIFICANT CHANGE UP (ref 3.3–5.2)
ALP SERPL-CCNC: 74 U/L — SIGNIFICANT CHANGE UP (ref 40–120)
ALT FLD-CCNC: 23 U/L — SIGNIFICANT CHANGE UP
ANION GAP SERPL CALC-SCNC: 14 MMOL/L — SIGNIFICANT CHANGE UP (ref 5–17)
AST SERPL-CCNC: 20 U/L — SIGNIFICANT CHANGE UP
BASE EXCESS BLDV CALC-SCNC: -2 MMOL/L — SIGNIFICANT CHANGE UP (ref -2–3)
BASOPHILS # BLD AUTO: 0.02 K/UL — SIGNIFICANT CHANGE UP (ref 0–0.2)
BASOPHILS NFR BLD AUTO: 0.4 % — SIGNIFICANT CHANGE UP (ref 0–2)
BILIRUB SERPL-MCNC: 0.7 MG/DL — SIGNIFICANT CHANGE UP (ref 0.4–2)
BUN SERPL-MCNC: 26.8 MG/DL — HIGH (ref 8–20)
CA-I SERPL-SCNC: 1.1 MMOL/L — LOW (ref 1.15–1.33)
CALCIUM SERPL-MCNC: 8.9 MG/DL — SIGNIFICANT CHANGE UP (ref 8.6–10.2)
CHLORIDE BLDV-SCNC: 104 MMOL/L — SIGNIFICANT CHANGE UP (ref 98–107)
CHLORIDE SERPL-SCNC: 100 MMOL/L — SIGNIFICANT CHANGE UP (ref 98–107)
CO2 SERPL-SCNC: 22 MMOL/L — SIGNIFICANT CHANGE UP (ref 22–29)
CREAT SERPL-MCNC: 1.76 MG/DL — HIGH (ref 0.5–1.3)
EOSINOPHIL # BLD AUTO: 0.13 K/UL — SIGNIFICANT CHANGE UP (ref 0–0.5)
EOSINOPHIL NFR BLD AUTO: 2.6 % — SIGNIFICANT CHANGE UP (ref 0–6)
GAS PNL BLDV: 135 MMOL/L — LOW (ref 136–145)
GAS PNL BLDV: SIGNIFICANT CHANGE UP
GLUCOSE BLDV-MCNC: 90 MG/DL — SIGNIFICANT CHANGE UP (ref 70–99)
GLUCOSE SERPL-MCNC: 91 MG/DL — SIGNIFICANT CHANGE UP (ref 70–99)
HCO3 BLDV-SCNC: 23 MMOL/L — SIGNIFICANT CHANGE UP (ref 22–29)
HCT VFR BLD CALC: 38.9 % — LOW (ref 39–50)
HCT VFR BLDA CALC: 40 % — SIGNIFICANT CHANGE UP (ref 39–51)
HGB BLD CALC-MCNC: 13.3 G/DL — SIGNIFICANT CHANGE UP (ref 12.6–17.4)
HGB BLD-MCNC: 12.7 G/DL — LOW (ref 13–17)
IMM GRANULOCYTES NFR BLD AUTO: 0.2 % — SIGNIFICANT CHANGE UP (ref 0–1.5)
LACTATE BLDV-MCNC: 1.8 MMOL/L — SIGNIFICANT CHANGE UP (ref 0.5–2)
LYMPHOCYTES # BLD AUTO: 1.31 K/UL — SIGNIFICANT CHANGE UP (ref 1–3.3)
LYMPHOCYTES # BLD AUTO: 26.3 % — SIGNIFICANT CHANGE UP (ref 13–44)
MAGNESIUM SERPL-MCNC: 2 MG/DL — SIGNIFICANT CHANGE UP (ref 1.8–2.6)
MCHC RBC-ENTMCNC: 30.9 PG — SIGNIFICANT CHANGE UP (ref 27–34)
MCHC RBC-ENTMCNC: 32.6 GM/DL — SIGNIFICANT CHANGE UP (ref 32–36)
MCV RBC AUTO: 94.6 FL — SIGNIFICANT CHANGE UP (ref 80–100)
MONOCYTES # BLD AUTO: 0.68 K/UL — SIGNIFICANT CHANGE UP (ref 0–0.9)
MONOCYTES NFR BLD AUTO: 13.7 % — SIGNIFICANT CHANGE UP (ref 2–14)
NEUTROPHILS # BLD AUTO: 2.83 K/UL — SIGNIFICANT CHANGE UP (ref 1.8–7.4)
NEUTROPHILS NFR BLD AUTO: 56.8 % — SIGNIFICANT CHANGE UP (ref 43–77)
NT-PROBNP SERPL-SCNC: 6659 PG/ML — HIGH (ref 0–300)
PCO2 BLDV: 38 MMHG — LOW (ref 42–55)
PH BLDV: 7.39 — SIGNIFICANT CHANGE UP (ref 7.32–7.43)
PLATELET # BLD AUTO: 240 K/UL — SIGNIFICANT CHANGE UP (ref 150–400)
PO2 BLDV: 86 MMHG — HIGH (ref 25–45)
POTASSIUM BLDV-SCNC: 4 MMOL/L — SIGNIFICANT CHANGE UP (ref 3.5–5.1)
POTASSIUM SERPL-MCNC: 4 MMOL/L — SIGNIFICANT CHANGE UP (ref 3.5–5.3)
POTASSIUM SERPL-SCNC: 4 MMOL/L — SIGNIFICANT CHANGE UP (ref 3.5–5.3)
PROT SERPL-MCNC: 7.5 G/DL — SIGNIFICANT CHANGE UP (ref 6.6–8.7)
RAPID RVP RESULT: DETECTED
RBC # BLD: 4.11 M/UL — LOW (ref 4.2–5.8)
RBC # FLD: 16.3 % — HIGH (ref 10.3–14.5)
RSV RNA SPEC QL NAA+PROBE: DETECTED
SAO2 % BLDV: 97.2 % — SIGNIFICANT CHANGE UP
SARS-COV-2 RNA SPEC QL NAA+PROBE: SIGNIFICANT CHANGE UP
SODIUM SERPL-SCNC: 136 MMOL/L — SIGNIFICANT CHANGE UP (ref 135–145)
TROPONIN T SERPL-MCNC: 0.03 NG/ML — SIGNIFICANT CHANGE UP (ref 0–0.06)
WBC # BLD: 4.98 K/UL — SIGNIFICANT CHANGE UP (ref 3.8–10.5)
WBC # FLD AUTO: 4.98 K/UL — SIGNIFICANT CHANGE UP (ref 3.8–10.5)

## 2021-08-07 PROCEDURE — 71045 X-RAY EXAM CHEST 1 VIEW: CPT | Mod: 26

## 2021-08-07 PROCEDURE — 99223 1ST HOSP IP/OBS HIGH 75: CPT

## 2021-08-07 PROCEDURE — 99291 CRITICAL CARE FIRST HOUR: CPT

## 2021-08-07 PROCEDURE — 93010 ELECTROCARDIOGRAM REPORT: CPT | Mod: 76

## 2021-08-07 RX ORDER — VANCOMYCIN HCL 1 G
1000 VIAL (EA) INTRAVENOUS ONCE
Refills: 0 | Status: COMPLETED | OUTPATIENT
Start: 2021-08-07 | End: 2021-08-07

## 2021-08-07 RX ORDER — POLYETHYLENE GLYCOL 3350 17 G/17G
17 POWDER, FOR SOLUTION ORAL AT BEDTIME
Refills: 0 | Status: DISCONTINUED | OUTPATIENT
Start: 2021-08-07 | End: 2021-08-14

## 2021-08-07 RX ORDER — INSULIN LISPRO 100/ML
4 VIAL (ML) SUBCUTANEOUS
Refills: 0 | Status: DISCONTINUED | OUTPATIENT
Start: 2021-08-07 | End: 2021-08-10

## 2021-08-07 RX ORDER — METOPROLOL TARTRATE 50 MG
25 TABLET ORAL
Refills: 0 | Status: DISCONTINUED | OUTPATIENT
Start: 2021-08-07 | End: 2021-08-14

## 2021-08-07 RX ORDER — LANOLIN ALCOHOL/MO/W.PET/CERES
3 CREAM (GRAM) TOPICAL AT BEDTIME
Refills: 0 | Status: DISCONTINUED | OUTPATIENT
Start: 2021-08-07 | End: 2021-08-14

## 2021-08-07 RX ORDER — ONDANSETRON 8 MG/1
4 TABLET, FILM COATED ORAL EVERY 8 HOURS
Refills: 0 | Status: DISCONTINUED | OUTPATIENT
Start: 2021-08-07 | End: 2021-08-14

## 2021-08-07 RX ORDER — IPRATROPIUM/ALBUTEROL SULFATE 18-103MCG
3 AEROSOL WITH ADAPTER (GRAM) INHALATION
Refills: 0 | Status: COMPLETED | OUTPATIENT
Start: 2021-08-07 | End: 2021-08-07

## 2021-08-07 RX ORDER — ACETAMINOPHEN 500 MG
650 TABLET ORAL ONCE
Refills: 0 | Status: COMPLETED | OUTPATIENT
Start: 2021-08-07 | End: 2021-08-07

## 2021-08-07 RX ORDER — CLOPIDOGREL BISULFATE 75 MG/1
75 TABLET, FILM COATED ORAL DAILY
Refills: 0 | Status: DISCONTINUED | OUTPATIENT
Start: 2021-08-08 | End: 2021-08-14

## 2021-08-07 RX ORDER — DEXTROSE 50 % IN WATER 50 %
25 SYRINGE (ML) INTRAVENOUS ONCE
Refills: 0 | Status: DISCONTINUED | OUTPATIENT
Start: 2021-08-07 | End: 2021-08-14

## 2021-08-07 RX ORDER — DEXTROSE 50 % IN WATER 50 %
12.5 SYRINGE (ML) INTRAVENOUS ONCE
Refills: 0 | Status: DISCONTINUED | OUTPATIENT
Start: 2021-08-07 | End: 2021-08-14

## 2021-08-07 RX ORDER — ASPIRIN/CALCIUM CARB/MAGNESIUM 324 MG
81 TABLET ORAL DAILY
Refills: 0 | Status: DISCONTINUED | OUTPATIENT
Start: 2021-08-07 | End: 2021-08-14

## 2021-08-07 RX ORDER — AMLODIPINE BESYLATE 2.5 MG/1
5 TABLET ORAL DAILY
Refills: 0 | Status: DISCONTINUED | OUTPATIENT
Start: 2021-08-07 | End: 2021-08-14

## 2021-08-07 RX ORDER — GLUCAGON INJECTION, SOLUTION 0.5 MG/.1ML
1 INJECTION, SOLUTION SUBCUTANEOUS ONCE
Refills: 0 | Status: DISCONTINUED | OUTPATIENT
Start: 2021-08-07 | End: 2021-08-14

## 2021-08-07 RX ORDER — INSULIN GLARGINE 100 [IU]/ML
5 INJECTION, SOLUTION SUBCUTANEOUS AT BEDTIME
Refills: 0 | Status: DISCONTINUED | OUTPATIENT
Start: 2021-08-07 | End: 2021-08-10

## 2021-08-07 RX ORDER — ACETAMINOPHEN 500 MG
650 TABLET ORAL EVERY 6 HOURS
Refills: 0 | Status: DISCONTINUED | OUTPATIENT
Start: 2021-08-07 | End: 2021-08-14

## 2021-08-07 RX ORDER — SIMVASTATIN 20 MG/1
20 TABLET, FILM COATED ORAL AT BEDTIME
Refills: 0 | Status: DISCONTINUED | OUTPATIENT
Start: 2021-08-07 | End: 2021-08-14

## 2021-08-07 RX ORDER — IPRATROPIUM/ALBUTEROL SULFATE 18-103MCG
3 AEROSOL WITH ADAPTER (GRAM) INHALATION EVERY 6 HOURS
Refills: 0 | Status: DISCONTINUED | OUTPATIENT
Start: 2021-08-07 | End: 2021-08-09

## 2021-08-07 RX ORDER — HEPARIN SODIUM 5000 [USP'U]/ML
5000 INJECTION INTRAVENOUS; SUBCUTANEOUS EVERY 12 HOURS
Refills: 0 | Status: DISCONTINUED | OUTPATIENT
Start: 2021-08-07 | End: 2021-08-14

## 2021-08-07 RX ORDER — INSULIN LISPRO 100/ML
VIAL (ML) SUBCUTANEOUS
Refills: 0 | Status: DISCONTINUED | OUTPATIENT
Start: 2021-08-07 | End: 2021-08-14

## 2021-08-07 RX ORDER — DEXTROSE 50 % IN WATER 50 %
15 SYRINGE (ML) INTRAVENOUS ONCE
Refills: 0 | Status: DISCONTINUED | OUTPATIENT
Start: 2021-08-07 | End: 2021-08-14

## 2021-08-07 RX ORDER — CEFEPIME 1 G/1
2000 INJECTION, POWDER, FOR SOLUTION INTRAMUSCULAR; INTRAVENOUS ONCE
Refills: 0 | Status: COMPLETED | OUTPATIENT
Start: 2021-08-07 | End: 2021-08-07

## 2021-08-07 RX ORDER — TAMSULOSIN HYDROCHLORIDE 0.4 MG/1
0.4 CAPSULE ORAL AT BEDTIME
Refills: 0 | Status: DISCONTINUED | OUTPATIENT
Start: 2021-08-07 | End: 2021-08-14

## 2021-08-07 RX ORDER — SODIUM CHLORIDE 9 MG/ML
1000 INJECTION, SOLUTION INTRAVENOUS
Refills: 0 | Status: DISCONTINUED | OUTPATIENT
Start: 2021-08-07 | End: 2021-08-14

## 2021-08-07 RX ORDER — FUROSEMIDE 40 MG
40 TABLET ORAL DAILY
Refills: 0 | Status: DISCONTINUED | OUTPATIENT
Start: 2021-08-07 | End: 2021-08-10

## 2021-08-07 RX ADMIN — Medication 650 MILLIGRAM(S): at 18:54

## 2021-08-07 RX ADMIN — Medication 125 MILLIGRAM(S): at 18:54

## 2021-08-07 RX ADMIN — Medication 3 MILLILITER(S): at 16:30

## 2021-08-07 RX ADMIN — Medication 250 MILLIGRAM(S): at 18:54

## 2021-08-07 RX ADMIN — Medication 650 MILLIGRAM(S): at 19:09

## 2021-08-07 RX ADMIN — Medication 3 MILLILITER(S): at 17:45

## 2021-08-07 RX ADMIN — Medication 3 MILLILITER(S): at 18:55

## 2021-08-07 RX ADMIN — CEFEPIME 100 MILLIGRAM(S): 1 INJECTION, POWDER, FOR SOLUTION INTRAMUSCULAR; INTRAVENOUS at 19:09

## 2021-08-07 NOTE — ED PROVIDER NOTE - OBJECTIVE STATEMENT
89 year old male with a past medical history of CAD status post PCI to the LAD in 2020, severe AS status post TAVR, complete heart block status post PPM, atrial flutter and CKD stage 3, hypertension, DM presenting with worsening cough and sob. Symptoms present for the past few days. No known fever. +chills. No known sick contacts. Pt notes rash to b/l UE itchy with dry skin. No currently on abx. No on any home O2 and no history of pulm disease. No chest pain, abd pain, n/v/d. No trauma.

## 2021-08-07 NOTE — H&P ADULT - PROBLEM SELECTOR PLAN 1
Due to RSV Bronchitis/Bronchospasm. Cont. supportive care, prn Nebs, short course of steroids for bronchospasm. COVID neg, prn NIV, supplemental 02, No role to cont. Abx, no suspicion of bacterial infection at this time. OOB, ambulate, fall risk, PT program, DVT-P.

## 2021-08-07 NOTE — H&P ADULT - HISTORY OF PRESENT ILLNESS
89 y/o male from home with increasing cough, wheezing, and SOB for past couple days. Denies any known sick contacts. Denies any changes in medications, diet, travel. Uses a walker at home, denies falls. In the ED started on Bipap for WOB/hypoxia, RVP returned pos. with RSV. No evidence of decompensated CHF or PNA. Transitioned to NC and currently breathing more comfortably and speaking in full sentences. Denies CP, N/V, HA, focal weakness.

## 2021-08-07 NOTE — ED ADULT TRIAGE NOTE - CHIEF COMPLAINT QUOTE
Patient A&Ox4 complaining of redness & edema to bilat upper extremities x2 weeks. Redness & swelling noted from hands to biceps. Hot to touch. Positive CMS.

## 2021-08-07 NOTE — ED ADULT NURSE NOTE - NSFALLRSKUNASSIST_ED_ALL_ED
Please start Imodium 1/2 or 1 pill daily to slow down and firm up your stools. If you get constipated from this low dose, try Lomotil instead. Remember it takes an hour to kick in and works up to 24 hours.     Try 1 Lomotil if needed to control diarrhea. Keep in mind it only works for about 4 hours.    Please talk to PMD, Dr. Han, about holding or decreasing metformin for 4-7 days to see if it gets rid of your diarrhea.     Current diagnosis: IBS-Diarrhea predominant.    
no

## 2021-08-07 NOTE — ED PROVIDER NOTE - PROGRESS NOTE DETAILS
AJM: pt improving after treatment. given hypoxia with resp sympotms and need for supp O2. will admit. pending rvp. signed out to dr oro UMA: called to bedside for worsening work of breathing and wheezing. Will place pt on bipap. MICU consulted. shorty made aware. if pt cleared for floor by ,icu pt to be admitted to dr christopher sainz AJM: on reassessment pts resp status improved. satting well on NC. no need for bipap. prior worsening symptoms likely due to exertion. pt improved at rest. will admit to medicine. signed out to dr sainz

## 2021-08-08 LAB
ANION GAP SERPL CALC-SCNC: 13 MMOL/L — SIGNIFICANT CHANGE UP (ref 5–17)
BUN SERPL-MCNC: 30 MG/DL — HIGH (ref 8–20)
CALCIUM SERPL-MCNC: 8.8 MG/DL — SIGNIFICANT CHANGE UP (ref 8.6–10.2)
CHLORIDE SERPL-SCNC: 102 MMOL/L — SIGNIFICANT CHANGE UP (ref 98–107)
CO2 SERPL-SCNC: 21 MMOL/L — LOW (ref 22–29)
COVID-19 SPIKE DOMAIN AB INTERP: POSITIVE
COVID-19 SPIKE DOMAIN ANTIBODY RESULT: 7.73 U/ML — HIGH
CREAT SERPL-MCNC: 1.71 MG/DL — HIGH (ref 0.5–1.3)
GLUCOSE BLDC GLUCOMTR-MCNC: 184 MG/DL — HIGH (ref 70–99)
GLUCOSE BLDC GLUCOMTR-MCNC: 191 MG/DL — HIGH (ref 70–99)
GLUCOSE BLDC GLUCOMTR-MCNC: 201 MG/DL — HIGH (ref 70–99)
GLUCOSE SERPL-MCNC: 239 MG/DL — HIGH (ref 70–99)
HCT VFR BLD CALC: 36.6 % — LOW (ref 39–50)
HGB BLD-MCNC: 11.8 G/DL — LOW (ref 13–17)
MCHC RBC-ENTMCNC: 30.7 PG — SIGNIFICANT CHANGE UP (ref 27–34)
MCHC RBC-ENTMCNC: 32.2 GM/DL — SIGNIFICANT CHANGE UP (ref 32–36)
MCV RBC AUTO: 95.3 FL — SIGNIFICANT CHANGE UP (ref 80–100)
PLATELET # BLD AUTO: 205 K/UL — SIGNIFICANT CHANGE UP (ref 150–400)
POTASSIUM SERPL-MCNC: 3.9 MMOL/L — SIGNIFICANT CHANGE UP (ref 3.5–5.3)
POTASSIUM SERPL-SCNC: 3.9 MMOL/L — SIGNIFICANT CHANGE UP (ref 3.5–5.3)
RBC # BLD: 3.84 M/UL — LOW (ref 4.2–5.8)
RBC # FLD: 16 % — HIGH (ref 10.3–14.5)
SARS-COV-2 IGG+IGM SERPL QL IA: 7.73 U/ML — HIGH
SARS-COV-2 IGG+IGM SERPL QL IA: POSITIVE
SODIUM SERPL-SCNC: 136 MMOL/L — SIGNIFICANT CHANGE UP (ref 135–145)
WBC # BLD: 3.57 K/UL — LOW (ref 3.8–10.5)
WBC # FLD AUTO: 3.57 K/UL — LOW (ref 3.8–10.5)

## 2021-08-08 PROCEDURE — 99233 SBSQ HOSP IP/OBS HIGH 50: CPT

## 2021-08-08 RX ORDER — MORPHINE SULFATE 50 MG/1
0.5 CAPSULE, EXTENDED RELEASE ORAL ONCE
Refills: 0 | Status: DISCONTINUED | OUTPATIENT
Start: 2021-08-08 | End: 2021-08-08

## 2021-08-08 RX ORDER — ALBUTEROL 90 UG/1
2.5 AEROSOL, METERED ORAL
Refills: 0 | Status: DISCONTINUED | OUTPATIENT
Start: 2021-08-08 | End: 2021-08-14

## 2021-08-08 RX ADMIN — Medication 650 MILLIGRAM(S): at 12:31

## 2021-08-08 RX ADMIN — Medication 4 UNIT(S): at 12:30

## 2021-08-08 RX ADMIN — Medication 3 MILLILITER(S): at 20:20

## 2021-08-08 RX ADMIN — SIMVASTATIN 20 MILLIGRAM(S): 20 TABLET, FILM COATED ORAL at 21:09

## 2021-08-08 RX ADMIN — INSULIN GLARGINE 5 UNIT(S): 100 INJECTION, SOLUTION SUBCUTANEOUS at 21:09

## 2021-08-08 RX ADMIN — MORPHINE SULFATE 0.5 MILLIGRAM(S): 50 CAPSULE, EXTENDED RELEASE ORAL at 21:09

## 2021-08-08 RX ADMIN — Medication 650 MILLIGRAM(S): at 13:18

## 2021-08-08 RX ADMIN — MORPHINE SULFATE 0.5 MILLIGRAM(S): 50 CAPSULE, EXTENDED RELEASE ORAL at 18:30

## 2021-08-08 RX ADMIN — ALBUTEROL 2.5 MILLIGRAM(S): 90 AEROSOL, METERED ORAL at 18:20

## 2021-08-08 RX ADMIN — Medication 3 MILLILITER(S): at 08:30

## 2021-08-08 RX ADMIN — MORPHINE SULFATE 0.5 MILLIGRAM(S): 50 CAPSULE, EXTENDED RELEASE ORAL at 18:59

## 2021-08-08 RX ADMIN — Medication 81 MILLIGRAM(S): at 12:30

## 2021-08-08 RX ADMIN — Medication 3 MILLILITER(S): at 14:30

## 2021-08-08 RX ADMIN — Medication 2: at 12:30

## 2021-08-08 RX ADMIN — Medication 25 MILLIGRAM(S): at 18:18

## 2021-08-08 RX ADMIN — Medication 4: at 21:18

## 2021-08-08 RX ADMIN — Medication 40 MILLIGRAM(S): at 22:16

## 2021-08-08 RX ADMIN — Medication 2: at 18:16

## 2021-08-08 RX ADMIN — AMLODIPINE BESYLATE 5 MILLIGRAM(S): 2.5 TABLET ORAL at 06:02

## 2021-08-08 RX ADMIN — Medication 25 MILLIGRAM(S): at 06:02

## 2021-08-08 RX ADMIN — TAMSULOSIN HYDROCHLORIDE 0.4 MILLIGRAM(S): 0.4 CAPSULE ORAL at 21:09

## 2021-08-08 RX ADMIN — Medication 3 MILLILITER(S): at 04:19

## 2021-08-08 RX ADMIN — Medication 40 MILLIGRAM(S): at 12:46

## 2021-08-08 RX ADMIN — Medication 4 UNIT(S): at 18:16

## 2021-08-08 RX ADMIN — ALBUTEROL 2.5 MILLIGRAM(S): 90 AEROSOL, METERED ORAL at 12:20

## 2021-08-08 RX ADMIN — CLOPIDOGREL BISULFATE 75 MILLIGRAM(S): 75 TABLET, FILM COATED ORAL at 12:30

## 2021-08-08 RX ADMIN — Medication 40 MILLIGRAM(S): at 06:02

## 2021-08-08 RX ADMIN — MORPHINE SULFATE 0.5 MILLIGRAM(S): 50 CAPSULE, EXTENDED RELEASE ORAL at 21:24

## 2021-08-08 RX ADMIN — HEPARIN SODIUM 5000 UNIT(S): 5000 INJECTION INTRAVENOUS; SUBCUTANEOUS at 06:30

## 2021-08-08 RX ADMIN — Medication 40 MILLIGRAM(S): at 06:01

## 2021-08-08 RX ADMIN — HEPARIN SODIUM 5000 UNIT(S): 5000 INJECTION INTRAVENOUS; SUBCUTANEOUS at 18:16

## 2021-08-08 NOTE — PATIENT PROFILE ADULT - ARE SIGNIFICANT INDICATORS COMPLETE.
[de-identified] : Right Lower Extremity\par o Knee :\par ¦ Inspection/Palpation : medial and lateral tenderness to palpation, no swelling, no deformity valgus alignment, \par ¦ Range of Motion : 10 - 100 degrees, no crepitus\par ¦ Stability : no valgus or varus instability present on provocative testing, Lachman’s Test (-)\par o Muscle Bulk : normal muscle bulk present\par o Skin : no erythema, no ecchymosis\par o Sensation : sensation to pin intact\par o Vascular Exam : no edema, no cyanosis, dorsalis pedis artery pulse 2+, posterior tibial artery pulse 2+\par \par Left Lower Extremity\par o Knee :\par ¦ Inspection/Palpation : medial and lateral tenderness to palpation, no swelling, valgus alignment\par ¦ Range of Motion :10 -100 degrees, no crepitus\par ¦ Stability : no valgus or varus instability present on provocative testing, Lachman’s Test (-)\par o Muscle Bulk : normal muscle bulk present\par o Skin : no erythema, no ecchymosis\par o Sensation : sensation to pin intact\par o Vascular Exam : no edema, no cyanosis, dorsalis pedis artery pulse 2+, posterior tibial artery pulse 2+ Yes

## 2021-08-09 LAB
ANION GAP SERPL CALC-SCNC: 18 MMOL/L — HIGH (ref 5–17)
BUN SERPL-MCNC: 38.3 MG/DL — HIGH (ref 8–20)
CALCIUM SERPL-MCNC: 8.8 MG/DL — SIGNIFICANT CHANGE UP (ref 8.6–10.2)
CHLORIDE SERPL-SCNC: 99 MMOL/L — SIGNIFICANT CHANGE UP (ref 98–107)
CO2 SERPL-SCNC: 20 MMOL/L — LOW (ref 22–29)
CREAT SERPL-MCNC: 1.69 MG/DL — HIGH (ref 0.5–1.3)
GLUCOSE BLDC GLUCOMTR-MCNC: 169 MG/DL — HIGH (ref 70–99)
GLUCOSE BLDC GLUCOMTR-MCNC: 186 MG/DL — HIGH (ref 70–99)
GLUCOSE BLDC GLUCOMTR-MCNC: 52 MG/DL — CRITICAL LOW (ref 70–99)
GLUCOSE BLDC GLUCOMTR-MCNC: 83 MG/DL — SIGNIFICANT CHANGE UP (ref 70–99)
GLUCOSE BLDC GLUCOMTR-MCNC: 98 MG/DL — SIGNIFICANT CHANGE UP (ref 70–99)
GLUCOSE SERPL-MCNC: 162 MG/DL — HIGH (ref 70–99)
HCT VFR BLD CALC: 36.3 % — LOW (ref 39–50)
HGB BLD-MCNC: 11.3 G/DL — LOW (ref 13–17)
MAGNESIUM SERPL-MCNC: 2.2 MG/DL — SIGNIFICANT CHANGE UP (ref 1.6–2.6)
MCHC RBC-ENTMCNC: 30.3 PG — SIGNIFICANT CHANGE UP (ref 27–34)
MCHC RBC-ENTMCNC: 31.1 GM/DL — LOW (ref 32–36)
MCV RBC AUTO: 97.3 FL — SIGNIFICANT CHANGE UP (ref 80–100)
PHOSPHATE SERPL-MCNC: 3.1 MG/DL — SIGNIFICANT CHANGE UP (ref 2.4–4.7)
PLATELET # BLD AUTO: 213 K/UL — SIGNIFICANT CHANGE UP (ref 150–400)
POTASSIUM SERPL-MCNC: 3.7 MMOL/L — SIGNIFICANT CHANGE UP (ref 3.5–5.3)
POTASSIUM SERPL-SCNC: 3.7 MMOL/L — SIGNIFICANT CHANGE UP (ref 3.5–5.3)
RBC # BLD: 3.73 M/UL — LOW (ref 4.2–5.8)
RBC # FLD: 16.4 % — HIGH (ref 10.3–14.5)
SODIUM SERPL-SCNC: 137 MMOL/L — SIGNIFICANT CHANGE UP (ref 135–145)
WBC # BLD: 11.11 K/UL — HIGH (ref 3.8–10.5)
WBC # FLD AUTO: 11.11 K/UL — HIGH (ref 3.8–10.5)

## 2021-08-09 PROCEDURE — 99233 SBSQ HOSP IP/OBS HIGH 50: CPT

## 2021-08-09 RX ORDER — IPRATROPIUM/ALBUTEROL SULFATE 18-103MCG
3 AEROSOL WITH ADAPTER (GRAM) INHALATION EVERY 8 HOURS
Refills: 0 | Status: DISCONTINUED | OUTPATIENT
Start: 2021-08-09 | End: 2021-08-14

## 2021-08-09 RX ORDER — TIOTROPIUM BROMIDE 18 UG/1
1 CAPSULE ORAL; RESPIRATORY (INHALATION) DAILY
Refills: 0 | Status: DISCONTINUED | OUTPATIENT
Start: 2021-08-09 | End: 2021-08-14

## 2021-08-09 RX ORDER — ALBUTEROL 90 UG/1
1 AEROSOL, METERED ORAL EVERY 4 HOURS
Refills: 0 | Status: DISCONTINUED | OUTPATIENT
Start: 2021-08-09 | End: 2021-08-14

## 2021-08-09 RX ORDER — MORPHINE SULFATE 50 MG/1
0.5 CAPSULE, EXTENDED RELEASE ORAL ONCE
Refills: 0 | Status: DISCONTINUED | OUTPATIENT
Start: 2021-08-09 | End: 2021-08-09

## 2021-08-09 RX ORDER — SOD,AMMONIUM,POTASSIUM LACTATE
1 CREAM (GRAM) TOPICAL
Refills: 0 | Status: DISCONTINUED | OUTPATIENT
Start: 2021-08-09 | End: 2021-08-14

## 2021-08-09 RX ORDER — DEXTROSE 50 % IN WATER 50 %
25 SYRINGE (ML) INTRAVENOUS ONCE
Refills: 0 | Status: COMPLETED | OUTPATIENT
Start: 2021-08-09 | End: 2021-08-09

## 2021-08-09 RX ADMIN — Medication 0.5 MILLIGRAM(S): at 23:36

## 2021-08-09 RX ADMIN — MORPHINE SULFATE 0.5 MILLIGRAM(S): 50 CAPSULE, EXTENDED RELEASE ORAL at 06:50

## 2021-08-09 RX ADMIN — CLOPIDOGREL BISULFATE 75 MILLIGRAM(S): 75 TABLET, FILM COATED ORAL at 16:52

## 2021-08-09 RX ADMIN — MORPHINE SULFATE 0.5 MILLIGRAM(S): 50 CAPSULE, EXTENDED RELEASE ORAL at 06:36

## 2021-08-09 RX ADMIN — Medication 3 MILLILITER(S): at 16:16

## 2021-08-09 RX ADMIN — Medication 40 MILLIGRAM(S): at 06:04

## 2021-08-09 RX ADMIN — Medication 25 MILLIGRAM(S): at 06:04

## 2021-08-09 RX ADMIN — Medication 2: at 09:30

## 2021-08-09 RX ADMIN — Medication 2: at 11:54

## 2021-08-09 RX ADMIN — ALBUTEROL 2.5 MILLIGRAM(S): 90 AEROSOL, METERED ORAL at 01:38

## 2021-08-09 RX ADMIN — Medication 3 MILLILITER(S): at 04:02

## 2021-08-09 RX ADMIN — Medication 25 MILLIGRAM(S): at 17:01

## 2021-08-09 RX ADMIN — Medication 1 APPLICATION(S): at 16:51

## 2021-08-09 RX ADMIN — Medication 20 MILLIGRAM(S): at 06:04

## 2021-08-09 RX ADMIN — Medication 3 MILLILITER(S): at 09:49

## 2021-08-09 RX ADMIN — Medication 25 GRAM(S): at 22:55

## 2021-08-09 RX ADMIN — HEPARIN SODIUM 5000 UNIT(S): 5000 INJECTION INTRAVENOUS; SUBCUTANEOUS at 16:52

## 2021-08-09 RX ADMIN — Medication 4 UNIT(S): at 09:31

## 2021-08-09 RX ADMIN — Medication 3 MILLIGRAM(S): at 01:39

## 2021-08-09 RX ADMIN — HEPARIN SODIUM 5000 UNIT(S): 5000 INJECTION INTRAVENOUS; SUBCUTANEOUS at 06:04

## 2021-08-09 RX ADMIN — AMLODIPINE BESYLATE 5 MILLIGRAM(S): 2.5 TABLET ORAL at 06:04

## 2021-08-09 RX ADMIN — Medication 81 MILLIGRAM(S): at 16:52

## 2021-08-09 RX ADMIN — Medication 4 UNIT(S): at 11:54

## 2021-08-09 NOTE — PROVIDER CONTACT NOTE (HYPOGLYCEMIA EVENT) - NS PROVIDER CONTACT BACKGROUND-HYPO
Age: 89y    Gender: Male    POCT Blood Glucose:  98 mg/dL (08-09-21 @ 23:13)  52 mg/dL (08-09-21 @ 22:34)  83 mg/dL (08-09-21 @ 16:56)  169 mg/dL (08-09-21 @ 11:52)  186 mg/dL (08-09-21 @ 09:20)      eMAR:  dextrose 50% Injectable   25 Gram(s) IV Push (08-09-21 @ 22:55)    insulin lispro (ADMELOG) corrective regimen sliding scale   2  SubCutaneous (08-09-21 @ 11:54)   2  SubCutaneous (08-09-21 @ 09:30)    insulin lispro Injectable (ADMELOG)   4 Unit(s) SubCutaneous (08-09-21 @ 09:31)    insulin lispro Injectable (ADMELOG)   4 Unit(s) SubCutaneous (08-09-21 @ 11:54)    predniSONE   Tablet   20 milliGRAM(s) Oral (08-09-21 @ 06:04)

## 2021-08-09 NOTE — PROVIDER CONTACT NOTE (HYPOGLYCEMIA EVENT) - NS PROVIDER CONTACT SITUATION-HYPO
Pt. blood sugar 52. Pt. complaining of nausea and forcing himself to throw up. Pt. given apple juice and Dextrose 50% IVP.   After waiting 20 minutes, at 2313 pt. repeat blood sugar 92. MD moreira.

## 2021-08-09 NOTE — CHART NOTE - NSCHARTNOTEFT_GEN_A_CORE
Called by RN to notify of blood sugar of 52. Patient complaining of nausea.  1amp Dextrose activated and given, blood sugar improved to 98.   Patient has been dry heaving and now complaining of lump in his chest and chest pressure. Called by RN to notify of blood sugar of 52. Patient complaining of nausea.  1 amp Dextrose activated and given, blood sugar improved to 98.   Patient has been dry heaving and now complaining of lump in his chest and chest pressure.   Patient very anxious, 0.5mg Ativan IVP ordered.  EKG to be completed when patient settles down and stops dry heaving.   Continue to monitor patient, notify PA of any changes in patient status.

## 2021-08-10 LAB
ALBUMIN SERPL ELPH-MCNC: 3.6 G/DL — SIGNIFICANT CHANGE UP (ref 3.3–5.2)
ALP SERPL-CCNC: 61 U/L — SIGNIFICANT CHANGE UP (ref 40–120)
ALT FLD-CCNC: 48 U/L — HIGH
ANION GAP SERPL CALC-SCNC: 15 MMOL/L — SIGNIFICANT CHANGE UP (ref 5–17)
AST SERPL-CCNC: 65 U/L — HIGH
BASOPHILS # BLD AUTO: 0.01 K/UL — SIGNIFICANT CHANGE UP (ref 0–0.2)
BASOPHILS NFR BLD AUTO: 0.1 % — SIGNIFICANT CHANGE UP (ref 0–2)
BILIRUB SERPL-MCNC: 0.5 MG/DL — SIGNIFICANT CHANGE UP (ref 0.4–2)
BUN SERPL-MCNC: 54.5 MG/DL — HIGH (ref 8–20)
CALCIUM SERPL-MCNC: 9 MG/DL — SIGNIFICANT CHANGE UP (ref 8.6–10.2)
CHLORIDE SERPL-SCNC: 100 MMOL/L — SIGNIFICANT CHANGE UP (ref 98–107)
CO2 SERPL-SCNC: 23 MMOL/L — SIGNIFICANT CHANGE UP (ref 22–29)
CREAT SERPL-MCNC: 2.02 MG/DL — HIGH (ref 0.5–1.3)
EOSINOPHIL # BLD AUTO: 0 K/UL — SIGNIFICANT CHANGE UP (ref 0–0.5)
EOSINOPHIL NFR BLD AUTO: 0 % — SIGNIFICANT CHANGE UP (ref 0–6)
GLUCOSE BLDC GLUCOMTR-MCNC: 107 MG/DL — HIGH (ref 70–99)
GLUCOSE BLDC GLUCOMTR-MCNC: 149 MG/DL — HIGH (ref 70–99)
GLUCOSE BLDC GLUCOMTR-MCNC: 162 MG/DL — HIGH (ref 70–99)
GLUCOSE BLDC GLUCOMTR-MCNC: 190 MG/DL — HIGH (ref 70–99)
GLUCOSE SERPL-MCNC: 107 MG/DL — HIGH (ref 70–99)
HCT VFR BLD CALC: 35.7 % — LOW (ref 39–50)
HGB BLD-MCNC: 11.4 G/DL — LOW (ref 13–17)
IMM GRANULOCYTES NFR BLD AUTO: 0.5 % — SIGNIFICANT CHANGE UP (ref 0–1.5)
LYMPHOCYTES # BLD AUTO: 1.16 K/UL — SIGNIFICANT CHANGE UP (ref 1–3.3)
LYMPHOCYTES # BLD AUTO: 10.2 % — LOW (ref 13–44)
MAGNESIUM SERPL-MCNC: 2.3 MG/DL — SIGNIFICANT CHANGE UP (ref 1.6–2.6)
MCHC RBC-ENTMCNC: 30.9 PG — SIGNIFICANT CHANGE UP (ref 27–34)
MCHC RBC-ENTMCNC: 31.9 GM/DL — LOW (ref 32–36)
MCV RBC AUTO: 96.7 FL — SIGNIFICANT CHANGE UP (ref 80–100)
MONOCYTES # BLD AUTO: 0.94 K/UL — HIGH (ref 0–0.9)
MONOCYTES NFR BLD AUTO: 8.3 % — SIGNIFICANT CHANGE UP (ref 2–14)
NEUTROPHILS # BLD AUTO: 9.19 K/UL — HIGH (ref 1.8–7.4)
NEUTROPHILS NFR BLD AUTO: 80.9 % — HIGH (ref 43–77)
PHOSPHATE SERPL-MCNC: 3.3 MG/DL — SIGNIFICANT CHANGE UP (ref 2.4–4.7)
PLATELET # BLD AUTO: 200 K/UL — SIGNIFICANT CHANGE UP (ref 150–400)
POTASSIUM SERPL-MCNC: 4.9 MMOL/L — SIGNIFICANT CHANGE UP (ref 3.5–5.3)
POTASSIUM SERPL-SCNC: 4.9 MMOL/L — SIGNIFICANT CHANGE UP (ref 3.5–5.3)
PROT SERPL-MCNC: 6.8 G/DL — SIGNIFICANT CHANGE UP (ref 6.6–8.7)
RBC # BLD: 3.69 M/UL — LOW (ref 4.2–5.8)
RBC # FLD: 16.6 % — HIGH (ref 10.3–14.5)
SODIUM SERPL-SCNC: 138 MMOL/L — SIGNIFICANT CHANGE UP (ref 135–145)
TROPONIN T SERPL-MCNC: 0.03 NG/ML — SIGNIFICANT CHANGE UP (ref 0–0.06)
WBC # BLD: 11.36 K/UL — HIGH (ref 3.8–10.5)
WBC # FLD AUTO: 11.36 K/UL — HIGH (ref 3.8–10.5)

## 2021-08-10 PROCEDURE — 99232 SBSQ HOSP IP/OBS MODERATE 35: CPT

## 2021-08-10 PROCEDURE — 93970 EXTREMITY STUDY: CPT | Mod: 26

## 2021-08-10 RX ORDER — ALPRAZOLAM 0.25 MG
0.25 TABLET ORAL AT BEDTIME
Refills: 0 | Status: DISCONTINUED | OUTPATIENT
Start: 2021-08-10 | End: 2021-08-14

## 2021-08-10 RX ORDER — ALPRAZOLAM 0.25 MG
0.25 TABLET ORAL THREE TIMES A DAY
Refills: 0 | Status: DISCONTINUED | OUTPATIENT
Start: 2021-08-10 | End: 2021-08-14

## 2021-08-10 RX ADMIN — SIMVASTATIN 20 MILLIGRAM(S): 20 TABLET, FILM COATED ORAL at 21:10

## 2021-08-10 RX ADMIN — Medication 40 MILLIGRAM(S): at 06:35

## 2021-08-10 RX ADMIN — HEPARIN SODIUM 5000 UNIT(S): 5000 INJECTION INTRAVENOUS; SUBCUTANEOUS at 17:54

## 2021-08-10 RX ADMIN — Medication 20 MILLIGRAM(S): at 06:34

## 2021-08-10 RX ADMIN — Medication 25 MILLIGRAM(S): at 17:54

## 2021-08-10 RX ADMIN — TAMSULOSIN HYDROCHLORIDE 0.4 MILLIGRAM(S): 0.4 CAPSULE ORAL at 21:10

## 2021-08-10 RX ADMIN — Medication 0.25 MILLIGRAM(S): at 21:10

## 2021-08-10 RX ADMIN — Medication 81 MILLIGRAM(S): at 12:36

## 2021-08-10 RX ADMIN — Medication 3 MILLILITER(S): at 15:26

## 2021-08-10 RX ADMIN — AMLODIPINE BESYLATE 5 MILLIGRAM(S): 2.5 TABLET ORAL at 06:34

## 2021-08-10 RX ADMIN — Medication 3 MILLIGRAM(S): at 21:10

## 2021-08-10 RX ADMIN — ALBUTEROL 2.5 MILLIGRAM(S): 90 AEROSOL, METERED ORAL at 20:42

## 2021-08-10 RX ADMIN — Medication 1 APPLICATION(S): at 17:54

## 2021-08-10 RX ADMIN — Medication 2: at 21:09

## 2021-08-10 RX ADMIN — Medication 25 MILLIGRAM(S): at 06:34

## 2021-08-10 RX ADMIN — HEPARIN SODIUM 5000 UNIT(S): 5000 INJECTION INTRAVENOUS; SUBCUTANEOUS at 06:34

## 2021-08-10 RX ADMIN — Medication 1 APPLICATION(S): at 06:35

## 2021-08-10 RX ADMIN — CLOPIDOGREL BISULFATE 75 MILLIGRAM(S): 75 TABLET, FILM COATED ORAL at 12:36

## 2021-08-10 NOTE — PROVIDER CONTACT NOTE (OTHER) - ASSESSMENT
MD came to assess patient at bedside. Pt. appeared to calm down after MD came to talk to him. Pt. said the pressure in his chest goes away when he doesn't try and vomit.

## 2021-08-10 NOTE — PROVIDER CONTACT NOTE (OTHER) - ACTION/TREATMENT ORDERED:
Stat ativan given. Morphine to be given if pt. complains of pain. Pt. now resting in bed comfortably with no complaints.  No further interventions at this time. Will continue to monitor.

## 2021-08-10 NOTE — PROVIDER CONTACT NOTE (OTHER) - SITUATION
Pt. complaining of nausea and "feeling sick" and stating he cant breathe and that there is a lump in his chest. Pt. increasingly anxious and stating he is forcing himself to vomit.

## 2021-08-10 NOTE — CHART NOTE - NSCHARTNOTEFT_GEN_A_CORE
IV removed from Right arm second to U/S proved thrombus in right arm. IV restarted in Left arm without difficulty. Infusing well. flushes well.

## 2021-08-11 ENCOUNTER — APPOINTMENT (OUTPATIENT)
Dept: CARDIOLOGY | Facility: CLINIC | Age: 86
End: 2021-08-11

## 2021-08-11 LAB
ANION GAP SERPL CALC-SCNC: 13 MMOL/L — SIGNIFICANT CHANGE UP (ref 5–17)
BUN SERPL-MCNC: 49.5 MG/DL — HIGH (ref 8–20)
CALCIUM SERPL-MCNC: 8.8 MG/DL — SIGNIFICANT CHANGE UP (ref 8.6–10.2)
CHLORIDE SERPL-SCNC: 104 MMOL/L — SIGNIFICANT CHANGE UP (ref 98–107)
CO2 SERPL-SCNC: 24 MMOL/L — SIGNIFICANT CHANGE UP (ref 22–29)
CREAT SERPL-MCNC: 1.74 MG/DL — HIGH (ref 0.5–1.3)
GLUCOSE BLDC GLUCOMTR-MCNC: 169 MG/DL — HIGH (ref 70–99)
GLUCOSE BLDC GLUCOMTR-MCNC: 207 MG/DL — HIGH (ref 70–99)
GLUCOSE BLDC GLUCOMTR-MCNC: 62 MG/DL — LOW (ref 70–99)
GLUCOSE BLDC GLUCOMTR-MCNC: 64 MG/DL — LOW (ref 70–99)
GLUCOSE BLDC GLUCOMTR-MCNC: 96 MG/DL — SIGNIFICANT CHANGE UP (ref 70–99)
GLUCOSE SERPL-MCNC: 71 MG/DL — SIGNIFICANT CHANGE UP (ref 70–99)
HCT VFR BLD CALC: 33.5 % — LOW (ref 39–50)
HGB BLD-MCNC: 11.1 G/DL — LOW (ref 13–17)
MCHC RBC-ENTMCNC: 31.1 PG — SIGNIFICANT CHANGE UP (ref 27–34)
MCHC RBC-ENTMCNC: 33.1 GM/DL — SIGNIFICANT CHANGE UP (ref 32–36)
MCV RBC AUTO: 93.8 FL — SIGNIFICANT CHANGE UP (ref 80–100)
PLATELET # BLD AUTO: 178 K/UL — SIGNIFICANT CHANGE UP (ref 150–400)
POTASSIUM SERPL-MCNC: 3.9 MMOL/L — SIGNIFICANT CHANGE UP (ref 3.5–5.3)
POTASSIUM SERPL-SCNC: 3.9 MMOL/L — SIGNIFICANT CHANGE UP (ref 3.5–5.3)
RBC # BLD: 3.57 M/UL — LOW (ref 4.2–5.8)
RBC # FLD: 16.1 % — HIGH (ref 10.3–14.5)
SODIUM SERPL-SCNC: 141 MMOL/L — SIGNIFICANT CHANGE UP (ref 135–145)
WBC # BLD: 6.39 K/UL — SIGNIFICANT CHANGE UP (ref 3.8–10.5)
WBC # FLD AUTO: 6.39 K/UL — SIGNIFICANT CHANGE UP (ref 3.8–10.5)

## 2021-08-11 PROCEDURE — 99232 SBSQ HOSP IP/OBS MODERATE 35: CPT

## 2021-08-11 RX ORDER — DEXTROSE 50 % IN WATER 50 %
15 SYRINGE (ML) INTRAVENOUS ONCE
Refills: 0 | Status: COMPLETED | OUTPATIENT
Start: 2021-08-11 | End: 2021-08-11

## 2021-08-11 RX ADMIN — Medication 2: at 12:44

## 2021-08-11 RX ADMIN — Medication 1 APPLICATION(S): at 05:12

## 2021-08-11 RX ADMIN — HEPARIN SODIUM 5000 UNIT(S): 5000 INJECTION INTRAVENOUS; SUBCUTANEOUS at 05:11

## 2021-08-11 RX ADMIN — Medication 25 MILLIGRAM(S): at 05:11

## 2021-08-11 RX ADMIN — HEPARIN SODIUM 5000 UNIT(S): 5000 INJECTION INTRAVENOUS; SUBCUTANEOUS at 18:31

## 2021-08-11 RX ADMIN — CLOPIDOGREL BISULFATE 75 MILLIGRAM(S): 75 TABLET, FILM COATED ORAL at 12:45

## 2021-08-11 RX ADMIN — Medication 3 MILLILITER(S): at 15:37

## 2021-08-11 RX ADMIN — AMLODIPINE BESYLATE 5 MILLIGRAM(S): 2.5 TABLET ORAL at 05:11

## 2021-08-11 RX ADMIN — Medication 3 MILLILITER(S): at 08:19

## 2021-08-11 RX ADMIN — Medication 1 APPLICATION(S): at 18:31

## 2021-08-11 RX ADMIN — Medication 4: at 18:28

## 2021-08-11 RX ADMIN — Medication 20 MILLIGRAM(S): at 05:11

## 2021-08-11 RX ADMIN — Medication 81 MILLIGRAM(S): at 12:45

## 2021-08-11 RX ADMIN — Medication 25 MILLIGRAM(S): at 18:31

## 2021-08-12 LAB
CULTURE RESULTS: SIGNIFICANT CHANGE UP
CULTURE RESULTS: SIGNIFICANT CHANGE UP
GLUCOSE BLDC GLUCOMTR-MCNC: 120 MG/DL — HIGH (ref 70–99)
GLUCOSE BLDC GLUCOMTR-MCNC: 137 MG/DL — HIGH (ref 70–99)
GLUCOSE BLDC GLUCOMTR-MCNC: 139 MG/DL — HIGH (ref 70–99)
GLUCOSE BLDC GLUCOMTR-MCNC: 185 MG/DL — HIGH (ref 70–99)
GLUCOSE BLDC GLUCOMTR-MCNC: 230 MG/DL — HIGH (ref 70–99)
SPECIMEN SOURCE: SIGNIFICANT CHANGE UP
SPECIMEN SOURCE: SIGNIFICANT CHANGE UP

## 2021-08-12 PROCEDURE — 99232 SBSQ HOSP IP/OBS MODERATE 35: CPT

## 2021-08-12 RX ADMIN — CLOPIDOGREL BISULFATE 75 MILLIGRAM(S): 75 TABLET, FILM COATED ORAL at 12:20

## 2021-08-12 RX ADMIN — SIMVASTATIN 20 MILLIGRAM(S): 20 TABLET, FILM COATED ORAL at 00:02

## 2021-08-12 RX ADMIN — Medication 0.25 MILLIGRAM(S): at 00:02

## 2021-08-12 RX ADMIN — TAMSULOSIN HYDROCHLORIDE 0.4 MILLIGRAM(S): 0.4 CAPSULE ORAL at 00:02

## 2021-08-12 RX ADMIN — Medication 15 GRAM(S): at 00:02

## 2021-08-12 RX ADMIN — Medication 3 MILLILITER(S): at 08:51

## 2021-08-12 RX ADMIN — Medication 3 MILLILITER(S): at 00:01

## 2021-08-12 RX ADMIN — Medication 600 MILLIGRAM(S): at 18:55

## 2021-08-12 RX ADMIN — Medication 1 APPLICATION(S): at 06:00

## 2021-08-12 RX ADMIN — Medication 4: at 12:20

## 2021-08-12 RX ADMIN — AMLODIPINE BESYLATE 5 MILLIGRAM(S): 2.5 TABLET ORAL at 06:00

## 2021-08-12 RX ADMIN — TAMSULOSIN HYDROCHLORIDE 0.4 MILLIGRAM(S): 0.4 CAPSULE ORAL at 22:52

## 2021-08-12 RX ADMIN — Medication 1 APPLICATION(S): at 17:44

## 2021-08-12 RX ADMIN — Medication 20 MILLIGRAM(S): at 05:59

## 2021-08-12 RX ADMIN — HEPARIN SODIUM 5000 UNIT(S): 5000 INJECTION INTRAVENOUS; SUBCUTANEOUS at 05:59

## 2021-08-12 RX ADMIN — Medication 25 MILLIGRAM(S): at 17:44

## 2021-08-12 RX ADMIN — SIMVASTATIN 20 MILLIGRAM(S): 20 TABLET, FILM COATED ORAL at 22:52

## 2021-08-12 RX ADMIN — Medication 3 MILLILITER(S): at 15:09

## 2021-08-12 RX ADMIN — Medication 81 MILLIGRAM(S): at 12:20

## 2021-08-12 RX ADMIN — Medication 25 MILLIGRAM(S): at 06:00

## 2021-08-12 RX ADMIN — Medication 3 MILLIGRAM(S): at 00:02

## 2021-08-12 RX ADMIN — Medication 0.25 MILLIGRAM(S): at 22:51

## 2021-08-12 RX ADMIN — HEPARIN SODIUM 5000 UNIT(S): 5000 INJECTION INTRAVENOUS; SUBCUTANEOUS at 17:44

## 2021-08-12 RX ADMIN — Medication 2: at 17:43

## 2021-08-12 NOTE — CDI QUERY NOTE - NSCDIOTHERTXTBX_GEN_ALL_CORE_HH
SUPPORTING DOCUMENTATION / EVIDENCE:  Presented with SOB, cough, fever, hypoxia + wheeze  Found to have acute hypoxic respiratory failure due to RSV bronchitis  UTI > 100,000 streptococcus algalactiae (group B)    URINE CULTURE:  Culture - Urine (07.11.21 @ 01:20)    Specimen Source: .Urine Catheterized    Culture Results:   >100,000 CFU/ml Streptococcus agalactiae (Group B)  Streptococcus agalactiae (Group B) isolated  Group B streptococci are susceptible to ampicillin,  penicillin and cefazolin, but may be resistant to  erythromycin and clindamycin.  Recommendations for intrapartum prophylaxis for Group B  streptococci are penicillin or ampicillin.      ANTIBIOTICS:  cefepime   IVPB   100 mL/Hr IV Intermittent (08-07-21)    vancomycin  IVPB.   250 mL/Hr IV Intermittent (08-07-21)            Please clarify, in progress notes and dc summary, a diagnosis for above data   - Likely UTI on admission   - Other    - Not clinically significant

## 2021-08-12 NOTE — DIETITIAN INITIAL EVALUATION ADULT. - PERTINENT LABORATORY DATA
08-11 Na141 mmol/L Glu 71 mg/dL K+ 3.9 mmol/L Cr  1.74 mg/dL<H> BUN 49.5 mg/dL<H> Phos n/a   Alb n/a   PAB n/a

## 2021-08-12 NOTE — DIETITIAN INITIAL EVALUATION ADULT. - ORAL INTAKE PTA/DIET HISTORY
Previous nutritional assessment 7/13. Weight was 160# which indicates 10# weight loss x 1 mo. Pt eating fair.

## 2021-08-12 NOTE — CDI QUERY NOTE - NSCDIOTHERTXTBX2_GEN_ALL_CORE_FT
SUPPORTING DOCUMENTATION / EVIDENCE:  see above  ED = sepsis workup    ED: Dx: respiratory distress  MDM:  pt presenting with worsening cough, sob, hypoxia, fever, rash to arms. +wheezing. will obtain cardiac and pulm workup, abx, sepsis workup, needs admission given hypoxia  Labs, Imaging Studies, Medications      ED VITALS:  97.5 – 101    95//64  20-28  % 4L    WBC: 8.41    LACTATE:  1.20    ANTIBIOTICS:  cefepime   IVPB   100 mL/Hr IV Intermittent (08-07-21)    vancomycin  IVPB.   250 mL/Hr IV Intermittent (08-07-21)        Please clarify in progress notes and dc summary, the status of sepsis   - Likely early/evolving sepsis on admission, resolving   - Sepsis on admission 2/2 RSV bronchitis and UTI   - Other   - Not clinically significant    Thank you SUPPORTING DOCUMENTATION / EVIDENCE:  see above  ED = sepsis workup    ED: Dx: respiratory distress  MDM:  pt presenting with worsening cough, sob, hypoxia, fever, rash to arms. +wheezing. will obtain cardiac and pulm workup, abx, sepsis workup, needs admission given hypoxia  Labs, Imaging Studies, Medications      ED VITALS:  97.5 – 101    95//64  20-28  % 4L    WBC: 8.41    LACTATE:  1.20    ANTIBIOTICS:  cefepime   IVPB   100 mL/Hr IV Intermittent (08-07-21)    vancomycin  IVPB.   250 mL/Hr IV Intermittent (08-07-21)        Please clarify in progress notes and dc summary, the status of sepsis   - Likely early/evolving sepsis on admission, resolving   - Sepsis on admission 2/2 RSV bronchitis    - Other   - Not clinically significant    Thank you

## 2021-08-12 NOTE — PROGRESS NOTE ADULT - NSPROGADDITIONALINFOA_GEN_ALL_CORE
updated daughter over the phone.    dc planning   suspect dc today or tomorrow after PT eval and home o2 eval
dc planning to [facility]
updated daughter over the phone     PT evaluation pending

## 2021-08-12 NOTE — DIETITIAN NUTRITION RISK NOTIFICATION - TREATMENT: THE FOLLOWING DIET HAS BEEN RECOMMENDED
Diet, DASH/TLC:   Sodium & Cholesterol Restricted  Consistent Carbohydrate {No Snacks} (CSTCHO) (08-07-21 @ 22:30) [Active]

## 2021-08-13 ENCOUNTER — TRANSCRIPTION ENCOUNTER (OUTPATIENT)
Age: 86
End: 2021-08-13

## 2021-08-13 LAB
GLUCOSE BLDC GLUCOMTR-MCNC: 120 MG/DL — HIGH (ref 70–99)
GLUCOSE BLDC GLUCOMTR-MCNC: 139 MG/DL — HIGH (ref 70–99)
GLUCOSE BLDC GLUCOMTR-MCNC: 218 MG/DL — HIGH (ref 70–99)
GLUCOSE BLDC GLUCOMTR-MCNC: 66 MG/DL — LOW (ref 70–99)
GLUCOSE BLDC GLUCOMTR-MCNC: 68 MG/DL — LOW (ref 70–99)
SARS-COV-2 RNA SPEC QL NAA+PROBE: SIGNIFICANT CHANGE UP

## 2021-08-13 PROCEDURE — 99232 SBSQ HOSP IP/OBS MODERATE 35: CPT

## 2021-08-13 RX ORDER — IPRATROPIUM/ALBUTEROL SULFATE 18-103MCG
3 AEROSOL WITH ADAPTER (GRAM) INHALATION
Qty: 0 | Refills: 0 | DISCHARGE
Start: 2021-08-13

## 2021-08-13 RX ORDER — ASPIRIN/CALCIUM CARB/MAGNESIUM 324 MG
1 TABLET ORAL
Qty: 0 | Refills: 0 | DISCHARGE
Start: 2021-08-13

## 2021-08-13 RX ORDER — ACETAMINOPHEN 500 MG
2 TABLET ORAL
Qty: 0 | Refills: 0 | DISCHARGE
Start: 2021-08-13

## 2021-08-13 RX ORDER — ALPRAZOLAM 0.25 MG
1 TABLET ORAL
Qty: 0 | Refills: 0 | DISCHARGE
Start: 2021-08-13

## 2021-08-13 RX ORDER — LANOLIN ALCOHOL/MO/W.PET/CERES
1 CREAM (GRAM) TOPICAL
Qty: 0 | Refills: 0 | DISCHARGE
Start: 2021-08-13

## 2021-08-13 RX ORDER — AMLODIPINE BESYLATE 2.5 MG/1
1 TABLET ORAL
Qty: 0 | Refills: 0 | DISCHARGE
Start: 2021-08-13

## 2021-08-13 RX ORDER — SOD,AMMONIUM,POTASSIUM LACTATE
1 CREAM (GRAM) TOPICAL
Qty: 0 | Refills: 0 | DISCHARGE
Start: 2021-08-13

## 2021-08-13 RX ADMIN — Medication 25 MILLIGRAM(S): at 18:02

## 2021-08-13 RX ADMIN — Medication 3 MILLILITER(S): at 08:43

## 2021-08-13 RX ADMIN — Medication 3 MILLILITER(S): at 15:24

## 2021-08-13 RX ADMIN — HEPARIN SODIUM 5000 UNIT(S): 5000 INJECTION INTRAVENOUS; SUBCUTANEOUS at 18:02

## 2021-08-13 RX ADMIN — Medication 81 MILLIGRAM(S): at 11:42

## 2021-08-13 RX ADMIN — Medication 600 MILLIGRAM(S): at 06:19

## 2021-08-13 RX ADMIN — Medication 20 MILLIGRAM(S): at 06:08

## 2021-08-13 RX ADMIN — SIMVASTATIN 20 MILLIGRAM(S): 20 TABLET, FILM COATED ORAL at 22:50

## 2021-08-13 RX ADMIN — Medication 600 MILLIGRAM(S): at 18:02

## 2021-08-13 RX ADMIN — Medication 4: at 18:01

## 2021-08-13 RX ADMIN — TAMSULOSIN HYDROCHLORIDE 0.4 MILLIGRAM(S): 0.4 CAPSULE ORAL at 22:50

## 2021-08-13 RX ADMIN — HEPARIN SODIUM 5000 UNIT(S): 5000 INJECTION INTRAVENOUS; SUBCUTANEOUS at 06:08

## 2021-08-13 RX ADMIN — CLOPIDOGREL BISULFATE 75 MILLIGRAM(S): 75 TABLET, FILM COATED ORAL at 11:42

## 2021-08-13 RX ADMIN — Medication 0.25 MILLIGRAM(S): at 22:50

## 2021-08-13 RX ADMIN — Medication 25 MILLIGRAM(S): at 06:07

## 2021-08-13 RX ADMIN — Medication 1 APPLICATION(S): at 06:08

## 2021-08-13 RX ADMIN — AMLODIPINE BESYLATE 5 MILLIGRAM(S): 2.5 TABLET ORAL at 06:08

## 2021-08-13 RX ADMIN — Medication 1 APPLICATION(S): at 18:02

## 2021-08-13 NOTE — PROGRESS NOTE ADULT - PROBLEM SELECTOR PROBLEM 1
Respiratory distress

## 2021-08-13 NOTE — PROGRESS NOTE ADULT - PROBLEM SELECTOR PLAN 4
Hold oral regimen, ADA diet, Basal/bolus regimen, coverage scale.

## 2021-08-13 NOTE — DISCHARGE NOTE PROVIDER - HOSPITAL COURSE
89 y/o male hx of CAD s/p PCI, DM-2, HLD, BPH, CKD3 presents with acute hypoxic respiratory failure due to RSV.  Treated with IV steroids and po prednisone with improvement for RSV bronchitis and bronchospasm.  sepsis ruled out.  bilateral arm rashes with improved with ammonium lactate cream. upper arm DVT study with superficial thrombus in right cephalic vein in distal upper arm likely due to infiltrated IV    stable for discharge to rehab due to generalized deconditioning.  dc planning 32 minutes.       89 y/o male hx of CAD s/p PCI, DM-2, HLD, BPH, CKD3 presents with acute hypoxic respiratory failure due to RSV.  Treated with IV steroids and po prednisone with improvement for RSV bronchitis and bronchospasm.  sepsis ruled out.  bilateral arm rashes with improved with ammonium lactate cream. upper arm DVT study with superficial thrombus in right cephalic vein in distal upper arm likely due to infiltrated IV        36 minutes total time       89 y/o male hx of CAD s/p PCI, DM-2, HLD, BPH, CKD3 presents with acute hypoxic respiratory failure due to RSV.  Treated with IV steroids and po prednisone with improvement for RSV bronchitis and bronchospasm.  sepsis ruled out.  bilateral arm rashes with improved with ammonium lactate cream. upper arm DVT study with superficial thrombus in right cephalic vein in distal upper arm likely due to infiltrated IV  The patient was noted to be debilitated and thought to benefit from further treatment at a rehabilitation facility.      36 minutes total time

## 2021-08-13 NOTE — PROGRESS NOTE ADULT - PROBLEM SELECTOR PLAN 1
Due to RSV Bronchitis/Bronchospasm. Cont. supportive care, prn Nebs, short course of IV steroids then PO pred for bronchospasm. COVID neg, prn NIV, supplemental 02, No role to cont. Abx, no suspicion of bacterial infection at this time. OOB, ambulate, fall risk, PT program, DVT-P.    dc bipap  wean off o2
Due to RSV Bronchitis/Bronchospasm. Cont. supportive care, prn Nebs, short course of IV steroids then PO pred for bronchospasm. COVID neg, prn NIV, supplemental 02, No role to cont. Abx, no suspicion of bacterial infection at this time. OOB, ambulate, fall risk, PT program, DVT-P.    dc bipap
Due to RSV Bronchitis/Bronchospasm. Cont. supportive care, prn Nebs, short course of IV steroids then PO pred for bronchospasm. COVID neg, prn NIV, supplemental 02, No role to cont. Abx, no suspicion of bacterial infection at this time. OOB, ambulate, fall risk, PT program, DVT-P.    dc bipap  wean off o2, home o2 evaluation
Due to RSV Bronchitis/Bronchospasm. Cont. supportive care, prn Nebs, short course of IV steroids then PO pred for bronchospasm. COVID neg, prn NIV, supplemental 02, No role to cont. Abx, no suspicion of bacterial infection at this time. OOB, ambulate, fall risk, PT program, DVT-P.    dc bipap  wean off o2
Due to RSV Bronchitis/Bronchospasm. Cont. supportive care, prn Nebs, short course of IV steroids then PO pred for bronchospasm. COVID neg, prn NIV, supplemental 02, No role to cont. Abx, no suspicion of bacterial infection at this time. OOB, ambulate, fall risk, PT program, DVT-P.    dc bipap  requires o2 currently
Due to RSV Bronchitis/Bronchospasm. Cont. supportive care, prn Nebs, short course of IV steroids then PO pred for bronchospasm. COVID neg, prn NIV, supplemental 02, No role to cont. Abx, no suspicion of bacterial infection at this time. OOB, ambulate, fall risk, PT program, DVT-P.    dc bipap  requires o2 currently  sepsis ruled out

## 2021-08-13 NOTE — DISCHARGE NOTE PROVIDER - NSDCMRMEDTOKEN_GEN_ALL_CORE_FT
acetaminophen 325 mg oral tablet: 2 tab(s) orally every 6 hours, As needed, Temp greater or equal to 38.5C (101.3F), Mild Pain (1 - 3)  adalimumab 40 mg/0.8 mL subcutaneous solution: subcutaneous every 2 weeks  ALPRAZolam 0.25 mg oral tablet: 1 tab(s) orally 3 times a day, As needed, anxiety  aluminum hydroxide-magnesium hydroxide 200 mg-200 mg/5 mL oral suspension: 30 milliliter(s) orally every 4 hours, As needed, Dyspepsia  amLODIPine 5 mg oral tablet: 1 tab(s) orally once a day  ammonium lactate 12% topical lotion: 1 application topically 2 times a day  aspirin 81 mg oral delayed release tablet: 1 tab(s) orally once a day  gabapentin 300 mg oral capsule: 1 cap(s) orally 2 times a day  glimepiride 1 mg oral tablet: 0.5 tab(s) orally once a day  guaiFENesin 600 mg oral tablet, extended release: 1 tab(s) orally every 12 hours  ipratropium-albuterol 0.5 mg-2.5 mg/3 mL inhalation solution: 3 milliliter(s) inhaled every 8 hours  Lasix: 40 milligram(s) orally once a day  melatonin 3 mg oral tablet: 1 tab(s) orally once a day (at bedtime), As needed, Insomnia  Metoprolol Tartrate 25 mg oral tablet: 1 tab(s) orally 2 times a day  Plavix 75 mg oral tablet: 1 tab(s) orally once a day  polyethylene glycol 3350 oral powder for reconstitution: 17 gram(s) orally once a day, As needed, Constipation  Prolia 60 mg/mL subcutaneous solution:   senna oral tablet: 2 tab(s) orally once a day (at bedtime), As Needed  simvastatin 20 mg oral tablet: 1 tab(s) orally once a day (at bedtime)  tamsulosin 0.4 mg oral capsule: 2 cap(s) orally once a day (at bedtime)

## 2021-08-13 NOTE — DISCHARGE NOTE PROVIDER - EXTENDED VTE YES NO FOR MLM ENOXAPARIN
Please call with any questions or concerns.      Monday thru Friday 8:00 am - 4:30 pm    Interventional Radiology   [phone]    After Hours    Ask for the Radiology Intern on call  [phone]        
,

## 2021-08-13 NOTE — DISCHARGE NOTE PROVIDER - NSDCCPCAREPLAN_GEN_ALL_CORE_FT
PRINCIPAL DISCHARGE DIAGNOSIS  Diagnosis: Acute respiratory failure with hypoxia  Assessment and Plan of Treatment: improving        SECONDARY DISCHARGE DIAGNOSES  Diagnosis: RSV bronchitis  Assessment and Plan of Treatment: supportive care  completed course of prednisone.     PRINCIPAL DISCHARGE DIAGNOSIS  Diagnosis: Acute respiratory failure with hypoxia  Assessment and Plan of Treatment: improving  Supplemental oxygen as needed.      SECONDARY DISCHARGE DIAGNOSES  Diagnosis: RSV bronchitis  Assessment and Plan of Treatment: supportive care  completed course of prednisone.    Diagnosis: Diabetes  Assessment and Plan of Treatment: Monitor glucose levels. Insulin coverage as needed.    Diagnosis: Chronic kidney disease  Assessment and Plan of Treatment: Monitor renal functions.    Diagnosis: CAD (coronary artery disease)  Assessment and Plan of Treatment: Continue on cardiac medications.

## 2021-08-13 NOTE — PROGRESS NOTE ADULT - PROBLEM SELECTOR PROBLEM 6
BPH without urinary obstruction

## 2021-08-13 NOTE — PROGRESS NOTE ADULT - PROBLEM SELECTOR PLAN 3
Cont. o/p medical regimen, no anginal symptoms

## 2021-08-13 NOTE — PROGRESS NOTE ADULT - PROBLEM SELECTOR PROBLEM 4
Type 2 diabetes mellitus with stage 3 chronic kidney disease, without long-term current use of insulin, unspecified whether stage 3a or 3b CKD

## 2021-08-13 NOTE — PROGRESS NOTE ADULT - PROBLEM SELECTOR PLAN 7
DASH diet, cont. o/p regimen.

## 2021-08-13 NOTE — PROGRESS NOTE ADULT - PROBLEM SELECTOR PLAN 5
Statin, cardiac diet

## 2021-08-13 NOTE — DISCHARGE NOTE PROVIDER - DETAILS OF MALNUTRITION DIAGNOSIS/DIAGNOSES
This patient has been assessed with a concern for Malnutrition and was treated during this hospitalization for the following Nutrition diagnosis/diagnoses:     -  08/12/2021: Moderate protein-calorie malnutrition

## 2021-08-13 NOTE — PROGRESS NOTE ADULT - PROBLEM SELECTOR PLAN 2
Plan as above  completed course of steroids
Plan as above

## 2021-08-13 NOTE — PROGRESS NOTE ADULT - PROBLEM SELECTOR PLAN 6
Cont. flomax, denies LUTs

## 2021-08-13 NOTE — DISCHARGE NOTE PROVIDER - CARE PROVIDER_API CALL
Wenceslao Sweeney (DO)  Internal Medicine  [address], [address]  [place] [address]  Phone: [phone]  Fax: [phone]  Follow Up Time:

## 2021-08-13 NOTE — PROGRESS NOTE ADULT - ASSESSMENT
89 y/o male with acute hypoxic respiratory failure due to RSV, hx of CAD s/p PCI, DM-2, HLD, BPH    arm swelling: ammonium lactate cream, dvt study.    
89 y/o male with acute hypoxic respiratory failure due to RSV, hx of CAD s/p PCI, DM-2, HLD, BPH
89 y/o male with acute hypoxic respiratory failure due to RSV, hx of CAD s/p PCI, DM-2, HLD, BPH    arm swelling: ammonium lactate cream, dvt study.     duplex Superficial thrombus in the right cephalic vein in the distal upper arm in the region of an IV site and in the mid forearm--    
89 y/o male with acute hypoxic respiratory failure due to RSV, hx of CAD s/p PCI, DM-2, HLD, BPH    arm swelling: ammonium lactate cream, dvt study.     duplex Superficial thrombus in the right cephalic vein in the distal upper arm in the region of an IV site and in the mid forearm--    
89 y/o male with acute hypoxic respiratory failure due to RSV, hx of CAD s/p PCI, DM-2, HLD, BPH.  Treated for RSV bronchitis with steroids and nebulizers with improvement. Planned for subacute rehab discharge as requiring o2 (new) and overall debility.            arm swelling: ammonium lactate cream, dvt study.     duplex Superficial thrombus in the right cephalic vein in the distal upper arm in the region of an IV site and in the mid forearm    
89 y/o male with acute hypoxic respiratory failure due to RSV, hx of CAD s/p PCI, DM-2, HLD, BPH    arm swelling: ammonium lactate cream, dvt study.     duplex Superficial thrombus in the right cephalic vein in the distal upper arm in the region of an IV site and in the mid forearm--

## 2021-08-14 ENCOUNTER — TRANSCRIPTION ENCOUNTER (OUTPATIENT)
Age: 86
End: 2021-08-14

## 2021-08-14 VITALS — SYSTOLIC BLOOD PRESSURE: 121 MMHG | HEART RATE: 70 BPM | DIASTOLIC BLOOD PRESSURE: 62 MMHG | OXYGEN SATURATION: 96 %

## 2021-08-14 LAB
GLUCOSE BLDC GLUCOMTR-MCNC: 116 MG/DL — HIGH (ref 70–99)
GLUCOSE BLDC GLUCOMTR-MCNC: 87 MG/DL — SIGNIFICANT CHANGE UP (ref 70–99)

## 2021-08-14 PROCEDURE — U0003: CPT

## 2021-08-14 PROCEDURE — 83880 ASSAY OF NATRIURETIC PEPTIDE: CPT

## 2021-08-14 PROCEDURE — 97163 PT EVAL HIGH COMPLEX 45 MIN: CPT

## 2021-08-14 PROCEDURE — 80048 BASIC METABOLIC PNL TOTAL CA: CPT

## 2021-08-14 PROCEDURE — 99239 HOSP IP/OBS DSCHRG MGMT >30: CPT

## 2021-08-14 PROCEDURE — 99285 EMERGENCY DEPT VISIT HI MDM: CPT | Mod: 25

## 2021-08-14 PROCEDURE — 36415 COLL VENOUS BLD VENIPUNCTURE: CPT

## 2021-08-14 PROCEDURE — 0225U NFCT DS DNA&RNA 21 SARSCOV2: CPT

## 2021-08-14 PROCEDURE — 84132 ASSAY OF SERUM POTASSIUM: CPT

## 2021-08-14 PROCEDURE — 83735 ASSAY OF MAGNESIUM: CPT

## 2021-08-14 PROCEDURE — 93970 EXTREMITY STUDY: CPT

## 2021-08-14 PROCEDURE — 85025 COMPLETE CBC W/AUTO DIFF WBC: CPT

## 2021-08-14 PROCEDURE — 93005 ELECTROCARDIOGRAM TRACING: CPT

## 2021-08-14 PROCEDURE — 82435 ASSAY OF BLOOD CHLORIDE: CPT

## 2021-08-14 PROCEDURE — 71045 X-RAY EXAM CHEST 1 VIEW: CPT

## 2021-08-14 PROCEDURE — 80053 COMPREHEN METABOLIC PANEL: CPT

## 2021-08-14 PROCEDURE — 82962 GLUCOSE BLOOD TEST: CPT

## 2021-08-14 PROCEDURE — 85014 HEMATOCRIT: CPT

## 2021-08-14 PROCEDURE — 96374 THER/PROPH/DIAG INJ IV PUSH: CPT

## 2021-08-14 PROCEDURE — 97116 GAIT TRAINING THERAPY: CPT

## 2021-08-14 PROCEDURE — U0005: CPT

## 2021-08-14 PROCEDURE — 87040 BLOOD CULTURE FOR BACTERIA: CPT

## 2021-08-14 PROCEDURE — 83605 ASSAY OF LACTIC ACID: CPT

## 2021-08-14 PROCEDURE — 85018 HEMOGLOBIN: CPT

## 2021-08-14 PROCEDURE — 96375 TX/PRO/DX INJ NEW DRUG ADDON: CPT

## 2021-08-14 PROCEDURE — 82947 ASSAY GLUCOSE BLOOD QUANT: CPT

## 2021-08-14 PROCEDURE — 97110 THERAPEUTIC EXERCISES: CPT

## 2021-08-14 PROCEDURE — 82803 BLOOD GASES ANY COMBINATION: CPT

## 2021-08-14 PROCEDURE — 84100 ASSAY OF PHOSPHORUS: CPT

## 2021-08-14 PROCEDURE — 85027 COMPLETE CBC AUTOMATED: CPT

## 2021-08-14 PROCEDURE — 86769 SARS-COV-2 COVID-19 ANTIBODY: CPT

## 2021-08-14 PROCEDURE — 94640 AIRWAY INHALATION TREATMENT: CPT

## 2021-08-14 PROCEDURE — 84295 ASSAY OF SERUM SODIUM: CPT

## 2021-08-14 PROCEDURE — 84484 ASSAY OF TROPONIN QUANT: CPT

## 2021-08-14 PROCEDURE — 94760 N-INVAS EAR/PLS OXIMETRY 1: CPT

## 2021-08-14 PROCEDURE — 82330 ASSAY OF CALCIUM: CPT

## 2021-08-14 RX ADMIN — Medication 3 MILLILITER(S): at 00:08

## 2021-08-14 RX ADMIN — AMLODIPINE BESYLATE 5 MILLIGRAM(S): 2.5 TABLET ORAL at 06:23

## 2021-08-14 RX ADMIN — Medication 3 MILLILITER(S): at 08:35

## 2021-08-14 RX ADMIN — Medication 25 MILLIGRAM(S): at 06:23

## 2021-08-14 RX ADMIN — Medication 1 APPLICATION(S): at 06:24

## 2021-08-14 RX ADMIN — Medication 600 MILLIGRAM(S): at 06:23

## 2021-08-14 RX ADMIN — HEPARIN SODIUM 5000 UNIT(S): 5000 INJECTION INTRAVENOUS; SUBCUTANEOUS at 06:23

## 2021-08-14 NOTE — DISCHARGE NOTE NURSING/CASE MANAGEMENT/SOCIAL WORK - PATIENT PORTAL LINK FT
You can access the FollowMyHealth Patient Portal offered by [facility] by registering at the following website: [email or web address]. By joining [employer]’s FollowMyHealth portal, you will also be able to view your health information using other applications (apps) compatible with our system.

## 2021-08-14 NOTE — PROGRESS NOTE ADULT - REASON FOR ADMISSION
Acute respiratory failure  RSV

## 2021-08-14 NOTE — PROGRESS NOTE ADULT - SUBJECTIVE AND OBJECTIVE BOX
seen for rsv bronchitis    no acute complaints  wants to go home.  ros negative     MEDICATIONS  (STANDING):  ALBUTerol    90 MICROgram(s) HFA Inhaler 1 Puff(s) Inhalation every 4 hours  albuterol/ipratropium for Nebulization 3 milliLiter(s) Nebulizer every 8 hours  ALPRAZolam 0.25 milliGRAM(s) Oral at bedtime  amLODIPine   Tablet 5 milliGRAM(s) Oral daily  ammonium lactate 12% Lotion 1 Application(s) Topical two times a day  aspirin enteric coated 81 milliGRAM(s) Oral daily  clopidogrel Tablet 75 milliGRAM(s) Oral daily  dextrose 40% Gel 15 Gram(s) Oral once  dextrose 5%. 1000 milliLiter(s) (50 mL/Hr) IV Continuous <Continuous>  dextrose 5%. 1000 milliLiter(s) (100 mL/Hr) IV Continuous <Continuous>  dextrose 50% Injectable 25 Gram(s) IV Push once  dextrose 50% Injectable 12.5 Gram(s) IV Push once  dextrose 50% Injectable 25 Gram(s) IV Push once  glucagon  Injectable 1 milliGRAM(s) IntraMuscular once  guaiFENesin  milliGRAM(s) Oral every 12 hours  heparin   Injectable 5000 Unit(s) SubCutaneous every 12 hours  insulin lispro (ADMELOG) corrective regimen sliding scale   SubCutaneous Before meals and at bedtime  metoprolol tartrate 25 milliGRAM(s) Oral two times a day  simvastatin 20 milliGRAM(s) Oral at bedtime  tamsulosin 0.4 milliGRAM(s) Oral at bedtime  tiotropium 18 MICROgram(s) Capsule 1 Capsule(s) Inhalation daily    MEDICATIONS  (PRN):  acetaminophen   Tablet .. 650 milliGRAM(s) Oral every 6 hours PRN Temp greater or equal to 38.5C (101.3F), Mild Pain (1 - 3)  ALBUTerol    0.083% 2.5 milliGRAM(s) Nebulizer every 2 hours PRN Shortness of Breath and/or Wheezing  ALPRAZolam 0.25 milliGRAM(s) Oral three times a day PRN anxiety  aluminum hydroxide/magnesium hydroxide/simethicone Suspension 30 milliLiter(s) Oral every 4 hours PRN Dyspepsia  melatonin 3 milliGRAM(s) Oral at bedtime PRN Insomnia  ondansetron Injectable 4 milliGRAM(s) IV Push every 8 hours PRN Nausea and/or Vomiting  polyethylene glycol 3350 17 Gram(s) Oral at bedtime PRN Constipation      Allergies    No Known Allergies      Vital Signs Last 24 Hrs  T(C): 36.4 (13 Aug 2021 10:50), Max: 36.6 (12 Aug 2021 16:15)  T(F): 97.5 (13 Aug 2021 10:50), Max: 97.8 (12 Aug 2021 16:15)  HR: 106 (13 Aug 2021 10:50) (62 - 106)  BP: 115/84 (13 Aug 2021 10:50) (115/84 - 130/72)  BP(mean): --  RR: 18 (13 Aug 2021 10:50) (18 - 19)  SpO2: 98% (13 Aug 2021 10:50) (95% - 98%)    PHYSICAL EXAM:    GENERAL: NAD  CHEST/LUNG: Clear to percussion bilaterally  HEART: Regular rate and rhythm; S1 S2  ABDOMEN: Soft, Nontender, Bowel sounds present  EXTREMITIES: no edema   NERVOUS SYSTEM:  Alert & Oriented X3,  gen weakness    LABS:                CAPILLARY BLOOD GLUCOSE      POCT Blood Glucose.: 139 mg/dL (13 Aug 2021 11:43)  POCT Blood Glucose.: 120 mg/dL (13 Aug 2021 09:02)  POCT Blood Glucose.: 139 mg/dL (12 Aug 2021 22:46)  POCT Blood Glucose.: 185 mg/dL (12 Aug 2021 16:45)        RADIOLOGY & ADDITIONAL TESTS:  
[facility]  ----------------------------------------  The patient was seen at bedside. Patient with respiratory failure. Offers no complaints. Reports that he wishes to go home. Denied chest pain or dyspnea.    Vital Signs Last 24 Hrs  T(C): 36.2 (14 Aug 2021 05:41), Max: 36.6 (13 Aug 2021 16:57)  T(F): 97.1 (14 Aug 2021 05:41), Max: 97.8 (13 Aug 2021 16:57)  HR: 69 (14 Aug 2021 05:41) (69 - 106)  BP: 117/69 (14 Aug 2021 05:41) (115/84 - 148/68)  BP(mean): --  RR: 18 (14 Aug 2021 05:41) (18 - 18)  SpO2: 96% (14 Aug 2021 05:41) (96% - 100%)    CAPILLARY BLOOD GLUCOSE  POCT Blood Glucose.: 87 mg/dL (14 Aug 2021 00:00)  POCT Blood Glucose.: 68 mg/dL (13 Aug 2021 22:58)  POCT Blood Glucose.: 66 mg/dL (13 Aug 2021 22:55)  POCT Blood Glucose.: 218 mg/dL (13 Aug 2021 18:00)  POCT Blood Glucose.: 139 mg/dL (13 Aug 2021 11:43)    PHYSICAL EXAMINATION:  ----------------------------------------  General appearance: No acute distress, Awake, Alert  HEENT: Normocephalic, Atraumatic, Conjunctiva clear, EOMI  Neck: Supple, No JVD, No tenderness  Lungs: Breath sound equal bilaterally, No wheezes, No rales  Cardiovascular: S1S2, Regular rhythm  Abdomen: Soft, Nontender, Nondistended, No guarding/rebound, Positive bowel sounds  Extremities: No clubbing, No cyanosis, No edema, No calf tenderness, Excoriations  Neuro: Strength equal bilaterally, No tremors  Psychiatric: Appropriate mood, Normal affect    MEDICATIONS  (STANDING):  ALBUTerol    90 MICROgram(s) HFA Inhaler 1 Puff(s) Inhalation every 4 hours  albuterol/ipratropium for Nebulization 3 milliLiter(s) Nebulizer every 8 hours  ALPRAZolam 0.25 milliGRAM(s) Oral at bedtime  amLODIPine   Tablet 5 milliGRAM(s) Oral daily  ammonium lactate 12% Lotion 1 Application(s) Topical two times a day  aspirin enteric coated 81 milliGRAM(s) Oral daily  clopidogrel Tablet 75 milliGRAM(s) Oral daily  dextrose 40% Gel 15 Gram(s) Oral once  dextrose 5%. 1000 milliLiter(s) (50 mL/Hr) IV Continuous <Continuous>  dextrose 5%. 1000 milliLiter(s) (100 mL/Hr) IV Continuous <Continuous>  dextrose 50% Injectable 25 Gram(s) IV Push once  dextrose 50% Injectable 12.5 Gram(s) IV Push once  dextrose 50% Injectable 25 Gram(s) IV Push once  glucagon  Injectable 1 milliGRAM(s) IntraMuscular once  guaiFENesin  milliGRAM(s) Oral every 12 hours  heparin   Injectable 5000 Unit(s) SubCutaneous every 12 hours  insulin lispro (ADMELOG) corrective regimen sliding scale   SubCutaneous Before meals and at bedtime  metoprolol tartrate 25 milliGRAM(s) Oral two times a day  simvastatin 20 milliGRAM(s) Oral at bedtime  tamsulosin 0.4 milliGRAM(s) Oral at bedtime  tiotropium 18 MICROgram(s) Capsule 1 Capsule(s) Inhalation daily    MEDICATIONS  (PRN):  acetaminophen   Tablet .. 650 milliGRAM(s) Oral every 6 hours PRN Temp greater or equal to 38.5C (101.3F), Mild Pain (1 - 3)  ALBUTerol    0.083% 2.5 milliGRAM(s) Nebulizer every 2 hours PRN Shortness of Breath and/or Wheezing  ALPRAZolam 0.25 milliGRAM(s) Oral three times a day PRN anxiety  aluminum hydroxide/magnesium hydroxide/simethicone Suspension 30 milliLiter(s) Oral every 4 hours PRN Dyspepsia  melatonin 3 milliGRAM(s) Oral at bedtime PRN Insomnia  ondansetron Injectable 4 milliGRAM(s) IV Push every 8 hours PRN Nausea and/or Vomiting  polyethylene glycol 3350 17 Gram(s) Oral at bedtime PRN Constipation      ASSESSMENT / PLAN:  ----------------------------------------  89 y/o male with acute hypoxic respiratory failure due to RSV, hx of CAD s/p PCI, DM-2, HLD, BPH.  Treated for RSV bronchitis with steroids and nebulizers with improvement. Planned for subacute rehab discharge as requiring o2 (new) and overall debility.    Right upper extremity superficial thrombus - Ultrasound was without deep venous thrombosis.     RSV bronchitis - Supplemental oxygen as needed. On nebulized bronchodilator therapy for bronchospasm.    Coronary artery disease - On aspirin, clopidogrel, and simvastatin.    Diabetes - Insulin coverage, close monitoring of blood glucose levels.    Chronic kidney disease - Monitor renal function.    BPH - On tamsulosin.    Hypertension - Close blood pressure monitoring. On amlodipine and metoprolol.
  seen for rsv bronchitis    episodes of anxiety with associated sob  feels well  ros negative      MEDICATIONS  (STANDING):  ALBUTerol    90 MICROgram(s) HFA Inhaler 1 Puff(s) Inhalation every 4 hours  albuterol/ipratropium for Nebulization 3 milliLiter(s) Nebulizer every 8 hours  ALPRAZolam 0.25 milliGRAM(s) Oral at bedtime  amLODIPine   Tablet 5 milliGRAM(s) Oral daily  ammonium lactate 12% Lotion 1 Application(s) Topical two times a day  aspirin enteric coated 81 milliGRAM(s) Oral daily  clopidogrel Tablet 75 milliGRAM(s) Oral daily  dextrose 40% Gel 15 Gram(s) Oral once  dextrose 5%. 1000 milliLiter(s) (50 mL/Hr) IV Continuous <Continuous>  dextrose 5%. 1000 milliLiter(s) (100 mL/Hr) IV Continuous <Continuous>  dextrose 50% Injectable 25 Gram(s) IV Push once  dextrose 50% Injectable 12.5 Gram(s) IV Push once  dextrose 50% Injectable 25 Gram(s) IV Push once  glucagon  Injectable 1 milliGRAM(s) IntraMuscular once  heparin   Injectable 5000 Unit(s) SubCutaneous every 12 hours  insulin lispro (ADMELOG) corrective regimen sliding scale   SubCutaneous Before meals and at bedtime  metoprolol tartrate 25 milliGRAM(s) Oral two times a day  predniSONE   Tablet 20 milliGRAM(s) Oral daily  simvastatin 20 milliGRAM(s) Oral at bedtime  tamsulosin 0.4 milliGRAM(s) Oral at bedtime  tiotropium 18 MICROgram(s) Capsule 1 Capsule(s) Inhalation daily    MEDICATIONS  (PRN):  acetaminophen   Tablet .. 650 milliGRAM(s) Oral every 6 hours PRN Temp greater or equal to 38.5C (101.3F), Mild Pain (1 - 3)  ALBUTerol    0.083% 2.5 milliGRAM(s) Nebulizer every 2 hours PRN Shortness of Breath and/or Wheezing  ALPRAZolam 0.25 milliGRAM(s) Oral three times a day PRN anxiety  aluminum hydroxide/magnesium hydroxide/simethicone Suspension 30 milliLiter(s) Oral every 4 hours PRN Dyspepsia  melatonin 3 milliGRAM(s) Oral at bedtime PRN Insomnia  ondansetron Injectable 4 milliGRAM(s) IV Push every 8 hours PRN Nausea and/or Vomiting  polyethylene glycol 3350 17 Gram(s) Oral at bedtime PRN Constipation      Allergies    No Known Allergies      Vital Signs Last 24 Hrs  T(C): 36.7 (10 Aug 2021 11:30), Max: 36.9 (10 Aug 2021 05:00)  T(F): 98.1 (10 Aug 2021 11:30), Max: 98.4 (10 Aug 2021 05:00)  HR: 84 (10 Aug 2021 11:30) (60 - 88)  BP: 144/86 (10 Aug 2021 11:30) (117/68 - 144/86)  BP(mean): --  RR: 18 (10 Aug 2021 11:30) (18 - 26)  SpO2: 96% (10 Aug 2021 08:20) (96% - 100%)    PHYSICAL EXAM:    GENERAL: NAD  CHEST/LUNG: coarse, minimal wheeze  HEART: Regular rate and rhythm; S1 S2  ABDOMEN: Soft, Nondistended; Bowel sounds present  EXTREMITIES:  no LE edema.  UE's with improved erythema  NERVOUS SYSTEM:  Alert & Oriented X3,  Motor Strength 5/5 B/L upper and lower extremities      LABS:                        11.4   11.36 )-----------( 200      ( 10 Aug 2021 08:07 )             35.7     08-10    138  |  100  |  54.5<H>  ----------------------------<  107<H>  4.9   |  23.0  |  2.02<H>    Ca    9.0      10 Aug 2021 08:07  Phos  3.3     08-10  Mg     2.3     08-10    TPro  6.8  /  Alb  3.6  /  TBili  0.5  /  DBili  x   /  AST  65<H>  /  ALT  48<H>  /  AlkPhos  61  08-10          CAPILLARY BLOOD GLUCOSE      POCT Blood Glucose.: 107 mg/dL (10 Aug 2021 12:35)  POCT Blood Glucose.: 162 mg/dL (10 Aug 2021 08:34)  POCT Blood Glucose.: 98 mg/dL (09 Aug 2021 23:13)  POCT Blood Glucose.: 52 mg/dL (09 Aug 2021 22:34)  POCT Blood Glucose.: 83 mg/dL (09 Aug 2021 16:56)        RADIOLOGY & ADDITIONAL TESTS:  
  seen for rsv bronchitis    mild cough  gen weak  ros negative     MEDICATIONS  (STANDING):  ALBUTerol    90 MICROgram(s) HFA Inhaler 1 Puff(s) Inhalation every 4 hours  albuterol/ipratropium for Nebulization 3 milliLiter(s) Nebulizer every 8 hours  ALPRAZolam 0.25 milliGRAM(s) Oral at bedtime  amLODIPine   Tablet 5 milliGRAM(s) Oral daily  ammonium lactate 12% Lotion 1 Application(s) Topical two times a day  aspirin enteric coated 81 milliGRAM(s) Oral daily  clopidogrel Tablet 75 milliGRAM(s) Oral daily  dextrose 40% Gel 15 Gram(s) Oral once  dextrose 5%. 1000 milliLiter(s) (50 mL/Hr) IV Continuous <Continuous>  dextrose 5%. 1000 milliLiter(s) (100 mL/Hr) IV Continuous <Continuous>  dextrose 50% Injectable 25 Gram(s) IV Push once  dextrose 50% Injectable 12.5 Gram(s) IV Push once  dextrose 50% Injectable 25 Gram(s) IV Push once  glucagon  Injectable 1 milliGRAM(s) IntraMuscular once  guaiFENesin  milliGRAM(s) Oral every 12 hours  heparin   Injectable 5000 Unit(s) SubCutaneous every 12 hours  insulin lispro (ADMELOG) corrective regimen sliding scale   SubCutaneous Before meals and at bedtime  metoprolol tartrate 25 milliGRAM(s) Oral two times a day  predniSONE   Tablet 20 milliGRAM(s) Oral daily  simvastatin 20 milliGRAM(s) Oral at bedtime  tamsulosin 0.4 milliGRAM(s) Oral at bedtime  tiotropium 18 MICROgram(s) Capsule 1 Capsule(s) Inhalation daily    MEDICATIONS  (PRN):  acetaminophen   Tablet .. 650 milliGRAM(s) Oral every 6 hours PRN Temp greater or equal to 38.5C (101.3F), Mild Pain (1 - 3)  ALBUTerol    0.083% 2.5 milliGRAM(s) Nebulizer every 2 hours PRN Shortness of Breath and/or Wheezing  ALPRAZolam 0.25 milliGRAM(s) Oral three times a day PRN anxiety  aluminum hydroxide/magnesium hydroxide/simethicone Suspension 30 milliLiter(s) Oral every 4 hours PRN Dyspepsia  melatonin 3 milliGRAM(s) Oral at bedtime PRN Insomnia  ondansetron Injectable 4 milliGRAM(s) IV Push every 8 hours PRN Nausea and/or Vomiting  polyethylene glycol 3350 17 Gram(s) Oral at bedtime PRN Constipation      Allergies    No Known Allergies      Vital Signs Last 24 Hrs  T(C): 36.3 (12 Aug 2021 09:58), Max: 36.6 (11 Aug 2021 16:58)  T(F): 97.4 (12 Aug 2021 09:58), Max: 97.8 (11 Aug 2021 16:58)  HR: 71 (12 Aug 2021 09:58) (65 - 72)  BP: 113/66 (12 Aug 2021 09:58) (109/67 - 125/68)  BP(mean): --  RR: 17 (12 Aug 2021 09:58) (16 - 18)  SpO2: 100% (12 Aug 2021 09:58) (96% - 100%)    PHYSICAL EXAM:    GENERAL: NAD  CHEST/LUNG: infrequent wheeze /rhonchi  HEART: Regular rate and rhythm; S1 S2  ABDOMEN: Soft, Nontender,  Bowel sounds present  EXTREMITIES:  no edema   NERVOUS SYSTEM: alert, gen weak, nonfocal    LABS:                        11.1   6.39  )-----------( 178      ( 11 Aug 2021 09:04 )             33.5     08-11    141  |  104  |  49.5<H>  ----------------------------<  71  3.9   |  24.0  |  1.74<H>    Ca    8.8      11 Aug 2021 09:04            CAPILLARY BLOOD GLUCOSE      POCT Blood Glucose.: 230 mg/dL (12 Aug 2021 12:16)  POCT Blood Glucose.: 120 mg/dL (12 Aug 2021 08:15)  POCT Blood Glucose.: 137 mg/dL (12 Aug 2021 00:25)  POCT Blood Glucose.: 62 mg/dL (11 Aug 2021 23:51)  POCT Blood Glucose.: 64 mg/dL (11 Aug 2021 23:48)  POCT Blood Glucose.: 207 mg/dL (11 Aug 2021 18:00)        RADIOLOGY & ADDITIONAL TESTS:  
  no acute complaints/events  feels ok  minimal cough, no chest pain  ROS negative      MEDICATIONS  (STANDING):  albuterol/ipratropium for Nebulization 3 milliLiter(s) Nebulizer every 6 hours  amLODIPine   Tablet 5 milliGRAM(s) Oral daily  aspirin enteric coated 81 milliGRAM(s) Oral daily  clopidogrel Tablet 75 milliGRAM(s) Oral daily  dextrose 40% Gel 15 Gram(s) Oral once  dextrose 5%. 1000 milliLiter(s) (50 mL/Hr) IV Continuous <Continuous>  dextrose 5%. 1000 milliLiter(s) (100 mL/Hr) IV Continuous <Continuous>  dextrose 50% Injectable 25 Gram(s) IV Push once  dextrose 50% Injectable 12.5 Gram(s) IV Push once  dextrose 50% Injectable 25 Gram(s) IV Push once  furosemide    Tablet 40 milliGRAM(s) Oral daily  glucagon  Injectable 1 milliGRAM(s) IntraMuscular once  heparin   Injectable 5000 Unit(s) SubCutaneous every 12 hours  insulin glargine Injectable (LANTUS) 5 Unit(s) SubCutaneous at bedtime  insulin lispro (ADMELOG) corrective regimen sliding scale   SubCutaneous Before meals and at bedtime  insulin lispro Injectable (ADMELOG) 4 Unit(s) SubCutaneous before breakfast  insulin lispro Injectable (ADMELOG) 4 Unit(s) SubCutaneous before lunch  insulin lispro Injectable (ADMELOG) 4 Unit(s) SubCutaneous before dinner  methylPREDNISolone sodium succinate Injectable 40 milliGRAM(s) IV Push every 8 hours  metoprolol tartrate 25 milliGRAM(s) Oral two times a day  morphine  - Injectable 0.5 milliGRAM(s) IV Push once  simvastatin 20 milliGRAM(s) Oral at bedtime  tamsulosin 0.4 milliGRAM(s) Oral at bedtime    MEDICATIONS  (PRN):  acetaminophen   Tablet .. 650 milliGRAM(s) Oral every 6 hours PRN Temp greater or equal to 38.5C (101.3F), Mild Pain (1 - 3)  ALBUTerol    0.083% 2.5 milliGRAM(s) Nebulizer every 2 hours PRN Shortness of Breath and/or Wheezing  aluminum hydroxide/magnesium hydroxide/simethicone Suspension 30 milliLiter(s) Oral every 4 hours PRN Dyspepsia  melatonin 3 milliGRAM(s) Oral at bedtime PRN Insomnia  ondansetron Injectable 4 milliGRAM(s) IV Push every 8 hours PRN Nausea and/or Vomiting  polyethylene glycol 3350 17 Gram(s) Oral at bedtime PRN Constipation      Allergies    No Known Allergies      Vital Signs Last 24 Hrs  T(C): 37.4 (08 Aug 2021 12:00), Max: 38.3 (07 Aug 2021 16:00)  T(F): 99.3 (08 Aug 2021 12:00), Max: 101 (07 Aug 2021 16:00)  HR: 76 (08 Aug 2021 12:20) (68 - 120)  BP: 138/100 (08 Aug 2021 12:00) (95/64 - 153/64)  BP(mean): 100 (07 Aug 2021 22:33) (100 - 100)  RR: 20 (08 Aug 2021 12:00) (14 - 30)  SpO2: 99% (08 Aug 2021 12:20) (96% - 100%)    PHYSICAL EXAM:    GENERAL: NAD  CHEST/LUNG: Clear to percussion bilaterally  HEART: Regular rate and rhythm; S1 S2  ABDOMEN: Soft, Bowel sounds present  EXTREMITIES: no edema   NERVOUS SYSTEM:  Alert & Oriented X3 gen weakness    LABS:                        11.8   3.57  )-----------( 205      ( 08 Aug 2021 10:55 )             36.6     08-08    136  |  102  |  30.0<H>  ----------------------------<  239<H>  3.9   |  21.0<L>  |  1.71<H>    Ca    8.8      08 Aug 2021 10:55  Mg     2.0     08-07    TPro  7.5  /  Alb  3.8  /  TBili  0.7  /  DBili  x   /  AST  20  /  ALT  23  /  AlkPhos  74  08-07          CAPILLARY BLOOD GLUCOSE      POCT Blood Glucose.: 191 mg/dL (08 Aug 2021 12:28)        RADIOLOGY & ADDITIONAL TESTS:  
  seen for resp failure rsv     MEDICATIONS  (STANDING):  ALBUTerol    90 MICROgram(s) HFA Inhaler 1 Puff(s) Inhalation every 4 hours  albuterol/ipratropium for Nebulization 3 milliLiter(s) Nebulizer every 8 hours  amLODIPine   Tablet 5 milliGRAM(s) Oral daily  ammonium lactate 12% Lotion 1 Application(s) Topical two times a day  aspirin enteric coated 81 milliGRAM(s) Oral daily  clopidogrel Tablet 75 milliGRAM(s) Oral daily  dextrose 40% Gel 15 Gram(s) Oral once  dextrose 5%. 1000 milliLiter(s) (50 mL/Hr) IV Continuous <Continuous>  dextrose 5%. 1000 milliLiter(s) (100 mL/Hr) IV Continuous <Continuous>  dextrose 50% Injectable 25 Gram(s) IV Push once  dextrose 50% Injectable 12.5 Gram(s) IV Push once  dextrose 50% Injectable 25 Gram(s) IV Push once  furosemide    Tablet 40 milliGRAM(s) Oral daily  glucagon  Injectable 1 milliGRAM(s) IntraMuscular once  heparin   Injectable 5000 Unit(s) SubCutaneous every 12 hours  insulin glargine Injectable (LANTUS) 5 Unit(s) SubCutaneous at bedtime  insulin lispro (ADMELOG) corrective regimen sliding scale   SubCutaneous Before meals and at bedtime  insulin lispro Injectable (ADMELOG) 4 Unit(s) SubCutaneous before breakfast  insulin lispro Injectable (ADMELOG) 4 Unit(s) SubCutaneous before lunch  insulin lispro Injectable (ADMELOG) 4 Unit(s) SubCutaneous before dinner  metoprolol tartrate 25 milliGRAM(s) Oral two times a day  predniSONE   Tablet 20 milliGRAM(s) Oral daily  simvastatin 20 milliGRAM(s) Oral at bedtime  tamsulosin 0.4 milliGRAM(s) Oral at bedtime  tiotropium 18 MICROgram(s) Capsule 1 Capsule(s) Inhalation daily    MEDICATIONS  (PRN):  acetaminophen   Tablet .. 650 milliGRAM(s) Oral every 6 hours PRN Temp greater or equal to 38.5C (101.3F), Mild Pain (1 - 3)  ALBUTerol    0.083% 2.5 milliGRAM(s) Nebulizer every 2 hours PRN Shortness of Breath and/or Wheezing  aluminum hydroxide/magnesium hydroxide/simethicone Suspension 30 milliLiter(s) Oral every 4 hours PRN Dyspepsia  melatonin 3 milliGRAM(s) Oral at bedtime PRN Insomnia  ondansetron Injectable 4 milliGRAM(s) IV Push every 8 hours PRN Nausea and/or Vomiting  polyethylene glycol 3350 17 Gram(s) Oral at bedtime PRN Constipation      Allergies    No Known Allergies        Vital Signs Last 24 Hrs  T(C): 36.4 (09 Aug 2021 09:07), Max: 36.8 (09 Aug 2021 06:11)  T(F): 97.6 (09 Aug 2021 09:07), Max: 98.2 (09 Aug 2021 06:11)  HR: 75 (09 Aug 2021 09:07) (72 - 92)  BP: 117/56 (09 Aug 2021 09:07) (117/56 - 144/65)  BP(mean): 94 (09 Aug 2021 06:11) (94 - 94)  RR: 26 (09 Aug 2021 09:07) (24 - 28)  SpO2: 100% (09 Aug 2021 09:07) (91% - 100%)    PHYSICAL EXAM:    GENERAL: NAD  CHEST/LUNG: Clear to percussion bilaterally  HEART: Regular rate and rhythm; S1 S2  ABDOMEN: Soft, Nontender, Bowel sounds present  EXTREMITIES no edema      UE's with edema, cracked dry skin   NERVOUS SYSTEM:  Alert & Oriented X3,  Motor Strength 5/5 B/L upper and lower extremities    LABS:                        11.3   11.11 )-----------( 213      ( 09 Aug 2021 09:40 )             36.3     08-09    137  |  99  |  38.3<H>  ----------------------------<  162<H>  3.7   |  20.0<L>  |  1.69<H>    Ca    8.8      09 Aug 2021 09:40  Phos  3.1     08-09  Mg     2.2     08-09    TPro  7.5  /  Alb  3.8  /  TBili  0.7  /  DBili  x   /  AST  20  /  ALT  23  /  AlkPhos  74  08-07          CAPILLARY BLOOD GLUCOSE      POCT Blood Glucose.: 169 mg/dL (09 Aug 2021 11:52)  POCT Blood Glucose.: 186 mg/dL (09 Aug 2021 09:20)  POCT Blood Glucose.: 201 mg/dL (08 Aug 2021 21:04)  POCT Blood Glucose.: 184 mg/dL (08 Aug 2021 18:10)        RADIOLOGY & ADDITIONAL TESTS:  
  seen for RSV    no acute complaints/events  feeling better  tolerating diet  no cough/sob  ros negative   .    MEDICATIONS  (STANDING):  ALBUTerol    90 MICROgram(s) HFA Inhaler 1 Puff(s) Inhalation every 4 hours  albuterol/ipratropium for Nebulization 3 milliLiter(s) Nebulizer every 8 hours  ALPRAZolam 0.25 milliGRAM(s) Oral at bedtime  amLODIPine   Tablet 5 milliGRAM(s) Oral daily  ammonium lactate 12% Lotion 1 Application(s) Topical two times a day  aspirin enteric coated 81 milliGRAM(s) Oral daily  clopidogrel Tablet 75 milliGRAM(s) Oral daily  dextrose 40% Gel 15 Gram(s) Oral once  dextrose 5%. 1000 milliLiter(s) (50 mL/Hr) IV Continuous <Continuous>  dextrose 5%. 1000 milliLiter(s) (100 mL/Hr) IV Continuous <Continuous>  dextrose 50% Injectable 25 Gram(s) IV Push once  dextrose 50% Injectable 12.5 Gram(s) IV Push once  dextrose 50% Injectable 25 Gram(s) IV Push once  glucagon  Injectable 1 milliGRAM(s) IntraMuscular once  heparin   Injectable 5000 Unit(s) SubCutaneous every 12 hours  insulin lispro (ADMELOG) corrective regimen sliding scale   SubCutaneous Before meals and at bedtime  metoprolol tartrate 25 milliGRAM(s) Oral two times a day  predniSONE   Tablet 20 milliGRAM(s) Oral daily  simvastatin 20 milliGRAM(s) Oral at bedtime  tamsulosin 0.4 milliGRAM(s) Oral at bedtime  tiotropium 18 MICROgram(s) Capsule 1 Capsule(s) Inhalation daily    MEDICATIONS  (PRN):  acetaminophen   Tablet .. 650 milliGRAM(s) Oral every 6 hours PRN Temp greater or equal to 38.5C (101.3F), Mild Pain (1 - 3)  ALBUTerol    0.083% 2.5 milliGRAM(s) Nebulizer every 2 hours PRN Shortness of Breath and/or Wheezing  ALPRAZolam 0.25 milliGRAM(s) Oral three times a day PRN anxiety  aluminum hydroxide/magnesium hydroxide/simethicone Suspension 30 milliLiter(s) Oral every 4 hours PRN Dyspepsia  melatonin 3 milliGRAM(s) Oral at bedtime PRN Insomnia  ondansetron Injectable 4 milliGRAM(s) IV Push every 8 hours PRN Nausea and/or Vomiting  polyethylene glycol 3350 17 Gram(s) Oral at bedtime PRN Constipation      Allergies    No Known Allergies    Vital Signs Last 24 Hrs  T(C): 36.7 (11 Aug 2021 11:21), Max: 37 (11 Aug 2021 05:09)  T(F): 98 (11 Aug 2021 11:21), Max: 98.6 (11 Aug 2021 05:09)  HR: 74 (11 Aug 2021 11:21) (68 - 74)  BP: 133/68 (11 Aug 2021 11:21) (108/57 - 138/57)  BP(mean): --  RR: 20 (11 Aug 2021 11:21) (16 - 20)  SpO2: 97% (11 Aug 2021 08:19) (97% - 100%)    PHYSICAL EXAM:    GENERAL: NAD  CHEST/LUNG: infrequent wheeze   HEART: Regular rate and rhythm; S1 S2  ABDOMEN: Soft,  Bowel sounds present  EXTREMITIES:  no edema   NERVOUS SYSTEM:  Alert & Oriented X3, Motor Strength 5/5 B/L upper and lower extremities      LABS:                        11.1   6.39  )-----------( 178      ( 11 Aug 2021 09:04 )             33.5     08-11    141  |  104  |  49.5<H>  ----------------------------<  71  3.9   |  24.0  |  1.74<H>    Ca    8.8      11 Aug 2021 09:04  Phos  3.3     08-10  Mg     2.3     08-10    TPro  6.8  /  Alb  3.6  /  TBili  0.5  /  DBili  x   /  AST  65<H>  /  ALT  48<H>  /  AlkPhos  61  08-10          CAPILLARY BLOOD GLUCOSE      POCT Blood Glucose.: 169 mg/dL (11 Aug 2021 12:12)  POCT Blood Glucose.: 96 mg/dL (11 Aug 2021 08:52)  POCT Blood Glucose.: 190 mg/dL (10 Aug 2021 21:08)  POCT Blood Glucose.: 149 mg/dL (10 Aug 2021 17:53)        RADIOLOGY & ADDITIONAL TESTS:

## 2021-08-14 NOTE — PROGRESS NOTE ADULT - NUTRITIONAL ASSESSMENT
This patient has been assessed with a concern for Malnutrition and has been determined to have a diagnosis/diagnoses of Moderate protein-calorie malnutrition.    This patient is being managed with:   Diet DASH/TLC-  Sodium & Cholesterol Restricted  Consistent Carbohydrate {No Snacks} (CSTCHO)  Entered: Aug  7 2021 10:28PM    

## 2021-08-20 ENCOUNTER — APPOINTMENT (OUTPATIENT)
Dept: CARDIOLOGY | Facility: CLINIC | Age: 86
End: 2021-08-20
Payer: MEDICARE

## 2021-08-20 ENCOUNTER — APPOINTMENT (OUTPATIENT)
Dept: DERMATOLOGY | Facility: CLINIC | Age: 86
End: 2021-08-20

## 2021-08-20 PROCEDURE — G2066: CPT

## 2021-08-20 PROCEDURE — 93298 REM INTERROG DEV EVAL SCRMS: CPT

## 2021-09-21 ENCOUNTER — NON-APPOINTMENT (OUTPATIENT)
Age: 86
End: 2021-09-21

## 2021-11-02 ENCOUNTER — APPOINTMENT (OUTPATIENT)
Dept: CARDIOLOGY | Facility: CLINIC | Age: 86
End: 2021-11-02
Payer: MEDICARE

## 2021-11-02 PROCEDURE — 93294 REM INTERROG EVL PM/LDLS PM: CPT

## 2021-11-02 PROCEDURE — 93296 REM INTERROG EVL PM/IDS: CPT | Mod: NC

## 2021-11-10 ENCOUNTER — RX RENEWAL (OUTPATIENT)
Age: 86
End: 2021-11-10

## 2021-11-24 ENCOUNTER — APPOINTMENT (OUTPATIENT)
Dept: CARDIOLOGY | Facility: CLINIC | Age: 86
End: 2021-11-24
Payer: MEDICARE

## 2021-11-24 VITALS
BODY MASS INDEX: 25.95 KG/M2 | HEIGHT: 64 IN | SYSTOLIC BLOOD PRESSURE: 98 MMHG | OXYGEN SATURATION: 96 % | WEIGHT: 152 LBS | RESPIRATION RATE: 14 BRPM | HEART RATE: 72 BPM | DIASTOLIC BLOOD PRESSURE: 60 MMHG

## 2021-11-24 DIAGNOSIS — E11.9 TYPE 2 DIABETES MELLITUS W/OUT COMPLICATIONS: ICD-10-CM

## 2021-11-24 DIAGNOSIS — I44.2 ATRIOVENTRICULAR BLOCK, COMPLETE: ICD-10-CM

## 2021-11-24 PROCEDURE — 93000 ELECTROCARDIOGRAM COMPLETE: CPT

## 2021-11-24 PROCEDURE — 99215 OFFICE O/P EST HI 40 MIN: CPT

## 2021-11-24 RX ORDER — METOPROLOL TARTRATE 50 MG/1
50 TABLET, FILM COATED ORAL TWICE DAILY
Qty: 180 | Refills: 2 | Status: DISCONTINUED | COMMUNITY
End: 2021-11-24

## 2021-11-24 NOTE — HISTORY OF PRESENT ILLNESS
[FreeTextEntry1] : 10/13/2020.  Due to progressive exertional dyspnea orthopnea and edema. he was hospitalized from the office \par Cardiac catheterization- A high-grade heavily calcified mid LAD stenosis.\par                                        10/14/20  -  2 stents placed to the LAD with Rotablator\par A permanent pacemaker was implanted\par Creatinine went from 1.8-1.5.eventually to 2.1 \par \par  11/20/20.  WIth hx of severe aortic stenosis/symptoms - had  TAVR  \par \par On 11/28/20 - [facility] with worsening l/e edema. L/E doppler - for DVT, \par                       Echo performed and stable with LVEF of 60-65%\par \par \par Had previously presented with 2+ tense bilateral  edema and spironolactone 25 mg daily was added, but was never taken.\par In conversation with Dr. Simpson, it would seem that the patient was stable on visits there 1/5, When he presented on 1/ 22/21 the edema had progressed and there was blistering and desquamation.. \par He was referred to vascular surgery for evaluation and Unna boots were placed 1/ 25.  Lasix was increased from 40 to 60 mg a day.  \par \par He has remained on that dose of Lasix ever since.\par \par Other medical problems include:\par - Diabetes\par - Hiatal hernia\par - Hearing impairment\par - Chronic kidney disease \par -Kidney stones and possibly cystoscopy.

## 2021-11-24 NOTE — ASSESSMENT
[FreeTextEntry1] : ECG-underlying atrial ectopic rhythm probably atypical atrial flutter.  V paced  at 72  bpm,\par \par Echocardiogram 6/23/2021:\par Very asynchronous left ventricle secondary to pacing but overall low normal systolic function with ejection fraction 50%\par Status post TAVR with bioprosthesis normal seating and functioning.\par Moderate tricuspid sufficiency\par No evidence of any significant pulmonary hypertension.\par No pericardial effusion.\par \par Echo 11/28/20 ( [facility])\par LFEV 60-65%\par Mod.-Sev. mitral calfication\par Mild mitral regurgitation \par Mild- Mod. Tricuspid regurgitation \par Pulm press. 36.8\par Bioprosthesis in aortic position.  \par \par Lab data \par        1/26/21:    4/8/21---6/14/21\par BUN     39          45--------81\par Creat 1.68         1.71------2.1\par A1c                   6.3\par Hgb                   11.9\par Chol                 153\par HDL                   61\par LDL                   72\par Electrolytes normal\par \par 11/28/20( [facility])\par Creat 1.49\par K 4.4\par \par Laboratory data 9/29/2020:\par BUN 41\par Creatinine 1.88\par Cholesterol 160\par HDL 56\par LDL 79\par \par Pacemaker interrogation 2/2/2021:\par Atrially pacing 30% of the time\par Battery thresholds impedance sensitivity all normal.\par 4 episodes of A. fib longest 22 seconds.\par \par Echocardiogram 10/13/2020:\par Peak transaortic valve gradient 73\par Mean gradient 44\par Aortic valve area 0.6 calculation\par Estimated pulmonary pressure 40 mmHg\par Mild to moderate mitral regurgitation\par Diastolic dysfunction\par Left ventricular ejection fraction 40 to 45%\par \par Cardiac catheterization 10/14/2020:\par 20 to 30% left main\par 95% calcified mid LAD >> PCI\par 50% circumflex\par 70% RCA in-stent restenosis\par \par 10/15/20\par Right heart catheterization;\par     pulmonary capillary wedge pressure 17\par     Pulmonary artery pressure at 37/15\par    Right atrial pressure 8\par Rotational atherectomy and drug-eluting stent to proximal LAD and drug-eluting stent x 2 to the mid LAD\par \par Echocardiogram 7/1/20:\par LV of 65-70%\par Severe aortic stenosis with a peak velocity 4.7\par Peak gradient of 87 mmHg\par Mean gradient 40 mm mercury\par Pulmonary pressures of 33\par \par Echocardiogram  4/9/19\par Severe aortic valve stenosis with some increase of the transvalvular gradient.\par Peak 78 mm Hg\par Mean 43 mm Hg\par Normal LV function\par Calcific mitral valve disease with mild insufficiency\par \par Carotid study 7/1/20\par bilateral tortuosity with moderate plaquing and ulcerated disease. Relative;y unchanged from prior.\par \par Impression: \par 1.  Patient with  substantial  edema and exertional dyspnea.\par       Does not appear substantially volume overloaded with clear lungs and relatively little edema.\par       Stat echocardiogram reveals no change in left ventricular systolic function, a well-seated and functioning bioprosthetic AVR, no evidence of any increase in pulmonary artery systolic pressure.\par \par 2.  Post hospitalization for strep pneumonia with protracted post hospital antibiotic course.\par \par 3.Chronic kidney disease with a baseline creatinine of about 1.6. \par    Progress with diuresis limited by the chronic kidney disease.\par \par 3. Carotid disease with moderate bilateral stenoses unchanged from prior. Bruits noted on exam right >left.\par \par 4.  Permanent pacemaker last assessed remotely 8/20/2021: With 80% ventricular pacing, normal impedances threshold sensitivities.\par       Ongoing mode switch for chronic atrial fibrillation flutter\par \par 5.  Repeat catheterization 7/9/2021 significant for a mild to moderate 40 to 50% in-stent restenosis of the LAD, 30 to 40% left main, 50 to 60% RCA.\par \par Plan:\par 1.  Ensure that Lasix 60 mg is being taken daily and obtain a repeat blood test in 2 weeks.\par \par 2.  Resume Zaroxolyn 2.5 mg 3 times a week (Monday, Wednesday, Friday) and Unna boots, edema has markedly      improved.\par \par 3.  CBC to be obtained shortly to ensure that he anemia does not account for the shortness of breath.\par \par 4.  Clinical follow-up here in 2 months\par \par 5.  6-month repeat pacemaker interrogation\par \par \par \par

## 2021-11-24 NOTE — PHYSICAL EXAM
[Normal Appearance] : normal appearance [Well Groomed] : well groomed [No Deformities] : no deformities [General Appearance - In No Acute Distress] : no acute distress [Normal Conjunctiva] : the conjunctiva exhibited no abnormalities [Eyelids - No Xanthelasma] : the eyelids demonstrated no xanthelasmas [Normal Oral Mucosa] : normal oral mucosa [No Oral Pallor] : no oral pallor [No Oral Cyanosis] : no oral cyanosis [Normal Jugular Venous A Waves Present] : normal jugular venous A waves present [Normal Jugular Venous V Waves Present] : normal jugular venous V waves present [No Jugular Venous Lan A Waves] : no jugular venous lan A waves [Respiration, Rhythm And Depth] : normal respiratory rhythm and effort [Exaggerated Use Of Accessory Muscles For Inspiration] : no accessory muscle use [Auscultation Breath Sounds / Voice Sounds] : lungs were clear to auscultation bilaterally [Systolic grade ___/6] : A grade [unfilled]/6 systolic murmur was heard. [Abdomen Soft] : soft [Abdomen Tenderness] : non-tender [Abdomen Mass (___ Cm)] : no abdominal mass palpated [Nail Clubbing] : no clubbing of the fingernails [Cyanosis, Localized] : no localized cyanosis [Petechial Hemorrhages (___cm)] : no petechial hemorrhages [Skin Color & Pigmentation] : normal skin color and pigmentation [] : no rash [Skin Lesions] : no skin lesions [No Skin Ulcers] : no skin ulcer [No Xanthoma] : no  xanthoma was observed [Oriented To Time, Place, And Person] : oriented to person, place, and time [Affect] : the affect was normal [FreeTextEntry1] : Very [place]

## 2021-11-26 ENCOUNTER — NON-APPOINTMENT (OUTPATIENT)
Age: 86
End: 2021-11-26

## 2021-12-09 ENCOUNTER — APPOINTMENT (OUTPATIENT)
Dept: CARDIOLOGY | Facility: CLINIC | Age: 86
End: 2021-12-09
Payer: MEDICARE

## 2021-12-09 PROCEDURE — 93294 REM INTERROG EVL PM/LDLS PM: CPT | Mod: NC

## 2021-12-16 ENCOUNTER — NON-APPOINTMENT (OUTPATIENT)
Age: 86
End: 2021-12-16

## 2021-12-17 ENCOUNTER — NON-APPOINTMENT (OUTPATIENT)
Age: 86
End: 2021-12-17

## 2021-12-20 ENCOUNTER — NON-APPOINTMENT (OUTPATIENT)
Age: 86
End: 2021-12-20

## 2021-12-21 ENCOUNTER — NON-APPOINTMENT (OUTPATIENT)
Age: 86
End: 2021-12-21

## 2022-02-02 ENCOUNTER — APPOINTMENT (OUTPATIENT)
Dept: CARDIOLOGY | Facility: CLINIC | Age: 87
End: 2022-02-02
Payer: COMMERCIAL

## 2022-02-02 VITALS
HEIGHT: 64 IN | RESPIRATION RATE: 14 BRPM | HEART RATE: 73 BPM | SYSTOLIC BLOOD PRESSURE: 92 MMHG | WEIGHT: 156 LBS | DIASTOLIC BLOOD PRESSURE: 60 MMHG | BODY MASS INDEX: 26.63 KG/M2 | OXYGEN SATURATION: 96 %

## 2022-02-02 DIAGNOSIS — I25.10 ATHEROSCLEROTIC HEART DISEASE OF NATIVE CORONARY ARTERY W/OUT ANGINA PECTORIS: ICD-10-CM

## 2022-02-02 DIAGNOSIS — Z95.2 PRESENCE OF PROSTHETIC HEART VALVE: ICD-10-CM

## 2022-02-02 DIAGNOSIS — I48.92 UNSPECIFIED ATRIAL FLUTTER: ICD-10-CM

## 2022-02-02 DIAGNOSIS — I10 ESSENTIAL (PRIMARY) HYPERTENSION: ICD-10-CM

## 2022-02-02 DIAGNOSIS — E78.5 HYPERLIPIDEMIA, UNSPECIFIED: ICD-10-CM

## 2022-02-02 DIAGNOSIS — R60.0 LOCALIZED EDEMA: ICD-10-CM

## 2022-02-02 DIAGNOSIS — Z87.898 PERSONAL HISTORY OF OTHER SPECIFIED CONDITIONS: ICD-10-CM

## 2022-02-02 DIAGNOSIS — Z95.0 PRESENCE OF CARDIAC PACEMAKER: ICD-10-CM

## 2022-02-02 DIAGNOSIS — N18.30 CHRONIC KIDNEY DISEASE, STAGE 3 UNSPECIFIED: ICD-10-CM

## 2022-02-02 DIAGNOSIS — I27.20 PULMONARY HYPERTENSION, UNSPECIFIED: ICD-10-CM

## 2022-02-02 DIAGNOSIS — I77.9 DISORDER OF ARTERIES AND ARTERIOLES, UNSPECIFIED: ICD-10-CM

## 2022-02-02 PROCEDURE — 93000 ELECTROCARDIOGRAM COMPLETE: CPT

## 2022-02-02 PROCEDURE — 99215 OFFICE O/P EST HI 40 MIN: CPT

## 2022-02-02 RX ORDER — METOPROLOL TARTRATE 50 MG/1
50 TABLET, FILM COATED ORAL TWICE DAILY
Qty: 180 | Refills: 2 | Status: ACTIVE | COMMUNITY

## 2022-02-02 RX ORDER — TAMSULOSIN HYDROCHLORIDE 0.4 MG/1
0.4 CAPSULE ORAL
Qty: 90 | Refills: 3 | Status: ACTIVE | COMMUNITY

## 2022-02-02 RX ORDER — METOLAZONE 2.5 MG/1
2.5 TABLET ORAL
Qty: 60 | Refills: 3 | Status: ACTIVE | COMMUNITY
Start: 2021-04-22 | End: 1900-01-01

## 2022-02-02 RX ORDER — PANTOPRAZOLE SODIUM 40 MG/10ML
40 INJECTION, POWDER, FOR SOLUTION INTRAVENOUS
Refills: 0 | Status: ACTIVE | COMMUNITY

## 2022-02-02 NOTE — PHYSICAL EXAM
[Normal Appearance] : normal appearance [Well Groomed] : well groomed [No Deformities] : no deformities [General Appearance - In No Acute Distress] : no acute distress [Normal Conjunctiva] : the conjunctiva exhibited no abnormalities [Eyelids - No Xanthelasma] : the eyelids demonstrated no xanthelasmas [Normal Oral Mucosa] : normal oral mucosa [No Oral Pallor] : no oral pallor [No Oral Cyanosis] : no oral cyanosis [Normal Jugular Venous A Waves Present] : normal jugular venous A waves present [No Jugular Venous Lan A Waves] : no jugular venous lan A waves [Respiration, Rhythm And Depth] : normal respiratory rhythm and effort [Exaggerated Use Of Accessory Muscles For Inspiration] : no accessory muscle use [Auscultation Breath Sounds / Voice Sounds] : lungs were clear to auscultation bilaterally [Systolic grade ___/6] : A grade [unfilled]/6 systolic murmur was heard. [Abdomen Soft] : soft [Abdomen Tenderness] : non-tender [Abdomen Mass (___ Cm)] : no abdominal mass palpated [Nail Clubbing] : no clubbing of the fingernails [Cyanosis, Localized] : no localized cyanosis [Petechial Hemorrhages (___cm)] : no petechial hemorrhages [Skin Color & Pigmentation] : normal skin color and pigmentation [] : no rash [Skin Lesions] : no skin lesions [No Skin Ulcers] : no skin ulcer [No Xanthoma] : no  xanthoma was observed [Oriented To Time, Place, And Person] : oriented to person, place, and time [Affect] : the affect was normal [JVD Elevated _____cm] : JVD elevated [unfilled] ~U cm above clavicle [FreeTextEntry1] : Very [place]

## 2022-02-02 NOTE — ASSESSMENT
[FreeTextEntry1] : ECG-underlying atrial ectopic rhythm probably atypical atrial flutter.  V paced  at 72  bpm,\par \par Echocardiogram 6/23/2021:\par Very asynchronous left ventricle secondary to pacing but overall low normal systolic function with ejection fraction 50%\par Status post TAVR with bioprosthesis normal seating and functioning.\par Moderate tricuspid sufficiency\par No evidence of any significant pulmonary hypertension.\par No pericardial effusion.\par \par Echo 11/28/20 ( [facility])\par LFEV 60-65%\par Mod.-Sev. mitral calfication\par Mild mitral regurgitation \par Mild- Mod. Tricuspid regurgitation \par Pulm press. 36.8\par Bioprosthesis in aortic position.  \par \par Pacemaker interrogation 2/2/2021:\par Atrially pacing 30% of the time\par Battery thresholds impedance sensitivity all normal.\par 4 episodes of A. fib longest 22 seconds.\par \par Echocardiogram 10/13/2020:\par Peak transaortic valve gradient 73\par Mean gradient 44\par Aortic valve area 0.6 calculation\par Estimated pulmonary pressure 40 mmHg\par Mild to moderate mitral regurgitation\par Diastolic dysfunction\par Left ventricular ejection fraction 40 to 45%\par \par Cardiac catheterization 10/14/2020:\par 20 to 30% left main\par 95% calcified mid LAD >> PCI\par 50% circumflex\par 70% RCA in-stent restenosis\par \par 10/15/20\par Right heart catheterization;\par     pulmonary capillary wedge pressure 17\par     Pulmonary artery pressure at 37/15\par    Right atrial pressure 8\par Rotational atherectomy and drug-eluting stent to proximal LAD and drug-eluting stent x 2 to the mid LAD\par \par Echocardiogram 7/1/20:\par LV of 65-70%\par Severe aortic stenosis with a peak velocity 4.7\par Peak gradient of 87 mmHg\par Mean gradient 40 mm mercury\par Pulmonary pressures of 33\par \par Echocardiogram  4/9/19\par Severe aortic valve stenosis with some increase of the transvalvular gradient.\par Peak 78 mm Hg\par Mean 43 mm Hg\par Normal LV function\par Calcific mitral valve disease with mild insufficiency\par \par Carotid study 7/1/20\par bilateral tortuosity with moderate plaquing and ulcerated disease. Relative;y unchanged from prior.\par \par Impression: \par 1.  Patient with severe  edema and LACEY on Lasix 60 mg alone. \par      - Up 4 lbs\par      -Obvious JVD on exam\par      -Does not appear to have much left-sided heart failure.\par      -SPO2 WNL despite appearing somewhat short of breath\par     \par       Last echocardiogram reveals no change in left ventricular systolic function, a well-seated and functioning               bioprosthetic AVR, no evidence of any increase in pulmonary artery systolic pressure.\par \par 2.  No chest pain.\par \par 3.Chronic kidney disease with a baseline creatinine of about 1.6 \par    Progress with diuresis limited by the chronic kidney disease.\par    Aware that Zaroxlyn bumped Creat: We will need to accept some worsening degree of renal insufficiency to manage this\par \par 3. Carotid disease with moderate bilateral stenoses unchanged from prior. Bruits noted on exam right >left.\par \par 4.  Permanent pacemaker last assessed remotely 8/20/2021: With 80% ventricular pacing, normal impedances           threshold sensitivities.\par       \par     Ongoing mode switch for chronic atrial fibrillation flutter\par \par 5.  Repeat catheterization 7/9/2021 significant for a mild to moderate 40 to 50% in-stent restenosis of the LAD, 30 to 40% left main, 50 to 60% RCA.\par \par Plan:\par 1.  For edema he will continue with Lasix 60 mg QD\par \par 2.  Resume Zaroxolyn 2.5 mg 3 times a week (Monday, Wednesday, Friday). We will monitor his renal fxn closely.\par      Home draw to be arranged in 2 weeks to repeat BMP\par \par 3. Daughter understands that he needs to see vasc. for an Unna boot.\par \par 4. C/W wound care dressings to l/e \par \par 5.  Pacemaker interrogation as scheduled.\par \par 6. Call with any worsening SOB or edema.\par \par 7. C/W NP nurse visits. \par \par Clinical follow up in 2 months \par \par \par \par

## 2022-02-02 NOTE — REASON FOR VISIT
[FreeTextEntry1] : 89 year old male presenting for cardiac eval. accompanied by his daughter.  Since our LOV he did not seek vascular eval. for an Unna boot as instructed to help with 3+ tense l/e edema. On exam today he continues to have tense 2-3+ b/l edema L<R.. There is a large  area of weeping superficial skin loss to his  l/e that is now being managed with wound care dressings. \par \par Zaroxolyn which was started 3 times weekly, was stopped several weeks back because of a rising creatinine.  Remains on Lasix only therapy\par \par He is enrolled in a program through [employer] which sends a nurse practitioner once a month to evaluate and assist in managing his care. She is attempting  to have Unna boot therapy applied while at home . \par \par Christie NP Cell [phone]\par \par Vascular is Dr. Aubrey Marks of [facility]. \par \par -----------Creat\par 11/21------1.4.\par 12/14/21---2.1       after restarting  Zaroxolyn 2.5 TIDW \par 12/22/21---1.4       after Dr Sweeney DC'd  Med and continued Lasix 60 mg only.  \par \par Denies any chest discomfort. Breathing is somewhat shallow but no obvious distress. SPO2 98% on room air. \par \par His medical history includes:\par \par 1. Aortic stenosis - now status post TAVR\par \par 2. Coronary artery disease, now status post LAD stent\par \par 3. Carotid stenosis - moderate, being monitored\par \par 4. Pulmonary hypertension - mild\par \par 5.  Chronic kidney disease stage III\par \par Other medical problems include:\par - Diabetes\par - Hiatal hernia\par - Hearing impairment\par - Chronic kidney disease \par -Kidney stones and possibly cystoscopy.

## 2022-02-02 NOTE — REVIEW OF SYSTEMS
[Weight Gain (___ Lbs)] : [unfilled] ~Ulb weight gain [Weight Loss (___ Lbs)] : no recent weight loss [SOB] : no shortness of breath [Dyspnea on exertion] : not dyspnea during exertion [Lower Ext Edema] : lower extremity edema [Skin Lesions] : skin lesion(s):

## 2022-02-02 NOTE — HISTORY OF PRESENT ILLNESS
[FreeTextEntry1] : Hospitalization hx:\par \par Hospitalization 9/14/2021, [facility]..\par Had strep sepsis in the face of pneumonia.  Presentation was hypoxemia cough fevers.\par No active cardiac issues during that hospitalization.  CONNIE was considered but deferred because  of hypoxemia and that it would not impact medical management\par \par Echocardiogram showed that the TAVR was well-seated and functioning with LVH and normal systolic function\par \par Minimal activity such as walking across the room make him very short of breath.  There is no associated chest pain.  The symptoms are a departure from where he was initially after his LAD stent and the TAVR late 2020.\par \par 10/13/2020.  Due to progressive exertional dyspnea orthopnea and edema. he was hospitalized from the office \par Cardiac catheterization- A high-grade heavily calcified mid LAD stenosis.\par \par 10/14/20  -  2 stents placed to the LAD with Rotablator\par A permanent pacemaker was implanted\par Creatinine went from 1.8-1.5.eventually to 2.1 \par \par Nov '20  hospitalization for progressive symptoms of heart failure and angina \par He is status post stenting of the LAD and a permanent pacemaker.\par s/p TAVR on 11/20/20 with Dr. Praful Jett at [facility].\par \par \par On 11/28/20 - [facility] with worsening l/e edema. L/E doppler - for DVT, \par                       Echo performed and stable with LVEF of 60-65%\par \par \par Had previously presented with 2+ tense bilateral  edema and spironolactone 25 mg daily was added, but was never taken.\par \par In conversation with Dr. Simpson, it would seem that the patient was stable on visits there 1/5/21, When he presented on 1/ 22/21 the edema had progressed and there was blistering and desquamation.. \par He was referred to vascular surgery for evaluation and Unna boots were placed 1/ 25.  Lasix was increased from 40 to 60 mg a day.  \par \par He has remained on that dose of Lasix ever since.\par \par \par [facility] 12/22/21  - Reportedly treated from a hernia with some hemoptysis? The results of N/A. \par \par

## 2022-02-14 ENCOUNTER — RX RENEWAL (OUTPATIENT)
Age: 87
End: 2022-02-14

## 2022-02-17 ENCOUNTER — NON-APPOINTMENT (OUTPATIENT)
Age: 87
End: 2022-02-17

## 2022-03-16 ENCOUNTER — NON-APPOINTMENT (OUTPATIENT)
Age: 87
End: 2022-03-16

## 2022-03-22 ENCOUNTER — APPOINTMENT (OUTPATIENT)
Dept: CARDIOLOGY | Facility: CLINIC | Age: 87
End: 2022-03-22
Payer: COMMERCIAL

## 2022-03-22 PROCEDURE — 93296 REM INTERROG EVL PM/IDS: CPT

## 2022-03-22 PROCEDURE — 93294 REM INTERROG EVL PM/LDLS PM: CPT

## 2022-03-25 DIAGNOSIS — E87.6 HYPOKALEMIA: ICD-10-CM

## 2022-03-25 RX ORDER — POTASSIUM CHLORIDE 1500 MG/1
20 TABLET, FILM COATED, EXTENDED RELEASE ORAL DAILY
Qty: 180 | Refills: 1 | Status: ACTIVE | COMMUNITY
Start: 2022-03-25 | End: 1900-01-01

## 2022-03-30 ENCOUNTER — TRANSCRIPTION ENCOUNTER (OUTPATIENT)
Age: 87
End: 2022-03-30

## 2022-04-04 ENCOUNTER — NON-APPOINTMENT (OUTPATIENT)
Age: 87
End: 2022-04-04

## 2022-04-07 ENCOUNTER — APPOINTMENT (OUTPATIENT)
Dept: CARDIOLOGY | Facility: CLINIC | Age: 87
End: 2022-04-07

## 2022-07-15 ENCOUNTER — APPOINTMENT (OUTPATIENT)
Dept: CARDIOLOGY | Facility: CLINIC | Age: 87
End: 2022-07-15

## 2022-07-15 PROCEDURE — 93296 REM INTERROG EVL PM/IDS: CPT | Mod: GW

## 2022-07-15 PROCEDURE — 93294 REM INTERROG EVL PM/LDLS PM: CPT

## 2022-09-26 ENCOUNTER — RX RENEWAL (OUTPATIENT)
Age: 87
End: 2022-09-26

## 2022-09-27 ENCOUNTER — RX RENEWAL (OUTPATIENT)
Age: 87
End: 2022-09-27

## 2022-10-26 ENCOUNTER — RX RENEWAL (OUTPATIENT)
Age: 87
End: 2022-10-26

## 2022-10-26 RX ORDER — FUROSEMIDE 40 MG/1
40 TABLET ORAL
Qty: 135 | Refills: 1 | Status: ACTIVE | COMMUNITY
Start: 2021-11-10 | End: 1900-01-01

## 2022-11-01 ENCOUNTER — APPOINTMENT (OUTPATIENT)
Dept: CARDIOLOGY | Facility: CLINIC | Age: 87
End: 2022-11-01

## 2023-02-28 NOTE — REASON FOR VISIT
Operative Note    Patient: Khadar Jeffries 74 year old male    MRN: [id number]    Surgeon(s): Binu Ruiz MD  Phone Number: [phone]                       Surgeon(s) and Role:     * Binu Ruiz MD - Primary    Pre-Op Diagnosis: Combined cataract, left eye     Post-Op Diagnosis: Combined cataract, left eye     Procedure: Procedure(s):  EXTRACTION, CATARACT, WITH IOL INSERTION/LEFT EYE (Left)     Anesthesia Type: MAC    Staff: Circulator: Na Jensen RN  Scrub Person: ST David  Surgical Assistant: Coleen Larios                                   Complications: None    Description: see below    Findings: see below    Specimens Removed: No specimens collected during this procedure.     Estimated Blood Loss: None     Assistant Tasks: Handing instruments     Implants:   Implant Name Type Inv. Item Serial No.  Lot No. LRB No. Used Action   LENS IOL ACRYSOF STABLEFORCE 0 D +18 D MOD L BCNVX 1 PC - [id number] Lens LENS IOL ACRYSOF STABLEFORCE 0 D +18 D MOD L BCNVX 1 PC [id number] [employer] . Left 1 Implanted       I was present for the key portions of the procedure and was immediately available for the non-key portions       Post-operative co-management optometrist: Dr. Cantor     DESCRIPTION OF PROCEDURE:  While in the preoperative holding area, the pupil of the operative eye was pharmacologically dilated.  Tetracaine eye drops were placed on the ocular surface twice before surgery.  The patient was properly positioned and transported to the operating room.  The periorbital area was thoroughly prepped with Betadine and draped in the usual fashion.     A wire lid speculum was then placed to separate the lids.  The operating microscope was brought into position. The surgery was performed from the temporal side. A 1 mm stab incision was placed through the limbal cornea.  1% preservative free lidocaine was then injected into the anterior chamber.  Viscoelastic was then  instilled to deepen the anterior chamber.  A 2.5 mm metal keratome was inserted into the temporal clear limbal cornea to create a self-sealing incision with entry into the anterior chamber.   A central capsulorrhexis was fashioned and balanced salt solution then placed to hydrodissect and hydrodelineate the lens material.  The lens nucleus was then rotated.  The phaco-emulsifier was introduced into the anterior chamber, the nucleus was removed with standard divide and conquer  fashion, requiring a total ultrasound time of 0:57 and a CDE of 8.56.  The remaining cortical material was then aspirated from the fornices of the capsular bag.  The posterior capsule was cleaned and polished and the bag filled with Viscoelastic.  The intraocular lens listed below, was then placed in the posterior chamber and delivered into the bag and centered.   The remaining Viscoelastic was then evacuated from the anterior chamber and from beneath the intraocular lens optic.  Additional balanced salt solution was placed to obtain physiological intraocular pressure.  The corneal wound was hydrated and found to be watertight. The lid speculum and drapes were removed.  Vigamox and 5% of betadine were placed on the eye.  A protective shield was placed, and the patient then returned to recovery in good condition.    Implant Name Type Inv. Item Serial No.  Lot No. LRB No. Used Action   LENS IOL ACRYSOF STABLEFORCE 0 D +18 D MOD L BCNVX 1  - [id number] Lens LENS IOL ACRYSOF STABLEFORCE 0 D +18 D MOD L BCNVX 1  [id number] [employer] . Left 1 Implanted        [FreeTextEntry1] : Patient seen after hospitalization 9/14/2021, [facility]..\par Had strep sepsis in the face of pneumonia.  Presentation was hypoxemia cough fevers.\par No active cardiac issues during that hospitalization.  CONNIE was considered but deferred because  of hypoxemia and that it would not impact medical management\par \par Echocardiogram showed that the TAVR was well-seated and functioning with LVH and normal systolic function\par \par Admission creatinine 2.3 which came down to 1.7 near his baseline\par Minimal activity such as walking across the room make him very short of breath.  There is no associated chest pain.  The symptoms are a departure from where he was initially after his LAD stent and the TAVR late 2020.\par \par Nov '20  hospitalization for progressive symptoms of heart failure and angina \par He is status post stenting of the LAD and a permanent pacemaker.\par s/p TAVR on 11/20/20 with Dr. Praful Jett at [facility].\par \par His medical history includes:\par \par 1. Aortic stenosis - now status post TAVR\par \par 2. Coronary artery disease, now status post LAD stent\par \par 3. Carotid stenosis - moderate, being monitored\par \par 4. Pulmonary hypertension - mild\par \par 5.  Chronic kidney disease stage III

## 2023-06-29 NOTE — H&P ADULT - PROBLEM SELECTOR PROBLEM 6
short and long term memory intact 
BPH without urinary obstruction
Rhombic Flap Text: The defect edges were debeveled with a #15 scalpel blade.  Given the location of the defect and the proximity to free margins a rhombic flap was deemed most appropriate.  Using a sterile surgical marker, an appropriate rhombic flap was drawn incorporating the defect.    The area thus outlined was incised deep to adipose tissue with a #15 scalpel blade.  The skin margins were undermined to an appropriate distance in all directions utilizing iris scissors.

## 2023-07-26 NOTE — BRIEF OPERATIVE NOTE - CONDITION POST OP
07/26/23 9:42 AM     VB [employer] SmartForm used to document caregap status.     [facility]
Stable

## 2023-10-27 NOTE — ED ADULT NURSE NOTE - SUICIDE SCREENING QUESTION 3
Prior to immunization administration, verified patients identity using patient s name and date of birth. Please see Immunization Activity for additional information.     Screening Questionnaire for Adult Immunization    Are you sick today?   No   Do you have allergies to medications, food, a vaccine component or latex?   No   Have you ever had a serious reaction after receiving a vaccination?   No   Do you have a long-term health problem with heart, lung, kidney, or metabolic disease (e.g., diabetes), asthma, a blood disorder, no spleen, complement component deficiency, a cochlear implant, or a spinal fluid leak?  Are you on long-term aspirin therapy?   No   Do you have cancer, leukemia, HIV/AIDS, or any other immune system problem?   No   Do you have a parent, brother, or sister with an immune system problem?   No   In the past 3 months, have you taken medications that affect  your immune system, such as prednisone, other steroids, or anticancer drugs; drugs for the treatment of rheumatoid arthritis, Crohn s disease, or psoriasis; or have you had radiation treatments?   No   Have you had a seizure, or a brain or other nervous system problem?   No   During the past year, have you received a transfusion of blood or blood    products, or been given immune (gamma) globulin or antiviral drug?   No   For women: Are you pregnant or is there a chance you could become       pregnant during the next month?   No   Have you received any vaccinations in the past 4 weeks?   No     Immunization questionnaire answers were all negative.    I have reviewed the following standing orders:   This patient is due and qualifies for the Covid-19 vaccine.     Click here for COVID-19 Standing Order    I have reviewed the vaccines inclusion and exclusion criteria; No concerns regarding eligibility.     This patient is due and qualifies for the Influenza vaccine.    Click here for Influenza Vaccine Standing Order    I have reviewed the  vaccines inclusion and exclusion criteria; No concerns regarding eligibility.     Patient instructed to remain in clinic for 15 minutes afterwards, and to report any adverse reactions.     Screening performed by Ana Gomez on 10/27/2023 at 2:28 PM.     No

## 2024-01-01 NOTE — DISCHARGE NOTE PROVIDER - NSDCMRMEDTOKEN_GEN_ALL_CORE_FT
Amaryl 1 mg oral tablet: 1 tab(s) orally once a day  aspirin 81 mg oral delayed release capsule:  orally   clopidogrel 75 mg oral tablet: 1 tab(s) orally once a day - last dose 4/11/14  furosemide 40 mg oral tablet: 1 tab(s) orally once a day  glimepiride 1 mg oral tablet: 1 tab(s) orally once a day  glyBURIDE 1.25 mg oral tablet: 1 tab(s) orally once a day  Humira 40 mg/0.8 mL subcutaneous kit:  subcutaneous   metoprolol 25 mg oral tablet: 1 tab(s) orally 2 times a day  simvastatin 20 mg oral tablet: 1 tab(s) orally once a day (at bedtime)   aspirin 81 mg oral delayed release capsule:  orally   clopidogrel 75 mg oral tablet: 1 tab(s) orally once a day - last dose 4/11/14  furosemide 40 mg oral tablet: 1 tab(s) orally once a day  glimepiride 1 mg oral tablet: 0.5 tab(s) orally once a day  Humira 40 mg/0.8 mL subcutaneous kit:  subcutaneous   metoprolol 25 mg oral tablet: 2 tab(s) orally 2 times a day  simvastatin 20 mg oral tablet: 1 tab(s) orally once a day (at bedtime)   aspirin 81 mg oral delayed release capsule:  orally   clopidogrel 75 mg oral tablet: 1 tab(s) orally once a day - last dose 4/11/14  furosemide 40 mg oral tablet: 1 tab(s) orally once a day  glimepiride 1 mg oral tablet: 0.5 tab(s) orally once a day  Humira 40 mg/0.8 mL subcutaneous kit:  subcutaneous   metoprolol succinate 25 mg oral tablet, extended release: 1 tab(s) orally every 12 hours  simvastatin 20 mg oral tablet: 1 tab(s) orally once a day (at bedtime)   aspirin 81 mg oral delayed release capsule:  orally   clopidogrel 75 mg oral tablet: 1 tab(s) orally once a day - last dose 4/11/14  glimepiride 1 mg oral tablet: 0.5 tab(s) orally once a day  Humira 40 mg/0.8 mL subcutaneous kit:  subcutaneous   metoprolol succinate 25 mg oral tablet, extended release: 1 tab(s) orally every 12 hours  simvastatin 20 mg oral tablet: 1 tab(s) orally once a day (at bedtime)   Statement Selected

## 2024-04-06 NOTE — DISCHARGE NOTE NURSING/CASE MANAGEMENT/SOCIAL WORK - NSTOBACCONEVERSMOKERY/N_GEN_A
Patient to d/c to home today with physician follow-up; family agreeable to d/c plan. Parents to drive patient home when discharged. Parents report they have picked up his prescriptions from their outpatient pharmacy, unsure if the mediations were still needed because the prescriptions may have been written by the ED provider prior to admission. Primary bedside RN updated. Primary RN to clarify with current inpatient provider prior to d/c and update the family.    No

## 2024-05-22 NOTE — H&P ADULT - SKIN COMMENTS
05/22/2024  Haley Castro is a 78 y.o., female.      Pre-op Assessment          Review of Systems  Cardiovascular:     Hypertension   CAD      Angina           Cardiovascular Symptoms: Angina       Coronary Artery Disease:                            Hypertension         Hepatic/GI:     GERD      Gerd          Musculoskeletal:  Arthritis        Arthritis          Neurological:    Neuromuscular Disease,           Arthritis                         Neuromuscular Disease   Psych:  Psychiatric History                  Physical Exam  General: Well nourished        Anesthesia Plan  Type of Anesthesia, risks & benefits discussed:    Anesthesia Type: Gen Natural Airway  Intra-op Monitoring Plan: Standard ASA Monitors  Induction:  IV  Informed Consent: Informed consent signed with the Patient and all parties understand the risks and agree with anesthesia plan.  All questions answered.   ASA Score: 3  Day of Surgery Review of History & Physical: H&P Update referred to the surgeon/provider.    Ready For Surgery From Anesthesia Perspective.     .       venous stasis changes B/L LE

## 2024-09-25 NOTE — REASON FOR VISIT
Hi   Your mammogram was normal!  Have a great day.  Valencia York MD  
[FreeTextEntry1] : Patient returns here cardiac revaluation. His medical history includes:\par \par 1. Aortic stenosis - severe\par \par 2. Coronary artery disease\par \par 3. Carotid stenosis - moderate\par \par 4. Pulmonary hypertension - mild\par 
[FreeTextEntry1] : Patient returns here cardiac revaluation. His medical history includes:\par \par 1. Aortic stenosis - severe\par \par 2. Coronary artery disease\par \par 3. Carotid stenosis - moderate\par \par 4. Pulmonary hypertension - mild\par

## 2025-01-21 NOTE — ED ADULT NURSE NOTE - NSFALLRSKASSESASSIST_ED_ALL_ED
Palliative Care Progress Note    Reason for Palliative Care: Psychosocial Support, Patient/Family and Advanced Heart Failure Therapy Options Eval       Subjective      Patient awake and alert; on CRRT, ECMO. No family at bedside.      Symptom Assessment/Review of Systems  Palliative Performance Scale at Time of Visit  40 (mainly in bed, unable to do most activity, extensive disease. Needs assistance with most care. Normal or reduced intake. Fully conscious, drowsy, or confused.)     Symptom Screening  Pain: 0 - none  Shortness of breath: 3 - mild  Nausea: 0 - none  Last recorded BM: 1 (01/20/25 1459) .  Constipation: No.        Heart Failure: Class IV: Symptomatic at rest, discomfort and symptoms with any physical activity  Heart Failure: Stage D: Refractory heart failure requiring specialized interventions    Review of Systems   Constitutional:  Positive for activity change and fatigue.   HENT: Negative.     Eyes: Negative.    Respiratory:  Positive for shortness of breath.    Cardiovascular:  Positive for chest pain.   Gastrointestinal: Negative.    Endocrine: Negative.    Genitourinary: Negative.    Musculoskeletal: Negative.    Allergic/Immunologic: Negative.    Neurological: Negative.    Hematological: Negative.    Psychiatric/Behavioral: Negative.         Medications:  Medications Reviewed: Yes         Objective        Vital Signs:   Vital Last Value (24 Hour) 24 Hour Range   Temperature 99.1 °F (37.3 °C) (01/21/25 1523) Temp  Min: 97.9 °F (36.6 °C)  Max: 99.5 °F (37.5 °C)   Pulse 81 (01/21/25 1523) Pulse  Min: 81  Max: 124   Non-Invasive  Blood Pressure (!) 84/71 (01/21/25 1523) BP  Min: 76/66  Max: 84/71   Respiratory (!) 12 (01/21/25 1523) Resp  Min: 0  Max: 23   SpO2 100 % (01/21/25 1500) SpO2  Min: 90 %  Max: 100 %     Physical Exam  HENT:      Head: Normocephalic.      Nose: Nose normal.      Mouth/Throat:      Mouth: Mucous membranes are moist.   Eyes:      Pupils: Pupils are equal, round, and reactive  to light.   Neck:      Comments: ECMO cannula in place, dialysis cath in place, PAC in place    Cardiovascular:      Rate and Rhythm: Tachycardia present.      Pulses: Normal pulses.   Pulmonary:      Effort: Pulmonary effort is normal.   Abdominal:      General: Abdomen is flat.   Skin:     General: Skin is warm.      Capillary Refill: Capillary refill takes less than 2 seconds.   Neurological:      General: No focal deficit present.      Mental Status: He is alert.         Labs & Imaging  Recent Labs   Lab 01/21/25  0309 01/20/25  1627 01/20/25  0322 01/19/25  1646 01/19/25  0430   SODIUM 134* 132* 132*   < > 131*   POTASSIUM 3.7 4.0 4.1   < > 4.5   CHLORIDE 102 101 100   < > 100   CO2 29 27 25   < > 23   BUN 25* 26* 32*   < > 32*   CREATININE 1.32* 1.29* 1.74*   < > 2.04*   GFRESTIMATE 63 65 45*   < > 38*   CALCIUM 8.6 9.0 9.5   < > 9.4   ALBUMIN 2.2*  --  2.6*  --  2.6*   BILIRUBIN 0.7  --  0.7  --  1.1*   ALKPT 111  --  90  --  88   GPT 21  --  23  --  25   AST 34  --  42*  --  59*   GLUCOSE 135* 262* 234*   < > 188*    < > = values in this interval not displayed.      Recent Labs   Lab 01/21/25  0309 01/20/25  1136 01/20/25  0322 01/19/25  1647   WBC 9.7  --  12.3* 13.3*   HGB 7.8* 7.9* 7.5* 7.9*   HCT 24.8* 24.6* 23.7* 25.0*   PLT 69*  --  83* 92*       Imaging Reports Reviewed: Yes       Advance Care Planning/Goals of Care   Discussion: Chart reviewed, to patient's bedside. Reintroduced myself as member of the palliative care team. Explained the role of palliative care as a medical speciality that offers support to patients with serious illnesses and their families.  Discussed with patient that our service was following for LVAD evaluation.  Shared with patient that we usually sign off after LVAD has been placed.  Patient became tearful and states that he would appreciate additional support from palliative care.  Patient states, \"I just feel very isolated and alone.  I have no one to talk to.  This is all  just a lot. I'm scared.\" Validated patient's feelings; acknowledged that chronic illness and surgery is very challenging and can be overwhelming.  Patient's wife is caring for their 3 young children and is unable to visit the hospital as frequently as she would like.  Shared with patient that palliative care  can continue to follow for psychosocial support.  Also offered  support which patient declined.  Patient states, \"I just need someone to vent to.\"  Discussed with palliative care  Ele who will follow. Sent Epic chat message to Dr. Castañeda, psychology.                 Assessment      Encounter For Palliative Care             Consulted for LVAD/OHT evaluation     Illnesses/Issues of concern and/or discussed   HTN (Since early 's, poorly control till )  HLD  DM (Since , 2018 HbA1c 12%)  NICM (Since , s/p Defib placement , s/p Angiogram  ( no blockages per patient)  DM Neuropathy  DM retinopathy  PAD (S/p angiogram)     Prognostication  Will likely return to baseline function? possibly      Needs after hospital: to be determined                Would you be surprised if patient  within a year from now?: No, given multiplicity and extent of comorbidities and poor performance status.              Plan      PLAN:  Patient declined completion of [facility]  S/p LVAD placement  Palliative care  Ele will follow for psychosocial support.  Palliative care will continue to follow for support per patient request.        Total Time Spent today on this visit EXCLUDES time spent with Advance Care Planning  Total time spent today on this visit is 50 minutes which includes reviewing tests in preparation to see patient, obtaining and reviewing separately obtained history, performing a medically appropriate exam and evaluation, counseling and educating the patient/family/caregiver, ordering medications, tests or procedures, communicating with other  healthcare professionals, and independently interpreting test results that are not separately billed.    Discussed with: Nurse at bedside during exam    Thank you for involving Palliative Care.  Please contact the covering provider via Pager, Answering Service, or Perfect Serve.    Letitia Mccarthy CNP      Progress Note For Department Use Only        #1 Post-Visit: No [facility] ([place] ONLY)  #2 Post-Visit: No  #3 Post-Visit: No          no

## 2025-03-25 NOTE — H&P ADULT - CONSTITUTIONAL COMMENTS
HISTORY OF PRESENT ILLNESS:  Preston Fortune a 88 year old man with hypertension, type 2 diabetes mellitus, chronic kidney disease ( Cr 1.16/GFR 61), Obesity ( BMI 36..28) and  dyslipidemia was evaluated in consultation request of Dr. Tomlinson for newly diagnosed atrial fibrillation    The patient was seen by Dr. Tomlinson on 2/19/2025 for a 2-month history of bilateral lower extremity edema.  There is been no chest pain, dyspnea, orthopnea, focal neurologic deficit, syncope or palpitations.  The patient's ECG 2/19/2025, which I have personally reviewed, demonstrated atrial fibrillation with a controlled ventricular response rate.  An echocardiogram dated 3/11/2025, which I have also personally reviewed, demonstrated atrial fibrillation with severe biatrial chamber enlargement and normal biventricular chamber size and systolic performance with no significant valvular stenosis or insufficiency.  Dr. Tomlinson ordered a Zio patch ambulatory monitor and advised apixaban 5 mg twice daily, which the patient has not yet started.  The ambulatory monitor was not completed because of technical difficulties with the device.  Meanwhile, the patient's leg edema has resolved entirely on furosemide.  There is no history of thyroid disorders or alcohol abuse.  There is a history of diabetes mellitus, well-controlled hypertension, dyslipidemia, but no history of MI or ongoing tobacco use.    The patient exercises daily with his exercise bicycle between 40 and 60 minutes, walks at least 20 to 30 minutes, and uses weights.  He has noted no limitations in exercise tolerance and was unaware he was in atrial fibrillation until the time of his clinic visit.  His last available ECG from 2019 showed a sinus rhythm.    PAST MEDICAL HISTORY  1)  Type 2 Diabetes Mellitus  2)  Essential Hypertension  3)  Obesity ( BMI 36.28)  4)  Persistent atrial fibrillation : last in sinus 2019. CHADSVaSc = 5 ( CHF 1  HTN 1  AGE 88 2 Diabetes 1 No stroke, vascular  disease, male gender)  6) sp right shoulder surgery/left knee surgery  7) dyslipidemia  8) history of left eye melanoma sp resection  9) HFPEF  TTE LVEF 65%. History of leg edema, resolved on diiuretics        Orders this Visit:  No orders of the defined types were placed in this encounter.    Orders Placed This Encounter   Medications    ferrous sulfate (FE TABS) 325 (65 Fe) MG EC tablet     Sig: Take 325 mg by mouth every other day.    Multiple Vitamins-Minerals (PRESERVISION AREDS 2 PO)     Sig: Take 2 tablets by mouth.     There are no discontinued medications.    Encounter Diagnoses   Name Primary?    Edema, unspecified type     Hypertension goal BP (blood pressure) < 140/90     Morbid obesity (H)     CKD (chronic kidney disease) stage 2, GFR 60-89 ml/min     Type 2 diabetes mellitus with stage 2 chronic kidney disease, without long-term current use of insulin (H)     Type 2 diabetes mellitus with other circulatory complication, without long-term current use of insulin (H)     Type 2 diabetes, HbA1c goal < 7% (H)     Hyperlipidemia LDL goal <70     Pulmonary hypertension (H)        CURRENT MEDICATIONS:  Current Outpatient Medications   Medication Sig Dispense Refill    acetaminophen (TYLENOL) 500 MG tablet [ACETAMINOPHEN (TYLENOL) 500 MG TABLET] Take 500 mg by mouth every 6 (six) hours as needed for pain.      amLODIPine (NORVASC) 10 MG tablet Take 0.5 tablets (5 mg) by mouth daily. 45 tablet 3    aspirin 81 MG EC tablet [ASPIRIN 81 MG EC TABLET] Take 81 mg by mouth daily.      cetirizine (ZYRTEC) 10 mg cap [CETIRIZINE (ZYRTEC) 10 MG CAP] Take 10 mg by mouth daily as needed.       cholecalciferol, vitamin D3, 50 mcg (2,000 unit) Tab [CHOLECALCIFEROL, VITAMIN D3, 50 MCG (2,000 UNIT) TAB] Take 2,000 Units by mouth daily.      ferrous sulfate (FE TABS) 325 (65 Fe) MG EC tablet Take 325 mg by mouth every other day.      furosemide (LASIX) 20 MG tablet Take 1 tablet (20 mg) by mouth daily. 30 tablet 3    generic  "lancets [GENERIC LANCETS] Use 1 each As Directed daily. Dispense brand per patient's insurance at pharmacy discretion.(E11.9) 100 each 1    lisinopril (ZESTRIL) 5 MG tablet Take 1 tablet (5 mg) by mouth daily. 90 tablet 3    Multiple Vitamins-Minerals (PRESERVISION AREDS 2 PO) Take 2 tablets by mouth.      ONETOUCH ULTRA test strip USE TO TEST ONCE DAILY 100 strip 0    pravastatin (PRAVACHOL) 40 MG tablet TAKE 1 TABLET BY MOUTH EVERYDAY AT BEDTIME 90 tablet 3    psyllium (METAMUCIL/KONSYL) capsule Take 3 capsules by mouth 3 times daily.      apixaban ANTICOAGULANT (ELIQUIS ANTICOAGULANT) 5 MG tablet Take 1 tablet (5 mg) by mouth 2 times daily. (Patient not taking: Reported on 3/25/2025) 60 tablet 1       ALLERGIES   No Known Allergies    PAST MEDICAL, SURGICAL, FAMILY, SOCIAL HISTORY:  History was reviewed and updated as needed, see medical record.     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    Physical Exam:  Vitals: /72 (BP Location: Right arm, Patient Position: Sitting, Cuff Size: Adult Large)   Pulse 75   Ht 1.632 m (5' 4.25\")   Wt 96.6 kg (213 lb)   SpO2 98%   BMI 36.28 kg/m      Constitutional: No apparent distress over weight intelligent  cooperative accompanied by wife and son    HEENT: pupils equal round reactive to light, thyroid normal size, JVP is normal    Chest: Clear to percussion and auscultation    Cardiac: irregular normal S1, normal S2 no S3, no murmur or click    Abdomen: Obese Liver percusses to 6 cm spleen is not palpable aorta is not tender or enlarged    Extremitiies: no edema.    Neurological:  Cranial nerves II through XII are intact, strength equal and symmetrical, displays normal insight and " judgment      ASSESSMENT:    The patient has newly diagnosed persistent atrial fibrillation with a [id number] score-5.  Current American College of cardiology guidelines favor chronic anticoagulation for prevention of stroke and I agree with Dr. Tomlinson's recommendation for apixaban 5 mg twice daily.  I have advised the patient to stop aspirin, which in this setting will only increase bleeding risk without conferring ischemic benefit.  His ventricular response rate appears to be well-controlled at rest, but a Holter monitor may be helpful in monitoring his heart rate during activity I determining whether he might benefit from better rate control.  With the patient's lack of symptoms, severe biatrial enlargement, and probable chronicity of his rhythm disturbance, I do not believe the risks of cardioversion exceed the benefit.  I suspect he will very likely to revert back to atrial fibrillation even if he were to have successful cardioversion.    The patient has responded nicely to diuretic therapy.  We could consider the addition of an SGL 2 transporter inhibitor in view of his diabetes mellitus 12 reduce the future risk of adverse renal and vascular events.  I would defer this decision to his excellent primary care physician.    Finally, I am uncertain whether the patient would benefit from continued follow-up in our clinic but we would be happy to see him again at his primary care physician or he requested.  I have tentatively arranged a visit for follow-up in about 1 year, but he can cancel this if he feels it is unnecessary.      RECOMMENDATIONS:   1)  24 hour Holter monitor to assess HR  2) stop aspirin, continue apixaban  3) continue other medications  4) consider empagliflozin          Recent Lab Results:  LIPID RESULTS:  Lab Results   Component Value Date    CHOL 113 12/16/2024    HDL 50 12/16/2024    LDL 47 12/16/2024    TRIG 78 12/16/2024       LIVER ENZYME RESULTS:  Lab Results   Component Value Date     AST 21 02/19/2025    ALT 12 02/19/2025       CBC RESULTS:  Lab Results   Component Value Date    WBC 8.7 02/19/2025    RBC 4.48 02/19/2025    HGB 12.7 (L) 02/19/2025    HCT 40.7 02/19/2025    MCV 91 02/19/2025    MCH 28.3 02/19/2025    MCHC 31.2 (L) 02/19/2025    RDW 14.2 02/19/2025     02/19/2025       BMP RESULTS:  Lab Results   Component Value Date     02/26/2025    POTASSIUM 5.2 02/26/2025    POTASSIUM 4.4 06/27/2022    CHLORIDE 105 02/26/2025    CHLORIDE 105 06/27/2022    CO2 28 02/26/2025    CO2 28 06/27/2022    ANIONGAP 10 02/26/2025    ANIONGAP 9 06/27/2022     (H) 02/26/2025     (H) 06/27/2022    BUN 30.0 (H) 02/26/2025    BUN 17 06/27/2022    CR 1.16 02/26/2025    GFRESTIMATED 61 02/26/2025    GFRESTIMATED >60 07/08/2021    GFRESTBLACK >60 07/08/2021    ANG 9.7 02/26/2025        A1C RESULTS:  Lab Results   Component Value Date    A1C 6.6 (H) 12/16/2024       INR RESULTS:  Lab Results   Component Value Date    INR 1.01 11/26/2020    INR 1.07 03/28/2018       We greatly appreciate the opportunity to be involved in the care of your patient, Preston Fortune.    Sincerely,  Osmar Ervin MD      CC  Cornell Tomlinson MD  [address] [place] [address]                                                                      awake, alert, [place] De[place], laying flat

## 2025-07-29 NOTE — DISCHARGE NOTE PROVIDER - NSCORESITESY/N_GEN_A_CORE_RD
No Total Number Of Lesions Treated: 5 Detail Level: Zone Post-Care Instructions: I reviewed with the patient in detail post-care instructions. Patient is to wear sunprotection, and avoid picking at any of the treated lesions. Pt may apply Vaseline to crusted or scabbing areas. Consent: The patient's consent was obtained including but not limited to risks of crusting, scabbing, blistering, scarring, darker or lighter pigmentary change, recurrence, incomplete removal and infection. Render Post-Care Instructions In Note?: no